# Patient Record
Sex: MALE | Race: WHITE | NOT HISPANIC OR LATINO | Employment: OTHER | URBAN - METROPOLITAN AREA
[De-identification: names, ages, dates, MRNs, and addresses within clinical notes are randomized per-mention and may not be internally consistent; named-entity substitution may affect disease eponyms.]

---

## 2023-05-15 ENCOUNTER — TELEPHONE (OUTPATIENT)
Dept: PRIMARY CARE | Facility: CLINIC | Age: 83
End: 2023-05-15
Payer: MEDICARE

## 2023-05-15 DIAGNOSIS — F41.9 ANXIETY: Primary | ICD-10-CM

## 2023-05-15 RX ORDER — SERTRALINE HYDROCHLORIDE 100 MG/1
100 TABLET, FILM COATED ORAL DAILY
Qty: 30 TABLET | Refills: 5 | Status: SHIPPED | OUTPATIENT
Start: 2023-05-15 | End: 2023-11-06 | Stop reason: SDUPTHER

## 2023-05-15 NOTE — TELEPHONE ENCOUNTER
Patient calling wants to know if he can increase his Sertraline from 1/2 tab to a full tab.  Please send new RX to [facility] in [facility]

## 2023-06-13 DIAGNOSIS — F41.9 ANXIETY: Primary | ICD-10-CM

## 2023-06-13 DIAGNOSIS — G47.00 INSOMNIA, UNSPECIFIED TYPE: ICD-10-CM

## 2023-06-13 RX ORDER — MIRTAZAPINE 15 MG/1
15 TABLET, FILM COATED ORAL NIGHTLY
COMMUNITY
End: 2023-06-13 | Stop reason: SDUPTHER

## 2023-06-13 RX ORDER — ALPRAZOLAM 0.25 MG/1
0.25 TABLET ORAL 3 TIMES DAILY PRN
Qty: 15 TABLET | Refills: 0 | Status: SHIPPED | OUTPATIENT
Start: 2023-06-13 | End: 2023-07-17

## 2023-06-13 RX ORDER — MIRTAZAPINE 15 MG/1
15 TABLET, FILM COATED ORAL NIGHTLY
Qty: 90 TABLET | Refills: 3 | Status: SHIPPED | OUTPATIENT
Start: 2023-06-13 | End: 2024-04-25

## 2023-07-17 ENCOUNTER — OFFICE VISIT (OUTPATIENT)
Dept: PRIMARY CARE | Facility: CLINIC | Age: 83
End: 2023-07-17
Payer: MEDICARE

## 2023-07-17 VITALS
TEMPERATURE: 98.2 F | BODY MASS INDEX: 26.34 KG/M2 | HEART RATE: 84 BPM | SYSTOLIC BLOOD PRESSURE: 130 MMHG | RESPIRATION RATE: 16 BRPM | DIASTOLIC BLOOD PRESSURE: 68 MMHG | WEIGHT: 184 LBS | HEIGHT: 70 IN | OXYGEN SATURATION: 98 %

## 2023-07-17 DIAGNOSIS — F41.9 ANXIETY: ICD-10-CM

## 2023-07-17 DIAGNOSIS — R73.9 HYPERGLYCEMIA: ICD-10-CM

## 2023-07-17 DIAGNOSIS — E55.9 VITAMIN D DEFICIENCY: Primary | ICD-10-CM

## 2023-07-17 DIAGNOSIS — E78.2 MIXED HYPERLIPIDEMIA: ICD-10-CM

## 2023-07-17 DIAGNOSIS — Z00.00 HEALTHCARE MAINTENANCE: ICD-10-CM

## 2023-07-17 DIAGNOSIS — I10 PRIMARY HYPERTENSION: ICD-10-CM

## 2023-07-17 DIAGNOSIS — D69.6 THROMBOCYTOPENIA (CMS-HCC): ICD-10-CM

## 2023-07-17 PROBLEM — R55 POSTURAL DIZZINESS WITH PRESYNCOPE: Status: RESOLVED | Noted: 2023-07-17 | Resolved: 2023-07-17

## 2023-07-17 PROBLEM — M54.9 BACK PAIN: Status: RESOLVED | Noted: 2023-07-17 | Resolved: 2023-07-17

## 2023-07-17 PROBLEM — M54.9 BACK PAIN: Status: ACTIVE | Noted: 2023-07-17

## 2023-07-17 PROBLEM — R47.02 DYSPHASIA: Status: ACTIVE | Noted: 2023-07-17

## 2023-07-17 PROBLEM — N52.9 MALE ERECTILE DISORDER OF ORGANIC ORIGIN: Status: ACTIVE | Noted: 2023-07-17

## 2023-07-17 PROBLEM — E78.5 HLD (HYPERLIPIDEMIA): Status: ACTIVE | Noted: 2023-07-17

## 2023-07-17 PROBLEM — I65.29 CAROTID ARTERY PLAQUE: Status: ACTIVE | Noted: 2023-07-17

## 2023-07-17 PROBLEM — B02.9 HERPES ZOSTER: Status: ACTIVE | Noted: 2023-07-17

## 2023-07-17 PROBLEM — G47.00 INSOMNIA: Status: ACTIVE | Noted: 2023-07-17

## 2023-07-17 PROBLEM — C44.90 SKIN CANCER: Status: ACTIVE | Noted: 2023-07-17

## 2023-07-17 PROBLEM — M25.559 ACUTE HIP PAIN: Status: ACTIVE | Noted: 2023-07-17

## 2023-07-17 PROBLEM — B02.29 POST HERPETIC NEURALGIA: Status: ACTIVE | Noted: 2023-07-17

## 2023-07-17 PROBLEM — N40.0 BPH (BENIGN PROSTATIC HYPERPLASIA): Status: ACTIVE | Noted: 2023-07-17

## 2023-07-17 PROBLEM — R42 POSTURAL DIZZINESS WITH PRESYNCOPE: Status: ACTIVE | Noted: 2023-07-17

## 2023-07-17 PROBLEM — I25.10 CAD (CORONARY ARTERY DISEASE): Status: ACTIVE | Noted: 2023-07-17

## 2023-07-17 PROBLEM — H10.30 ACUTE CONJUNCTIVITIS: Status: RESOLVED | Noted: 2023-07-17 | Resolved: 2023-07-17

## 2023-07-17 PROBLEM — R05.9 COUGH: Status: RESOLVED | Noted: 2023-07-17 | Resolved: 2023-07-17

## 2023-07-17 PROBLEM — R05.9 COUGH: Status: ACTIVE | Noted: 2023-07-17

## 2023-07-17 PROBLEM — R42 POSTURAL DIZZINESS WITH PRESYNCOPE: Status: RESOLVED | Noted: 2023-07-17 | Resolved: 2023-07-17

## 2023-07-17 PROBLEM — H10.30 ACUTE CONJUNCTIVITIS: Status: ACTIVE | Noted: 2023-07-17

## 2023-07-17 PROBLEM — R55 POSTURAL DIZZINESS WITH PRESYNCOPE: Status: ACTIVE | Noted: 2023-07-17

## 2023-07-17 PROBLEM — R47.02 DYSPHASIA: Status: RESOLVED | Noted: 2023-07-17 | Resolved: 2023-07-17

## 2023-07-17 PROBLEM — M25.559 ACUTE HIP PAIN: Status: RESOLVED | Noted: 2023-07-17 | Resolved: 2023-07-17

## 2023-07-17 PROBLEM — R09.89 CAROTID BRUIT: Status: ACTIVE | Noted: 2023-07-17

## 2023-07-17 PROCEDURE — 1157F ADVNC CARE PLAN IN RCRD: CPT | Performed by: INTERNAL MEDICINE

## 2023-07-17 PROCEDURE — 1036F TOBACCO NON-USER: CPT | Performed by: INTERNAL MEDICINE

## 2023-07-17 PROCEDURE — 1159F MED LIST DOCD IN RCRD: CPT | Performed by: INTERNAL MEDICINE

## 2023-07-17 PROCEDURE — 99214 OFFICE O/P EST MOD 30 MIN: CPT | Performed by: INTERNAL MEDICINE

## 2023-07-17 PROCEDURE — 1160F RVW MEDS BY RX/DR IN RCRD: CPT | Performed by: INTERNAL MEDICINE

## 2023-07-17 PROCEDURE — 3078F DIAST BP <80 MM HG: CPT | Performed by: INTERNAL MEDICINE

## 2023-07-17 PROCEDURE — G0439 PPPS, SUBSEQ VISIT: HCPCS | Performed by: INTERNAL MEDICINE

## 2023-07-17 PROCEDURE — 1126F AMNT PAIN NOTED NONE PRSNT: CPT | Performed by: INTERNAL MEDICINE

## 2023-07-17 PROCEDURE — 3075F SYST BP GE 130 - 139MM HG: CPT | Performed by: INTERNAL MEDICINE

## 2023-07-17 PROCEDURE — 1170F FXNL STATUS ASSESSED: CPT | Performed by: INTERNAL MEDICINE

## 2023-07-17 RX ORDER — DUTASTERIDE 0.5 MG/1
0.5 CAPSULE, LIQUID FILLED ORAL DAILY
COMMUNITY
End: 2023-10-06 | Stop reason: SDUPTHER

## 2023-07-17 RX ORDER — CHOLECALCIFEROL (VITAMIN D3) 125 MCG
1 CAPSULE ORAL DAILY
COMMUNITY
Start: 2019-09-12

## 2023-07-17 RX ORDER — ALPRAZOLAM 0.5 MG/1
0.5 TABLET ORAL 3 TIMES DAILY PRN
Qty: 30 TABLET | Refills: 0 | Status: SHIPPED | OUTPATIENT
Start: 2023-07-17 | End: 2023-10-16 | Stop reason: SDUPTHER

## 2023-07-17 RX ORDER — LEVETIRACETAM 500 MG/1
TABLET ORAL
COMMUNITY
End: 2023-10-23

## 2023-07-17 RX ORDER — ATORVASTATIN CALCIUM 20 MG/1
20 TABLET, FILM COATED ORAL DAILY
COMMUNITY
End: 2023-12-14 | Stop reason: SDUPTHER

## 2023-07-17 ASSESSMENT — ENCOUNTER SYMPTOMS
COUGH: 0
FATIGUE: 0
LOSS OF SENSATION IN FEET: 0
OCCASIONAL FEELINGS OF UNSTEADINESS: 0
DEPRESSION: 0
FEVER: 0
SHORTNESS OF BREATH: 0

## 2023-07-17 ASSESSMENT — PATIENT HEALTH QUESTIONNAIRE - PHQ9
1. LITTLE INTEREST OR PLEASURE IN DOING THINGS: NOT AT ALL
2. FEELING DOWN, DEPRESSED OR HOPELESS: NOT AT ALL
SUM OF ALL RESPONSES TO PHQ9 QUESTIONS 1 AND 2: 0

## 2023-07-17 ASSESSMENT — ACTIVITIES OF DAILY LIVING (ADL)
GROCERY_SHOPPING: INDEPENDENT
MANAGING_FINANCES: INDEPENDENT
DOING_HOUSEWORK: INDEPENDENT
TAKING_MEDICATION: INDEPENDENT
BATHING: INDEPENDENT
DRESSING: INDEPENDENT

## 2023-07-17 ASSESSMENT — PAIN SCALES - GENERAL: PAINLEVEL: 0-NO PAIN

## 2023-07-17 NOTE — PROGRESS NOTES
Subjective   Reason for Visit: Minh Harrington is an 83 y.o. male here for a Medicare Wellness visit.     Past Medical, Surgical, and Family History reviewed and updated in chart.    Reviewed all medications by prescribing practitioner or clinical pharmacist (such as prescriptions, OTCs, herbal therapies and supplements) and documented in the medical record.    HPI  Influenza declines  Covid 2021, 2022  PCV13 2016  PPSV23 2020  Shingrix declines  Tdap  Colonoscopy 2007  Psa   Adv Dir y  Depression screen 23  Bmi 26  Eye exam  Fall risk 23      C/o increase in Anxiety.  Taking x2 Alprazolam.  X1 tab 0.25 mg, ineffective.    OARRS:  Isabel Ritter, DO on 7/17/2023  2:21 PM  I have personally reviewed the OARRS report for Minh Harrington. I have considered the risks of abuse, dependence, addiction and diversion    Is the patient prescribed a combination of a benzodiazepine and opioid?  No    Last Urine Drug Screen / ordered today: Yes  Recent Results (from the past 71073 hour(s))   OPIATE/OPIOID/BENZO PRESCRIPTION COMPLIANCE    Collection Time: 05/26/21  3:11 PM   Result Value Ref Range    DRUG SCREEN COMMENT URINE SEE BELOW     Creatine, Urine 63.9 mg/dL    Amphetamine Screen, Urine PRESUMPTIVE NEGATIVE NEGATIVE    Barbiturate Screen, Urine PRESUMPTIVE NEGATIVE NEGATIVE    Cannabinoid Screen, Urine PRESUMPTIVE NEGATIVE NEGATIVE    Cocaine Screen, Urine PRESUMPTIVE NEGATIVE NEGATIVE    PCP Screen, Urine PRESUMPTIVE NEGATIVE NEGATIVE    7-Aminoclonazepam <25 Cutoff <25 ng/mL    Alpha-Hydroxyalprazolam 37 (A) Cutoff <25 ng/mL    Alpha-Hydroxymidazolam <25 Cutoff <25 ng/mL    Alprazolam <25 Cutoff <25 ng/mL    Chlordiazepoxide <25 Cutoff <25 ng/mL    Clonazepam <25 Cutoff <25 ng/mL    Diazepam <25 Cutoff <25 ng/mL    Lorazepam <25 Cutoff <25 ng/mL    Midazolam <25 Cutoff <25 ng/mL    Nordiazepam <25 Cutoff <25 ng/mL    Oxazepam <25 Cutoff <25 ng/mL    Temazepam <25 Cutoff <25 ng/mL    Zolpidem <25 Cutoff <25  "ng/mL    Zolpidem Metabolite (ZCA) <25 Cutoff <25 ng/mL    6-Acetylmorphine <25 Cutoff <25 ng/mL    Codeine <50 Cutoff <50 ng/mL    Hydrocodone <25 Cutoff <25 ng/mL    Hydromorphone <25 Cutoff <25 ng/mL    Morphine Urine <50 Cutoff <50 ng/mL    Norhydrocodone <25 Cutoff <25 ng/mL    Noroxycodone <25 Cutoff <25 ng/mL    Oxycodone <25 Cutoff <25 ng/mL    Oxymorphone <25 Cutoff <25 ng/mL    Tramadol <50 Cutoff <50 ng/mL    O-Desmethyltramadol <50 Cutoff <50 ng/mL    Fentanyl <2.5 Cutoff<2.5 ng/mL    Norfentanyl <2.5 Cutoff<2.5 ng/mL    METHADONE CONFIRMATION,URINE <25 Cutoff <25 ng/mL    EDDP <25 Cutoff <25 ng/mL     Results are as expected.     Controlled Substance Agreement:  Date of the Last Agreement: 7/17/2023  Reviewed Controlled Substance Agreement including but not limited to the benefits, risks, and alternatives to treatment with a Controlled Substance medication(s).    Benzodiazepines:  What is the patient's goal of therapy? Anxiety relief  Is this being achieved with current treatment? Yes    SANFORD-7:  No data recorded    Activities of Daily Living:   Is your overall impression that this patient is benefiting (symptom reduction outweighs side effects) from benzodiazepine therapy? Yes     1. Physical Functioning: Better  2. Family Relationship: Better  3. Social Relationship: Better  4. Mood: Better  5. Sleep Patterns: Better  6. Overall Function: Better  Patient Care Team:  Isabel Ritter DO as PCP - General  Isabel Ritter DO as PCP - [employer] PCP     Review of Systems   Constitutional:  Negative for fatigue and fever.   Respiratory:  Negative for cough and shortness of breath.    Cardiovascular:  Negative for chest pain and leg swelling.   All other systems reviewed and are negative.      Objective   Vitals:  /68   Pulse 84   Temp 36.8 °C (98.2 °F)   Resp 16   Ht 1.778 m (5' 10\")   Wt 83.5 kg (184 lb)   SpO2 98%   BMI 26.40 kg/m²       Physical Exam  Vitals and nursing " note reviewed.   Constitutional:       Appearance: Normal appearance.   HENT:      Head: Normocephalic.   Eyes:      Conjunctiva/sclera: Conjunctivae normal.      Pupils: Pupils are equal, round, and reactive to light.   Cardiovascular:      Rate and Rhythm: Normal rate and regular rhythm.      Pulses: Normal pulses.      Heart sounds: Normal heart sounds.   Pulmonary:      Effort: Pulmonary effort is normal.      Breath sounds: Normal breath sounds.   Musculoskeletal:         General: No swelling.      Cervical back: Neck supple.   Skin:     General: Skin is warm and dry.   Neurological:      General: No focal deficit present.      Mental Status: He is oriented to person, place, and time.         Assessment/Plan   Problem List Items Addressed This Visit       Anxiety    Relevant Medications    ALPRAZolam (Xanax) 0.5 mg tablet    HLD (hyperlipidemia)    Relevant Orders    Lipid Panel    Thyroid Stimulating Hormone    HTN (hypertension)    Relevant Orders    Comprehensive Metabolic Panel    CBC    Hyperglycemia    Relevant Orders    Hemoglobin A1C    Thrombocytopenia (CMS/HCC)    Vitamin D deficiency - Primary    Relevant Orders    Vitamin D, Total    Vitamin B12     Other Visit Diagnoses       Healthcare maintenance              Update preventive  Cont meds  Check labs  Stable based on symptoms and exam.  Continue established treatment plan and follow up at least yearly.  I have personally reviewed patients OARRS report.  This report is scanned into the emr.  I have considered the risks of abuse, dependence, addiction and diversion.  3 month fu

## 2023-10-06 DIAGNOSIS — N40.0 BENIGN PROSTATIC HYPERPLASIA WITHOUT LOWER URINARY TRACT SYMPTOMS: ICD-10-CM

## 2023-10-06 RX ORDER — DUTASTERIDE 0.5 MG/1
0.5 CAPSULE, LIQUID FILLED ORAL DAILY
Qty: 90 CAPSULE | Refills: 3 | Status: SHIPPED | OUTPATIENT
Start: 2023-10-06 | End: 2024-01-11 | Stop reason: SDUPTHER

## 2023-10-11 ENCOUNTER — LAB (OUTPATIENT)
Dept: LAB | Facility: LAB | Age: 83
End: 2023-10-11
Payer: MEDICARE

## 2023-10-11 DIAGNOSIS — E55.9 VITAMIN D DEFICIENCY: ICD-10-CM

## 2023-10-11 DIAGNOSIS — R73.9 HYPERGLYCEMIA: ICD-10-CM

## 2023-10-11 DIAGNOSIS — I10 PRIMARY HYPERTENSION: ICD-10-CM

## 2023-10-11 DIAGNOSIS — E78.2 MIXED HYPERLIPIDEMIA: ICD-10-CM

## 2023-10-11 LAB
25(OH)D3 SERPL-MCNC: 63 NG/ML (ref 30–100)
ALBUMIN SERPL BCP-MCNC: 4 G/DL (ref 3.4–5)
ALP SERPL-CCNC: 48 U/L (ref 33–136)
ALT SERPL W P-5'-P-CCNC: 13 U/L (ref 10–52)
ANION GAP SERPL CALC-SCNC: 10 MMOL/L (ref 10–20)
AST SERPL W P-5'-P-CCNC: 20 U/L (ref 9–39)
BILIRUB SERPL-MCNC: 0.9 MG/DL (ref 0–1.2)
BUN SERPL-MCNC: 17 MG/DL (ref 6–23)
CALCIUM SERPL-MCNC: 9.2 MG/DL (ref 8.6–10.3)
CHLORIDE SERPL-SCNC: 104 MMOL/L (ref 98–107)
CHOLEST SERPL-MCNC: 142 MG/DL (ref 0–199)
CHOLESTEROL/HDL RATIO: 2.4
CO2 SERPL-SCNC: 31 MMOL/L (ref 21–32)
CREAT SERPL-MCNC: 1.1 MG/DL (ref 0.5–1.3)
ERYTHROCYTE [DISTWIDTH] IN BLOOD BY AUTOMATED COUNT: 12.8 % (ref 11.5–14.5)
GFR SERPL CREATININE-BSD FRML MDRD: 66 ML/MIN/1.73M*2
GLUCOSE SERPL-MCNC: 127 MG/DL (ref 74–99)
HCT VFR BLD AUTO: 42.6 % (ref 41–52)
HDLC SERPL-MCNC: 58.7 MG/DL
HGB BLD-MCNC: 13.9 G/DL (ref 13.5–17.5)
LDLC SERPL CALC-MCNC: 72 MG/DL (ref 140–190)
MCH RBC QN AUTO: 30.7 PG (ref 26–34)
MCHC RBC AUTO-ENTMCNC: 32.6 G/DL (ref 32–36)
MCV RBC AUTO: 94 FL (ref 80–100)
NON HDL CHOLESTEROL: 83 MG/DL (ref 0–149)
NRBC BLD-RTO: 0 /100 WBCS (ref 0–0)
PLATELET # BLD AUTO: 128 X10*3/UL (ref 150–450)
PMV BLD AUTO: 12 FL (ref 7.5–11.5)
POTASSIUM SERPL-SCNC: 4.3 MMOL/L (ref 3.5–5.3)
PROT SERPL-MCNC: 7 G/DL (ref 6.4–8.2)
RBC # BLD AUTO: 4.53 X10*6/UL (ref 4.5–5.9)
SODIUM SERPL-SCNC: 141 MMOL/L (ref 136–145)
TRIGL SERPL-MCNC: 55 MG/DL (ref 0–149)
TSH SERPL-ACNC: 2.71 MIU/L (ref 0.44–3.98)
VIT B12 SERPL-MCNC: 458 PG/ML (ref 211–911)
VLDL: 11 MG/DL (ref 0–40)
WBC # BLD AUTO: 8.5 X10*3/UL (ref 4.4–11.3)

## 2023-10-11 PROCEDURE — 82306 VITAMIN D 25 HYDROXY: CPT

## 2023-10-11 PROCEDURE — 80061 LIPID PANEL: CPT

## 2023-10-11 PROCEDURE — 80053 COMPREHEN METABOLIC PANEL: CPT

## 2023-10-11 PROCEDURE — 85027 COMPLETE CBC AUTOMATED: CPT

## 2023-10-11 PROCEDURE — 84443 ASSAY THYROID STIM HORMONE: CPT

## 2023-10-11 PROCEDURE — 83036 HEMOGLOBIN GLYCOSYLATED A1C: CPT

## 2023-10-11 PROCEDURE — 36415 COLL VENOUS BLD VENIPUNCTURE: CPT

## 2023-10-11 PROCEDURE — 82607 VITAMIN B-12: CPT

## 2023-10-12 LAB
EST. AVERAGE GLUCOSE BLD GHB EST-MCNC: 103 MG/DL
HBA1C MFR BLD: 5.2 %

## 2023-10-16 DIAGNOSIS — F41.9 ANXIETY: ICD-10-CM

## 2023-10-16 RX ORDER — ALPRAZOLAM 0.5 MG/1
0.5 TABLET ORAL 3 TIMES DAILY PRN
Qty: 30 TABLET | Refills: 0 | Status: SHIPPED | OUTPATIENT
Start: 2023-10-16 | End: 2024-03-13 | Stop reason: SDUPTHER

## 2023-10-16 NOTE — TELEPHONE ENCOUNTER
----- Message from Isabel Ritter DO sent at 10/16/2023 12:25 PM EDT -----  Regarding: FW: Alprazolam .5mg  Contact: [phone]  Can you ask dr Nguyen to fill this rx. I won’t be back until tomorrow and I only have my phone.  Thx!  ----- Message -----  From: Ben June CMA  Sent: 10/16/2023  12:06 PM EDT  To: Isabel Ritter DO  Subject: FW: Alprazolam .5mg                                ----- Message -----  From: Minh Harrington  Sent: 10/16/2023  11:34 AM EDT  To: Do [place] [id number] Clinical Support Staff  Subject: Alprazolam .5mg                                  Hi yee, Christine.  Would you please ask the Dr if she would do me a small script for the above? I have been out of them for the past 5 weeks and would appreciate having a small supply just in case.  Thank you,  Minh Harrington

## 2023-10-17 ENCOUNTER — APPOINTMENT (OUTPATIENT)
Dept: PRIMARY CARE | Facility: CLINIC | Age: 83
End: 2023-10-17
Payer: MEDICARE

## 2023-10-18 ENCOUNTER — TELEPHONE (OUTPATIENT)
Dept: PRIMARY CARE | Facility: CLINIC | Age: 83
End: 2023-10-18
Payer: MEDICARE

## 2023-10-18 NOTE — TELEPHONE ENCOUNTER
----- Message from Isabel Ritter DO sent at 10/18/2023  9:28 AM EDT -----  Call patient his labs look very  good  I hope he is recovering well from his covid and will go in detail on labs when he comes in

## 2023-10-18 NOTE — RESULT ENCOUNTER NOTE
Call patient his labs look very  good  I hope he is recovering well from his covid and will go in detail on labs when he comes in

## 2023-10-22 DIAGNOSIS — R56.9 UNSPECIFIED CONVULSIONS (MULTI): ICD-10-CM

## 2023-10-23 RX ORDER — LEVETIRACETAM 500 MG/1
TABLET ORAL
Qty: 90 TABLET | Refills: 3 | Status: SHIPPED | OUTPATIENT
Start: 2023-10-23

## 2023-10-27 ENCOUNTER — TELEPHONE (OUTPATIENT)
Dept: PRIMARY CARE | Facility: CLINIC | Age: 83
End: 2023-10-27
Payer: MEDICARE

## 2023-10-27 NOTE — TELEPHONE ENCOUNTER
----- Message from Isabel Ritter DO sent at 10/25/2023 11:25 AM EDT -----  Regarding: FW: Scheduling Appt  Contact: [phone]  Can you reschedule his appt  He cancelled because he had covid  Nonurgent but due to follow up  ----- Message -----  From: Christine Collier LPN  Sent: 10/25/2023  11:00 AM EDT  To: Isabel Ritter DO  Subject: FW: Scheduling Appt                                ----- Message -----  From: Minh Harrington  Sent: 10/25/2023  10:22 AM EDT  To: [place] [id number] Clinical Support Staff  Subject: Scheduling Appt                                  Christine Watts. Tested negative for Covid on Tues 10/24. Feeling well enough to try the gym today. Ready to schedule visit to your office. Wondering how long the  and you would prefer me to wait before darkening your door?  Minh Corrales

## 2023-10-27 NOTE — TELEPHONE ENCOUNTER
----- Message from Isabel Ritter DO sent at 10/25/2023 11:25 AM EDT -----  Regarding: FW: Scheduling Appt  Contact: [phone]  Can you reschedule his appt  He cancelled because he had covid  Nonurgent but due to follow up  ----- Message -----  From: Christine Collier LPN  Sent: 10/25/2023  11:00 AM EDT  To: Isabel Ritter DO  Subject: FW: Scheduling Appt                                ----- Message -----  From: Minh Harrington  Sent: 10/25/2023  10:22 AM EDT  To: [place] [id number] Clinical Support Staff  Subject: Scheduling Appt                                  Christine Watts. Tested negative for Covid on Tues 10/24. Feeling well enough to try the gym today. Ready to schedule visit to your office. Wondering how long the  and you would prefer me to wait before darkening your door?  Minh Corrales

## 2023-11-01 DIAGNOSIS — R39.14 BENIGN PROSTATIC HYPERPLASIA WITH INCOMPLETE BLADDER EMPTYING: Primary | ICD-10-CM

## 2023-11-01 DIAGNOSIS — N40.1 BENIGN PROSTATIC HYPERPLASIA WITH INCOMPLETE BLADDER EMPTYING: Primary | ICD-10-CM

## 2023-11-06 DIAGNOSIS — F41.9 ANXIETY: ICD-10-CM

## 2023-11-06 RX ORDER — SERTRALINE HYDROCHLORIDE 100 MG/1
100 TABLET, FILM COATED ORAL DAILY
Qty: 90 TABLET | Refills: 3 | Status: SHIPPED | OUTPATIENT
Start: 2023-11-06 | End: 2024-11-05

## 2023-11-27 ENCOUNTER — OFFICE VISIT (OUTPATIENT)
Dept: PRIMARY CARE | Facility: CLINIC | Age: 83
End: 2023-11-27
Payer: MEDICARE

## 2023-11-27 VITALS
WEIGHT: 193 LBS | HEART RATE: 80 BPM | OXYGEN SATURATION: 98 % | BODY MASS INDEX: 27.63 KG/M2 | RESPIRATION RATE: 16 BRPM | SYSTOLIC BLOOD PRESSURE: 138 MMHG | DIASTOLIC BLOOD PRESSURE: 76 MMHG | TEMPERATURE: 97.8 F | HEIGHT: 70 IN

## 2023-11-27 DIAGNOSIS — I47.20 V TACH (MULTI): ICD-10-CM

## 2023-11-27 DIAGNOSIS — U07.1 COVID: ICD-10-CM

## 2023-11-27 DIAGNOSIS — F51.01 PRIMARY INSOMNIA: ICD-10-CM

## 2023-11-27 DIAGNOSIS — N40.1 BENIGN PROSTATIC HYPERPLASIA WITH INCOMPLETE BLADDER EMPTYING: ICD-10-CM

## 2023-11-27 DIAGNOSIS — F41.9 ANXIETY: ICD-10-CM

## 2023-11-27 DIAGNOSIS — I71.21 ANEURYSM OF ASCENDING AORTA WITHOUT RUPTURE (CMS-HCC): ICD-10-CM

## 2023-11-27 DIAGNOSIS — F33.1 MODERATE EPISODE OF RECURRENT MAJOR DEPRESSIVE DISORDER (MULTI): Primary | ICD-10-CM

## 2023-11-27 DIAGNOSIS — R39.14 BENIGN PROSTATIC HYPERPLASIA WITH INCOMPLETE BLADDER EMPTYING: ICD-10-CM

## 2023-11-27 PROCEDURE — 1126F AMNT PAIN NOTED NONE PRSNT: CPT | Performed by: INTERNAL MEDICINE

## 2023-11-27 PROCEDURE — 1159F MED LIST DOCD IN RCRD: CPT | Performed by: INTERNAL MEDICINE

## 2023-11-27 PROCEDURE — 1036F TOBACCO NON-USER: CPT | Performed by: INTERNAL MEDICINE

## 2023-11-27 PROCEDURE — 1160F RVW MEDS BY RX/DR IN RCRD: CPT | Performed by: INTERNAL MEDICINE

## 2023-11-27 PROCEDURE — 3078F DIAST BP <80 MM HG: CPT | Performed by: INTERNAL MEDICINE

## 2023-11-27 PROCEDURE — 99214 OFFICE O/P EST MOD 30 MIN: CPT | Performed by: INTERNAL MEDICINE

## 2023-11-27 PROCEDURE — 3075F SYST BP GE 130 - 139MM HG: CPT | Performed by: INTERNAL MEDICINE

## 2023-11-27 ASSESSMENT — PAIN SCALES - GENERAL: PAINLEVEL: 0-NO PAIN

## 2023-11-27 NOTE — PROGRESS NOTES
"Subjective   Minh Harrington is a 83 y.o. male who presents for Anxiety follow up.    HPI   Taking 1/2 tab Xanax as sleep aid PRN.  Rare use.  Wanting to decrease usage.    Covid positive 10/14/23.  Stated sx's were worse than previous infection.  Reports come increase in mucous production.  Clearing throat more often.    Reports noted hematuria x3 weeks ago.  Has since resolved.  Has h/o TURP, per Dr. Bush.  Was scheduled w/Urologist. Cancelled appt.    Review of Systems   Constitutional:  Negative for fatigue and fever.   Respiratory:  Negative for cough and shortness of breath.    Cardiovascular:  Negative for chest pain and leg swelling.   All other systems reviewed and are negative.      Health Maintenance Due   Topic Date Due    Derm Melanoma Skin Check  Never done    DTaP/Tdap/Td Vaccines (1 - Tdap) Never done    Zoster Vaccines (1 of 2) Never done    COVID-19 Vaccine (4 - Pfizer series) 04/13/2022    Influenza Vaccine (1) 09/01/2023       Objective   /76   Pulse 80   Temp 36.6 °C (97.8 °F)   Resp 16   Ht 1.778 m (5' 10\")   Wt 87.5 kg (193 lb)   SpO2 98%   BMI 27.69 kg/m²     Physical Exam  Vitals and nursing note reviewed.   Constitutional:       Appearance: Normal appearance.   HENT:      Head: Normocephalic.   Eyes:      Conjunctiva/sclera: Conjunctivae normal.      Pupils: Pupils are equal, round, and reactive to light.   Cardiovascular:      Rate and Rhythm: Normal rate and regular rhythm.      Pulses: Normal pulses.      Heart sounds: Normal heart sounds.   Pulmonary:      Effort: Pulmonary effort is normal.      Breath sounds: Normal breath sounds.   Musculoskeletal:         General: No swelling.      Cervical back: Neck supple.   Skin:     General: Skin is warm and dry.   Neurological:      General: No focal deficit present.      Mental Status: He is oriented to person, place, and time.         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  This report is scanned into the emr.  I have considered the risks of abuse, dependence, addiction and diversion.  Cont meds  Stable based on symptoms and exam.  Continue established treatment plan and follow up at least yearly.

## 2023-11-28 ASSESSMENT — ENCOUNTER SYMPTOMS
SHORTNESS OF BREATH: 0
FEVER: 0
FATIGUE: 0
COUGH: 0

## 2023-12-14 DIAGNOSIS — E78.2 MIXED HYPERLIPIDEMIA: ICD-10-CM

## 2023-12-14 RX ORDER — ATORVASTATIN CALCIUM 20 MG/1
20 TABLET, FILM COATED ORAL DAILY
Qty: 90 TABLET | Refills: 3 | Status: SHIPPED | OUTPATIENT
Start: 2023-12-14 | End: 2024-12-13

## 2024-01-11 ENCOUNTER — TELEPHONE (OUTPATIENT)
Dept: PRIMARY CARE | Facility: CLINIC | Age: 84
End: 2024-01-11
Payer: MEDICARE

## 2024-01-11 DIAGNOSIS — N40.0 BENIGN PROSTATIC HYPERPLASIA WITHOUT LOWER URINARY TRACT SYMPTOMS: ICD-10-CM

## 2024-01-11 RX ORDER — DUTASTERIDE 0.5 MG/1
0.5 CAPSULE, LIQUID FILLED ORAL DAILY
Qty: 90 CAPSULE | Refills: 3 | Status: SHIPPED | OUTPATIENT
Start: 2024-01-11

## 2024-03-13 ENCOUNTER — TELEPHONE (OUTPATIENT)
Dept: PEDIATRICS | Facility: CLINIC | Age: 84
End: 2024-03-13
Payer: MEDICARE

## 2024-03-13 DIAGNOSIS — F41.9 ANXIETY: ICD-10-CM

## 2024-03-13 RX ORDER — ALPRAZOLAM 0.5 MG/1
0.5 TABLET ORAL 3 TIMES DAILY PRN
Qty: 30 TABLET | Refills: 0 | Status: SHIPPED | OUTPATIENT
Start: 2024-03-13 | End: 2024-04-11 | Stop reason: SDUPTHER

## 2024-03-25 ENCOUNTER — OFFICE VISIT (OUTPATIENT)
Dept: PRIMARY CARE | Facility: CLINIC | Age: 84
End: 2024-03-25
Payer: MEDICARE

## 2024-03-25 ENCOUNTER — TELEPHONE (OUTPATIENT)
Dept: SCHEDULING | Age: 84
End: 2024-03-25

## 2024-03-25 VITALS
WEIGHT: 191 LBS | HEIGHT: 70 IN | TEMPERATURE: 97.9 F | BODY MASS INDEX: 27.35 KG/M2 | SYSTOLIC BLOOD PRESSURE: 130 MMHG | HEART RATE: 112 BPM | DIASTOLIC BLOOD PRESSURE: 80 MMHG | OXYGEN SATURATION: 100 % | RESPIRATION RATE: 16 BRPM

## 2024-03-25 DIAGNOSIS — I48.91 ATRIAL FIBRILLATION BY ELECTROCARDIOGRAM (MULTI): Primary | ICD-10-CM

## 2024-03-25 DIAGNOSIS — I71.21 ANEURYSM OF ASCENDING AORTA WITHOUT RUPTURE (CMS-HCC): ICD-10-CM

## 2024-03-25 DIAGNOSIS — F33.1 MODERATE EPISODE OF RECURRENT MAJOR DEPRESSIVE DISORDER (MULTI): ICD-10-CM

## 2024-03-25 DIAGNOSIS — I71.22 ANEURYSM OF THE AORTIC ARCH, WITHOUT RUPTURE (CMS-HCC): ICD-10-CM

## 2024-03-25 DIAGNOSIS — D69.6 THROMBOCYTOPENIA (CMS-HCC): ICD-10-CM

## 2024-03-25 DIAGNOSIS — I47.20 V TACH (MULTI): ICD-10-CM

## 2024-03-25 PROCEDURE — 99215 OFFICE O/P EST HI 40 MIN: CPT | Performed by: INTERNAL MEDICINE

## 2024-03-25 PROCEDURE — 93000 ELECTROCARDIOGRAM COMPLETE: CPT | Performed by: INTERNAL MEDICINE

## 2024-03-25 PROCEDURE — 1159F MED LIST DOCD IN RCRD: CPT | Performed by: INTERNAL MEDICINE

## 2024-03-25 PROCEDURE — 3075F SYST BP GE 130 - 139MM HG: CPT | Performed by: INTERNAL MEDICINE

## 2024-03-25 PROCEDURE — 1123F ACP DISCUSS/DSCN MKR DOCD: CPT | Performed by: INTERNAL MEDICINE

## 2024-03-25 PROCEDURE — 1036F TOBACCO NON-USER: CPT | Performed by: INTERNAL MEDICINE

## 2024-03-25 PROCEDURE — 3079F DIAST BP 80-89 MM HG: CPT | Performed by: INTERNAL MEDICINE

## 2024-03-25 PROCEDURE — 1160F RVW MEDS BY RX/DR IN RCRD: CPT | Performed by: INTERNAL MEDICINE

## 2024-03-25 PROCEDURE — 1157F ADVNC CARE PLAN IN RCRD: CPT | Performed by: INTERNAL MEDICINE

## 2024-03-25 RX ORDER — METOPROLOL TARTRATE 25 MG/1
25 TABLET, FILM COATED ORAL 2 TIMES DAILY
Qty: 60 TABLET | Refills: 11 | Status: SHIPPED | OUTPATIENT
Start: 2024-03-25 | End: 2025-03-25

## 2024-03-25 ASSESSMENT — PATIENT HEALTH QUESTIONNAIRE - PHQ9
SUM OF ALL RESPONSES TO PHQ9 QUESTIONS 1 AND 2: 0
2. FEELING DOWN, DEPRESSED OR HOPELESS: NOT AT ALL
1. LITTLE INTEREST OR PLEASURE IN DOING THINGS: NOT AT ALL

## 2024-03-25 NOTE — PROGRESS NOTES
"Subjective   Minh Harrington is a 84 y.o. male who presents for Anxiety and Depression follow up.    HPI     Pt states this morning felt his heart was irregular  Started at 815am  States this happens on a regular basis  He has been noticing that he gets winded more frequently and has decreased his work load at the gym  He still walks two miles a day and bikes 3 miles but does so slower than he used to   His wife has noticed  He isnt very concerned  States he lived a good life and doesn't want to have any surgery or invasive testing ie yousuf  Due to swallowing issues    Pt reports rare use of Xanax for sleep assistance.  Anxiety well controlled.    Reports having echo approx 3-4 years ago.  Noted \"leaky mitral valve\".  Recommended YOUSUF. Did not have testing done.  Reports feeling like \"heart stopped\" while in bed and while exercising over past x2 months.    OARRS:  Isabel Ritter, DO on 3/25/2024  3:23 PM  I have personally reviewed the OARRS report for Minh Harrington. I have considered the risks of abuse, dependence, addiction and diversion    Is the patient prescribed a combination of a benzodiazepine and opioid?  Yes, I feel it is clincially indicated to continue the medication and have discussed with the patient risks/benefits/alternatives.    Last Urine Drug Screen / ordered today: Yes  No results found for this or any previous visit (from the past 8760 hour(s)).  Results are as expected.     Clinical rationale for not completing a Urine Drug Screen: not due      Controlled Substance Agreement:  Date of the Last Agreement: 7/17/23  Reviewed Controlled Substance Agreement including but not limited to the benefits, risks, and alternatives to treatment with a Controlled Substance medication(s).    Benzodiazepines:  What is the patient's goal of therapy? Anxiety relief  Is this being achieved with current treatment? Yes    SANFORD-7:  No data recorded    Activities of Daily Living:   Is your overall impression " "that this patient is benefiting (symptom reduction outweighs side effects) from benzodiazepine therapy? Yes     1. Physical Functioning: Better  2. Family Relationship: Better  3. Social Relationship: Better  4. Mood: Better  5. Sleep Patterns: Better  6. Overall Function: Better  Review of Systems   Constitutional:  Positive for fatigue. Negative for fever.   Respiratory:  Positive for shortness of breath. Negative for cough.    Cardiovascular:  Negative for chest pain and leg swelling.   All other systems reviewed and are negative.      Health Maintenance Due   Topic Date Due    Derm Melanoma Skin Check  Never done    DTaP/Tdap/Td Vaccines (1 - Tdap) Never done    Zoster Vaccines (1 of 2) Never done    COVID-19 Vaccine (4 - 2023-24 season) 09/01/2023       Objective   /80   Pulse (!) 112   Temp 36.6 °C (97.9 °F)   Resp 16   Ht 1.778 m (5' 10\")   Wt 86.6 kg (191 lb)   SpO2 100%   BMI 27.41 kg/m²     Physical Exam  Vitals and nursing note reviewed.   Constitutional:       Appearance: Normal appearance.   HENT:      Head: Normocephalic.   Eyes:      Conjunctiva/sclera: Conjunctivae normal.      Pupils: Pupils are equal, round, and reactive to light.   Cardiovascular:      Rate and Rhythm: Normal rate. Rhythm irregular.      Pulses: Normal pulses.   Pulmonary:      Effort: Pulmonary effort is normal.      Breath sounds: Normal breath sounds.   Musculoskeletal:         General: No swelling.      Cervical back: Neck supple.   Skin:     General: Skin is warm and dry.   Neurological:      General: No focal deficit present.      Mental Status: He is oriented to person, place, and time.         Assessment/Plan   Problem List Items Addressed This Visit       Thrombocytopenia (CMS/HCC)    Relevant Medications    apixaban (Eliquis) 5 mg tablet    Moderate episode of recurrent major depressive disorder (CMS/HCC)    Aneurysm of ascending aorta without rupture (CMS/HCC)    V tach (CMS/HCC)    Relevant Medications    " metoprolol tartrate (Lopressor) 25 mg tablet     Other Visit Diagnoses       Atrial fibrillation by electrocardiogram (CMS/[facility])    -  Primary    Relevant Medications    apixaban (Eliquis) 5 mg tablet    metoprolol tartrate (Lopressor) 25 mg tablet    Other Relevant Orders    Thyroid Stimulating Hormone    Comprehensive Metabolic Panel    Magnesium    Transthoracic Echo (TTE) Complete    Referral to Cardiology    ECG 12 lead (Clinic Performed) (Completed)    Aneurysm of the aortic arch, without rupture (CMS/[facility])        Relevant Orders    Transthoracic Echo (TTE) Complete          I discussed with pt at length  I offered hospitalization , he respectfully declined  Wishing for medical management  Will check labs  Echo   Start metoprolol and eliquiis   Refer to cardiology for further recommendations  Did advise pt and he is agreeable that if he develops chest pain or lightheaded/dizzy he is to call 911 and go to hospital

## 2024-03-26 ENCOUNTER — OFFICE VISIT (OUTPATIENT)
Dept: CARDIOLOGY | Facility: CLINIC | Age: 84
End: 2024-03-26
Payer: MEDICARE

## 2024-03-26 VITALS
DIASTOLIC BLOOD PRESSURE: 70 MMHG | HEART RATE: 102 BPM | BODY MASS INDEX: 27.63 KG/M2 | WEIGHT: 193 LBS | HEIGHT: 70 IN | SYSTOLIC BLOOD PRESSURE: 122 MMHG

## 2024-03-26 DIAGNOSIS — I48.91 ATRIAL FIBRILLATION BY ELECTROCARDIOGRAM (MULTI): ICD-10-CM

## 2024-03-26 DIAGNOSIS — I25.10 CORONARY ARTERY DISEASE INVOLVING NATIVE CORONARY ARTERY OF NATIVE HEART WITHOUT ANGINA PECTORIS: Primary | ICD-10-CM

## 2024-03-26 DIAGNOSIS — I10 PRIMARY HYPERTENSION: ICD-10-CM

## 2024-03-26 DIAGNOSIS — I34.0 MITRAL VALVE INSUFFICIENCY, UNSPECIFIED ETIOLOGY: ICD-10-CM

## 2024-03-26 DIAGNOSIS — R94.31 ABNORMAL EKG: ICD-10-CM

## 2024-03-26 DIAGNOSIS — E78.49 OTHER HYPERLIPIDEMIA: ICD-10-CM

## 2024-03-26 PROCEDURE — 1160F RVW MEDS BY RX/DR IN RCRD: CPT | Performed by: INTERNAL MEDICINE

## 2024-03-26 PROCEDURE — 93000 ELECTROCARDIOGRAM COMPLETE: CPT | Performed by: INTERNAL MEDICINE

## 2024-03-26 PROCEDURE — 1036F TOBACCO NON-USER: CPT | Performed by: INTERNAL MEDICINE

## 2024-03-26 PROCEDURE — 1157F ADVNC CARE PLAN IN RCRD: CPT | Performed by: INTERNAL MEDICINE

## 2024-03-26 PROCEDURE — 3078F DIAST BP <80 MM HG: CPT | Performed by: INTERNAL MEDICINE

## 2024-03-26 PROCEDURE — 1123F ACP DISCUSS/DSCN MKR DOCD: CPT | Performed by: INTERNAL MEDICINE

## 2024-03-26 PROCEDURE — 3074F SYST BP LT 130 MM HG: CPT | Performed by: INTERNAL MEDICINE

## 2024-03-26 PROCEDURE — 1159F MED LIST DOCD IN RCRD: CPT | Performed by: INTERNAL MEDICINE

## 2024-03-26 PROCEDURE — 99205 OFFICE O/P NEW HI 60 MIN: CPT | Performed by: INTERNAL MEDICINE

## 2024-03-26 ASSESSMENT — ENCOUNTER SYMPTOMS
SHORTNESS OF BREATH: 1
FATIGUE: 1
COUGH: 0
FEVER: 0

## 2024-03-26 NOTE — PROGRESS NOTES
"Referred by Dr. Ritter for Please see below.     History Of Present Illness:    Minh Harrington is a 84 y.o. male presenting with CAD, atrial fibrillation.    I am seeing this 84-year-old hypertensive, hyperlipidemic man at the request of Dr. Ritter for evaluation of atrial fibrillation, and a history of coronary artery disease.    The patient has a longstanding history of coronary artery disease having sustained a prior myocardial infarction in October 2000 with subsequent CABG at [facility].  He has not followed with a cardiologist since then.  He has no prior history of congestive heart failure, and no known prior history of valvular heart disease.  At a routine office visit with Dr. Ritter, an irregular rhythm was discovered.  He has atrial fibrillation, for which his primary care physician started him on metoprolol and Eliquis.  He has not picked up either of these prescriptions yet, but will do so later today.    The patient reports that about 6 weeks ago he encountered stressful situation involving family issues at which time he started to experience symptoms of palpitations.  This feeling is an irregular beating and racing of his heart.  The symptoms occur daily and resolve after he consumes 2 ounces of vodka.  The palpitations are not associated with exercise.  In fact, during exercise the symptoms of palpitations are quiescent.  He exercises 6 days/week, walking 1 mile in 25 to 30 minutes, and riding a stationary bike for 30 minutes.  He also does resistance exercises 6 days/week.    He admits to irregular sleep patterns, but has no known prior history of sleep apnea.  He denies angina, dyspnea, orthopnea, PND, syncope, and near syncope.    At the conclusion of today's appointment, the patient stated \"my bucket list is complete.  If I die tomorrow I will die a happy man.  I do not want to go into the hospital for any further procedures.\"    PMH notable for history of epilepsy, status post CVA x 2, " history of transient global amnesia, status post TURP.    Review of Systems:  ROS is positive for microhematuria, and a nonproductive cough; patient denies fevers, chills, nausea, vomiting, diarrhea, constipation,  dysuria, abdominal pain,  red blood per rectum; all other review of systems is negative.      Past Medical History:  He has a past medical history of COVID-19 (02/16/2022), Epilepsy, unspecified, not intractable, without status epilepticus (CMS/HCC) (11/18/2020), and Unspecified convulsions (CMS/[facility]) (02/24/2021).    Past Surgical History:  He has a past surgical history that includes Other surgical history (02/12/2019); Other surgical history (02/12/2019); and Other surgical history (02/12/2019).      Social History:  He reports that he quit smoking about 27 years ago. His smoking use included cigarettes. He has never used smokeless tobacco. He reports current alcohol use of about 14.0 standard drinks of alcohol per week. He reports that he does not use drugs.    Family History:  Family History   Problem Relation Name Age of Onset    Heart failure Mother      Emphysema Father      Bipolar disorder Brother          Allergies:  Patient has no known allergies.    Outpatient Medications:  Current Outpatient Medications   Medication Instructions    ALPRAZolam (XANAX) 0.5 mg, oral, 3 times daily PRN    apixaban (ELIQUIS) 5 mg, oral, 2 times daily    atorvastatin (LIPITOR) 20 mg, oral, Daily, as directed    cholecalciferol (Vitamin D-3) 125 MCG (5000 UT) capsule 1 tablet, oral, Daily    dutasteride (AVODART) 0.5 mg, oral, Daily    levETIRAcetam (Keppra) 500 mg tablet TAKE 1/2 (ONE-HALF) OF A TABLET BY MOUTH DAILY    metoprolol tartrate (LOPRESSOR) 25 mg, oral, 2 times daily    mirtazapine (REMERON) 15 mg, oral, Nightly    sertraline (ZOLOFT) 100 mg, oral, Daily        Last Recorded Vitals:  Vitals:    03/26/24 0900   BP: 122/70   BP Location: Right arm   Patient Position: Sitting   Pulse: 102   Weight: 87.5 kg  "(193 lb)   Height: 1.778 m (5' 10\")   Body mass index is 27.69 kg/m².      Physical Exam:  GENERAL:  pleasant 84 year-old  HEENT: No xanthelasma  NECK: Supple, no palpable adenopathy or thyromegaly  CHEST: Clear to auscultation, respiratory effort unlabored  CARDIAC: Irregularly irregular, normal S1 and S2, no audible , rub, gallop, carotids are brisk, PMI is not displaced; there is a grade 2 systolic murmur heard at the apex.  ABD: Active bowel sounds, nontender, no organomegaly, no evidence of ascites  EXT: No clubbing, cyanosis, edema, or tenderness  NEURO: Awake, alert, appropriate, speech is fluent         Last Labs:  CBC -  Lab Results   Component Value Date    WBC 8.5 10/11/2023    HGB 13.9 10/11/2023    HCT 42.6 10/11/2023    MCV 94 10/11/2023     (L) 10/11/2023       CMP -  Lab Results   Component Value Date    CALCIUM 9.2 10/11/2023    PROT 7.0 10/11/2023    ALBUMIN 4.0 10/11/2023    AST 20 10/11/2023    ALT 13 10/11/2023    ALKPHOS 48 10/11/2023    BILITOT 0.9 10/11/2023       LIPID PANEL -   Lab Results   Component Value Date    CHOL 142 10/11/2023    TRIG 55 10/11/2023    HDL 58.7 10/11/2023    CHHDL 2.4 10/11/2023    LDLF 74 11/07/2022    VLDL 11 10/11/2023    NHDL 83 10/11/2023       RENAL FUNCTION PANEL -   Lab Results   Component Value Date    GLUCOSE 127 (H) 10/11/2023     10/11/2023    K 4.3 10/11/2023     10/11/2023    CO2 31 10/11/2023    ANIONGAP 10 10/11/2023    BUN 17 10/11/2023    CREATININE 1.10 10/11/2023    GFRMALE 76 12/16/2022    CALCIUM 9.2 10/11/2023    ALBUMIN 4.0 10/11/2023        Lab Results   Component Value Date    HGBA1C 5.2 10/11/2023         No recent results to review    Assessment/Plan   Problem List Items Addressed This Visit          Cardiac and Vasculature    CAD (coronary artery disease) - Primary    HLD (hyperlipidemia)    HTN (hypertension)    Atrial fibrillation by electrocardiogram (CMS/HCC)    Mitral valve insufficiency     This 84-year-old " "hypertensive, hyperlipidemic man with prior history of stroke has atrial fibrillation of uncertain duration and a [id number] score of 6, placing him at high risk for future stroke and thromboembolism.  He is status post prior CABG and myocardial infarction in 2000, with no details available.  He is quite active for age and has not had anginal symptoms during exercise.  He has a murmur of mitral regurgitation on exam.  Our approach:    1.  Based on his [id number] score, and high risk for stroke, I reemphasized the importance of him picking up the medications prescribed by his primary care physician yesterday, and starting them ASAP.  Specifically, Eliquis 5 mg twice daily, and metoprolol tartrate 25 mg twice daily should be started.  He understands and agrees.  At the conclusion of today's appointment, the patient stated \"my bucket list is complete.  If I die tomorrow I will die a  happy man.  I do not want to go into the hospital for any further procedures.\"  In the context of such a statement, a simple strategy of rate control and anticoagulation is appropriate.  - CBC, CMP  -Holter monitor to assess adequacy of rate control.    2.  Coronary artery disease, prior MI in 2000 with subsequent CABG, abnormal resting EKG with no recent ischemia assessment.  -Exercise SPECT.    -Lipid profile    3.  Murmur of mitral regurgitation:  -Echocardiography    4.  HTN:  BP is well controlled.   We discussed sodium restriction, lifestyle modification, and the DASH diet.  I advised the patient to check BPs at home daily, and to contact me with an update in one month.      Curt Shine MD  "

## 2024-03-26 NOTE — PATIENT INSTRUCTIONS
"It was my pleasure to meet you.  I look forward to being your cardiologist.  I am a huge believer in communicating with my patients.  Please contact me at any time, if anything is not clear to you regarding anything we have discussed, or if new questions occur to you.     You should increase your intake of fresh fruits and vegetables.  Try to consume 9-12 servings per day of such foods.  You should increase your intake of deep sea fish such as salmon and tuna.  Try to get two servings per week of fish, but if you are a pregnant woman, talk to your obstetrician before increasing your fish intake.  You should increase your intake of unprocessed nuts such as walnuts or almonds.  Increase your intake of plant-based protein.  You should avoid fried foods.  Don't consume sugary or starchy foods and sugary drinks.  Avoid saturated fats.  Try not to dine at restaurants more than once per month, and don't dine at fast food places.  Try to get 7-9 hours of sleep every night.  Try to get 150 minutes per week of moderate intensity exercise (after I have cleared you to start an exercise program).  Try to maintain the appropriate weight for your height based on body mass index (BMI). Maintain your cholesterol, blood sugar, and blood pressure in the recommended respective normal ranges.  There is a wealth of information on the American [employer] Association's website regarding this.  Just Google \"Life's Essential 8\" for more information.   Ask me about any of these details  if you have questions.    As your cardiologist, I will be available to you at any time to answer any question you have concerning your heart health.  My staff, Gali can also answer any questions you may have.  Best of luck.     It is important for us to have an accurate list of the medications, supplements, and their doses.  It is also important for us to have an accurate list of your allergies.  Please bring this information to every appointment.  " This is a vital part of the quality of care you receive through all of your providers.

## 2024-03-27 ENCOUNTER — APPOINTMENT (OUTPATIENT)
Dept: PRIMARY CARE | Facility: CLINIC | Age: 84
End: 2024-03-27
Payer: MEDICARE

## 2024-03-28 ENCOUNTER — LAB (OUTPATIENT)
Dept: LAB | Facility: LAB | Age: 84
End: 2024-03-28
Payer: MEDICARE

## 2024-03-28 DIAGNOSIS — I48.91 ATRIAL FIBRILLATION BY ELECTROCARDIOGRAM (MULTI): ICD-10-CM

## 2024-03-28 DIAGNOSIS — I25.10 CORONARY ARTERY DISEASE INVOLVING NATIVE CORONARY ARTERY OF NATIVE HEART WITHOUT ANGINA PECTORIS: ICD-10-CM

## 2024-03-28 LAB
ALBUMIN SERPL BCP-MCNC: 4 G/DL (ref 3.4–5)
ALP SERPL-CCNC: 134 U/L (ref 33–136)
ALT SERPL W P-5'-P-CCNC: 169 U/L (ref 10–52)
ANION GAP SERPL CALC-SCNC: 12 MMOL/L (ref 10–20)
AST SERPL W P-5'-P-CCNC: 184 U/L (ref 9–39)
BILIRUB SERPL-MCNC: 1.2 MG/DL (ref 0–1.2)
BUN SERPL-MCNC: 25 MG/DL (ref 6–23)
CALCIUM SERPL-MCNC: 9 MG/DL (ref 8.6–10.3)
CHLORIDE SERPL-SCNC: 104 MMOL/L (ref 98–107)
CHOLEST SERPL-MCNC: 110 MG/DL (ref 0–199)
CHOLESTEROL/HDL RATIO: 2.4
CO2 SERPL-SCNC: 26 MMOL/L (ref 21–32)
CREAT SERPL-MCNC: 1.23 MG/DL (ref 0.5–1.3)
EGFRCR SERPLBLD CKD-EPI 2021: 57 ML/MIN/1.73M*2
ERYTHROCYTE [DISTWIDTH] IN BLOOD BY AUTOMATED COUNT: 13.8 % (ref 11.5–14.5)
GLUCOSE SERPL-MCNC: 115 MG/DL (ref 74–99)
HCT VFR BLD AUTO: 42.6 % (ref 41–52)
HDLC SERPL-MCNC: 46.6 MG/DL
HGB BLD-MCNC: 13.5 G/DL (ref 13.5–17.5)
LDLC SERPL CALC-MCNC: 52 MG/DL
MAGNESIUM SERPL-MCNC: 2.07 MG/DL (ref 1.6–2.4)
MCH RBC QN AUTO: 29.9 PG (ref 26–34)
MCHC RBC AUTO-ENTMCNC: 31.7 G/DL (ref 32–36)
MCV RBC AUTO: 95 FL (ref 80–100)
NON HDL CHOLESTEROL: 63 MG/DL (ref 0–149)
NRBC BLD-RTO: 0 /100 WBCS (ref 0–0)
PLATELET # BLD AUTO: 129 X10*3/UL (ref 150–450)
POTASSIUM SERPL-SCNC: 4.9 MMOL/L (ref 3.5–5.3)
PROT SERPL-MCNC: 6.4 G/DL (ref 6.4–8.2)
RBC # BLD AUTO: 4.51 X10*6/UL (ref 4.5–5.9)
SODIUM SERPL-SCNC: 137 MMOL/L (ref 136–145)
TRIGL SERPL-MCNC: 55 MG/DL (ref 0–149)
TSH SERPL-ACNC: 5.27 MIU/L (ref 0.44–3.98)
VLDL: 11 MG/DL (ref 0–40)
WBC # BLD AUTO: 8.3 X10*3/UL (ref 4.4–11.3)

## 2024-03-28 PROCEDURE — 80061 LIPID PANEL: CPT

## 2024-03-28 PROCEDURE — 83735 ASSAY OF MAGNESIUM: CPT

## 2024-03-28 PROCEDURE — 36415 COLL VENOUS BLD VENIPUNCTURE: CPT

## 2024-03-28 PROCEDURE — 85027 COMPLETE CBC AUTOMATED: CPT

## 2024-03-28 PROCEDURE — 80053 COMPREHEN METABOLIC PANEL: CPT

## 2024-03-28 PROCEDURE — 84443 ASSAY THYROID STIM HORMONE: CPT

## 2024-04-04 ENCOUNTER — APPOINTMENT (OUTPATIENT)
Dept: CARDIOLOGY | Facility: CLINIC | Age: 84
End: 2024-04-04
Payer: MEDICARE

## 2024-04-08 DIAGNOSIS — I10 PRIMARY HYPERTENSION: Primary | ICD-10-CM

## 2024-04-08 RX ORDER — FUROSEMIDE 20 MG/1
20 TABLET ORAL DAILY
Qty: 3 TABLET | Refills: 0 | Status: SHIPPED | OUTPATIENT
Start: 2024-04-08 | End: 2024-04-18 | Stop reason: SDUPTHER

## 2024-04-09 DIAGNOSIS — R79.89 ABNORMAL LFTS: Primary | ICD-10-CM

## 2024-04-09 NOTE — RESULT ENCOUNTER NOTE
Call patient his labs look good  Only his liver tests are mildly elevated  Want to recheck in 2-3 weeks  Minimize alcohol in that time    Order in

## 2024-04-11 DIAGNOSIS — F41.9 ANXIETY: ICD-10-CM

## 2024-04-11 RX ORDER — ALPRAZOLAM 0.5 MG/1
0.5 TABLET ORAL 3 TIMES DAILY PRN
Qty: 30 TABLET | Refills: 0 | Status: SHIPPED | OUTPATIENT
Start: 2024-04-11 | End: 2024-05-10 | Stop reason: SDUPTHER

## 2024-04-15 ENCOUNTER — HOSPITAL ENCOUNTER (EMERGENCY)
Dept: CARDIOLOGY | Facility: HOSPITAL | Age: 84
Discharge: HOME | DRG: 291 | End: 2024-04-15
Payer: MEDICARE

## 2024-04-15 ENCOUNTER — HOSPITAL ENCOUNTER (INPATIENT)
Facility: HOSPITAL | Age: 84
LOS: 2 days | Discharge: LEFT BEFORE DISCHARGE | DRG: 291 | End: 2024-04-17
Attending: EMERGENCY MEDICINE | Admitting: HOSPITALIST
Payer: MEDICARE

## 2024-04-15 ENCOUNTER — APPOINTMENT (OUTPATIENT)
Dept: CARDIOLOGY | Facility: HOSPITAL | Age: 84
DRG: 291 | End: 2024-04-15
Payer: MEDICARE

## 2024-04-15 ENCOUNTER — APPOINTMENT (OUTPATIENT)
Dept: RADIOLOGY | Facility: HOSPITAL | Age: 84
DRG: 291 | End: 2024-04-15
Payer: MEDICARE

## 2024-04-15 DIAGNOSIS — I50.43 CHF (CONGESTIVE HEART FAILURE), NYHA CLASS I, ACUTE ON CHRONIC, COMBINED (MULTI): ICD-10-CM

## 2024-04-15 DIAGNOSIS — I10 PRIMARY HYPERTENSION: ICD-10-CM

## 2024-04-15 DIAGNOSIS — I48.91 ATRIAL FIBRILLATION WITH CONTROLLED VENTRICULAR RESPONSE (MULTI): Primary | ICD-10-CM

## 2024-04-15 DIAGNOSIS — I50.9 ACUTE CONGESTIVE HEART FAILURE, UNSPECIFIED HEART FAILURE TYPE (MULTI): ICD-10-CM

## 2024-04-15 LAB
ALBUMIN SERPL BCP-MCNC: 3.8 G/DL (ref 3.4–5)
ALP SERPL-CCNC: 75 U/L (ref 33–136)
ALT SERPL W P-5'-P-CCNC: 33 U/L (ref 10–52)
ANION GAP SERPL CALC-SCNC: 10 MMOL/L (ref 10–20)
AST SERPL W P-5'-P-CCNC: 28 U/L (ref 9–39)
ATRIAL RATE: 37 BPM
BASOPHILS # BLD AUTO: 0.03 X10*3/UL (ref 0–0.1)
BASOPHILS NFR BLD AUTO: 0.3 %
BILIRUB SERPL-MCNC: 1 MG/DL (ref 0–1.2)
BNP SERPL-MCNC: 1318 PG/ML (ref 0–99)
BUN SERPL-MCNC: 19 MG/DL (ref 6–23)
CALCIUM SERPL-MCNC: 9 MG/DL (ref 8.6–10.3)
CARDIAC TROPONIN I PNL SERPL HS: 32 NG/L (ref 0–20)
CARDIAC TROPONIN I PNL SERPL HS: 32 NG/L (ref 0–20)
CHLORIDE SERPL-SCNC: 104 MMOL/L (ref 98–107)
CO2 SERPL-SCNC: 26 MMOL/L (ref 21–32)
CREAT SERPL-MCNC: 1.13 MG/DL (ref 0.5–1.3)
EGFRCR SERPLBLD CKD-EPI 2021: 63 ML/MIN/1.73M*2
EOSINOPHIL # BLD AUTO: 0.04 X10*3/UL (ref 0–0.4)
EOSINOPHIL NFR BLD AUTO: 0.3 %
ERYTHROCYTE [DISTWIDTH] IN BLOOD BY AUTOMATED COUNT: 14 % (ref 11.5–14.5)
GLUCOSE SERPL-MCNC: 94 MG/DL (ref 74–99)
HCT VFR BLD AUTO: 43.1 % (ref 41–52)
HGB BLD-MCNC: 14.1 G/DL (ref 13.5–17.5)
IMM GRANULOCYTES # BLD AUTO: 0.06 X10*3/UL (ref 0–0.5)
IMM GRANULOCYTES NFR BLD AUTO: 0.5 % (ref 0–0.9)
LYMPHOCYTES # BLD AUTO: 0.94 X10*3/UL (ref 0.8–3)
LYMPHOCYTES NFR BLD AUTO: 8 %
MAGNESIUM SERPL-MCNC: 1.82 MG/DL (ref 1.6–2.4)
MCH RBC QN AUTO: 30.4 PG (ref 26–34)
MCHC RBC AUTO-ENTMCNC: 32.7 G/DL (ref 32–36)
MCV RBC AUTO: 93 FL (ref 80–100)
MONOCYTES # BLD AUTO: 1.08 X10*3/UL (ref 0.05–0.8)
MONOCYTES NFR BLD AUTO: 9.2 %
NEUTROPHILS # BLD AUTO: 9.6 X10*3/UL (ref 1.6–5.5)
NEUTROPHILS NFR BLD AUTO: 81.7 %
NRBC BLD-RTO: 0 /100 WBCS (ref 0–0)
PLATELET # BLD AUTO: 109 X10*3/UL (ref 150–450)
POTASSIUM SERPL-SCNC: 3.9 MMOL/L (ref 3.5–5.3)
PROT SERPL-MCNC: 6.5 G/DL (ref 6.4–8.2)
Q ONSET: 224 MS
QRS COUNT: 21 BEATS
QRS DURATION: 94 MS
QT INTERVAL: 330 MS
QTC CALCULATION(BAZETT): 483 MS
QTC FREDERICIA: 426 MS
R AXIS: 16 DEGREES
RBC # BLD AUTO: 4.64 X10*6/UL (ref 4.5–5.9)
SODIUM SERPL-SCNC: 136 MMOL/L (ref 136–145)
T AXIS: 99 DEGREES
T OFFSET: 389 MS
VENTRICULAR RATE: 129 BPM
WBC # BLD AUTO: 11.8 X10*3/UL (ref 4.4–11.3)

## 2024-04-15 PROCEDURE — 93005 ELECTROCARDIOGRAM TRACING: CPT

## 2024-04-15 PROCEDURE — G0378 HOSPITAL OBSERVATION PER HR: HCPCS

## 2024-04-15 PROCEDURE — 2500000001 HC RX 250 WO HCPCS SELF ADMINISTERED DRUGS (ALT 637 FOR MEDICARE OP): Performed by: EMERGENCY MEDICINE

## 2024-04-15 PROCEDURE — 84484 ASSAY OF TROPONIN QUANT: CPT | Performed by: EMERGENCY MEDICINE

## 2024-04-15 PROCEDURE — 2500000005 HC RX 250 GENERAL PHARMACY W/O HCPCS: Performed by: EMERGENCY MEDICINE

## 2024-04-15 PROCEDURE — 83735 ASSAY OF MAGNESIUM: CPT | Performed by: EMERGENCY MEDICINE

## 2024-04-15 PROCEDURE — 71045 X-RAY EXAM CHEST 1 VIEW: CPT | Performed by: RADIOLOGY

## 2024-04-15 PROCEDURE — 2500000004 HC RX 250 GENERAL PHARMACY W/ HCPCS (ALT 636 FOR OP/ED): Performed by: EMERGENCY MEDICINE

## 2024-04-15 PROCEDURE — 2500000002 HC RX 250 W HCPCS SELF ADMINISTERED DRUGS (ALT 637 FOR MEDICARE OP, ALT 636 FOR OP/ED): Mod: MUE | Performed by: HOSPITALIST

## 2024-04-15 PROCEDURE — 99285 EMERGENCY DEPT VISIT HI MDM: CPT | Mod: 25

## 2024-04-15 PROCEDURE — 96374 THER/PROPH/DIAG INJ IV PUSH: CPT

## 2024-04-15 PROCEDURE — 96366 THER/PROPH/DIAG IV INF ADDON: CPT

## 2024-04-15 PROCEDURE — 96375 TX/PRO/DX INJ NEW DRUG ADDON: CPT

## 2024-04-15 PROCEDURE — 71045 X-RAY EXAM CHEST 1 VIEW: CPT

## 2024-04-15 PROCEDURE — 99223 1ST HOSP IP/OBS HIGH 75: CPT | Performed by: HOSPITALIST

## 2024-04-15 PROCEDURE — 96365 THER/PROPH/DIAG IV INF INIT: CPT

## 2024-04-15 PROCEDURE — 1200000002 HC GENERAL ROOM WITH TELEMETRY DAILY

## 2024-04-15 PROCEDURE — 2500000001 HC RX 250 WO HCPCS SELF ADMINISTERED DRUGS (ALT 637 FOR MEDICARE OP): Performed by: HOSPITALIST

## 2024-04-15 PROCEDURE — 36415 COLL VENOUS BLD VENIPUNCTURE: CPT | Performed by: EMERGENCY MEDICINE

## 2024-04-15 PROCEDURE — 80053 COMPREHEN METABOLIC PANEL: CPT | Performed by: EMERGENCY MEDICINE

## 2024-04-15 PROCEDURE — 83880 ASSAY OF NATRIURETIC PEPTIDE: CPT | Performed by: EMERGENCY MEDICINE

## 2024-04-15 PROCEDURE — 85025 COMPLETE CBC W/AUTO DIFF WBC: CPT | Performed by: EMERGENCY MEDICINE

## 2024-04-15 RX ORDER — FUROSEMIDE 10 MG/ML
40 INJECTION INTRAMUSCULAR; INTRAVENOUS EVERY 24 HOURS
Status: DISCONTINUED | OUTPATIENT
Start: 2024-04-15 | End: 2024-04-17

## 2024-04-15 RX ORDER — DILTIAZEM HCL/D5W 125 MG/125
5 PLASTIC BAG, INJECTION (ML) INTRAVENOUS CONTINUOUS
Status: DISCONTINUED | OUTPATIENT
Start: 2024-04-15 | End: 2024-04-16

## 2024-04-15 RX ORDER — METOPROLOL TARTRATE 25 MG/1
25 TABLET, FILM COATED ORAL 2 TIMES DAILY
Status: DISCONTINUED | OUTPATIENT
Start: 2024-04-15 | End: 2024-04-15

## 2024-04-15 RX ORDER — ALPRAZOLAM 0.5 MG/1
0.5 TABLET ORAL NIGHTLY PRN
Status: DISCONTINUED | OUTPATIENT
Start: 2024-04-15 | End: 2024-04-17

## 2024-04-15 RX ORDER — LEVETIRACETAM 500 MG/1
500 TABLET, EXTENDED RELEASE ORAL DAILY
COMMUNITY
End: 2024-04-25 | Stop reason: WASHOUT

## 2024-04-15 RX ORDER — MIRTAZAPINE 15 MG/1
15 TABLET, FILM COATED ORAL NIGHTLY
Status: DISCONTINUED | OUTPATIENT
Start: 2024-04-15 | End: 2024-04-17 | Stop reason: HOSPADM

## 2024-04-15 RX ORDER — LEVETIRACETAM 500 MG/1
250 TABLET ORAL DAILY
Status: DISCONTINUED | OUTPATIENT
Start: 2024-04-15 | End: 2024-04-15

## 2024-04-15 RX ORDER — SERTRALINE HYDROCHLORIDE 50 MG/1
100 TABLET, FILM COATED ORAL DAILY
Status: DISCONTINUED | OUTPATIENT
Start: 2024-04-15 | End: 2024-04-17 | Stop reason: HOSPADM

## 2024-04-15 RX ORDER — DIVALPROEX SODIUM 250 MG/1
250 TABLET, FILM COATED, EXTENDED RELEASE ORAL DAILY
Status: DISCONTINUED | OUTPATIENT
Start: 2024-04-15 | End: 2024-04-15

## 2024-04-15 RX ORDER — VITAMIN B COMPLEX
1 CAPSULE ORAL DAILY
COMMUNITY

## 2024-04-15 RX ORDER — FUROSEMIDE 10 MG/ML
20 INJECTION INTRAMUSCULAR; INTRAVENOUS ONCE
Status: COMPLETED | OUTPATIENT
Start: 2024-04-15 | End: 2024-04-15

## 2024-04-15 RX ORDER — ATORVASTATIN CALCIUM 20 MG/1
20 TABLET, FILM COATED ORAL NIGHTLY
Status: DISCONTINUED | OUTPATIENT
Start: 2024-04-15 | End: 2024-04-17 | Stop reason: HOSPADM

## 2024-04-15 RX ORDER — LEVETIRACETAM 500 MG/1
250 TABLET ORAL 2 TIMES DAILY
Status: DISCONTINUED | OUTPATIENT
Start: 2024-04-15 | End: 2024-04-17 | Stop reason: HOSPADM

## 2024-04-15 RX ORDER — DILTIAZEM HYDROCHLORIDE 5 MG/ML
10 INJECTION INTRAVENOUS ONCE
Status: COMPLETED | OUTPATIENT
Start: 2024-04-15 | End: 2024-04-15

## 2024-04-15 RX ADMIN — Medication 5 MG/HR: at 16:18

## 2024-04-15 RX ADMIN — DILTIAZEM HYDROCHLORIDE 10 MG: 5 INJECTION INTRAVENOUS at 16:17

## 2024-04-15 RX ADMIN — FUROSEMIDE 20 MG: 10 INJECTION, SOLUTION INTRAMUSCULAR; INTRAVENOUS at 17:10

## 2024-04-15 RX ADMIN — SERTRALINE HYDROCHLORIDE 100 MG: 50 TABLET, FILM COATED ORAL at 20:26

## 2024-04-15 RX ADMIN — MIRTAZAPINE 15 MG: 15 TABLET, FILM COATED ORAL at 21:30

## 2024-04-15 RX ADMIN — ALPRAZOLAM 0.5 MG: 0.5 TABLET ORAL at 21:03

## 2024-04-15 RX ADMIN — NITROGLYCERIN 0.5 INCH: 20 OINTMENT TOPICAL at 17:10

## 2024-04-15 SDOH — SOCIAL STABILITY: SOCIAL INSECURITY: HAS ANYONE EVER THREATENED TO HURT YOUR FAMILY OR YOUR PETS?: NO

## 2024-04-15 SDOH — SOCIAL STABILITY: SOCIAL INSECURITY: ARE YOU OR HAVE YOU BEEN THREATENED OR ABUSED PHYSICALLY, EMOTIONALLY, OR SEXUALLY BY ANYONE?: NO

## 2024-04-15 SDOH — SOCIAL STABILITY: SOCIAL INSECURITY: DOES ANYONE TRY TO KEEP YOU FROM HAVING/CONTACTING OTHER FRIENDS OR DOING THINGS OUTSIDE YOUR HOME?: NO

## 2024-04-15 SDOH — SOCIAL STABILITY: SOCIAL INSECURITY: DO YOU FEEL ANYONE HAS EXPLOITED OR TAKEN ADVANTAGE OF YOU FINANCIALLY OR OF YOUR PERSONAL PROPERTY?: NO

## 2024-04-15 SDOH — SOCIAL STABILITY: SOCIAL INSECURITY: ARE THERE ANY APPARENT SIGNS OF INJURIES/BEHAVIORS THAT COULD BE RELATED TO ABUSE/NEGLECT?: NO

## 2024-04-15 SDOH — SOCIAL STABILITY: SOCIAL INSECURITY: HAVE YOU HAD THOUGHTS OF HARMING ANYONE ELSE?: NO

## 2024-04-15 SDOH — SOCIAL STABILITY: SOCIAL INSECURITY: ABUSE: ADULT

## 2024-04-15 SDOH — SOCIAL STABILITY: SOCIAL INSECURITY: WERE YOU ABLE TO COMPLETE ALL THE BEHAVIORAL HEALTH SCREENINGS?: YES

## 2024-04-15 SDOH — SOCIAL STABILITY: SOCIAL INSECURITY: DO YOU FEEL UNSAFE GOING BACK TO THE PLACE WHERE YOU ARE LIVING?: NO

## 2024-04-15 ASSESSMENT — LIFESTYLE VARIABLES
HOW MANY STANDARD DRINKS CONTAINING ALCOHOL DO YOU HAVE ON A TYPICAL DAY: 1 OR 2
HOW OFTEN DO YOU HAVE 6 OR MORE DRINKS ON ONE OCCASION: NEVER
AUDIT-C TOTAL SCORE: 1
SKIP TO QUESTIONS 9-10: 1
AUDIT-C TOTAL SCORE: 1
EVER FELT BAD OR GUILTY ABOUT YOUR DRINKING: NO
HOW OFTEN DO YOU HAVE A DRINK CONTAINING ALCOHOL: MONTHLY OR LESS
TOTAL SCORE: 0
HAVE YOU EVER FELT YOU SHOULD CUT DOWN ON YOUR DRINKING: NO
EVER HAD A DRINK FIRST THING IN THE MORNING TO STEADY YOUR NERVES TO GET RID OF A HANGOVER: NO
HAVE PEOPLE ANNOYED YOU BY CRITICIZING YOUR DRINKING: NO

## 2024-04-15 ASSESSMENT — PAIN SCALES - GENERAL
PAINLEVEL_OUTOF10: 0 - NO PAIN

## 2024-04-15 ASSESSMENT — COGNITIVE AND FUNCTIONAL STATUS - GENERAL
MOBILITY SCORE: 24
PATIENT BASELINE BEDBOUND: NO
DAILY ACTIVITIY SCORE: 24

## 2024-04-15 ASSESSMENT — ACTIVITIES OF DAILY LIVING (ADL)
TOILETING: INDEPENDENT
ADEQUATE_TO_COMPLETE_ADL: YES
ASSISTIVE_DEVICE: EYEGLASSES
BATHING: INDEPENDENT
JUDGMENT_ADEQUATE_SAFELY_COMPLETE_DAILY_ACTIVITIES: YES
HEARING - RIGHT EAR: DIFFICULTY WITH NOISE
DRESSING YOURSELF: INDEPENDENT
WALKS IN HOME: INDEPENDENT
LACK_OF_TRANSPORTATION: NO
HEARING - LEFT EAR: FUNCTIONAL
GROOMING: INDEPENDENT
PATIENT'S MEMORY ADEQUATE TO SAFELY COMPLETE DAILY ACTIVITIES?: YES
FEEDING YOURSELF: INDEPENDENT

## 2024-04-15 ASSESSMENT — PATIENT HEALTH QUESTIONNAIRE - PHQ9
SUM OF ALL RESPONSES TO PHQ9 QUESTIONS 1 & 2: 1
2. FEELING DOWN, DEPRESSED OR HOPELESS: NOT AT ALL
1. LITTLE INTEREST OR PLEASURE IN DOING THINGS: SEVERAL DAYS

## 2024-04-15 ASSESSMENT — PAIN - FUNCTIONAL ASSESSMENT: PAIN_FUNCTIONAL_ASSESSMENT: 0-10

## 2024-04-15 NOTE — ED PROVIDER NOTES
HPI   Chief Complaint   Patient presents with    Shortness of Breath     Patient has had shortness of breath for 2-3 weeks, patient has a pulse ox at home and it was reading        Patient is a 84-year-old male with history of coronary artery disease s/p CABG recently diagnosed atrial fibrillation on metoprolol and Eliquis states that when he goes to sleep or lays down his oxygen level goes down he feels short of breath his leg Xanax and he can sleep for several hours, also states he is about 12 pound weight gain and at this time was given a pill as a water pill but he took that pill and he could not feel his pulse in his hands and he thought he was dying so he would walk for several hours so that he does not die.  Never got dizzy never lost consciousness was just concerned about the pulse in his left wrist.  No chest pain                          Juanito Coma Scale Score: 15                     Patient History   Past Medical History:   Diagnosis Date    COVID-19 2022    COVID-19    Epilepsy, unspecified, not intractable, without status epilepticus (Multi) 2020    Epilepsy    Unspecified convulsions (Multi) 2021    Seizures     Past Surgical History:   Procedure Laterality Date    OTHER SURGICAL HISTORY  2019    Angioplasty    OTHER SURGICAL HISTORY  2019    Appendectomy    OTHER SURGICAL HISTORY  2019    Bypass     Family History   Problem Relation Name Age of Onset    Heart failure Mother      Emphysema Father      Bipolar disorder Brother       Social History     Tobacco Use    Smoking status: Former     Current packs/day: 0.00     Types: Cigarettes     Quit date:      Years since quittin.3    Smokeless tobacco: Never   Vaping Use    Vaping status: Never Used   Substance Use Topics    Alcohol use: Yes     Alcohol/week: 14.0 standard drinks of alcohol     Types: 14 Standard drinks or equivalent per week    Drug use: Never       Physical Exam   ED Triage Vitals [04/15/24  1513]   Temperature Heart Rate Respirations BP   36.6 °C (97.9 °F) (!) 132 (!) 23 174/81      Pulse Ox Temp Source Heart Rate Source Patient Position   96 % Temporal Monitor Sitting      BP Location FiO2 (%)     Right arm --       Physical Exam  Vitals and nursing note reviewed. Exam conducted with a chaperone present.   Constitutional:       Appearance: Normal appearance.   HENT:      Head: Normocephalic and atraumatic.      Nose: Nose normal.      Mouth/Throat:      Mouth: Mucous membranes are moist.   Eyes:      Pupils: Pupils are equal, round, and reactive to light.   Cardiovascular:      Rate and Rhythm: Tachycardia present. Rhythm irregular.   Pulmonary:      Effort: Pulmonary effort is normal.      Breath sounds: Examination of the right-lower field reveals rales. Examination of the left-lower field reveals rales. Rales present.   Abdominal:      Palpations: Abdomen is soft.   Musculoskeletal:         General: Normal range of motion.      Cervical back: Normal range of motion.      Right lower leg: Edema present.      Comments: 2+ bilateral pedal edema   Skin:     General: Skin is warm and dry.      Capillary Refill: Capillary refill takes less than 2 seconds.   Neurological:      General: No focal deficit present.      Mental Status: He is alert.   Psychiatric:         Mood and Affect: Mood normal.         ED Course & MDM   Diagnoses as of 04/15/24 1718   Atrial fibrillation with controlled ventricular response (Multi)   Acute congestive heart failure, unspecified heart failure type (Multi)       Medical Decision Making  EKG interpreted by me shows atrial fibrillation rapid rate of 129, normal QRS and nonspecific ST changes    Repeat EKG done and interpreted by me shows atrial fibrillation rate of 84 nonspecific ST changes normal QRS  Order CBC chemistry cardiac labs BN peptide and a chest x-ray.  My differential diagnosis  Atrial fibrillation with rapid ventricular rate congestive heart failure, fluid  overload, unlikely to be PE as patient is on Eliquis so I did not order the testing.  I also ordered IV Cardizem bolus at 10 mg x 1 and IV Cardizem nontypeable drip at 5 mg an hour.  Will reevaluate once all the interventions have been done and results of the tests are obtained.  Patient was given the medications and at this time patient still stays in atrial fibrillation but the rate is controlled patient was also given IV Lasix as the chest x-ray showed evolving congestive heart failure along with elevated BN peptide.  Patient was discussed with the hospitalist team and accepted to the service.  Labs Reviewed  CBC WITH AUTO DIFFERENTIAL - Abnormal     WBC                           11.8 (*)               nRBC                          0.0                    RBC                           4.64                   Hemoglobin                    14.1                   Hematocrit                    43.1                   MCV                           93                     MCH                           30.4                   MCHC                          32.7                   RDW                           14.0                   Platelets                     109 (*)                Neutrophils %                 81.7                   Immature Granulocytes %, Automated   0.5                    Lymphocytes %                 8.0                    Monocytes %                   9.2                    Eosinophils %                 0.3                    Basophils %                   0.3                    Neutrophils Absolute          9.60 (*)               Immature Granulocytes Absolute, Au*   0.06                   Lymphocytes Absolute          0.94                   Monocytes Absolute            1.08 (*)               Eosinophils Absolute          0.04                   Basophils Absolute            0.03                B-TYPE NATRIURETIC PEPTIDE - Abnormal     BNP                           1,318 (*)                 Narrative:     <100 pg/mL - Heart failure unlikely                100-299 pg/mL - Intermediate probability of acute heart                                failure exacerbation. Correlate with clinical                                context and patient history.                  >=300 pg/mL - Heart Failure likely. Correlate with clinical                                context and patient history.                                BNP testing is performed using different testing methodology at [facility] than at other [facility]. Direct result comparisons should only be made within the same method.                   SERIAL TROPONIN-INITIAL - Abnormal     Troponin I, High Sensitivity   32 (*)                   Narrative: Less than 99th percentile of normal range cutoff-                Female and children under 18 years old <14 ng/L; Male <21 ng/L: Negative                Repeat testing should be performed if clinically indicated.                                 Female and children under 18 years old 14-50 ng/L; Male 21-50 ng/L:                Consistent with possible cardiac damage and possible increased clinical                 risk. Serial measurements may help to assess extent of myocardial damage.                                 >50 ng/L: Consistent with cardiac damage, increased clinical risk and                myocardial infarction. Serial measurements may help assess extent of                 myocardial damage.                                  NOTE: Children less than 1 year old may have higher baseline troponin                 levels and results should be interpreted in conjunction with the overall                 clinical context.                                 NOTE: Troponin I testing is performed using a different                 testing methodology at [facility] than at other                 [facility]. Direct result comparisons should only                 be made within the same  method.  COMPREHENSIVE METABOLIC PANEL - Normal     Glucose                       94                     Sodium                        136                    Potassium                     3.9                    Chloride                      104                    Bicarbonate                   26                     Anion Gap                     10                     Urea Nitrogen                 19                     Creatinine                    1.13                   eGFR                          63                     Calcium                       9.0                    Albumin                       3.8                    Alkaline Phosphatase          75                     Total Protein                 6.5                    AST                           28                     Bilirubin, Total              1.0                    ALT                           33                  MAGNESIUM - Normal     Magnesium                     1.82                TROPONIN SERIES- (INITIAL, 1 HR)       Narrative: The following orders were created for panel order Troponin I Series, High Sensitivity (0, 1 HR).                Procedure                               Abnormality         Status                                   ---------                               -----------         ------                                   Troponin I, High Sensiti...[[id number]]  Abnormal            Final result                             Troponin, High Sensitivi...[[id number]]                                                                               Please view results for these tests on the individual orders.  SERIAL TROPONIN, 1 HOUR     XR chest 1 view   Final Result    Developing CHF          MACRO:    None          Signed by: Cam Cho 4/15/2024 4:48 PM    Dictation workstation:   [id number]              Procedure  Procedures     Rigo Aguila MD  04/15/24 7924

## 2024-04-15 NOTE — H&P
History and Physical        ASSESSMENT AND PLAN:     Atrial fibrillation with RVR  Congestive heart failure exacerbation  History of coronary artery disease status post bypass  -P/W worsening shortness of breath, lower extremity edema  -Symptoms consistent with heart failure  -CXR with bilateral effusions    Plan:  -Continue Cardizem drip  -Obtain echocardiogram  -Start Lasix IV daily  -Cardiology consult  -Patient states that he has had terrible fatigue, and intolerance to metoprolol.  Hold metoprolol for now          VTE Prophylaxis: Sanaz Birmingham MD    HISTORY OF PRESENT ILLNESS:   Chief Complaint:     History Of Present Illness:    Minh Harrington is a 84 y.o. male with a significant past medical history of coronary artery disease status post bypass, newly diagnosed atrial fibrillation who presented to the emergency room with worsening shortness of breath and lower extremity edema    He states that he was recently diagnosed with atrial fibrillation, he was started on metoprolol and Eliquis.    He states since then he has had 12 pound weight gain and worsening shortness of breath.  He wakes up in the middle the night gasping for air, checks his oxygenation and it is it is in the 70s.    He currently admits to shortness of breath, denies chest pain, denies abdominal symptoms.    The emergency room, patient was noted to be in atrial fibrillation with RVR, started on Cardizem at 5 cc.  Chest x-ray showed bilateral pleural effusion    He states that prior to all of this he has been healthy and exercising at the gym.       Review of systems: 10 point review of systems is otherwise negative except as mentioned above.    PAST HISTORIES:       Past Medical History:  He has a past medical history of COVID-19 (02/16/2022), Epilepsy, unspecified, not intractable, without status epilepticus (Multi) (11/18/2020), and Unspecified convulsions (Multi) (02/24/2021).    Past Surgical History:  He has  "a past surgical history that includes Other surgical history (02/12/2019); Other surgical history (02/12/2019); and Other surgical history (02/12/2019).      Social History:  He reports that he quit smoking about 27 years ago. His smoking use included cigarettes. He has never used smokeless tobacco. He reports current alcohol use of about 14.0 standard drinks of alcohol per week. He reports that he does not use drugs.    Family History:  Family History   Problem Relation Name Age of Onset   • Heart failure Mother     • Emphysema Father     • Bipolar disorder Brother          Allergies:  Patient has no known allergies.    OBJECTIVE:       Last Recorded Vitals:  Vitals:    04/15/24 1513 04/15/24 1649   BP: 174/81 148/69   BP Location: Right arm Left arm   Patient Position: Sitting Lying   Pulse: (!) 132 79   Resp: (!) 23 18   Temp: 36.6 °C (97.9 °F)    TempSrc: Temporal    SpO2: 96% 98%   Weight: 86.6 kg (191 lb)    Height: 1.778 m (5' 10\")        Last I/O:  No intake/output data recorded.    Physical Exam      PHYSICAL EXAM:   GENERAL: Laying in bed, does not appear to be in any distress.   HEENT: HEAD: Normocephalic atraumatic.  Neck: Supple.  Eyes: Pupils are reactive to direct light.   CVS: S1, S2 heard.  Irregular rhythm.  LUNGS: Coarse breath sounds appreciated.  ABDOMEN: Soft, nontender to palpate. Positive bowel sounds. No guarding or rebound appreciated.  NEUROLOGICAL: No focal neurological deficits appreciated. Cranial nerves are grossly intact.  EXTREMITIES: Lower extremity edema  SKIN:  Grossly intact, warm and dry.      Scheduled Medications  furosemide, 20 mg, intravenous, Once  nitroglycerin, 0.5 inch, transdermal, Once      PRN Medications    Continuous Medications  dilTIAZem, 5 mg/hr, Last Rate: 5 mg/hr (04/15/24 1618)        Outpatient Medications:  Prior to Admission medications    Medication Sig Start Date End Date Taking? Authorizing Provider   ALPRAZolam (Xanax) 0.5 mg tablet Take 1 tablet (0.5 " mg) by mouth 3 times a day as needed for anxiety. 4/11/24 12/7/24  Isabel Ritter DO   apixaban (Eliquis) 5 mg tablet Take 1 tablet (5 mg) by mouth 2 times a day. 3/25/24 3/25/25  Isabel Ritter DO   atorvastatin (Lipitor) 20 mg tablet Take 1 tablet (20 mg) by mouth once daily. as directed 12/14/23 12/13/24  Isabel Ritter DO   cholecalciferol (Vitamin D-3) 125 MCG (5000 UT) capsule Take 1 tablet by mouth once daily. 9/12/19   Historical Provider, MD   dutasteride (Avodart) 0.5 mg capsule Take 1 capsule (0.5 mg) by mouth once daily. 1/11/24   Isabel Ritter DO   furosemide (Lasix) 20 mg tablet Take 1 tablet (20 mg) by mouth once daily. 4/8/24 4/8/25  Isabel Ritter DO   levETIRAcetam (Keppra) 500 mg tablet TAKE 1/2 (ONE-HALF) OF A TABLET BY MOUTH DAILY 10/23/23   Isabel Ritter DO   metoprolol tartrate (Lopressor) 25 mg tablet Take 1 tablet (25 mg) by mouth 2 times a day. 3/25/24 3/25/25  Isabel Ritter DO   mirtazapine (Remeron) 15 mg tablet Take 1 tablet (15 mg) by mouth once daily at bedtime. 6/13/23 6/12/24  Isabel Ritter DO   sertraline (Zoloft) 100 mg tablet Take 1 tablet (100 mg) by mouth once daily. 11/6/23 11/5/24  Isabel Ritter DO   ALPRAZolam (Xanax) 0.5 mg tablet Take 1 tablet (0.5 mg) by mouth 3 times a day as needed for anxiety. 3/13/24 4/11/24  Misty Ruffin DO       LABS AND IMAGING:     Labs:  Results for orders placed or performed during the hospital encounter of 04/15/24 (from the past 24 hour(s))   ECG 12 lead   Result Value Ref Range    Ventricular Rate 129 BPM    Atrial Rate 37 BPM    QRS Duration 94 ms    QT Interval 330 ms    QTC Calculation(Bazett) 483 ms    R Axis 16 degrees    T Axis 99 degrees    QRS Count 21 beats    Q Onset 224 ms    T Offset 389 ms    QTC Fredericia 426 ms   CBC and Auto Differential   Result Value Ref Range    WBC 11.8 (H) 4.4 - 11.3 x10*3/uL    nRBC 0.0 0.0 - 0.0 /100 WBCs    RBC 4.64 4.50 - 5.90 x10*6/uL    Hemoglobin 14.1 13.5 -  17.5 g/dL    Hematocrit 43.1 41.0 - 52.0 %    MCV 93 80 - 100 fL    MCH 30.4 26.0 - 34.0 pg    MCHC 32.7 32.0 - 36.0 g/dL    RDW 14.0 11.5 - 14.5 %    Platelets 109 (L) 150 - 450 x10*3/uL    Neutrophils % 81.7 40.0 - 80.0 %    Immature Granulocytes %, Automated 0.5 0.0 - 0.9 %    Lymphocytes % 8.0 13.0 - 44.0 %    Monocytes % 9.2 2.0 - 10.0 %    Eosinophils % 0.3 0.0 - 6.0 %    Basophils % 0.3 0.0 - 2.0 %    Neutrophils Absolute 9.60 (H) 1.60 - 5.50 x10*3/uL    Immature Granulocytes Absolute, Automated 0.06 0.00 - 0.50 x10*3/uL    Lymphocytes Absolute 0.94 0.80 - 3.00 x10*3/uL    Monocytes Absolute 1.08 (H) 0.05 - 0.80 x10*3/uL    Eosinophils Absolute 0.04 0.00 - 0.40 x10*3/uL    Basophils Absolute 0.03 0.00 - 0.10 x10*3/uL   Comprehensive Metabolic Panel   Result Value Ref Range    Glucose 94 74 - 99 mg/dL    Sodium 136 136 - 145 mmol/L    Potassium 3.9 3.5 - 5.3 mmol/L    Chloride 104 98 - 107 mmol/L    Bicarbonate 26 21 - 32 mmol/L    Anion Gap 10 10 - 20 mmol/L    Urea Nitrogen 19 6 - 23 mg/dL    Creatinine 1.13 0.50 - 1.30 mg/dL    eGFR 63 >60 mL/min/1.73m*2    Calcium 9.0 8.6 - 10.3 mg/dL    Albumin 3.8 3.4 - 5.0 g/dL    Alkaline Phosphatase 75 33 - 136 U/L    Total Protein 6.5 6.4 - 8.2 g/dL    AST 28 9 - 39 U/L    Bilirubin, Total 1.0 0.0 - 1.2 mg/dL    ALT 33 10 - 52 U/L   Magnesium   Result Value Ref Range    Magnesium 1.82 1.60 - 2.40 mg/dL   B-type natriuretic peptide   Result Value Ref Range    BNP 1,318 (H) 0 - 99 pg/mL   Troponin I, High Sensitivity, Initial   Result Value Ref Range    Troponin I, High Sensitivity 32 (H) 0 - 20 ng/L        Imaging:  XR chest 1 view  Narrative: Interpreted By:  Cam Cho,   STUDY:  XR CHEST 1 VIEW;  4/15/2024 4:39 pm      INDICATION:  Signs/Symptoms:Chest Pain.      COMPARISON:  01/06/2022.      ACCESSION NUMBER(S):  [id number]      ORDERING CLINICIAN:  CONSTANZA TUCKER      FINDINGS:  Cardiac silhouette enlarged. Small to moderate-sized right-sided and  trace  left-sided pleural effusions. Pulmonary vascular congestion  noted. Bibasilar patchy opacities, likely atelectasis. No  pneumothorax. Status post median sternotomy      Impression: Developing CHF      MACRO:  None      Signed by: Cam Cho 4/15/2024 4:48 PM  Dictation workstation:   [id number]  ECG 12 lead  Atrial fibrillation with rapid ventricular response  Nonspecific ST and T wave abnormality  Abnormal ECG  When compared with ECG of 03-FEB-2019 07:32,  Atrial fibrillation has replaced Sinus rhythm  Vent. rate has increased BY  64 BPM  Borderline criteria for Inferior infarct are no longer Present  ST now depressed in Inferior leads  Nonspecific T wave abnormality now evident in Inferior leads    See ED provider note for full interpretation and clinical correlation    Confirmed by Janette Madden ([id number]) on 4/15/2024 4:38:48 PM

## 2024-04-16 ENCOUNTER — APPOINTMENT (OUTPATIENT)
Dept: CARDIOLOGY | Facility: HOSPITAL | Age: 84
DRG: 291 | End: 2024-04-16
Payer: MEDICARE

## 2024-04-16 PROBLEM — I50.9 ACUTE CONGESTIVE HEART FAILURE (MULTI): Status: ACTIVE | Noted: 2024-04-15

## 2024-04-16 LAB
ANION GAP SERPL CALC-SCNC: 12 MMOL/L (ref 10–20)
AORTIC VALVE MEAN GRADIENT: 3 MMHG
AORTIC VALVE PEAK VELOCITY: 1.15 M/S
ATRIAL RATE: 234 BPM
AV PEAK GRADIENT: 5.3 MMHG
AVA (PEAK VEL): 1.95 CM2
AVA (VTI): 1.85 CM2
BUN SERPL-MCNC: 15 MG/DL (ref 6–23)
CALCIUM SERPL-MCNC: 8.6 MG/DL (ref 8.6–10.3)
CHLORIDE SERPL-SCNC: 104 MMOL/L (ref 98–107)
CO2 SERPL-SCNC: 27 MMOL/L (ref 21–32)
CREAT SERPL-MCNC: 0.99 MG/DL (ref 0.5–1.3)
EGFRCR SERPLBLD CKD-EPI 2021: 74 ML/MIN/1.73M*2
EJECTION FRACTION APICAL 4 CHAMBER: 33.9
ERYTHROCYTE [DISTWIDTH] IN BLOOD BY AUTOMATED COUNT: 14.1 % (ref 11.5–14.5)
GLUCOSE SERPL-MCNC: 78 MG/DL (ref 74–99)
HCT VFR BLD AUTO: 41.3 % (ref 41–52)
HGB BLD-MCNC: 13.7 G/DL (ref 13.5–17.5)
HOLD SPECIMEN: NORMAL
LEFT ATRIUM VOLUME AREA LENGTH INDEX BSA: 68.9 ML/M2
LEFT VENTRICLE INTERNAL DIMENSION DIASTOLE: 5.85 CM (ref 3.5–6)
LEFT VENTRICULAR OUTFLOW TRACT DIAMETER: 2.1 CM
LV EJECTION FRACTION BIPLANE: 34 %
MAGNESIUM SERPL-MCNC: 1.88 MG/DL (ref 1.6–2.4)
MCH RBC QN AUTO: 30.9 PG (ref 26–34)
MCHC RBC AUTO-ENTMCNC: 33.2 G/DL (ref 32–36)
MCV RBC AUTO: 93 FL (ref 80–100)
MITRAL VALVE E/E' RATIO: 5.8
NRBC BLD-RTO: 0 /100 WBCS (ref 0–0)
PLATELET # BLD AUTO: 112 X10*3/UL (ref 150–450)
POTASSIUM SERPL-SCNC: 3.6 MMOL/L (ref 3.5–5.3)
Q ONSET: 225 MS
QRS COUNT: 14 BEATS
QRS DURATION: 96 MS
QT INTERVAL: 386 MS
QTC CALCULATION(BAZETT): 456 MS
QTC FREDERICIA: 431 MS
R AXIS: 30 DEGREES
RBC # BLD AUTO: 4.44 X10*6/UL (ref 4.5–5.9)
RIGHT VENTRICLE FREE WALL PEAK S': 7.29 CM/S
RIGHT VENTRICLE PEAK SYSTOLIC PRESSURE: 36.9 MMHG
SODIUM SERPL-SCNC: 139 MMOL/L (ref 136–145)
T AXIS: -64 DEGREES
T OFFSET: 418 MS
TRICUSPID ANNULAR PLANE SYSTOLIC EXCURSION: 1.4 CM
VENTRICULAR RATE: 84 BPM
WBC # BLD AUTO: 9.9 X10*3/UL (ref 4.4–11.3)

## 2024-04-16 PROCEDURE — 96374 THER/PROPH/DIAG INJ IV PUSH: CPT | Mod: 59

## 2024-04-16 PROCEDURE — 1200000002 HC GENERAL ROOM WITH TELEMETRY DAILY

## 2024-04-16 PROCEDURE — 96366 THER/PROPH/DIAG IV INF ADDON: CPT

## 2024-04-16 PROCEDURE — 2500000001 HC RX 250 WO HCPCS SELF ADMINISTERED DRUGS (ALT 637 FOR MEDICARE OP): Performed by: NURSE PRACTITIONER

## 2024-04-16 PROCEDURE — 82374 ASSAY BLOOD CARBON DIOXIDE: CPT | Performed by: HOSPITALIST

## 2024-04-16 PROCEDURE — 2500000004 HC RX 250 GENERAL PHARMACY W/ HCPCS (ALT 636 FOR OP/ED): Performed by: NURSE PRACTITIONER

## 2024-04-16 PROCEDURE — 93306 TTE W/DOPPLER COMPLETE: CPT

## 2024-04-16 PROCEDURE — G0378 HOSPITAL OBSERVATION PER HR: HCPCS

## 2024-04-16 PROCEDURE — 93306 TTE W/DOPPLER COMPLETE: CPT | Performed by: INTERNAL MEDICINE

## 2024-04-16 PROCEDURE — 99223 1ST HOSP IP/OBS HIGH 75: CPT | Performed by: INTERNAL MEDICINE

## 2024-04-16 PROCEDURE — 2500000004 HC RX 250 GENERAL PHARMACY W/ HCPCS (ALT 636 FOR OP/ED): Performed by: HOSPITALIST

## 2024-04-16 PROCEDURE — 85027 COMPLETE CBC AUTOMATED: CPT | Performed by: HOSPITALIST

## 2024-04-16 PROCEDURE — 99232 SBSQ HOSP IP/OBS MODERATE 35: CPT | Performed by: INTERNAL MEDICINE

## 2024-04-16 PROCEDURE — 2500000001 HC RX 250 WO HCPCS SELF ADMINISTERED DRUGS (ALT 637 FOR MEDICARE OP): Performed by: HOSPITALIST

## 2024-04-16 PROCEDURE — 2500000001 HC RX 250 WO HCPCS SELF ADMINISTERED DRUGS (ALT 637 FOR MEDICARE OP): Performed by: INTERNAL MEDICINE

## 2024-04-16 PROCEDURE — 2500000002 HC RX 250 W HCPCS SELF ADMINISTERED DRUGS (ALT 637 FOR MEDICARE OP, ALT 636 FOR OP/ED): Performed by: NURSE PRACTITIONER

## 2024-04-16 PROCEDURE — 36415 COLL VENOUS BLD VENIPUNCTURE: CPT | Performed by: HOSPITALIST

## 2024-04-16 PROCEDURE — 80048 BASIC METABOLIC PNL TOTAL CA: CPT | Performed by: HOSPITALIST

## 2024-04-16 PROCEDURE — 96367 TX/PROPH/DG ADDL SEQ IV INF: CPT

## 2024-04-16 PROCEDURE — 83735 ASSAY OF MAGNESIUM: CPT | Performed by: HOSPITALIST

## 2024-04-16 RX ORDER — METOPROLOL TARTRATE 50 MG/1
50 TABLET ORAL 2 TIMES DAILY
Status: DISCONTINUED | OUTPATIENT
Start: 2024-04-16 | End: 2024-04-16

## 2024-04-16 RX ORDER — MAGNESIUM SULFATE HEPTAHYDRATE 40 MG/ML
2 INJECTION, SOLUTION INTRAVENOUS ONCE
Status: COMPLETED | OUTPATIENT
Start: 2024-04-16 | End: 2024-04-16

## 2024-04-16 RX ORDER — ACETAMINOPHEN 325 MG/1
650 TABLET ORAL EVERY 6 HOURS PRN
Status: DISCONTINUED | OUTPATIENT
Start: 2024-04-16 | End: 2024-04-17 | Stop reason: HOSPADM

## 2024-04-16 RX ORDER — METOPROLOL TARTRATE 25 MG/1
25 TABLET, FILM COATED ORAL 2 TIMES DAILY
Status: DISCONTINUED | OUTPATIENT
Start: 2024-04-16 | End: 2024-04-17 | Stop reason: HOSPADM

## 2024-04-16 RX ORDER — DILTIAZEM HYDROCHLORIDE 30 MG/1
60 TABLET, FILM COATED ORAL EVERY 6 HOURS
Status: DISCONTINUED | OUTPATIENT
Start: 2024-04-16 | End: 2024-04-16

## 2024-04-16 RX ORDER — POTASSIUM CHLORIDE 20 MEQ/1
40 TABLET, EXTENDED RELEASE ORAL ONCE
Status: COMPLETED | OUTPATIENT
Start: 2024-04-16 | End: 2024-04-16

## 2024-04-16 RX ORDER — METOPROLOL TARTRATE 25 MG/1
25 TABLET, FILM COATED ORAL 2 TIMES DAILY
Status: DISCONTINUED | OUTPATIENT
Start: 2024-04-16 | End: 2024-04-16

## 2024-04-16 RX ADMIN — POTASSIUM CHLORIDE 40 MEQ: 1500 TABLET, EXTENDED RELEASE ORAL at 08:26

## 2024-04-16 RX ADMIN — MAGNESIUM SULFATE HEPTAHYDRATE 2 G: 40 INJECTION, SOLUTION INTRAVENOUS at 08:26

## 2024-04-16 RX ADMIN — LEVETIRACETAM 250 MG: 500 TABLET, FILM COATED ORAL at 21:35

## 2024-04-16 RX ADMIN — ALPRAZOLAM 0.5 MG: 0.5 TABLET ORAL at 21:34

## 2024-04-16 RX ADMIN — METOPROLOL TARTRATE 50 MG: 50 TABLET, FILM COATED ORAL at 08:26

## 2024-04-16 RX ADMIN — ATORVASTATIN CALCIUM 20 MG: 10 TABLET, FILM COATED ORAL at 21:34

## 2024-04-16 RX ADMIN — FUROSEMIDE 40 MG: 10 INJECTION, SOLUTION INTRAMUSCULAR; INTRAVENOUS at 18:15

## 2024-04-16 RX ADMIN — MIRTAZAPINE 15 MG: 15 TABLET, FILM COATED ORAL at 21:35

## 2024-04-16 RX ADMIN — LEVETIRACETAM 250 MG: 500 TABLET, FILM COATED ORAL at 08:26

## 2024-04-16 RX ADMIN — PERFLUTREN 2 ML OF DILUTION: 6.52 INJECTION, SUSPENSION INTRAVENOUS at 11:27

## 2024-04-16 RX ADMIN — APIXABAN 5 MG: 5 TABLET, FILM COATED ORAL at 08:26

## 2024-04-16 ASSESSMENT — COGNITIVE AND FUNCTIONAL STATUS - GENERAL
DAILY ACTIVITIY SCORE: 24
MOBILITY SCORE: 23
CLIMB 3 TO 5 STEPS WITH RAILING: A LITTLE

## 2024-04-16 ASSESSMENT — PAIN SCALES - GENERAL
PAINLEVEL_OUTOF10: 0 - NO PAIN
PAINLEVEL_OUTOF10: 0 - NO PAIN

## 2024-04-16 ASSESSMENT — PAIN - FUNCTIONAL ASSESSMENT: PAIN_FUNCTIONAL_ASSESSMENT: 0-10

## 2024-04-16 NOTE — CARE PLAN
The patient's goals for the shift include      The clinical goals for the shift include Patients heart rate will remain WNR

## 2024-04-16 NOTE — NURSING NOTE
Patient care assumed- he is sleeping at this time, but arouses easily to name. Alert and oriented. Call light within reach.

## 2024-04-16 NOTE — NURSING NOTE
CHF Clinical Nurse Navigator Documentation  Congestive Heart Failure disease education was performed by the Clinical Nurse Navigator with a good understanding: yes  CHF signs and symptoms discussed and when to call cardiologist?  yes  Living With Heart Failure Education booklet?  no  Controlling Heart Failure at Home Education? yes  CHF Education Teaching Tool? yes  AGUILAR education modules assigned?  no  Home medication usage?  yes  Nutrition Education? Yes-low sodium   Fluid Restriction Education? yes  Daily Weight Education? Yes-daily weight log provided   Cardiovascular Rehab Referral ordered?  no  Follow-up with Cardiologist after discharge education? yes  Comments: Met with patient, wife, and son at bedside for heart failure education. All verbalized understanding. Patient lives at home with his wife. Patient's cardiologist is Dr. Valle. He is compliant with all his medications and follow up appointments. Answered all questions. Provided my contact information should any other questions or concerns arise.

## 2024-04-16 NOTE — H&P (VIEW-ONLY)
Inpatient consult to Cardiology  Consult performed by: MERCEDES Vargas-CNP  Consult ordered by: Amada Birmingham MD  Reason for consult: afib                            Date:   4/16/2024  Patient name:  Minh Harrington Jr.  Date of admission:  4/15/2024  3:22 PM  MRN:   [id number]  YOB: 1940  Time of Consult:  9:24 AM    Consulting Cardiologist: MERCEDES Reyez, CNP  Primary Cardiologist:  Dr. Heriberto Valle    Referring Provider: Dr. Shafer      Admission Diagnosis:     Atrial fibrillation with controlled ventricular response (Multi)      History of Present Illness:      84-year-old gentleman with past medical history of coronary artery disease status post bypass surgery, MI in October 2000 with subsequent CABG, newly diagnosed atrial fibrillation and epilepsy who presented to [facility] emergency department after his wife and son who is a paramedic told him he needs to come be evaluated.  The patient reports that he is very active and works out at the gym every day and noticed his breathing to be labored for the past few weeks.  He has reported 10 to 12 pound weight gain over the past few weeks.  He also was recently diagnosed with atrial fibrillation about 3 weeks ago and was started on metoprolol and Eliquis.  He denies any chest pain.  EKG does show atrial fibrillation with RVR.  No obvious ST wave abnormalities.  Chest x-ray concerning for new developing congestive heart failure.  BNP was elevated at 1300.  Troponin elevated at 32 but flat.  He has not seen a cardiologist prior to seeing Dr. Shine for some time.  He plans to follow-up with Dr. Valle.  He was admitted to the general medicine service and started on diuretics.  General cardiology was consulted for atrial fibrillation.      Allergies:     No Known Allergies      Past Medical History:     Past Medical History:   Diagnosis Date     COVID-19 2022    COVID-19    Epilepsy, unspecified, not intractable, without status epilepticus (Multi) 2020    Epilepsy    Unspecified convulsions (Multi) 2021    Seizures       Past Surgical History:     Past Surgical History:   Procedure Laterality Date    OTHER SURGICAL HISTORY  2019    Angioplasty    OTHER SURGICAL HISTORY  2019    Appendectomy    OTHER SURGICAL HISTORY  2019    Bypass       Family History:     Family History   Problem Relation Name Age of Onset    Heart failure Mother      Emphysema Father      Bipolar disorder Brother         Social History:     Social History     Tobacco Use    Smoking status: Former     Current packs/day: 0.00     Types: Cigarettes     Quit date:      Years since quittin.3    Smokeless tobacco: Never   Vaping Use    Vaping status: Never Used   Substance Use Topics    Alcohol use: Yes     Alcohol/week: 14.0 standard drinks of alcohol     Types: 14 Standard drinks or equivalent per week    Drug use: Never       CURRENT INPATIENT MEDICATIONS    apixaban, 5 mg, oral, BID  atorvastatin, 20 mg, oral, Nightly  furosemide, 40 mg, intravenous, q24h  levETIRAcetam, 250 mg, oral, BID  magnesium sulfate, 2 g, intravenous, Once  metoprolol tartrate, 50 mg, oral, BID  mirtazapine, 15 mg, oral, Nightly  perflutren lipid microspheres, 0.5-10 mL of dilution, intravenous, Once in imaging  perflutren protein A microsphere, 0.5 mL, intravenous, Once in imaging  sertraline, 100 mg, oral, Daily  sulfur hexafluoride microsphr, 2 mL, intravenous, Once in imaging         Current Outpatient Medications   Medication Instructions    ALPRAZolam (XANAX) 0.5 mg, oral, 3 times daily PRN    apixaban (ELIQUIS) 5 mg, oral, 2 times daily    atorvastatin (LIPITOR) 20 mg, oral, Daily, as directed    b complex vitamins capsule 1 capsule, oral, Daily, After lunch    cholecalciferol (Vitamin D-3) 125 MCG (5000 UT) capsule 1 tablet, oral, Daily, After lunch     dutasteride (AVODART) 0.5 mg, oral, Daily    furosemide (LASIX) 20 mg, oral, Daily    levETIRAcetam (Keppra) 500 mg tablet TAKE 1/2 (ONE-HALF) OF A TABLET BY MOUTH DAILY    levETIRAcetam XR (KEPPRA XR) 500 mg, oral, Daily, After lunch    metoprolol tartrate (LOPRESSOR) 25 mg, oral, 2 times daily    mirtazapine (REMERON) 15 mg, oral, Nightly    sertraline (ZOLOFT) 100 mg, oral, Daily        Review of Systems:      12 point review of systems was obtained in detail and is negative other than that detailed above.    Vital Signs:     Vitals:    24 0200 24 0400 24 0613 24 0823   BP: 149/76 146/86 147/77 (!) 140/92   BP Location: Left arm Left arm Left arm Left arm   Patient Position: Lying Lying Lying Lying   Pulse: 92 86 88 105   Resp:    Temp: 36.2 °C (97.1 °F) 36.1 °C (97 °F) 36.2 °C (97.2 °F) 36.5 °C (97.7 °F)   TempSrc: Temporal Temporal Temporal Temporal   SpO2: 94% 96% 95% 92%   Weight:       Height:           Intake/Output Summary (Last 24 hours) at 2024 0924  Last data filed at 2024 0823  Gross per 24 hour   Intake 471.77 ml   Output --   Net 471.77 ml       Wt Readings from Last 4 Encounters:   04/15/24 84 kg (185 lb 1.6 oz)   24 87.5 kg (193 lb)   24 86.6 kg (191 lb)   23 87.5 kg (193 lb)       Physical Examination:     Physical Exam  Vitals and nursing note reviewed.   HENT:      Head: Normocephalic.      Nose: Nose normal.      Mouth/Throat:      Mouth: Mucous membranes are moist.   Eyes:      Pupils: Pupils are equal, round, and reactive to light.   Cardiovascular:      Rate and Rhythm: Normal rate. Rhythm irregular.      Heart sounds: S1 normal and S2 normal.   Pulmonary:      Effort: Pulmonary effort is normal.      Comments: Diminished  Abdominal:      Palpations: Abdomen is soft.   Musculoskeletal:      Right lower le+ Edema present.      Left lower le+ Edema present.   Skin:     General: Skin is warm and dry.      Capillary Refill:  Capillary refill takes less than 2 seconds.   Neurological:      General: No focal deficit present.      Mental Status: He is alert and oriented to person, place, and time.   Psychiatric:         Mood and Affect: Mood normal.           Lab:     CBC:   Results from last 7 days   Lab Units 04/16/24  0529 04/15/24  1611   WBC AUTO x10*3/uL 9.9 11.8*   RBC AUTO x10*6/uL 4.44* 4.64   HEMOGLOBIN g/dL 13.7 14.1   HEMATOCRIT % 41.3 43.1   MCV fL 93 93   MCH pg 30.9 30.4   MCHC g/dL 33.2 32.7   RDW % 14.1 14.0   PLATELETS AUTO x10*3/uL 112* 109*     CMP:    Results from last 7 days   Lab Units 04/16/24  0529 04/15/24  1611   SODIUM mmol/L 139 136   POTASSIUM mmol/L 3.6 3.9   CHLORIDE mmol/L 104 104   CO2 mmol/L 27 26   BUN mg/dL 15 19   CREATININE mg/dL 0.99 1.13   GLUCOSE mg/dL 78 94   PROTEIN TOTAL g/dL  --  6.5   CALCIUM mg/dL 8.6 9.0   BILIRUBIN TOTAL mg/dL  --  1.0   ALK PHOS U/L  --  75   AST U/L  --  28   ALT U/L  --  33     BMP:    Results from last 7 days   Lab Units 04/16/24  0529 04/15/24  1611   SODIUM mmol/L 139 136   POTASSIUM mmol/L 3.6 3.9   CHLORIDE mmol/L 104 104   CO2 mmol/L 27 26   BUN mg/dL 15 19   CREATININE mg/dL 0.99 1.13   CALCIUM mg/dL 8.6 9.0   GLUCOSE mg/dL 78 94     Magnesium:  Results from last 7 days   Lab Units 04/16/24  0529 04/15/24  1611   MAGNESIUM mg/dL 1.88 1.82     Troponin:    Results from last 7 days   Lab Units 04/15/24  1747 04/15/24  1611   TROPHS ng/L 32* 32*     BNP:   Results from last 7 days   Lab Units 04/15/24  1611   BNP pg/mL 1,318*     Lipid Panel:         Diagnostic Studies:       ECG 12 lead    Result Date: 4/15/2024  Atrial fibrillation Nonspecific ST and T wave abnormality Abnormal ECG When compared with ECG of 15-APR-2024 15:26, Vent. rate has decreased BY  45 BPM T wave inversion no longer evident in Lateral leads See ED provider note for full interpretation and clinical correlation    XR chest 1 view    Result Date: 4/15/2024  Interpreted By:  Cam Cho, STUDY:  XR CHEST 1 VIEW;  4/15/2024 4:39 pm   INDICATION: Signs/Symptoms:Chest Pain.   COMPARISON: 01/06/2022.   ACCESSION NUMBER(S): [id number]   ORDERING CLINICIAN: CONSTANZA TUCKER   FINDINGS: Cardiac silhouette enlarged. Small to moderate-sized right-sided and trace left-sided pleural effusions. Pulmonary vascular congestion noted. Bibasilar patchy opacities, likely atelectasis. No pneumothorax. Status post median sternotomy       Developing CHF   MACRO: None   Signed by: Cam Cho 4/15/2024 4:48 PM Dictation workstation:   [id number]    ECG 12 lead    Result Date: 4/15/2024  Atrial fibrillation with rapid ventricular response Nonspecific ST and T wave abnormality Abnormal ECG When compared with ECG of 03-FEB-2019 07:32, Atrial fibrillation has replaced Sinus rhythm Vent. rate has increased BY  64 BPM Borderline criteria for Inferior infarct are no longer Present ST now depressed in Inferior leads Nonspecific T wave abnormality now evident in Inferior leads See ED provider note for full interpretation and clinical correlation Confirmed by Janette Madden ([id number]) on 4/15/2024 4:38:48 PM        Radiology:     XR chest 1 view   Final Result   Developing CHF        MACRO:   None        Signed by: Cam Cho 4/15/2024 4:48 PM   Dictation workstation:   [id number]      Transthoracic Echo (TTE) Complete    (Results Pending)       Problem List:     Patient Active Problem List   Diagnosis    Anxiety    BPH (benign prostatic hyperplasia)    CAD (coronary artery disease)    Carotid artery plaque    Carotid bruit    Herpes zoster    Post herpetic neuralgia    HLD (hyperlipidemia)    HTN (hypertension)    Hyperglycemia    Insomnia    Male erectile disorder of organic origin    Skin cancer    Thrombocytopenia (CMS-HCC)    Vitamin D deficiency    Moderate episode of recurrent major depressive disorder (Multi)    Aneurysm of ascending aorta without rupture (CMS-HCC)    V tach (Multi)    Abnormal EKG    Mitral valve  insufficiency    Atrial fibrillation with controlled ventricular response (Multi)       Assessment:   Acute congestive heart failure  Hypoxia  CAD status post CABG  Hyperlipidemia  Hypertension  Paroxysmal atrial fibrillation on Eliquis      Plan:   Admit to medicine service.  Continue telemetry monitoring.  Remains in rate controlled atrial fibrillation currently.  Monitor electrolytes.  Keep potassium greater than 4 and magnesium greater than 2.  Replaced this morning.  DC IV Cardizem and increase metoprolol.  Continue Eliquis  Echocardiogram to be done today  Spoke to patient about options moving forward and treatments in regards to his A-fib.  He is heavily against a YORDY with cardioversion.  He has concerns with his epiglottitis as he has had issues in the past with swallowing and he does not want a YORDY done.  We did speak to him about having a cardioversion done after being on 4 to 5 weeks of continued anticoagulation in which she is entertaining the idea.  For now we will continue with rate control strategies with metoprolol  Continue diuretics, monitor strict I's and O's and daily weights  Will continue to follow along, plan for discharge probably tomorrow.    Didier BRANDT-BC  Adult Gerontology Acute Care Nurse Practitioner  [facility]  [phone]        Didier BRANDT-BC  Adult Gerontology Acute Care Nurse Practitioner  [facility]  [phone]      Of note, this documentation is completed using the Dragon Dictation system (voice recognition software). There may be spelling and/or grammatical errors that were not corrected prior to final submission.      Electronically signed by MERCEDES Vargas-CNP, on 4/16/2024 at 9:24 AM   Patient seen and examined in conjunction with MERCEDES Bryan/CNP and agree with the evaluation as  noted above.  84-year-old gentleman with history of CAD s/p CABG, was recently diagnosed with new onset atrial fibrillation and started on beta-blockers and Eliquis for anticoagulation.  He reported a recent 10 pound weight gain and increased shortness of breath with his usual activity at the gym for which she came to the emergency room was found to be in A-fib with RVR.  He denied any associated chest pain.  Troponin is mildly elevated in the 30 range and BNP is greater than 1300.  He continues to deny any chest pain.  Agree with examination as noted above.  Cardiac exam reveals irregularly irregular first and second heart sound, no murmurs are heard.  Chest is clear to auscultation bilaterally.  There is 1+ bilateral ankle edema.    ASSESSMENT AND PLAN:  1.  Persistent A-fib: The patient remains in A-fib but the rate is better controlled with current medications.  Had a lengthy discussion with the patient regarding his options for possible YORDY cardioversion and continued rate control and anticoagulation.  He said he has a chronic issues with his epiglottis, and he is adamant about not having anything introduced down his throat.  We also gave him the option of strict compliance with anticoagulation for at least 4 to 6 weeks and attempt elective cardioversion with no YORDY.  This appears to be agreeable for the patient.  He has an echocardiogram pending and we will as long as there are no significant high risk features on the echocardiogram, we will proceed with this option.  2.  CAD s/p remote CABG with no recurrent chest pain, continue current medications.  3.  Hypertension: Blood pressure is well-controlled on current medications which we will continue.

## 2024-04-16 NOTE — DISCHARGE INSTRUCTIONS
HEART FAILURE EDUCATION:  1. Weigh yourself daily and record on your weight log.  2. If you gain more than 2 or 3 pounds overnight, call your cardiologist.  3. Follow a low sodium diet. No more than 2000 mg in one day, or more than 650 mg per meal.  4. Limit total fluids to no more than 8 cups (or 2 liters) per day - this includes all fluids (water, coffee, juice, milk, tea, etc.)  5. Monitor your blood pressure daily and record on your weight log.  6. Call to schedule your follow-up appointments when you get home if they were not already scheduled for you.  7. Keep your follow-up appointments! Bring your weight log with you so the doctors can see your weight trend and blood pressure readings.  8. Be sure to  any new prescriptions and take them as directed. If unsure of the medications, be sure to call your cardiologist.  9. Stay as active as you can tolerate.   10. If you notice subtle change of symptoms (slight increase in swelling, slight shortness of breath, a new intolerance to laying flat, a new cough), be sure to call your cardiologist.  11. If you have any questions or concerns or you have not heard back from the cardiologist, feel free to call Celestina Castañeda heart failure navigator at [phone].

## 2024-04-16 NOTE — CONSULTS
Inpatient consult to Cardiology  Consult performed by: MERCEDES Vargas-CNP  Consult ordered by: Amada Birmingham MD  Reason for consult: afib                            Date:   4/16/2024  Patient name:  Minh Harrington Jr.  Date of admission:  4/15/2024  3:22 PM  MRN:   [id number]  YOB: 1940  Time of Consult:  9:24 AM    Consulting Cardiologist: MERCEDES Reyez, CNP  Primary Cardiologist:  Dr. Heriberto Valle    Referring Provider: Dr. Shafer      Admission Diagnosis:     Atrial fibrillation with controlled ventricular response (Multi)      History of Present Illness:      84-year-old gentleman with past medical history of coronary artery disease status post bypass surgery, MI in October 2000 with subsequent CABG, newly diagnosed atrial fibrillation and epilepsy who presented to [facility] emergency department after his wife and son who is a paramedic told him he needs to come be evaluated.  The patient reports that he is very active and works out at the gym every day and noticed his breathing to be labored for the past few weeks.  He has reported 10 to 12 pound weight gain over the past few weeks.  He also was recently diagnosed with atrial fibrillation about 3 weeks ago and was started on metoprolol and Eliquis.  He denies any chest pain.  EKG does show atrial fibrillation with RVR.  No obvious ST wave abnormalities.  Chest x-ray concerning for new developing congestive heart failure.  BNP was elevated at 1300.  Troponin elevated at 32 but flat.  He has not seen a cardiologist prior to seeing Dr. Shine for some time.  He plans to follow-up with Dr. Valle.  He was admitted to the general medicine service and started on diuretics.  General cardiology was consulted for atrial fibrillation.      Allergies:     No Known Allergies      Past Medical History:     Past Medical History:   Diagnosis Date     COVID-19 2022    COVID-19    Epilepsy, unspecified, not intractable, without status epilepticus (Multi) 2020    Epilepsy    Unspecified convulsions (Multi) 2021    Seizures       Past Surgical History:     Past Surgical History:   Procedure Laterality Date    OTHER SURGICAL HISTORY  2019    Angioplasty    OTHER SURGICAL HISTORY  2019    Appendectomy    OTHER SURGICAL HISTORY  2019    Bypass       Family History:     Family History   Problem Relation Name Age of Onset    Heart failure Mother      Emphysema Father      Bipolar disorder Brother         Social History:     Social History     Tobacco Use    Smoking status: Former     Current packs/day: 0.00     Types: Cigarettes     Quit date:      Years since quittin.3    Smokeless tobacco: Never   Vaping Use    Vaping status: Never Used   Substance Use Topics    Alcohol use: Yes     Alcohol/week: 14.0 standard drinks of alcohol     Types: 14 Standard drinks or equivalent per week    Drug use: Never       CURRENT INPATIENT MEDICATIONS    apixaban, 5 mg, oral, BID  atorvastatin, 20 mg, oral, Nightly  furosemide, 40 mg, intravenous, q24h  levETIRAcetam, 250 mg, oral, BID  magnesium sulfate, 2 g, intravenous, Once  metoprolol tartrate, 50 mg, oral, BID  mirtazapine, 15 mg, oral, Nightly  perflutren lipid microspheres, 0.5-10 mL of dilution, intravenous, Once in imaging  perflutren protein A microsphere, 0.5 mL, intravenous, Once in imaging  sertraline, 100 mg, oral, Daily  sulfur hexafluoride microsphr, 2 mL, intravenous, Once in imaging         Current Outpatient Medications   Medication Instructions    ALPRAZolam (XANAX) 0.5 mg, oral, 3 times daily PRN    apixaban (ELIQUIS) 5 mg, oral, 2 times daily    atorvastatin (LIPITOR) 20 mg, oral, Daily, as directed    b complex vitamins capsule 1 capsule, oral, Daily, After lunch    cholecalciferol (Vitamin D-3) 125 MCG (5000 UT) capsule 1 tablet, oral, Daily, After lunch     dutasteride (AVODART) 0.5 mg, oral, Daily    furosemide (LASIX) 20 mg, oral, Daily    levETIRAcetam (Keppra) 500 mg tablet TAKE 1/2 (ONE-HALF) OF A TABLET BY MOUTH DAILY    levETIRAcetam XR (KEPPRA XR) 500 mg, oral, Daily, After lunch    metoprolol tartrate (LOPRESSOR) 25 mg, oral, 2 times daily    mirtazapine (REMERON) 15 mg, oral, Nightly    sertraline (ZOLOFT) 100 mg, oral, Daily        Review of Systems:      12 point review of systems was obtained in detail and is negative other than that detailed above.    Vital Signs:     Vitals:    24 0200 24 0400 24 0613 24 0823   BP: 149/76 146/86 147/77 (!) 140/92   BP Location: Left arm Left arm Left arm Left arm   Patient Position: Lying Lying Lying Lying   Pulse: 92 86 88 105   Resp:    Temp: 36.2 °C (97.1 °F) 36.1 °C (97 °F) 36.2 °C (97.2 °F) 36.5 °C (97.7 °F)   TempSrc: Temporal Temporal Temporal Temporal   SpO2: 94% 96% 95% 92%   Weight:       Height:           Intake/Output Summary (Last 24 hours) at 2024 0924  Last data filed at 2024 0823  Gross per 24 hour   Intake 471.77 ml   Output --   Net 471.77 ml       Wt Readings from Last 4 Encounters:   04/15/24 84 kg (185 lb 1.6 oz)   24 87.5 kg (193 lb)   24 86.6 kg (191 lb)   23 87.5 kg (193 lb)       Physical Examination:     Physical Exam  Vitals and nursing note reviewed.   HENT:      Head: Normocephalic.      Nose: Nose normal.      Mouth/Throat:      Mouth: Mucous membranes are moist.   Eyes:      Pupils: Pupils are equal, round, and reactive to light.   Cardiovascular:      Rate and Rhythm: Normal rate. Rhythm irregular.      Heart sounds: S1 normal and S2 normal.   Pulmonary:      Effort: Pulmonary effort is normal.      Comments: Diminished  Abdominal:      Palpations: Abdomen is soft.   Musculoskeletal:      Right lower le+ Edema present.      Left lower le+ Edema present.   Skin:     General: Skin is warm and dry.      Capillary Refill:  Capillary refill takes less than 2 seconds.   Neurological:      General: No focal deficit present.      Mental Status: He is alert and oriented to person, place, and time.   Psychiatric:         Mood and Affect: Mood normal.           Lab:     CBC:   Results from last 7 days   Lab Units 04/16/24  0529 04/15/24  1611   WBC AUTO x10*3/uL 9.9 11.8*   RBC AUTO x10*6/uL 4.44* 4.64   HEMOGLOBIN g/dL 13.7 14.1   HEMATOCRIT % 41.3 43.1   MCV fL 93 93   MCH pg 30.9 30.4   MCHC g/dL 33.2 32.7   RDW % 14.1 14.0   PLATELETS AUTO x10*3/uL 112* 109*     CMP:    Results from last 7 days   Lab Units 04/16/24  0529 04/15/24  1611   SODIUM mmol/L 139 136   POTASSIUM mmol/L 3.6 3.9   CHLORIDE mmol/L 104 104   CO2 mmol/L 27 26   BUN mg/dL 15 19   CREATININE mg/dL 0.99 1.13   GLUCOSE mg/dL 78 94   PROTEIN TOTAL g/dL  --  6.5   CALCIUM mg/dL 8.6 9.0   BILIRUBIN TOTAL mg/dL  --  1.0   ALK PHOS U/L  --  75   AST U/L  --  28   ALT U/L  --  33     BMP:    Results from last 7 days   Lab Units 04/16/24  0529 04/15/24  1611   SODIUM mmol/L 139 136   POTASSIUM mmol/L 3.6 3.9   CHLORIDE mmol/L 104 104   CO2 mmol/L 27 26   BUN mg/dL 15 19   CREATININE mg/dL 0.99 1.13   CALCIUM mg/dL 8.6 9.0   GLUCOSE mg/dL 78 94     Magnesium:  Results from last 7 days   Lab Units 04/16/24  0529 04/15/24  1611   MAGNESIUM mg/dL 1.88 1.82     Troponin:    Results from last 7 days   Lab Units 04/15/24  1747 04/15/24  1611   TROPHS ng/L 32* 32*     BNP:   Results from last 7 days   Lab Units 04/15/24  1611   BNP pg/mL 1,318*     Lipid Panel:         Diagnostic Studies:       ECG 12 lead    Result Date: 4/15/2024  Atrial fibrillation Nonspecific ST and T wave abnormality Abnormal ECG When compared with ECG of 15-APR-2024 15:26, Vent. rate has decreased BY  45 BPM T wave inversion no longer evident in Lateral leads See ED provider note for full interpretation and clinical correlation    XR chest 1 view    Result Date: 4/15/2024  Interpreted By:  Cam Cho, STUDY:  XR CHEST 1 VIEW;  4/15/2024 4:39 pm   INDICATION: Signs/Symptoms:Chest Pain.   COMPARISON: 01/06/2022.   ACCESSION NUMBER(S): [id number]   ORDERING CLINICIAN: CONSTANZA TUCKER   FINDINGS: Cardiac silhouette enlarged. Small to moderate-sized right-sided and trace left-sided pleural effusions. Pulmonary vascular congestion noted. Bibasilar patchy opacities, likely atelectasis. No pneumothorax. Status post median sternotomy       Developing CHF   MACRO: None   Signed by: Cam Cho 4/15/2024 4:48 PM Dictation workstation:   [id number]    ECG 12 lead    Result Date: 4/15/2024  Atrial fibrillation with rapid ventricular response Nonspecific ST and T wave abnormality Abnormal ECG When compared with ECG of 03-FEB-2019 07:32, Atrial fibrillation has replaced Sinus rhythm Vent. rate has increased BY  64 BPM Borderline criteria for Inferior infarct are no longer Present ST now depressed in Inferior leads Nonspecific T wave abnormality now evident in Inferior leads See ED provider note for full interpretation and clinical correlation Confirmed by Janette Madden ([id number]) on 4/15/2024 4:38:48 PM        Radiology:     XR chest 1 view   Final Result   Developing CHF        MACRO:   None        Signed by: Cam Cho 4/15/2024 4:48 PM   Dictation workstation:   [id number]      Transthoracic Echo (TTE) Complete    (Results Pending)       Problem List:     Patient Active Problem List   Diagnosis    Anxiety    BPH (benign prostatic hyperplasia)    CAD (coronary artery disease)    Carotid artery plaque    Carotid bruit    Herpes zoster    Post herpetic neuralgia    HLD (hyperlipidemia)    HTN (hypertension)    Hyperglycemia    Insomnia    Male erectile disorder of organic origin    Skin cancer    Thrombocytopenia (CMS-HCC)    Vitamin D deficiency    Moderate episode of recurrent major depressive disorder (Multi)    Aneurysm of ascending aorta without rupture (CMS-HCC)    V tach (Multi)    Abnormal EKG    Mitral valve  insufficiency    Atrial fibrillation with controlled ventricular response (Multi)       Assessment:   Acute congestive heart failure  Hypoxia  CAD status post CABG  Hyperlipidemia  Hypertension  Paroxysmal atrial fibrillation on Eliquis      Plan:   Admit to medicine service.  Continue telemetry monitoring.  Remains in rate controlled atrial fibrillation currently.  Monitor electrolytes.  Keep potassium greater than 4 and magnesium greater than 2.  Replaced this morning.  DC IV Cardizem and increase metoprolol.  Continue Eliquis  Echocardiogram to be done today  Spoke to patient about options moving forward and treatments in regards to his A-fib.  He is heavily against a YORDY with cardioversion.  He has concerns with his epiglottitis as he has had issues in the past with swallowing and he does not want a YORDY done.  We did speak to him about having a cardioversion done after being on 4 to 5 weeks of continued anticoagulation in which she is entertaining the idea.  For now we will continue with rate control strategies with metoprolol  Continue diuretics, monitor strict I's and O's and daily weights  Will continue to follow along, plan for discharge probably tomorrow.    Didier BRANDT-BC  Adult Gerontology Acute Care Nurse Practitioner  [facility]  [phone]        Didier BRANDT-BC  Adult Gerontology Acute Care Nurse Practitioner  [facility]  [phone]      Of note, this documentation is completed using the Dragon Dictation system (voice recognition software). There may be spelling and/or grammatical errors that were not corrected prior to final submission.      Electronically signed by MERCEDES Vargas-CNP, on 4/16/2024 at 9:24 AM   Patient seen and examined in conjunction with MERCEDES Bryan/CNP and agree with the evaluation as  noted above.  84-year-old gentleman with history of CAD s/p CABG, was recently diagnosed with new onset atrial fibrillation and started on beta-blockers and Eliquis for anticoagulation.  He reported a recent 10 pound weight gain and increased shortness of breath with his usual activity at the gym for which she came to the emergency room was found to be in A-fib with RVR.  He denied any associated chest pain.  Troponin is mildly elevated in the 30 range and BNP is greater than 1300.  He continues to deny any chest pain.  Agree with examination as noted above.  Cardiac exam reveals irregularly irregular first and second heart sound, no murmurs are heard.  Chest is clear to auscultation bilaterally.  There is 1+ bilateral ankle edema.    ASSESSMENT AND PLAN:  1.  Persistent A-fib: The patient remains in A-fib but the rate is better controlled with current medications.  Had a lengthy discussion with the patient regarding his options for possible YORDY cardioversion and continued rate control and anticoagulation.  He said he has a chronic issues with his epiglottis, and he is adamant about not having anything introduced down his throat.  We also gave him the option of strict compliance with anticoagulation for at least 4 to 6 weeks and attempt elective cardioversion with no YORDY.  This appears to be agreeable for the patient.  He has an echocardiogram pending and we will as long as there are no significant high risk features on the echocardiogram, we will proceed with this option.  2.  CAD s/p remote CABG with no recurrent chest pain, continue current medications.  3.  Hypertension: Blood pressure is well-controlled on current medications which we will continue.

## 2024-04-16 NOTE — PROGRESS NOTES
Medical Group Progress Note  ASSESSMENT & PLAN:     Atrial fibrillation with RVR  Congestive heart failure exacerbation  History of coronary artery disease status post bypass  -P/W worsening shortness of breath, lower extremity edema  -Symptoms consistent with heart failure  -CXR with bilateral effusions     Plan:  -Continue Cardizem drip  -Obtain echocardiogram  -Start Lasix IV daily  -Cardiology consult  -Patient states that he has had terrible fatigue, and intolerance to metoprolol.  Hold metoprolol for now         VTE Prophylaxis: Eliquis     4/26/24:  Rates better controlled and symptoms improved  Swelling down  Cont daily lasix  Cont metoprolol  Plan for outpatient cardioversion, avoiding Mike for now  Cont eliquis  Likely discharge tomorrow if continued improvement    El Shafer MD    SUBJECTIVE     No overnight events. Resting comfortably. Swelling and dyspnea improved.    OBJECTIVE:     Last Recorded Vitals:  Vitals:    04/16/24 0200 04/16/24 0400 04/16/24 0613 04/16/24 0823   BP: 149/76 146/86 147/77 (!) 140/92   BP Location: Left arm Left arm Left arm Left arm   Patient Position: Lying Lying Lying Lying   Pulse: 92 86 88 105   Resp: 17 16 16 16   Temp: 36.2 °C (97.1 °F) 36.1 °C (97 °F) 36.2 °C (97.2 °F) 36.5 °C (97.7 °F)   TempSrc: Temporal Temporal Temporal Temporal   SpO2: 94% 96% 95% 92%   Weight:       Height:         Last I/O:  No intake/output data recorded.    Physical Exam  GENERAL: Laying in bed, does not appear to be in any distress.   HEENT: HEAD: Normocephalic atraumatic.  Neck: Supple.  Eyes: Pupils are reactive to direct light.   CVS: S1, S2 heard.  Irregular rhythm.  LUNGS: Coarse breath sounds appreciated.  ABDOMEN: Soft, nontender to palpate. Positive bowel sounds. No guarding or rebound appreciated.  NEUROLOGICAL: No focal neurological deficits appreciated. Cranial nerves are grossly intact.  EXTREMITIES: Lower extremity edema  SKIN:  Grossly intact, warm and dry.    Inpatient  Medications:  [Held by provider] apixaban, 5 mg, oral, BID  atorvastatin, 20 mg, oral, Nightly  furosemide, 40 mg, intravenous, q24h  levETIRAcetam, 250 mg, oral, BID  metoprolol tartrate, 25 mg, oral, BID  mirtazapine, 15 mg, oral, Nightly  perflutren protein A microsphere, 0.5 mL, intravenous, Once in imaging  sertraline, 100 mg, oral, Daily  sulfur hexafluoride microsphr, 2 mL, intravenous, Once in imaging    PRN Medications  PRN medications: acetaminophen, ALPRAZolam  Continuous Medications:     LABS AND IMAGING:     Labs:  Results from last 7 days   Lab Units 04/16/24  0529 04/15/24  1611   WBC AUTO x10*3/uL 9.9 11.8*   RBC AUTO x10*6/uL 4.44* 4.64   HEMOGLOBIN g/dL 13.7 14.1   HEMATOCRIT % 41.3 43.1   MCV fL 93 93   MCH pg 30.9 30.4   MCHC g/dL 33.2 32.7   RDW % 14.1 14.0   PLATELETS AUTO x10*3/uL 112* 109*     Results from last 7 days   Lab Units 04/16/24  0529 04/15/24  1611   SODIUM mmol/L 139 136   POTASSIUM mmol/L 3.6 3.9   CHLORIDE mmol/L 104 104   CO2 mmol/L 27 26   BUN mg/dL 15 19   CREATININE mg/dL 0.99 1.13   GLUCOSE mg/dL 78 94   PROTEIN TOTAL g/dL  --  6.5   CALCIUM mg/dL 8.6 9.0   BILIRUBIN TOTAL mg/dL  --  1.0   ALK PHOS U/L  --  75   AST U/L  --  28   ALT U/L  --  33     Results from last 7 days   Lab Units 04/16/24  0529 04/15/24  1611   MAGNESIUM mg/dL 1.88 1.82     Results from last 7 days   Lab Units 04/15/24  1747 04/15/24  1611   TROPHS ng/L 32* 32*     Imaging:  ECG 12 lead  Atrial fibrillation  Nonspecific ST and T wave abnormality  Abnormal ECG  When compared with ECG of 15-APR-2024 15:26,  Vent. rate has decreased BY  45 BPM  T wave inversion no longer evident in Lateral leads    See ED provider note for full interpretation and clinical correlation    Confirmed by Janette Madden ([id number]) on 4/16/2024 11:38:43 AM

## 2024-04-17 VITALS
OXYGEN SATURATION: 96 % | HEART RATE: 117 BPM | TEMPERATURE: 98.2 F | BODY MASS INDEX: 26.5 KG/M2 | RESPIRATION RATE: 16 BRPM | SYSTOLIC BLOOD PRESSURE: 152 MMHG | WEIGHT: 185.1 LBS | DIASTOLIC BLOOD PRESSURE: 94 MMHG | HEIGHT: 70 IN

## 2024-04-17 PROCEDURE — RXMED WILLOW AMBULATORY MEDICATION CHARGE

## 2024-04-17 PROCEDURE — 99233 SBSQ HOSP IP/OBS HIGH 50: CPT | Performed by: INTERNAL MEDICINE

## 2024-04-17 PROCEDURE — G0378 HOSPITAL OBSERVATION PER HR: HCPCS

## 2024-04-17 PROCEDURE — 2500000005 HC RX 250 GENERAL PHARMACY W/O HCPCS: Performed by: NURSE PRACTITIONER

## 2024-04-17 PROCEDURE — 2500000001 HC RX 250 WO HCPCS SELF ADMINISTERED DRUGS (ALT 637 FOR MEDICARE OP): Performed by: NURSE PRACTITIONER

## 2024-04-17 PROCEDURE — 2500000006 HC RX 250 W HCPCS SELF ADMINISTERED DRUGS (ALT 637 FOR ALL PAYERS): Mod: MUE | Performed by: HOSPITALIST

## 2024-04-17 PROCEDURE — 2500000001 HC RX 250 WO HCPCS SELF ADMINISTERED DRUGS (ALT 637 FOR MEDICARE OP): Performed by: INTERNAL MEDICINE

## 2024-04-17 PROCEDURE — 99239 HOSP IP/OBS DSCHRG MGMT >30: CPT | Performed by: INTERNAL MEDICINE

## 2024-04-17 PROCEDURE — 96375 TX/PRO/DX INJ NEW DRUG ADDON: CPT

## 2024-04-17 RX ORDER — LANOLIN ALCOHOL/MO/W.PET/CERES
400 CREAM (GRAM) TOPICAL DAILY
Status: DISCONTINUED | OUTPATIENT
Start: 2024-04-17 | End: 2024-04-17 | Stop reason: HOSPADM

## 2024-04-17 RX ORDER — FUROSEMIDE 40 MG/1
20 TABLET ORAL DAILY
Status: DISCONTINUED | OUTPATIENT
Start: 2024-04-17 | End: 2024-04-17 | Stop reason: HOSPADM

## 2024-04-17 RX ORDER — MAGNESIUM SULFATE HEPTAHYDRATE 40 MG/ML
2 INJECTION, SOLUTION INTRAVENOUS ONCE
Status: DISCONTINUED | OUTPATIENT
Start: 2024-04-17 | End: 2024-04-17 | Stop reason: HOSPADM

## 2024-04-17 RX ORDER — ALPRAZOLAM 0.5 MG/1
0.5 TABLET ORAL 3 TIMES DAILY PRN
Status: DISCONTINUED | OUTPATIENT
Start: 2024-04-17 | End: 2024-04-17 | Stop reason: HOSPADM

## 2024-04-17 RX ORDER — METOPROLOL TARTRATE 1 MG/ML
5 INJECTION, SOLUTION INTRAVENOUS ONCE
Status: COMPLETED | OUTPATIENT
Start: 2024-04-17 | End: 2024-04-17

## 2024-04-17 RX ORDER — POTASSIUM CHLORIDE 20 MEQ/1
40 TABLET, EXTENDED RELEASE ORAL ONCE
Status: DISCONTINUED | OUTPATIENT
Start: 2024-04-17 | End: 2024-04-17 | Stop reason: HOSPADM

## 2024-04-17 RX ADMIN — LEVETIRACETAM 250 MG: 500 TABLET, FILM COATED ORAL at 08:51

## 2024-04-17 RX ADMIN — METOPROLOL TARTRATE 5 MG: 5 INJECTION INTRAVENOUS at 09:31

## 2024-04-17 RX ADMIN — METOPROLOL TARTRATE 25 MG: 25 TABLET, FILM COATED ORAL at 08:51

## 2024-04-17 RX ADMIN — SERTRALINE HYDROCHLORIDE 100 MG: 50 TABLET, FILM COATED ORAL at 08:50

## 2024-04-17 ASSESSMENT — COGNITIVE AND FUNCTIONAL STATUS - GENERAL
DAILY ACTIVITIY SCORE: 24
CLIMB 3 TO 5 STEPS WITH RAILING: A LITTLE
MOBILITY SCORE: 23

## 2024-04-17 ASSESSMENT — PAIN SCALES - GENERAL
PAINLEVEL_OUTOF10: 0 - NO PAIN
PAINLEVEL_OUTOF10: 0 - NO PAIN

## 2024-04-17 ASSESSMENT — PAIN - FUNCTIONAL ASSESSMENT
PAIN_FUNCTIONAL_ASSESSMENT: 0-10
PAIN_FUNCTIONAL_ASSESSMENT: 0-10

## 2024-04-17 NOTE — CARE PLAN
Problem: Pain  Goal: My pain/discomfort is manageable  Outcome: Progressing     Problem: Safety  Goal: Patient will be injury free during hospitalization  Outcome: Progressing  Goal: I will remain free of falls  Outcome: Progressing  Flowsheets (Taken 4/17/2024 0550)  Resident will remain free of falls: Assist with toileting as orderd     Problem: Daily Care  Goal: Daily care needs are met  Outcome: Progressing     Problem: Psychosocial Needs  Goal: Demonstrates ability to cope with hospitalization/illness  Outcome: Progressing  Goal: Collaborate with me, my family, and caregiver to identify my specific goals  Outcome: Progressing     Problem: Discharge Barriers  Goal: My discharge needs are met  Outcome: Progressing  Flowsheets (Taken 4/17/2024 0550)  Resident's discharge needs are met:   Identify potential discharge barriers on admission and throughout stay   Involve resident/family/caregiver in discharge planning process     Problem: Nutrition  Goal: Less than 5 days NPO/clear liquids  Outcome: Progressing  Goal: Oral intake greater than 50%  Outcome: Progressing  Goal: Oral intake greater 75%  Outcome: Progressing  Goal: Consume prescribed supplement  Outcome: Progressing  Goal: Adequate PO fluid intake  Outcome: Progressing  Goal: Nutrition support goals are met within 48 hrs  Outcome: Progressing  Goal: Nutrition support is meeting 75% of nutrient needs  Outcome: Progressing  Goal: Tube feed tolerance  Outcome: Progressing  Goal: BG  mg/dL  Outcome: Progressing  Goal: Lab values WNL  Outcome: Progressing  Goal: Electrolytes WNL  Outcome: Progressing  Goal: Promote healing  Outcome: Progressing  Goal: Maintain stable weight  Outcome: Progressing  Goal: Reduce weight from edema/fluid  Outcome: Progressing  Goal: Gradual weight gain  Outcome: Progressing  Goal: Improve ostomy output  Outcome: Progressing     Problem: Pain - Adult  Goal: Verbalizes/displays adequate comfort level or baseline comfort  level  Outcome: Progressing     Problem: Safety - Adult  Goal: Free from fall injury  Outcome: Progressing  Flowsheets (Taken 4/17/2024 0550)  Free from fall injury:   Instruct family/caregiver on patient safety   Based on caregiver fall risk screen, instruct family/caregiver to ask for assistance with transferring infant if caregiver noted to have fall risk factors     Problem: Discharge Planning  Goal: Discharge to home or other facility with appropriate resources  Outcome: Progressing     Problem: Chronic Conditions and Co-morbidities  Goal: Patient's chronic conditions and co-morbidity symptoms are monitored and maintained or improved  Outcome: Progressing     Problem: Heart Failure  Goal: Improved gas exchange this shift  Outcome: Progressing  Goal: Improved urinary output this shift  Outcome: Progressing  Flowsheets (Taken 4/17/2024 0550)  Improved urinary output this shift:   Med administration/monitor effect   Monitor intake/output and daily weight   Monitor serum electrolytes/replace per order  Goal: Reduction in peripheral edema within 24 hours  Outcome: Progressing  Goal: Report improvement of dyspnea/breathlessness this shift  Outcome: Progressing  Goal: Weight from fluid excess reduced over 2-3 days, then stabilize  Outcome: Progressing  Goal: Increase self care and/or family involvement in 24 hours  Outcome: Progressing   The patient's goals for the shift include      The clinical goals for the shift include Patients heart rate will remain WNR    Over the shift, the patient did not make progress toward the following goals. Barriers to progression include acute illness. Recommendations to address these barriers include education and medication .

## 2024-04-17 NOTE — PROGRESS NOTES
Jose Martin LONI/Cardiologist:  Didier Hill, MERCEDES-CEM, Dr. Floyd Reid  Primary Cardiologist:   Date:  4/17/2024  Patient:  Minh Harrington Jr.  YOB: 1940  MRN:  [id number]   Admit Date:  4/15/2024      SUBJECTIVE:    Minh Harrington Jr. was seen and examined today at bedside.     He denies any chest pain or shortness of breath.     Still in atrial fibrillation, slightly faster today    Plan for [facility] today with new finding of cardiomyopathy        VITALS:     Vitals:    04/16/24 1617 04/16/24 1916 04/17/24 0433 04/17/24 0745   BP: 122/71 159/70 123/55 (!) 152/94   BP Location:  Left arm Left arm Left arm   Patient Position:  Lying Lying Sitting   Pulse: 88 93 99 (!) 117   Resp:  17 16 16   Temp: 36.3 °C (97.3 °F) 36.1 °C (97 °F) 36.5 °C (97.7 °F) 36.8 °C (98.2 °F)   TempSrc:   Temporal Temporal   SpO2: 90% 96% 94% 96%   Weight:       Height:           Intake/Output Summary (Last 24 hours) at 4/17/2024 0908  Last data filed at 4/16/2024 2250  Gross per 24 hour   Intake 530 ml   Output 1700 ml   Net -1170 ml       Wt Readings from Last 4 Encounters:   04/15/24 84 kg (185 lb 1.6 oz)   03/26/24 87.5 kg (193 lb)   03/25/24 86.6 kg (191 lb)   11/27/23 87.5 kg (193 lb)       CURRENT HOSPITAL MEDICATIONS:   [Held by provider] apixaban, 5 mg, oral, BID  atorvastatin, 20 mg, oral, Nightly  furosemide, 40 mg, intravenous, q24h  levETIRAcetam, 250 mg, oral, BID  metoprolol tartrate, 25 mg, oral, BID  mirtazapine, 15 mg, oral, Nightly  perflutren protein A microsphere, 0.5 mL, intravenous, Once in imaging  sertraline, 100 mg, oral, Daily  sulfur hexafluoride microsphr, 2 mL, intravenous, Once in imaging         Current Outpatient Medications   Medication Instructions    ALPRAZolam (XANAX) 0.5 mg, oral, 3 times daily PRN    apixaban (ELIQUIS) 5 mg, oral, 2 times daily    atorvastatin (LIPITOR) 20 mg, oral, Daily, as directed    b complex vitamins capsule 1 capsule, oral, Daily,  After lunch    cholecalciferol (Vitamin D-3) 125 MCG (5000 UT) capsule 1 tablet, oral, Daily, After lunch    dutasteride (AVODART) 0.5 mg, oral, Daily    furosemide (LASIX) 20 mg, oral, Daily    levETIRAcetam (Keppra) 500 mg tablet TAKE 1/2 (ONE-HALF) OF A TABLET BY MOUTH DAILY    levETIRAcetam XR (KEPPRA XR) 500 mg, oral, Daily, After lunch    metoprolol tartrate (LOPRESSOR) 25 mg, oral, 2 times daily    mirtazapine (REMERON) 15 mg, oral, Nightly    sertraline (ZOLOFT) 100 mg, oral, Daily        PHYSICAL EXAMINATION:     Physical Exam  HENT:      Head: Normocephalic.      Mouth/Throat:      Mouth: Mucous membranes are moist.   Eyes:      Pupils: Pupils are equal, round, and reactive to light.   Cardiovascular:      Rate and Rhythm: Tachycardia present. Rhythm irregular.   Pulmonary:      Effort: Pulmonary effort is normal.   Abdominal:      General: Bowel sounds are normal.      Palpations: Abdomen is soft.   Musculoskeletal:         General: Normal range of motion.      Right lower le+ Edema present.      Left lower le+ Edema present.   Skin:     General: Skin is warm and dry.      Capillary Refill: Capillary refill takes less than 2 seconds.   Neurological:      Mental Status: He is alert and oriented to person, place, and time.   Psychiatric:         Mood and Affect: Mood normal.         LAB DATA:     CBC:   Results from last 7 days   Lab Units 24  0529 04/15/24  1611   WBC AUTO x10*3/uL 9.9 11.8*   RBC AUTO x10*6/uL 4.44* 4.64   HEMOGLOBIN g/dL 13.7 14.1   HEMATOCRIT % 41.3 43.1   MCV fL 93 93   MCH pg 30.9 30.4   MCHC g/dL 33.2 32.7   RDW % 14.1 14.0   PLATELETS AUTO x10*3/uL 112* 109*     CMP:    Results from last 7 days   Lab Units 24  0529 04/15/24  1611   SODIUM mmol/L 139 136   POTASSIUM mmol/L 3.6 3.9   CHLORIDE mmol/L 104 104   CO2 mmol/L 27 26   BUN mg/dL 15 19   CREATININE mg/dL 0.99 1.13   GLUCOSE mg/dL 78 94   PROTEIN TOTAL g/dL  --  6.5   CALCIUM mg/dL 8.6 9.0   BILIRUBIN TOTAL  mg/dL  --  1.0   ALK PHOS U/L  --  75   AST U/L  --  28   ALT U/L  --  33     BMP:    Results from last 7 days   Lab Units 04/16/24  0529 04/15/24  1611   SODIUM mmol/L 139 136   POTASSIUM mmol/L 3.6 3.9   CHLORIDE mmol/L 104 104   CO2 mmol/L 27 26   BUN mg/dL 15 19   CREATININE mg/dL 0.99 1.13   CALCIUM mg/dL 8.6 9.0   GLUCOSE mg/dL 78 94     Magnesium:  Results from last 7 days   Lab Units 04/16/24  0529 04/15/24  1611   MAGNESIUM mg/dL 1.88 1.82     Troponin:    Results from last 7 days   Lab Units 04/15/24  1747 04/15/24  1611   TROPHS ng/L 32* 32*     BNP:   Results from last 7 days   Lab Units 04/15/24  1611   BNP pg/mL 1,318*     Lipid Panel:         DIAGNOSTIC TESTING:   [place] [id number]   Tel [phone] Fax [phone]     TRANSTHORACIC ECHOCARDIOGRAM REPORT        Patient Name:      EAMON ORR LULÚ Bond Physician:    [id number] Ministerio Napier DO  Study Date:        4/16/2024            Ordering Provider:    [id number] SALMA KERNS  MRN/PID:           [id number]             Fellow:  Accession#:        [id number]         Nurse:                Claudia Riggins RN  Date of Birth/Age: 1940 / 84 years Sonographer:          Samira CLOUD  Gender:            M                    Additional Staff:  Height:            177.80 cm            Admit Date:           4/15/2024  Weight:            83.92 kg             Admission Status:     Inpatient -                                                                Routine  BSA / BMI:         2.02 m2 / 26.54      Department Location:  [facility]                     kg/m2                                      Echo Lab  Blood Pressure: 147 /77 mmHg     Study Type:    TRANSTHORACIC ECHO (TTE) COMPLETE  Diagnosis/ICD: 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  Valve Disorders:   Aortic Insufficiency, Mitral Regurgitation, Tricuspid                     Regurgitation and Pulmonic Insufficiency.  Pertinent History: HTN, CAD, Hyperlipidemia, A-Fib and Cancer.     Study Detail: The following Echo studies were performed: 2D, M-Mode, Doppler and                color flow. Definity used as a contrast agent for endocardial                border definition. Total contrast used for this procedure was 2 mL                via IV push. Unable to obtain subcostal view. The patient was                awake.        PHYSICIAN INTERPRETATION:  Left Ventricle: Left ventricular systolic function is moderately to severely decreased, with an estimated ejection fraction of 30-35%. There is global hypokinesis of the left ventricle with minor regional variations. The left ventricular cavity size is upper limits of normal. Left ventricular diastolic filling was not assessed.  Left Atrium: The left atrium is moderately dilated.  Right Ventricle: The right ventricle is mildly enlarged. There is mildly reduced right ventricular systolic function.  Right Atrium: The right atrium is moderately dilated.  Aortic Valve: The aortic valve appears structurally normal. The aortic valve appears tricuspid. There is mild aortic valve cusp calcification. There is evidence of mildly elevated transaortic gradients consistent with sclerosis of the aortic valve.  There is mild aortic valve regurgitation. The peak instantaneous gradient of the aortic valve is 5.3 mmHg. The mean gradient of the aortic valve is 3.0 mmHg.  Mitral Valve: The mitral valve is normal in structure. There is mild thickening and calcification of the anterior and posterior mitral valve leaflets.  There is no evidence of mitral valve stenosis. There is normal mitral valve leaflet mobility. There is mild to moderate mitral annular calcification. There is mild mitral valve regurgitation.  Tricuspid Valve: The tricuspid valve is structurally normal. There is normal tricuspid valve leaflet mobility. There is mild to moderate tricuspid regurgitation.  Pulmonic Valve: The pulmonic valve is structurally normal. There is mild pulmonic valve regurgitation.  Pericardium: There is a trivial pericardial effusion.  Aorta: The aortic root is normal.  Pulmonary Artery: The main pulmonary artery is normal in size, and position, with normal bifurcation into the left and right pulmonary arteries. The tricuspid regurgitant velocity is 2.91 m/s, and with an estimated right atrial pressure of 3 mmHg, the estimated pulmonary artery pressure is mildly elevated with the RVSP at 36.9 mmHg.  Systemic Veins: The inferior vena cava appears to be of normal size.  In comparison to the previous echocardiogram(s): There are no prior studies on this patient for comparison purposes.        CONCLUSIONS:   1. Left ventricular systolic function is moderately to severely decreased with a 30-35% estimated ejection fraction.   2. There is mildly reduced right ventricular systolic function.   3. The left atrium is moderately dilated.   4. The right atrium is moderately dilated.   5. There is no evidence of mitral valve stenosis.   6. Mild mitral valve regurgitation.   7. Mild to moderate tricuspid regurgitation.   8. Aortic valve sclerosis.   9. Mild aortic valve regurgitation.  10. The main pulmonary artery is normal in size, and position, with normal bifurcation into the left and right pulmonary arteries.  11. There is global hypokinesis of the left ventricle with minor regional variations.    ECG 12 lead    Result Date: 4/16/2024  Atrial fibrillation Nonspecific ST and T wave abnormality Abnormal ECG When compared with ECG of 15-APR-2024 15:26,  Vent. rate has decreased BY  45 BPM T wave inversion no longer evident in Lateral leads See ED provider note for full interpretation and clinical correlation Confirmed by Janette Madden ([id number]) on 4/16/2024 11:38:43 AM    XR chest 1 view    Result Date: 4/15/2024  Interpreted By:  Cam Cho, STUDY: XR CHEST 1 VIEW;  4/15/2024 4:39 pm   INDICATION: Signs/Symptoms:Chest Pain.   COMPARISON: 01/06/2022.   ACCESSION NUMBER(S): [id number]   ORDERING CLINICIAN: CONSTANZA TUCKER   FINDINGS: Cardiac silhouette enlarged. Small to moderate-sized right-sided and trace left-sided pleural effusions. Pulmonary vascular congestion noted. Bibasilar patchy opacities, likely atelectasis. No pneumothorax. Status post median sternotomy       Developing CHF   MACRO: None   Signed by: Cam Cho 4/15/2024 4:48 PM Dictation workstation:   [id number]    ECG 12 lead    Result Date: 4/15/2024  Atrial fibrillation with rapid ventricular response Nonspecific ST and T wave abnormality Abnormal ECG When compared with ECG of 03-FEB-2019 07:32, Atrial fibrillation has replaced Sinus rhythm Vent. rate has increased BY  64 BPM Borderline criteria for Inferior infarct are no longer Present ST now depressed in Inferior leads Nonspecific T wave abnormality now evident in Inferior leads See ED provider note for full interpretation and clinical correlation Confirmed by Janette Madden ([id number]) on 4/15/2024 4:38:48 PM       Transthoracic Echo (TTE) Complete   Final Result      XR chest 1 view   Final Result   Developing CHF        MACRO:   None        Signed by: Cam Cho 4/15/2024 4:48 PM   Dictation workstation:   [id number]      Cardiac Catheterization Procedure    (Results Pending)         RADIOLOGY:     Transthoracic Echo (TTE) Complete   Final Result      XR chest 1 view   Final Result   Developing CHF        MACRO:   None        Signed by: Cam Cho 4/15/2024 4:48 PM   Dictation workstation:   [id number]      Cardiac  Catheterization Procedure    (Results Pending)       PROBLEM LIST     Patient Active Problem List   Diagnosis    Anxiety    BPH (benign prostatic hyperplasia)    CAD (coronary artery disease)    Carotid artery plaque    Carotid bruit    Herpes zoster    Post herpetic neuralgia    HLD (hyperlipidemia)    HTN (hypertension)    Hyperglycemia    Insomnia    Male erectile disorder of organic origin    Skin cancer    Thrombocytopenia (CMS-HCC)    Vitamin D deficiency    Moderate episode of recurrent major depressive disorder (Multi)    Aneurysm of ascending aorta without rupture (CMS-HCC)    V tach (Multi)    Abnormal EKG    Mitral valve insufficiency    Atrial fibrillation with controlled ventricular response (Multi)    Acute congestive heart failure (Multi)       ASSESSMENT:   Acute congestive heart failure  Hypoxia  CAD status post CABG  Hyperlipidemia  Hypertension  Paroxysmal atrial fibrillation on Eliquis        PLAN:   Admit to medicine service.  Continue telemetry monitoring.  Remains in rate controlled atrial fibrillation currently.  Monitor electrolytes.  Keep potassium greater than 4 and magnesium greater than 2.  Replaced this morning.  DC IV Cardizem and increase metoprolol.  Continue Eliquis  Echocardiogram to be done today  Spoke to patient about options moving forward and treatments in regards to his A-fib.  He is heavily against a YORDY with cardioversion.  He has concerns with his epiglottitis as he has had issues in the past with swallowing and he does not want a YORDY done.  We did speak to him about having a cardioversion done after being on 4 to 5 weeks of continued anticoagulation in which she is entertaining the idea.  For now we will continue with rate control strategies with metoprolol  Continue diuretics, monitor strict I's and O's and daily weights  Will continue to follow along, plan for discharge probably tomorrow.    Didier Hill [facility]  Adult Gerontology Acute Care Nurse Practitioner    [facility] [place]   [facility]  [phone]          Didier Hill AGACNP-BC  Adult Gerontology Acute Care Nurse Practitioner  [facility]  [phone]        Of note, this documentation is completed using the Dragon Dictation system (voice recognition software). There may be spelling and/or grammatical errors that were not corrected prior to final submission.        Electronically signed by VANIA Vargas, on 4/16/2024 at 9:24 AM   Patient seen and examined in conjunction with KLAUDIA Bryan and agree with the evaluation as noted above.  84-year-old gentleman with history of CAD s/p CABG, was recently diagnosed with new onset atrial fibrillation and started on beta-blockers and Eliquis for anticoagulation.  He reported a recent 10 pound weight gain and increased shortness of breath with his usual activity at the gym for which she came to the emergency room was found to be in A-fib with RVR.  He denied any associated chest pain.  Troponin is mildly elevated in the 30 range and BNP is greater than 1300.  He continues to deny any chest pain.  Agree with examination as noted above.  Cardiac exam reveals irregularly irregular first and second heart sound, no murmurs are heard.  Chest is clear to auscultation bilaterally.  There is 1+ bilateral ankle edema.     ASSESSMENT AND PLAN:  1.  Persistent A-fib: The patient remains in A-fib but the rate is better controlled with current medications.  Had a lengthy discussion with the patient regarding his options for possible YORDY cardioversion and continued rate control and anticoagulation.  He said he has a chronic issues with his epiglottis, and he is adamant about not having anything introduced down his throat.  We also gave him the option of strict compliance with anticoagulation for at least 4 to 6 weeks and attempt  elective cardioversion with no YORDY.  This appears to be agreeable for the patient.  He has an echocardiogram pending and we will as long as there are no significant high risk features on the echocardiogram, we will proceed with this option.  2.  CAD s/p remote CABG with no recurrent chest pain, continue current medications.  3.  Hypertension: Blood pressure is well-controlled on current medications which we will continue.     4/17/24  Patient remains alert and oriented this morning.  Follows commands.  Mentation appropriate.  For some reason his metoprolol was on hold as there was some miscommunication in regards to the patient and the primary caregiver.  This medication cannot be held for any circumstance.  Please speak with cardiology before holding medication.  Reinitiate metoprolol  Give 5 mg IV Lopressor now  Continue holding Eliquis with plans for LHC today  Echo completed yesterday, shows EF to be around 30 to 35% which is new for him.  Heart failure navigator consulted and following  Initiate GDMT for heart failure management  Continue diuretics, monitor strict I's and O's and daily weights  Monitor electrolytes, keep potassium greater than 4 and magnesium greater than 2  Further recommendations post LHC  Will continue to follow along        Didier Hill [facility]  Adult Gerontology Acute Care Nurse Practitioner  [facility] [place]   [facility]  [phone]          Of note, this documentation is completed using the Dragon Dictation system (voice recognition software). There may be spelling and/or grammatical errors that were not corrected prior to final submission.    Please do not hesitate to call with questions.  Electronically signed by VANIA Vargas, on 4/17/2024 at 9:08 AM     Patient seen and examined in conjunction with MERCEDES Bryan/CNP and agree with the evaluation as noted above.  Patient remains in A-fib with  controlled ventricular response.  Echo shows decline in LV function with EF of 35% as noted above for which cardiac catheterization was recommended.  He was initially agreeable to proceeding, however because of some changes in the schedule, the patient has declined to proceed with the cardiac catheterization as scheduled.  Had a lengthy discussion with him on the need for further ischemic evaluation, but he was adamant about not proceeding with cardiac catheterization and wishes to be discharged.  We will continue with current rate control and anticoagulation and schedule follow-up with Dr. Valle as an outpatient for further evaluation.  AKA

## 2024-04-17 NOTE — DISCHARGE SUMMARY
Discharge Diagnosis  Atrial fibrillation with controlled ventricular response (Multi)  Acute on chronic systolic CHF  CAD    Issues Requiring Follow-Up  Cardiology follow up    Discharge Meds     Your medication list        START taking these medications        Instructions Last Dose Given Next Dose Due   empagliflozin 10 mg  Commonly known as: Jardiance      Take 1 tablet (10 mg) by mouth once daily.       furosemide 20 mg tablet  Commonly known as: Lasix      Take 1 tablet (20 mg) by mouth once daily.       sacubitriL-valsartan 24-26 mg tablet  Commonly known as: Entresto      Take 1 tablet by mouth 2 times a day.              CHANGE how you take these medications        Instructions Last Dose Given Next Dose Due   sertraline 100 mg tablet  Commonly known as: Zoloft  What changed: when to take this      Take 1 tablet (100 mg) by mouth once daily.              CONTINUE taking these medications        Instructions Last Dose Given Next Dose Due   ALPRAZolam 0.5 mg tablet  Commonly known as: Xanax      Take 1 tablet (0.5 mg) by mouth 3 times a day as needed for anxiety.       apixaban 5 mg tablet  Commonly known as: Eliquis      Take 1 tablet (5 mg) by mouth 2 times a day.       atorvastatin 20 mg tablet  Commonly known as: Lipitor      Take 1 tablet (20 mg) by mouth once daily. as directed       b complex vitamins capsule           cholecalciferol 125 MCG (5000 UT) capsule  Commonly known as: Vitamin D-3           dutasteride 0.5 mg capsule  Commonly known as: Avodart      Take 1 capsule (0.5 mg) by mouth once daily.       levETIRAcetam  mg 24 hr tablet  Commonly known as: Keppra XR           metoprolol tartrate 25 mg tablet  Commonly known as: Lopressor      Take 1 tablet (25 mg) by mouth 2 times a day.       mirtazapine 15 mg tablet  Commonly known as: Remeron      Take 1 tablet (15 mg) by mouth once daily at bedtime.              ASK your doctor about these medications        Instructions Last Dose Given  Next Dose Due   levETIRAcetam 500 mg tablet  Commonly known as: Keppra      TAKE 1/2 (ONE-HALF) OF A TABLET BY MOUTH DAILY                 Where to Get Your Medications        These medications were sent to [facility]  [address] , [facility] [address], [facility] [address]      Hours: 9 AM to 5:30 PM Mon-Fri, 9 AM to 1 PM Saturday Phone: [phone]   empagliflozin 10 mg  sacubitriL-valsartan 24-26 mg tablet         Test Results Pending At Discharge  Pending Labs       No current pending labs.            Hospital Course   84-year-old male with past medical history of coronary artery disease status post bypass, newly diagnosed atrial fibrillation, who is presenting to the emergency department with shortness of breath and lower extremity edema.  He was in atrial for with rapid ventricular rate and placed on a diltiazem drip initially and transition back to orals.  His echocardiogram showed a newly depressed EF of 30 to 35%.  Initially plans were for a left heart catheterization to outline his coronary anatomy.  He was started on appropriate GDMT for the heart failure.  Unfortunately his catheterization was delayed due to other procedures and he did not want to wait to be rescheduled later in the day.  He requested to be discharged AGAINST MEDICAL ADVICE.  We discussed the risks of leaving including heart attack worsening shortness of breath even death.  He understood these risks and still preferred to discharge home despite our recommendations.  Will arrange for outpatient cardiology follow-up.  He will also arrange for an outpatient BMP to evaluate his kidney function in light of the new medications.  Greater than 30 minutes spent in discharge activities.    Pertinent Physical Exam At Time of Discharge  Physical Exam  GENERAL: Laying in bed, does not appear to be in any distress.   HEENT: HEAD: Normocephalic atraumatic.  Neck: Supple.  Eyes: Pupils are reactive to direct light.   CVS: S1, S2 heard.  Irregular  rhythm.  LUNGS: Coarse breath sounds appreciated.  ABDOMEN: Soft, nontender to palpate. Positive bowel sounds. No guarding or rebound appreciated.  NEUROLOGICAL: No focal neurological deficits appreciated. Cranial nerves are grossly intact.  EXTREMITIES: Lower extremity edema  SKIN:  Grossly intact, warm and dry.    Outpatient Follow-Up  Future Appointments   Date Time Provider Department Center   4/18/2024  2:30 PM Heriberto Valle MD [id number] [place]   6/27/2024  2:30 PM Isabel Ritter DO [address]         El Shafer MD

## 2024-04-17 NOTE — NURSING NOTE
"Pt refused to take his Xanax this morning, states he will receive Valium when he goes for his Cardiac Cath and he doesn't want to take both.    Pt then became upset, stating he was told his procedure would be at a specific time and he did not have the procedure at that time.    Pt takes Metoprolol d/t increased heart rate. VERONICA Hill informed pt his heart rate was too high, therefore his procedure was pushed back. Pt's heart rate was in the 180's. New order for push Metoprolol was given. Pt's heart rate went down to normal range.    Pt remained unhappy and became increasingly more upset because he still hadn't gone for his Cardiac Cath. Pt refused to talk to anyone. States he is leaving. Pt was informed if he left it would be against medical advise. Pt was informed insurance may not cover hospital stay if he left AMA. Pt stated, \"He did not care\" and began to get dressed. Pt. Refused to sign AMA paperwork.    MD CHETAN Reid and SABI Villegaser both aware of Pt's decision to leave against medical advice.  "

## 2024-04-18 ENCOUNTER — PATIENT OUTREACH (OUTPATIENT)
Dept: CARDIOLOGY | Facility: CLINIC | Age: 84
End: 2024-04-18

## 2024-04-18 ENCOUNTER — PHARMACY VISIT (OUTPATIENT)
Dept: PHARMACY | Facility: CLINIC | Age: 84
End: 2024-04-18
Payer: COMMERCIAL

## 2024-04-18 DIAGNOSIS — I10 PRIMARY HYPERTENSION: ICD-10-CM

## 2024-04-18 RX ORDER — FUROSEMIDE 20 MG/1
20 TABLET ORAL DAILY
Qty: 30 TABLET | Refills: 0 | Status: SHIPPED | OUTPATIENT
Start: 2024-04-18 | End: 2024-05-18

## 2024-04-18 NOTE — NURSING NOTE
Received call from patient's wife with questions regarding his low sodium diet. Answered her questions. Patient did leave AMA. She said she noted that he was to be started on Jardiance, Entresto, and Lasix, however, she called their pharmacy and there were no prescriptions there. I explained that these medications were sent to [facility] and can be picked up there. He was originally scheduled to see Dr. Valle today but was told while he was in the hospital that that appointment was cancelled. I explained that nothing had yet been rescheduled and she was planning to call and get it rescheduled. No other questions or concerns at this time.

## 2024-04-18 NOTE — PROGRESS NOTES
Discharge Facility:  [facility]  Discharge Diagnosis:  Atrial fibrillation with controlled ventricular response (Multi)  Acute on chronic systolic CHF  CAD  Admission Date: 4/15/24  Discharge Date: 4/17/24--AMA    PCP Appointment Date: Dr Ritter 6/27/24  Specialist Appointment Date:  Dr. Valle 4/18/24  Hospital Encounter and Summary: Linked     Patient has an appt within 2 days of discharge     no

## 2024-04-23 ENCOUNTER — PATIENT OUTREACH (OUTPATIENT)
Dept: CARDIOLOGY | Facility: CLINIC | Age: 84
End: 2024-04-23
Payer: MEDICARE

## 2024-04-23 NOTE — PROGRESS NOTES
Unable to reach patient for call back after patient's follow up appointment with PCP.   SAEM with call back number for patient to call if needed   If no voicemail available call attempts x 2 were made to contact the patient to assist with any questions or concerns patient may have.

## 2024-04-24 ENCOUNTER — APPOINTMENT (OUTPATIENT)
Dept: CARDIOLOGY | Facility: HOSPITAL | Age: 84
End: 2024-04-24
Payer: MEDICARE

## 2024-04-24 ENCOUNTER — APPOINTMENT (OUTPATIENT)
Dept: RADIOLOGY | Facility: HOSPITAL | Age: 84
End: 2024-04-24
Payer: MEDICARE

## 2024-04-24 DIAGNOSIS — G47.00 INSOMNIA, UNSPECIFIED TYPE: ICD-10-CM

## 2024-04-25 ENCOUNTER — OFFICE VISIT (OUTPATIENT)
Dept: CARDIOLOGY | Facility: CLINIC | Age: 84
End: 2024-04-25
Payer: MEDICARE

## 2024-04-25 VITALS
WEIGHT: 183 LBS | SYSTOLIC BLOOD PRESSURE: 116 MMHG | BODY MASS INDEX: 25.62 KG/M2 | DIASTOLIC BLOOD PRESSURE: 60 MMHG | HEIGHT: 71 IN | HEART RATE: 84 BPM

## 2024-04-25 DIAGNOSIS — I50.41 ACUTE COMBINED SYSTOLIC AND DIASTOLIC HEART FAILURE (MULTI): ICD-10-CM

## 2024-04-25 DIAGNOSIS — Z87.891 FORMER SMOKER: ICD-10-CM

## 2024-04-25 DIAGNOSIS — I10 PRIMARY HYPERTENSION: ICD-10-CM

## 2024-04-25 DIAGNOSIS — I48.0 PAROXYSMAL ATRIAL FIBRILLATION (MULTI): ICD-10-CM

## 2024-04-25 DIAGNOSIS — E78.2 MIXED HYPERLIPIDEMIA: ICD-10-CM

## 2024-04-25 DIAGNOSIS — I65.23 ATHEROSCLEROSIS OF BOTH CAROTID ARTERIES: ICD-10-CM

## 2024-04-25 DIAGNOSIS — I48.91 ATRIAL FIBRILLATION WITH CONTROLLED VENTRICULAR RESPONSE (MULTI): ICD-10-CM

## 2024-04-25 DIAGNOSIS — I71.21 ANEURYSM OF ASCENDING AORTA WITHOUT RUPTURE (CMS-HCC): ICD-10-CM

## 2024-04-25 DIAGNOSIS — I25.10 CORONARY ARTERY DISEASE INVOLVING NATIVE CORONARY ARTERY OF NATIVE HEART WITHOUT ANGINA PECTORIS: ICD-10-CM

## 2024-04-25 DIAGNOSIS — Z95.1 HISTORY OF CORONARY ARTERY BYPASS GRAFT: ICD-10-CM

## 2024-04-25 PROCEDURE — 99214 OFFICE O/P EST MOD 30 MIN: CPT | Performed by: INTERNAL MEDICINE

## 2024-04-25 PROCEDURE — 1111F DSCHRG MED/CURRENT MED MERGE: CPT | Performed by: INTERNAL MEDICINE

## 2024-04-25 PROCEDURE — 1157F ADVNC CARE PLAN IN RCRD: CPT | Performed by: INTERNAL MEDICINE

## 2024-04-25 PROCEDURE — 3078F DIAST BP <80 MM HG: CPT | Performed by: INTERNAL MEDICINE

## 2024-04-25 PROCEDURE — 1036F TOBACCO NON-USER: CPT | Performed by: INTERNAL MEDICINE

## 2024-04-25 PROCEDURE — 1159F MED LIST DOCD IN RCRD: CPT | Performed by: INTERNAL MEDICINE

## 2024-04-25 PROCEDURE — 1123F ACP DISCUSS/DSCN MKR DOCD: CPT | Performed by: INTERNAL MEDICINE

## 2024-04-25 PROCEDURE — 1160F RVW MEDS BY RX/DR IN RCRD: CPT | Performed by: INTERNAL MEDICINE

## 2024-04-25 PROCEDURE — 3074F SYST BP LT 130 MM HG: CPT | Performed by: INTERNAL MEDICINE

## 2024-04-25 RX ORDER — MIRTAZAPINE 15 MG/1
15 TABLET, FILM COATED ORAL NIGHTLY
Qty: 90 TABLET | Refills: 3 | Status: SHIPPED | OUTPATIENT
Start: 2024-04-25

## 2024-04-25 ASSESSMENT — ENCOUNTER SYMPTOMS
CONSTITUTIONAL NEGATIVE: 1
CARDIOVASCULAR NEGATIVE: 1
NEUROLOGICAL NEGATIVE: 1
RESPIRATORY NEGATIVE: 1

## 2024-04-25 NOTE — PROGRESS NOTES
CARDIOLOGY OFFICE VISIT      CHIEF COMPLAINT      HISTORY OF PRESENT ILLNESS  The patient is being seen after recent hospitalization at [facility] for congestive heart failure.  The patient was having problems with dyspnea with exertion and swelling of his lower extremities.  He went to the hospital because of this.  He was found to be in atrial fibrillation with a controlled ventricular response.  They were going to plan to do cardioversion however he did not wish to have YORDY performed.  Therefore we are going to perform cardioversion in approximately 4 weeks.  However he is going to a graduation of a grandson from college and will not be back till May 20 and will do it soon after that.  He states that since discharge in the hospital he has been doing well.  He denies chest discomfort or symptoms of myocardial ischemia.  He denies dyspnea with activities.  He denies palpitations and syncope.  He denies pretibial edema.  He denies any problem with his current medication.  I told him I would like to increase his Entresto.  He will let me know if any problems with this.  I told him like to obtain lab work in a week or so with his renal profile and BNP.  I told him I will see him back in the office about 1 week after cardioversion with an EKG.    Impression:  Coronary disease no angina  Remote CABG  Former smoker  Hyperlipidemia  Chronic systolic congestive heart failure  Paroxysmal atrial fibrillation  Ischemic cardiomyopathy left ventricular ejection fraction 30 to 35% on echocardiogram 4/16/2024, will repeat an 3 months to see if any improvement left ventricular systolic function with better control of atrial fibrillation  Overweight  History of aneurysm of ascending thoracic aorta    Please excuse any errors in grammar or translation related to this dictation.  Voice recognition software was utilized to prepare this document.    Past Medical History  Past Medical History:   Diagnosis Date    COVID-19 02/16/2022     COVID-19    Epilepsy, unspecified, not intractable, without status epilepticus (Multi) 2020    Epilepsy    Unspecified convulsions (Multi) 2021    Seizures       Social History  Social History     Tobacco Use    Smoking status: Former     Current packs/day: 0.00     Types: Cigarettes     Quit date:      Years since quittin.3    Smokeless tobacco: Never   Vaping Use    Vaping status: Never Used   Substance Use Topics    Alcohol use: Yes     Alcohol/week: 14.0 standard drinks of alcohol     Types: 14 Standard drinks or equivalent per week    Drug use: Never       Family History     Family History   Problem Relation Name Age of Onset    Heart failure Mother      Emphysema Father      Bipolar disorder Brother          Allergies:  No Known Allergies     Outpatient Medications:  Current Outpatient Medications   Medication Instructions    ALPRAZolam (XANAX) 0.5 mg, oral, 3 times daily PRN    apixaban (ELIQUIS) 5 mg, oral, 2 times daily    atorvastatin (LIPITOR) 20 mg, oral, Daily, as directed    b complex vitamins capsule 1 capsule, oral, Daily, After lunch    cholecalciferol (Vitamin D-3) 125 MCG (5000 UT) capsule 1 tablet, oral, Daily, After lunch    dutasteride (AVODART) 0.5 mg, oral, Daily    furosemide (LASIX) 20 mg, oral, Daily    Jardiance 10 mg, oral, Daily    levETIRAcetam (Keppra) 500 mg tablet TAKE 1/2 (ONE-HALF) OF A TABLET BY MOUTH DAILY    metoprolol tartrate (LOPRESSOR) 25 mg, oral, 2 times daily    mirtazapine (REMERON) 15 mg, oral, Nightly    sertraline (ZOLOFT) 100 mg, oral, Daily          REVIEW OF SYSTEMS  Review of Systems   Constitutional: Negative.   Cardiovascular: Negative.    Respiratory: Negative.     Neurological: Negative.    All other systems reviewed and are negative.        VITALS  Vitals:    24 1452   BP: 116/60   Pulse: 84       PHYSICAL EXAM  Constitutional:       Appearance: Healthy appearance.   Eyes:      Pupils: Pupils are equal, round, and reactive to  light.   Pulmonary:      Effort: Pulmonary effort is normal.      Breath sounds: Normal breath sounds.   Cardiovascular:      PMI at left midclavicular line. Normal rate. Irregularly irregular rhythm.      Murmurs:  1/6 murmur at apex      No gallop.  No click. No rub.   Pulses:     Intact distal pulses.   Musculoskeletal: Normal range of motion.      Cervical back: Normal range of motion. Skin:     General: Skin is warm and dry.   Neurological:      General: No focal deficit present.      Mental Status: Alert and oriented to person, place and time.           ASSESSMENT AND PLAN  Problem List Items Addressed This Visit       CAD (coronary artery disease)    Carotid artery plaque    HLD (hyperlipidemia)    HTN (hypertension)    Aneurysm of ascending aorta without rupture (CMS-HCC)    Atrial fibrillation with controlled ventricular response (Multi)    History of coronary artery bypass graft    Acute combined systolic and diastolic heart failure (Multi)    BMI 25.0-25.9,adult    Former smoker     Other Visit Diagnoses       Paroxysmal atrial fibrillation (Multi)                [unfilled]

## 2024-04-30 ENCOUNTER — APPOINTMENT (OUTPATIENT)
Dept: CARDIOLOGY | Facility: CLINIC | Age: 84
End: 2024-04-30
Payer: MEDICARE

## 2024-04-30 ENCOUNTER — PHARMACY VISIT (OUTPATIENT)
Dept: PHARMACY | Facility: CLINIC | Age: 84
End: 2024-04-30
Payer: MEDICARE

## 2024-04-30 PROCEDURE — RXMED WILLOW AMBULATORY MEDICATION CHARGE

## 2024-05-10 DIAGNOSIS — F41.9 ANXIETY: ICD-10-CM

## 2024-05-10 RX ORDER — ALPRAZOLAM 0.5 MG/1
0.5 TABLET ORAL 3 TIMES DAILY PRN
Qty: 30 TABLET | Refills: 0 | Status: SHIPPED | OUTPATIENT
Start: 2024-05-10 | End: 2024-06-10 | Stop reason: SDUPTHER

## 2024-05-10 NOTE — TELEPHONE ENCOUNTER
----- Message from Minh Harrington Jr. sent at 5/10/2024  9:11 AM EDT -----  Regarding: Alprazolam 0.5mg  Contact: [phone]  Christine Watts.   My 30 day refill anniversary is tomorrow, May 11. I still have 6 left, but will be leaving for a week next Tuesday for a grandson's grad from [employer]. I was hoping the Dr would do a new 30 script for me today or Monday, May 13. I am going for the requested blood work on Monday as well.  Next fun time for me is an electrical Cardioversion with Dr. Yanez of [facility] on Friday, May 24th. Will keep you posted. As always thanks to you and the Dr for your help.  Minh Harirngton

## 2024-05-14 ENCOUNTER — LAB (OUTPATIENT)
Dept: LAB | Facility: LAB | Age: 84
End: 2024-05-14
Payer: MEDICARE

## 2024-05-14 DIAGNOSIS — R79.89 ABNORMAL LFTS: ICD-10-CM

## 2024-05-14 DIAGNOSIS — I50.43 CHF (CONGESTIVE HEART FAILURE), NYHA CLASS I, ACUTE ON CHRONIC, COMBINED (MULTI): ICD-10-CM

## 2024-05-14 LAB
ALBUMIN SERPL BCP-MCNC: 4.1 G/DL (ref 3.4–5)
ALP SERPL-CCNC: 56 U/L (ref 33–136)
ALT SERPL W P-5'-P-CCNC: 23 U/L (ref 10–52)
ANION GAP SERPL CALC-SCNC: 12 MMOL/L (ref 10–20)
AST SERPL W P-5'-P-CCNC: 27 U/L (ref 9–39)
BILIRUB SERPL-MCNC: 0.8 MG/DL (ref 0–1.2)
BUN SERPL-MCNC: 32 MG/DL (ref 6–23)
CALCIUM SERPL-MCNC: 9.2 MG/DL (ref 8.6–10.3)
CHLORIDE SERPL-SCNC: 105 MMOL/L (ref 98–107)
CO2 SERPL-SCNC: 29 MMOL/L (ref 21–32)
CREAT SERPL-MCNC: 1.39 MG/DL (ref 0.5–1.3)
EGFRCR SERPLBLD CKD-EPI 2021: 49 ML/MIN/1.73M*2
GLUCOSE SERPL-MCNC: 110 MG/DL (ref 74–99)
POTASSIUM SERPL-SCNC: 4.6 MMOL/L (ref 3.5–5.3)
PROT SERPL-MCNC: 6.5 G/DL (ref 6.4–8.2)
SODIUM SERPL-SCNC: 141 MMOL/L (ref 136–145)

## 2024-05-14 PROCEDURE — 80053 COMPREHEN METABOLIC PANEL: CPT

## 2024-05-14 PROCEDURE — 36415 COLL VENOUS BLD VENIPUNCTURE: CPT

## 2024-05-14 NOTE — RESULT ENCOUNTER NOTE
Call patient liver tests are normalized  A little dehydrated  How much water pill has he been taking

## 2024-05-16 ENCOUNTER — PATIENT OUTREACH (OUTPATIENT)
Dept: CARDIOLOGY | Facility: CLINIC | Age: 84
End: 2024-05-16
Payer: MEDICARE

## 2024-05-22 RX ORDER — SODIUM CHLORIDE 9 MG/ML
20 INJECTION, SOLUTION INTRAVENOUS CONTINUOUS
Status: CANCELLED | OUTPATIENT
Start: 2024-05-22

## 2024-05-24 ENCOUNTER — APPOINTMENT (OUTPATIENT)
Dept: CARDIOLOGY | Facility: HOSPITAL | Age: 84
End: 2024-05-24
Payer: MEDICARE

## 2024-06-03 PROCEDURE — RXMED WILLOW AMBULATORY MEDICATION CHARGE

## 2024-06-04 ENCOUNTER — PHARMACY VISIT (OUTPATIENT)
Dept: PHARMACY | Facility: CLINIC | Age: 84
End: 2024-06-04
Payer: MEDICARE

## 2024-06-10 ENCOUNTER — TELEPHONE (OUTPATIENT)
Dept: CARDIOLOGY | Facility: HOSPITAL | Age: 84
End: 2024-06-10

## 2024-06-10 DIAGNOSIS — F41.9 ANXIETY: ICD-10-CM

## 2024-06-10 RX ORDER — ALPRAZOLAM 0.5 MG/1
0.5 TABLET ORAL 3 TIMES DAILY PRN
Qty: 30 TABLET | Refills: 0 | Status: SHIPPED | OUTPATIENT
Start: 2024-06-10 | End: 2025-02-05

## 2024-06-11 ENCOUNTER — HOSPITAL ENCOUNTER (OUTPATIENT)
Dept: CARDIOLOGY | Facility: HOSPITAL | Age: 84
Discharge: HOME | End: 2024-06-11
Payer: MEDICARE

## 2024-06-11 ENCOUNTER — ANESTHESIA EVENT (OUTPATIENT)
Dept: CARDIOLOGY | Facility: HOSPITAL | Age: 84
End: 2024-06-11
Payer: MEDICARE

## 2024-06-11 ENCOUNTER — ANESTHESIA (OUTPATIENT)
Dept: CARDIOLOGY | Facility: HOSPITAL | Age: 84
End: 2024-06-11
Payer: MEDICARE

## 2024-06-11 VITALS
HEART RATE: 83 BPM | SYSTOLIC BLOOD PRESSURE: 110 MMHG | BODY MASS INDEX: 25.93 KG/M2 | HEIGHT: 71 IN | DIASTOLIC BLOOD PRESSURE: 76 MMHG | TEMPERATURE: 97.2 F | WEIGHT: 185.19 LBS | OXYGEN SATURATION: 98 % | RESPIRATION RATE: 18 BRPM

## 2024-06-11 DIAGNOSIS — I48.0 PAROXYSMAL ATRIAL FIBRILLATION (MULTI): ICD-10-CM

## 2024-06-11 DIAGNOSIS — E78.2 MIXED HYPERLIPIDEMIA: ICD-10-CM

## 2024-06-11 LAB
ANION GAP SERPL CALC-SCNC: 11 MMOL/L (ref 10–20)
APTT PPP: 37 SECONDS (ref 27–38)
ATRIAL RATE: 74 BPM
ATRIAL RATE: 81 BPM
BUN SERPL-MCNC: 21 MG/DL (ref 6–23)
CALCIUM SERPL-MCNC: 9 MG/DL (ref 8.6–10.3)
CHLORIDE SERPL-SCNC: 103 MMOL/L (ref 98–107)
CO2 SERPL-SCNC: 27 MMOL/L (ref 21–32)
CREAT SERPL-MCNC: 1.25 MG/DL (ref 0.5–1.3)
EGFRCR SERPLBLD CKD-EPI 2021: 56 ML/MIN/1.73M*2
GLUCOSE SERPL-MCNC: 93 MG/DL (ref 74–99)
INR PPP: 1.5 (ref 0.9–1.1)
P AXIS: 85 DEGREES
P OFFSET: 187 MS
P ONSET: 137 MS
POTASSIUM SERPL-SCNC: 5.4 MMOL/L (ref 3.5–5.3)
PR INTERVAL: 178 MS
PROTHROMBIN TIME: 17.3 SECONDS (ref 9.8–12.8)
Q ONSET: 226 MS
Q ONSET: 227 MS
QRS COUNT: 13 BEATS
QRS COUNT: 16 BEATS
QRS DURATION: 90 MS
QRS DURATION: 90 MS
QT INTERVAL: 342 MS
QT INTERVAL: 430 MS
QTC CALCULATION(BAZETT): 441 MS
QTC CALCULATION(BAZETT): 477 MS
QTC FREDERICIA: 405 MS
QTC FREDERICIA: 461 MS
R AXIS: 15 DEGREES
R AXIS: 17 DEGREES
SODIUM SERPL-SCNC: 136 MMOL/L (ref 136–145)
T AXIS: 159 DEGREES
T AXIS: 162 DEGREES
T OFFSET: 398 MS
T OFFSET: 441 MS
VENTRICULAR RATE: 100 BPM
VENTRICULAR RATE: 74 BPM

## 2024-06-11 PROCEDURE — 93005 ELECTROCARDIOGRAM TRACING: CPT | Mod: 59

## 2024-06-11 PROCEDURE — 3700000002 HC GENERAL ANESTHESIA TIME - EACH INCREMENTAL 1 MINUTE: Performed by: INTERNAL MEDICINE

## 2024-06-11 PROCEDURE — 7100000009 HC PHASE TWO TIME - INITIAL BASE CHARGE: Performed by: INTERNAL MEDICINE

## 2024-06-11 PROCEDURE — 93010 ELECTROCARDIOGRAM REPORT: CPT | Performed by: INTERNAL MEDICINE

## 2024-06-11 PROCEDURE — 2500000004 HC RX 250 GENERAL PHARMACY W/ HCPCS (ALT 636 FOR OP/ED): Performed by: STUDENT IN AN ORGANIZED HEALTH CARE EDUCATION/TRAINING PROGRAM

## 2024-06-11 PROCEDURE — 7100000010 HC PHASE TWO TIME - EACH INCREMENTAL 1 MINUTE: Performed by: INTERNAL MEDICINE

## 2024-06-11 PROCEDURE — 80048 BASIC METABOLIC PNL TOTAL CA: CPT | Performed by: NURSE PRACTITIONER

## 2024-06-11 PROCEDURE — 85610 PROTHROMBIN TIME: CPT | Performed by: NURSE PRACTITIONER

## 2024-06-11 PROCEDURE — 36415 COLL VENOUS BLD VENIPUNCTURE: CPT | Performed by: NURSE PRACTITIONER

## 2024-06-11 PROCEDURE — 92960 CARDIOVERSION ELECTRIC EXT: CPT | Performed by: INTERNAL MEDICINE

## 2024-06-11 PROCEDURE — 3700000001 HC GENERAL ANESTHESIA TIME - INITIAL BASE CHARGE: Performed by: INTERNAL MEDICINE

## 2024-06-11 PROCEDURE — 2500000004 HC RX 250 GENERAL PHARMACY W/ HCPCS (ALT 636 FOR OP/ED): Performed by: NURSE PRACTITIONER

## 2024-06-11 PROCEDURE — 93005 ELECTROCARDIOGRAM TRACING: CPT

## 2024-06-11 PROCEDURE — 99223 1ST HOSP IP/OBS HIGH 75: CPT | Performed by: NURSE PRACTITIONER

## 2024-06-11 RX ORDER — SODIUM CHLORIDE 9 MG/ML
20 INJECTION, SOLUTION INTRAVENOUS CONTINUOUS
Status: DISCONTINUED | OUTPATIENT
Start: 2024-06-11 | End: 2024-06-12 | Stop reason: HOSPADM

## 2024-06-11 RX ORDER — PROPOFOL 10 MG/ML
INJECTION, EMULSION INTRAVENOUS AS NEEDED
Status: DISCONTINUED | OUTPATIENT
Start: 2024-06-11 | End: 2024-06-11

## 2024-06-11 RX ORDER — ATORVASTATIN CALCIUM 20 MG/1
20 TABLET, FILM COATED ORAL DAILY
Start: 2024-06-11

## 2024-06-11 RX ADMIN — PROPOFOL 50 MG: 10 INJECTION, EMULSION INTRAVENOUS at 10:43

## 2024-06-11 RX ADMIN — SODIUM CHLORIDE 20 ML/HR: 9 INJECTION, SOLUTION INTRAVENOUS at 08:56

## 2024-06-11 ASSESSMENT — ENCOUNTER SYMPTOMS
ALLERGIC/IMMUNOLOGIC NEGATIVE: 1
GASTROINTESTINAL NEGATIVE: 1
LIGHT-HEADEDNESS: 0
ENDOCRINE NEGATIVE: 1
CARDIOVASCULAR NEGATIVE: 1
OCCASIONAL FEELINGS OF UNSTEADINESS: 0
EYES NEGATIVE: 1
DIZZINESS: 0
PSYCHIATRIC NEGATIVE: 1
PALPITATIONS: 0
NEUROLOGICAL NEGATIVE: 1
FATIGUE: 1
CHEST TIGHTNESS: 0
DEPRESSION: 0
SHORTNESS OF BREATH: 1
LOSS OF SENSATION IN FEET: 0

## 2024-06-11 ASSESSMENT — PATIENT HEALTH QUESTIONNAIRE - PHQ9
10. IF YOU CHECKED OFF ANY PROBLEMS, HOW DIFFICULT HAVE THESE PROBLEMS MADE IT FOR YOU TO DO YOUR WORK, TAKE CARE OF THINGS AT HOME, OR GET ALONG WITH OTHER PEOPLE: NOT DIFFICULT AT ALL
SUM OF ALL RESPONSES TO PHQ9 QUESTIONS 1 AND 2: 1
1. LITTLE INTEREST OR PLEASURE IN DOING THINGS: SEVERAL DAYS
2. FEELING DOWN, DEPRESSED OR HOPELESS: NOT AT ALL

## 2024-06-11 ASSESSMENT — COLUMBIA-SUICIDE SEVERITY RATING SCALE - C-SSRS
6. HAVE YOU EVER DONE ANYTHING, STARTED TO DO ANYTHING, OR PREPARED TO DO ANYTHING TO END YOUR LIFE?: NO
1. IN THE PAST MONTH, HAVE YOU WISHED YOU WERE DEAD OR WISHED YOU COULD GO TO SLEEP AND NOT WAKE UP?: NO
2. HAVE YOU ACTUALLY HAD ANY THOUGHTS OF KILLING YOURSELF?: NO

## 2024-06-11 ASSESSMENT — PAIN SCALES - GENERAL: PAIN_LEVEL: 0

## 2024-06-11 NOTE — H&P
History Of Present Illness  Minh Harrington Jr. is a 84 y.o. male with past medical history significant for chronic systolic heart failure, ICM with LVEF 30-35% per ECHO 4/16/2024, CAD, s/p remote CABG, HLD with recent hospitalization for dyspnea/CHF and found to be in atrial fibrillation.  Patient was to undergo YORDY guided DCC during that hospitalization, however did not wish to have a YORDY and therefore it was recommended patient take 4-6 weeks of oral anticoagulation and return for elective outpatient DCC.  Patient requested to wait until after his [employer] college graduation.  He is at [facility] today for this procedure under the care of Dr. Yanez.    Patient denies recent c/o illness, fevers chills or sweats.  Denies c/o light headedness, dizziness, headache, chest pain, SOB or palpitations at the present time.  Denies c/o N/V/D/C.  Denies c/o lower extremity edema or weakness.      Past Medical History  Past Medical History:   Diagnosis Date    Arrhythmia     CHF (congestive heart failure) (Multi)     Coronary artery disease     COVID-19 02/16/2022    COVID-19    Epilepsy, unspecified, not intractable, without status epilepticus (Multi) 11/18/2020    Epilepsy    Hyperlipidemia     Hypertension     Ischemic cardiomyopathy     Unspecified convulsions (Multi) 02/24/2021    Seizures     Surgical History  Past Surgical History:   Procedure Laterality Date    OTHER SURGICAL HISTORY  02/12/2019    Angioplasty    OTHER SURGICAL HISTORY  02/12/2019    Appendectomy    OTHER SURGICAL HISTORY  02/12/2019    Bypass     Social History  Remote tobacco use  Frequent alcohol use (14 drinks per week)  Denies drug use    Family History  Family History   Problem Relation Name Age of Onset    Heart failure Mother      Emphysema Father      Bipolar disorder Brother       Allergies  Patient has no known allergies.    Review of Systems   Constitutional:  Positive for fatigue.   HENT: Negative.     Eyes: Negative.   "  Respiratory:  Positive for shortness of breath. Negative for chest tightness.    Cardiovascular: Negative.  Negative for palpitations.   Gastrointestinal: Negative.    Endocrine: Negative.    Genitourinary: Negative.    Skin: Negative.    Allergic/Immunologic: Negative.    Neurological: Negative.  Negative for dizziness and light-headedness.   Psychiatric/Behavioral: Negative.     All other systems reviewed and are negative.    Physical Exam  Vitals reviewed.   Constitutional:       Appearance: Normal appearance.   HENT:      Head: Normocephalic and atraumatic.      Nose: Nose normal.      Mouth/Throat:      Mouth: Mucous membranes are moist.      Pharynx: Oropharynx is clear.   Eyes:      Pupils: Pupils are equal, round, and reactive to light.   Cardiovascular:      Rate and Rhythm: Normal rate. Rhythm irregular.      Pulses: Normal pulses.      Heart sounds: Normal heart sounds.   Pulmonary:      Effort: Pulmonary effort is normal.      Breath sounds: Normal breath sounds.   Abdominal:      General: Bowel sounds are normal.      Palpations: Abdomen is soft.   Musculoskeletal:         General: Normal range of motion.      Cervical back: Normal range of motion and neck supple.   Skin:     General: Skin is warm and dry.   Neurological:      General: No focal deficit present.      Mental Status: He is alert and oriented to person, place, and time.   Psychiatric:         Mood and Affect: Mood normal.         Behavior: Behavior normal.       Last Recorded Vitals  Blood pressure 110/76, pulse 83, temperature 36.2 °C (97.2 °F), temperature source Temporal, resp. rate 18, height 1.791 m (5' 10.5\"), weight 84 kg (185 lb 3 oz), SpO2 94%.    Relevant Results  Labs pending at this time     Results for orders placed or performed during the hospital encounter of 06/11/24 (from the past 24 hour(s))   ECG 12 Lead   Result Value Ref Range    Ventricular Rate 100 BPM    Atrial Rate 81 BPM    QRS Duration 90 ms    QT Interval 342 " ms    QTC Calculation(Bazett) 441 ms    R Axis 15 degrees    T Axis 159 degrees    QRS Count 16 beats    Q Onset 227 ms    T Offset 398 ms    QTC Fredericia 405 ms        Assessment/Plan   Persistent Atrial Fibrillation   -Maintained on metoprolol tartrate 25mg PO BID and anticoagulated with Eliquis 5mg PO BID.   -DCC today under the care of Dr. Yanez.  2.    Chronic systolic heart failure   -currently compensated  3.    Ischemic cardiomyopathy   -LVEF 30 to 35% per echocardiogram in April, 2024  4.    Coronary artery disease   -S/p remote CABG  5.    Benign essential hypertension   -Stable  6.    Hyperlipidemia   -On statin therapy.  7.    Hx of AAA    I spent 75 minutes in the professional and overall care of this patient.      Mariely Urias, APRN-CNP

## 2024-06-11 NOTE — DISCHARGE INSTRUCTIONS
CARDIOVERSION DISCHARGE INSTRUCTIONS    FOR SUDDEN AND SEVERE CHEST PAIN, SHORTNESS OF BREATH, SIGNS OF STROKE OR CHANGES IN MENTAL STATUS YOU SHOULD CALL 911 IMMEDIATELY.    FOR NEXT 24 HOURS  - Upon discharge, you should return home and rest for the remainder of the day and evening. You do not have to stay on bed rest but should not be very active.    It is recommended a responsible adult be with you for the first 24 hours after the procedure.    - No driving for 24 hours after procedure.  Please arrange for someone to drive you home from the hospital today.  No driving until your follow-up appointment with your provider if you have had a passing out spell in the recent past or previously restricted from driving.     - Do not drive, operate machinery, or use power tools for 24 hours after your procedure.     - Do not make any legal decisions for 24 hours after your procedure.     - Do not drink alcoholic beverages for 24 hours after your procedure.

## 2024-06-11 NOTE — ANESTHESIA POSTPROCEDURE EVALUATION
Patient: Minh Harrington Jr.    Procedure Summary       Date: 06/11/24 Room / Location: [facility]    Anesthesia Start: 1041 Anesthesia Stop: 1048    Procedure: CARDIOVERSION EXTERNAL Diagnosis: Paroxysmal atrial fibrillation (Multi)    Scheduled Providers: Fausto Yanez MD Responsible Provider: Ministerio Ya DO    Anesthesia Type: MAC ASA Status: 3            Anesthesia Type: MAC    Vitals Value Taken Time   /71 06/11/24 1048   Pulse 70 06/11/24 1048   Resp 10 06/11/24 1048   SpO2 98 % 06/11/24 1036       Anesthesia Post Evaluation    Patient location during evaluation: bedside  Patient participation: complete - patient participated  Level of consciousness: awake and alert  Pain score: 0  Pain management: adequate  Airway patency: patent  Cardiovascular status: acceptable  Respiratory status: acceptable  Hydration status: acceptable  Postoperative Nausea and Vomiting: none        There were no known notable events for this encounter.

## 2024-06-11 NOTE — ANESTHESIA PREPROCEDURE EVALUATION
Patient: Minh Harrington Jr.    Procedure Information       Date/Time: 06/11/24 1030    Scheduled providers: Fausto Yanez MD    Procedure: CARDIOVERSION EXTERNAL    Location: [facility]            Relevant Problems   Cardiac   (+) Abnormal EKG   (+) Acute congestive heart failure (Multi)   (+) Aneurysm of ascending aorta without rupture (CMS-HCC)   (+) Atrial fibrillation with controlled ventricular response (Multi)   (+) CAD (coronary artery disease)   (+) HLD (hyperlipidemia)   (+) HTN (hypertension)   (+) History of coronary artery bypass graft   (+) Mitral valve insufficiency      Neuro   (+) Anxiety   (+) Carotid artery plaque   (+) Moderate episode of recurrent major depressive disorder (Multi)      /Renal   (+) BPH (benign prostatic hyperplasia)      Hematology   (+) Thrombocytopenia (CMS-HCC)      ID   (+) Herpes zoster      Skin   (+) Skin cancer       Clinical information reviewed:   Tobacco  Allergies  Meds   Med Hx  Surg Hx   Fam Hx  Soc Hx        NPO Detail:  NPO/Void Status  Date of Last Liquid: 06/10/24  Time of Last Liquid: 2300  Date of Last Solid: 06/10/24  Time of Last Solid: 1900  Last Intake Type: Clear fluids  Time of Last Void: 0700         Physical Exam    Airway  Mallampati: II     Cardiovascular   Rhythm: regular  Rate: normal     Dental    Pulmonary - normal exam     Abdominal - normal exam             Anesthesia Plan    History of general anesthesia?: yes  History of complications of general anesthesia?: no    ASA 3     MAC     intravenous induction   Postoperative administration of opioids is intended.  Anesthetic plan and risks discussed with patient.

## 2024-06-14 ENCOUNTER — PATIENT OUTREACH (OUTPATIENT)
Dept: CARDIOLOGY | Facility: CLINIC | Age: 84
End: 2024-06-14
Payer: MEDICARE

## 2024-06-27 ENCOUNTER — APPOINTMENT (OUTPATIENT)
Dept: PRIMARY CARE | Facility: CLINIC | Age: 84
End: 2024-06-27
Payer: MEDICARE

## 2024-06-27 VITALS
BODY MASS INDEX: 26.48 KG/M2 | RESPIRATION RATE: 16 BRPM | DIASTOLIC BLOOD PRESSURE: 68 MMHG | TEMPERATURE: 97.8 F | WEIGHT: 185 LBS | OXYGEN SATURATION: 97 % | HEART RATE: 88 BPM | HEIGHT: 70 IN | SYSTOLIC BLOOD PRESSURE: 112 MMHG

## 2024-06-27 DIAGNOSIS — F41.9 ANXIETY: ICD-10-CM

## 2024-06-27 DIAGNOSIS — Z00.00 HEALTHCARE MAINTENANCE: ICD-10-CM

## 2024-06-27 DIAGNOSIS — E55.9 VITAMIN D DEFICIENCY: ICD-10-CM

## 2024-06-27 DIAGNOSIS — I50.41 ACUTE COMBINED SYSTOLIC AND DIASTOLIC HEART FAILURE (MULTI): ICD-10-CM

## 2024-06-27 DIAGNOSIS — R73.9 HYPERGLYCEMIA: ICD-10-CM

## 2024-06-27 DIAGNOSIS — F33.1 MODERATE EPISODE OF RECURRENT MAJOR DEPRESSIVE DISORDER (MULTI): ICD-10-CM

## 2024-06-27 DIAGNOSIS — G47.33 OBSTRUCTIVE SLEEP APNEA SYNDROME: ICD-10-CM

## 2024-06-27 DIAGNOSIS — N18.31 STAGE 3A CHRONIC KIDNEY DISEASE (MULTI): Primary | ICD-10-CM

## 2024-06-27 DIAGNOSIS — I48.91 ATRIAL FIBRILLATION WITH CONTROLLED VENTRICULAR RESPONSE (MULTI): ICD-10-CM

## 2024-06-27 DIAGNOSIS — I10 PRIMARY HYPERTENSION: ICD-10-CM

## 2024-06-27 DIAGNOSIS — I50.43 CHF (CONGESTIVE HEART FAILURE), NYHA CLASS I, ACUTE ON CHRONIC, COMBINED (MULTI): ICD-10-CM

## 2024-06-27 DIAGNOSIS — E78.2 MIXED HYPERLIPIDEMIA: ICD-10-CM

## 2024-06-27 DIAGNOSIS — I25.810 CORONARY ARTERY DISEASE INVOLVING CORONARY BYPASS GRAFT OF NATIVE HEART WITHOUT ANGINA PECTORIS: ICD-10-CM

## 2024-06-27 PROBLEM — R09.89 CAROTID BRUIT: Status: RESOLVED | Noted: 2023-07-17 | Resolved: 2024-06-27

## 2024-06-27 PROBLEM — R94.31 ABNORMAL EKG: Status: RESOLVED | Noted: 2024-03-26 | Resolved: 2024-06-27

## 2024-06-27 PROCEDURE — 3078F DIAST BP <80 MM HG: CPT | Performed by: INTERNAL MEDICINE

## 2024-06-27 PROCEDURE — 1157F ADVNC CARE PLAN IN RCRD: CPT | Performed by: INTERNAL MEDICINE

## 2024-06-27 PROCEDURE — 1160F RVW MEDS BY RX/DR IN RCRD: CPT | Performed by: INTERNAL MEDICINE

## 2024-06-27 PROCEDURE — 1159F MED LIST DOCD IN RCRD: CPT | Performed by: INTERNAL MEDICINE

## 2024-06-27 PROCEDURE — 3074F SYST BP LT 130 MM HG: CPT | Performed by: INTERNAL MEDICINE

## 2024-06-27 PROCEDURE — 99214 OFFICE O/P EST MOD 30 MIN: CPT | Performed by: INTERNAL MEDICINE

## 2024-06-27 PROCEDURE — 1036F TOBACCO NON-USER: CPT | Performed by: INTERNAL MEDICINE

## 2024-06-27 PROCEDURE — G0439 PPPS, SUBSEQ VISIT: HCPCS | Performed by: INTERNAL MEDICINE

## 2024-06-27 PROCEDURE — 1123F ACP DISCUSS/DSCN MKR DOCD: CPT | Performed by: INTERNAL MEDICINE

## 2024-06-27 PROCEDURE — 1158F ADVNC CARE PLAN TLK DOCD: CPT | Performed by: INTERNAL MEDICINE

## 2024-06-27 PROCEDURE — 1170F FXNL STATUS ASSESSED: CPT | Performed by: INTERNAL MEDICINE

## 2024-06-27 ASSESSMENT — ACTIVITIES OF DAILY LIVING (ADL)
DRESSING: INDEPENDENT
DOING_HOUSEWORK: INDEPENDENT
BATHING: INDEPENDENT
MANAGING_FINANCES: INDEPENDENT
TAKING_MEDICATION: INDEPENDENT
GROCERY_SHOPPING: INDEPENDENT

## 2024-06-27 ASSESSMENT — ENCOUNTER SYMPTOMS
OCCASIONAL FEELINGS OF UNSTEADINESS: 0
LOSS OF SENSATION IN FEET: 0
FATIGUE: 0
FEVER: 0
DEPRESSION: 0
COUGH: 0
SHORTNESS OF BREATH: 0

## 2024-06-27 NOTE — PROGRESS NOTES
Subjective   Reason for Visit: Minh Harrington Jr. is an 84 y.o. male here for a Medicare Wellness visit.     Past Medical, Surgical, and Family History reviewed and updated in chart.         HPI  Influenza 2023  Covid 2021, 2022  PCV13 2016  PPSV23 2020  Shingrix decline  Tdap  Colonoscopy age  Psa age  Adv Dir Yes  Bmi 26  Eye exam 24  Fall risk 24  Depression screen 24    Pt fatigued  Tired during day  Has had eradic bp  Snoring during night per wife  Recent dx of chf and afib    OARRS:  Isabel Ritter DO on 7/2/2024  3:21 PM  I have personally reviewed the OARRS report for Minh Harrington Jr.. I have considered the risks of abuse, dependence, addiction and diversion    Is the patient prescribed a combination of a benzodiazepine and opioid?  Yes, I feel it is clincially indicated to continue the medication and have discussed with the patient risks/benefits/alternatives.    Last Urine Drug Screen / ordered today: Yes  No results found for this or any previous visit (from the past 8760 hour(s)).  Results are as expected.     Clinical rationale for not completing a Urine Drug Screen: today      Controlled Substance Agreement:  Date of the Last Agreement: 7/17/2023  Reviewed Controlled Substance Agreement including but not limited to the benefits, risks, and alternatives to treatment with a Controlled Substance medication(s).    Benzodiazepines:  What is the patient's goal of therapy? Anxiety relief  Is this being achieved with current treatment? Yes    SANFORD-7:  No data recorded    Activities of Daily Living:   Is your overall impression that this patient is benefiting (symptom reduction outweighs side effects) from benzodiazepine therapy? Yes     1. Physical Functioning: Better  2. Family Relationship: Better  3. Social Relationship: Better  4. Mood: Better  5. Sleep Patterns: Better  6. Overall Function: Better    Patient Care Team:  Isabel Rtiter DO as PCP - General  Isabel Ritter DO as PCP -  "[employer] Medicare Advantage PCP  Curt Shine MD as Consulting Physician (Cardiology)  Heriberto Valle MD as Cardiologist (Cardiology)  Veda Davis RN as Care Manager (Case Management)     Review of Systems   Constitutional:  Negative for fatigue and fever.   Respiratory:  Negative for cough and shortness of breath.    Cardiovascular:  Negative for chest pain and leg swelling.   All other systems reviewed and are negative.      Objective   Vitals:  /68   Pulse 88   Temp 36.6 °C (97.8 °F)   Resp 16   Ht 1.778 m (5' 10\")   Wt 83.9 kg (185 lb)   SpO2 97%   BMI 26.54 kg/m²       Physical Exam  Vitals and nursing note reviewed.   Constitutional:       Appearance: Normal appearance.   HENT:      Head: Normocephalic.   Eyes:      Conjunctiva/sclera: Conjunctivae normal.      Pupils: Pupils are equal, round, and reactive to light.   Cardiovascular:      Rate and Rhythm: Normal rate and regular rhythm.      Pulses: Normal pulses.      Heart sounds: Normal heart sounds.   Pulmonary:      Effort: Pulmonary effort is normal.      Breath sounds: Normal breath sounds.   Musculoskeletal:         General: No swelling.      Cervical back: Neck supple.   Skin:     General: Skin is warm and dry.   Neurological:      General: No focal deficit present.      Mental Status: He is oriented to person, place, and time.       Assessment/Plan   Problem List Items Addressed This Visit       Anxiety    CAD (coronary artery disease)    HLD (hyperlipidemia)    HTN (hypertension)    Hyperglycemia    Vitamin D deficiency    Moderate episode of recurrent major depressive disorder (Multi)    Atrial fibrillation with controlled ventricular response (Multi)    Acute combined systolic and diastolic heart failure (Multi)    Stage 3a chronic kidney disease (Multi) - Primary     Other Visit Diagnoses       CHF (congestive heart failure), NYHA class I, acute on chronic, combined (Multi)        Relevant Medications    " empagliflozin (Jardiance) 10 mg    Healthcare maintenance        Obstructive sleep apnea syndrome        Relevant Orders    Home sleep apnea test (HSAT)        Check labs  Cont meds  Update preventive  I have personally reviewed patients OARRS report.  This report is scanned into the emr.  I have considered the risks of abuse, dependence, addiction and diversion.  Stable based on symptoms and exam.  Continue established treatment plan and follow up at least yearly.  Reviewed all labs and records  Pt considering home sleep study - agreeable

## 2024-06-28 ENCOUNTER — APPOINTMENT (OUTPATIENT)
Dept: CARDIOLOGY | Facility: CLINIC | Age: 84
End: 2024-06-28
Payer: MEDICARE

## 2024-07-02 DIAGNOSIS — F41.9 ANXIETY: ICD-10-CM

## 2024-07-02 RX ORDER — ALPRAZOLAM 0.5 MG/1
0.5 TABLET ORAL 3 TIMES DAILY PRN
Qty: 30 TABLET | Refills: 0 | Status: SHIPPED | OUTPATIENT
Start: 2024-07-02 | End: 2025-02-27

## 2024-07-08 DIAGNOSIS — E78.2 MIXED HYPERLIPIDEMIA: ICD-10-CM

## 2024-07-08 RX ORDER — ATORVASTATIN CALCIUM 20 MG/1
20 TABLET, FILM COATED ORAL DAILY
Qty: 90 TABLET | Refills: 3 | Status: SHIPPED | OUTPATIENT
Start: 2024-07-08

## 2024-07-09 ENCOUNTER — APPOINTMENT (OUTPATIENT)
Dept: CARDIOLOGY | Facility: CLINIC | Age: 84
End: 2024-07-09
Payer: MEDICARE

## 2024-07-10 ENCOUNTER — APPOINTMENT (OUTPATIENT)
Dept: CARDIOLOGY | Facility: CLINIC | Age: 84
End: 2024-07-10
Payer: MEDICARE

## 2024-07-25 ENCOUNTER — HOSPITAL ENCOUNTER (OUTPATIENT)
Dept: CARDIOLOGY | Facility: CLINIC | Age: 84
Discharge: HOME | End: 2024-07-25
Payer: MEDICARE

## 2024-07-25 VITALS — HEIGHT: 70 IN | BODY MASS INDEX: 26.48 KG/M2 | WEIGHT: 185 LBS

## 2024-07-25 DIAGNOSIS — I25.10 CORONARY ARTERY DISEASE INVOLVING NATIVE CORONARY ARTERY OF NATIVE HEART WITHOUT ANGINA PECTORIS: ICD-10-CM

## 2024-07-25 DIAGNOSIS — I50.41 ACUTE COMBINED SYSTOLIC AND DIASTOLIC HEART FAILURE (MULTI): ICD-10-CM

## 2024-07-25 PROCEDURE — 93306 TTE W/DOPPLER COMPLETE: CPT | Performed by: INTERNAL MEDICINE

## 2024-07-25 PROCEDURE — 93306 TTE W/DOPPLER COMPLETE: CPT

## 2024-07-26 LAB
AORTIC VALVE MEAN GRADIENT: 4 MMHG
AORTIC VALVE PEAK VELOCITY: 1.25 M/S
AV PEAK GRADIENT: 6.3 MMHG
AVA (PEAK VEL): 2.69 CM2
AVA (VTI): 2.8 CM2
EJECTION FRACTION APICAL 4 CHAMBER: 34.9
EJECTION FRACTION: 33 %
LEFT VENTRICLE INTERNAL DIMENSION DIASTOLE: 4.8 CM (ref 3.5–6)
LEFT VENTRICULAR OUTFLOW TRACT DIAMETER: 2.5 CM
LV EJECTION FRACTION BIPLANE: 31 %
MITRAL VALVE E/E' RATIO: 5.4
RIGHT VENTRICLE PEAK SYSTOLIC PRESSURE: 52.8 MMHG

## 2024-07-29 DIAGNOSIS — F41.9 ANXIETY: ICD-10-CM

## 2024-07-29 RX ORDER — ALPRAZOLAM 0.5 MG/1
0.5 TABLET ORAL 3 TIMES DAILY PRN
Qty: 30 TABLET | Refills: 0 | Status: SHIPPED | OUTPATIENT
Start: 2024-07-29 | End: 2025-03-26

## 2024-08-01 ENCOUNTER — APPOINTMENT (OUTPATIENT)
Dept: CARDIOLOGY | Facility: CLINIC | Age: 84
End: 2024-08-01
Payer: MEDICARE

## 2024-08-01 VITALS
BODY MASS INDEX: 26.05 KG/M2 | DIASTOLIC BLOOD PRESSURE: 64 MMHG | WEIGHT: 182 LBS | SYSTOLIC BLOOD PRESSURE: 114 MMHG | HEIGHT: 70 IN | HEART RATE: 73 BPM

## 2024-08-01 DIAGNOSIS — I10 PRIMARY HYPERTENSION: ICD-10-CM

## 2024-08-01 DIAGNOSIS — E78.2 MIXED HYPERLIPIDEMIA: ICD-10-CM

## 2024-08-01 DIAGNOSIS — Z87.891 FORMER SMOKER: ICD-10-CM

## 2024-08-01 DIAGNOSIS — I34.0 NONRHEUMATIC MITRAL VALVE REGURGITATION: ICD-10-CM

## 2024-08-01 DIAGNOSIS — I50.41 ACUTE COMBINED SYSTOLIC AND DIASTOLIC HEART FAILURE (MULTI): ICD-10-CM

## 2024-08-01 DIAGNOSIS — I25.10 CORONARY ARTERY DISEASE INVOLVING NATIVE CORONARY ARTERY OF NATIVE HEART WITHOUT ANGINA PECTORIS: ICD-10-CM

## 2024-08-01 PROCEDURE — 1159F MED LIST DOCD IN RCRD: CPT | Performed by: INTERNAL MEDICINE

## 2024-08-01 PROCEDURE — 1157F ADVNC CARE PLAN IN RCRD: CPT | Performed by: INTERNAL MEDICINE

## 2024-08-01 PROCEDURE — 3074F SYST BP LT 130 MM HG: CPT | Performed by: INTERNAL MEDICINE

## 2024-08-01 PROCEDURE — 99214 OFFICE O/P EST MOD 30 MIN: CPT | Performed by: INTERNAL MEDICINE

## 2024-08-01 PROCEDURE — 1123F ACP DISCUSS/DSCN MKR DOCD: CPT | Performed by: INTERNAL MEDICINE

## 2024-08-01 PROCEDURE — 3078F DIAST BP <80 MM HG: CPT | Performed by: INTERNAL MEDICINE

## 2024-08-01 PROCEDURE — 1036F TOBACCO NON-USER: CPT | Performed by: INTERNAL MEDICINE

## 2024-08-01 RX ORDER — FUROSEMIDE 20 MG/1
TABLET ORAL
Qty: 30 TABLET | Refills: 1 | Status: SHIPPED | OUTPATIENT
Start: 2024-08-01

## 2024-08-01 ASSESSMENT — ENCOUNTER SYMPTOMS
CARDIOVASCULAR NEGATIVE: 1
RESPIRATORY NEGATIVE: 1
NEUROLOGICAL NEGATIVE: 1
CONSTITUTIONAL NEGATIVE: 1

## 2024-08-01 NOTE — PATIENT INSTRUCTIONS
2 months visit  PRN furosemide 20 mg for swelling    Patient educated on proper medication use.   Patient educated on risk factor modification.   Please bring any lab results from other providers / physicians to your next appointment.     Please bring all medicines, vitamins, and herbal supplements with you when you come to the office.     Prescriptions will not be filled unless you are compliant with your follow up appointments or have a follow up appointment scheduled as per instruction of your physician. Refills should be requested at the time of your visit.

## 2024-08-01 NOTE — PROGRESS NOTES
CARDIOLOGY OFFICE VISIT      CHIEF COMPLAINT      HISTORY OF PRESENT ILLNESS    The patient states that he is feeling well.  He states he had cardioversion without any problems but shortly thereafter he went back in atrial fibrillation.  The patient states that he wants to just keep doing the way he is doing since he is asymptomatic.  I did talk to him about drug loading and retrying cardioversion.  He states she has no interest in doing that.  He states that he is upset from his hospitalization in April.  He states she does not want to go back to the hospital anymore unless he absolutely has to.  He continues to be physically quite active with exercising.  He denies chest discomfort.  He denies dyspnea.  He denies presyncope and syncope.  He denies any problem with his medications.  He denies any problem with bleeding.  I did go over his echocardiogram with him from .  This demonstrates moderately to severely dilated left atrium and left ventricular ejection fraction of 30 to 35%.      Impression:  Coronary disease no angina  Remote CABG  Former smoker  Hyperlipidemia  Chronic systolic congestive heart failure  Permanent atrial fibrillation  Ischemic cardiomyopathy left ventricular ejection fraction 30 to 35% on echocardiogram   Overweight  History of aneurysm of ascending thoracic aorta     Please excuse any errors in grammar or translation related to this dictation.  Voice recognition software was utilized to prepare this document    Past Medical History      Social History  Social History     Tobacco Use    Smoking status: Former     Current packs/day: 0.00     Average packs/day: 1 pack/day for 40.0 years (40.0 ttl pk-yrs)     Types: Cigarettes     Start date: 1957     Quit date:      Years since quittin.6    Smokeless tobacco: Never   Vaping Use    Vaping status: Never Used   Substance Use Topics    Alcohol use: Yes     Alcohol/week: 14.0 standard drinks of alcohol     Types: 14 Standard  drinks or equivalent per week     Comment: daily    Drug use: Never       Family History     Family History   Problem Relation Name Age of Onset    Heart failure Mother Nandaly     Emphysema Father Saroj John.     Lung disease Father Saroj John.     Bipolar disorder Brother          Allergies:  No Known Allergies     Outpatient Medications:  Current Outpatient Medications   Medication Instructions    ALPRAZolam (XANAX) 0.5 mg, oral, 3 times daily PRN    apixaban (ELIQUIS) 5 mg, oral, 2 times daily    atorvastatin (LIPITOR) 20 mg, oral, Daily, as directed    b complex vitamins capsule 1 capsule, oral, Daily, After lunch    cholecalciferol (Vitamin D-3) 125 MCG (5000 UT) capsule 1 tablet, oral, Daily, After lunch    dutasteride (AVODART) 0.5 mg, oral, Daily    empagliflozin (JARDIANCE) 10 mg, oral, Daily    furosemide (LASIX) 20 mg, oral, Daily    levETIRAcetam (Keppra) 500 mg tablet TAKE 1/2 (ONE-HALF) OF A TABLET BY MOUTH DAILY    metoprolol tartrate (LOPRESSOR) 25 mg, oral, 2 times daily    mirtazapine (REMERON) 15 mg, oral, Nightly    sacubitriL-valsartan (Entresto) 49-51 mg tablet 1 tablet, oral, 2 times daily    sertraline (ZOLOFT) 100 mg, oral, Daily          REVIEW OF SYSTEMS  Review of Systems   Constitutional: Negative.   Cardiovascular: Negative.    Respiratory: Negative.     Neurological: Negative.    All other systems reviewed and are negative.        VITALS  Vitals:    08/01/24 1400   BP: 114/64   Pulse: 73       PHYSICAL EXAM  Constitutional:       Appearance: Healthy appearance.   Eyes:      Pupils: Pupils are equal, round, and reactive to light.   Pulmonary:      Effort: Pulmonary effort is normal.      Breath sounds: Normal breath sounds.   Cardiovascular:      PMI at left midclavicular line. Normal rate. Regular rhythm.      Murmurs: There is no murmur.      No gallop.  No click. No rub.   Pulses:     Intact distal pulses.   Musculoskeletal: Normal range of motion.      Cervical back: Normal range of  motion. Skin:     General: Skin is warm and dry.   Neurological:      General: No focal deficit present.      Mental Status: Alert and oriented to person, place and time.           ASSESSMENT AND PLAN  Problem List Items Addressed This Visit       CAD (coronary artery disease)    HLD (hyperlipidemia)    HTN (hypertension)    Mitral valve insufficiency    Acute combined systolic and diastolic heart failure (Multi)    BMI 26.0-26.9,adult    Former smoker       [unfilled]

## 2024-08-05 ENCOUNTER — APPOINTMENT (OUTPATIENT)
Dept: CARDIOLOGY | Facility: HOSPITAL | Age: 84
End: 2024-08-05
Payer: MEDICARE

## 2024-08-05 ENCOUNTER — APPOINTMENT (OUTPATIENT)
Dept: RADIOLOGY | Facility: HOSPITAL | Age: 84
End: 2024-08-05
Payer: MEDICARE

## 2024-08-05 ENCOUNTER — HOSPITAL ENCOUNTER (INPATIENT)
Facility: HOSPITAL | Age: 84
LOS: 3 days | Discharge: HOME | End: 2024-08-08
Attending: EMERGENCY MEDICINE | Admitting: INTERNAL MEDICINE
Payer: MEDICARE

## 2024-08-05 DIAGNOSIS — I48.91 ATRIAL FIBRILLATION WITH CONTROLLED VENTRICULAR RESPONSE (MULTI): ICD-10-CM

## 2024-08-05 DIAGNOSIS — N32.89 BLADDER MASS: ICD-10-CM

## 2024-08-05 DIAGNOSIS — R31.9 HEMATURIA, UNSPECIFIED TYPE: Primary | ICD-10-CM

## 2024-08-05 PROBLEM — I50.42 CHRONIC COMBINED SYSTOLIC (CONGESTIVE) AND DIASTOLIC (CONGESTIVE) HEART FAILURE (MULTI): Status: ACTIVE | Noted: 2024-08-05

## 2024-08-05 PROBLEM — D68.32 HEMORRHAGIC DISORDER DUE TO EXTRINSIC CIRCULATING ANTICOAGULANTS (MULTI): Status: ACTIVE | Noted: 2024-08-05

## 2024-08-05 PROBLEM — R31.0 GROSS HEMATURIA: Status: ACTIVE | Noted: 2024-08-05

## 2024-08-05 LAB
ABO GROUP (TYPE) IN BLOOD: NORMAL
ALBUMIN SERPL BCP-MCNC: 4.1 G/DL (ref 3.4–5)
ALP SERPL-CCNC: 70 U/L (ref 33–136)
ALT SERPL W P-5'-P-CCNC: 21 U/L (ref 10–52)
ANION GAP SERPL CALC-SCNC: 11 MMOL/L (ref 10–20)
ANTIBODY SCREEN: NORMAL
APPEARANCE UR: ABNORMAL
APTT PPP: 44 SECONDS (ref 27–38)
AST SERPL W P-5'-P-CCNC: 24 U/L (ref 9–39)
BASOPHILS # BLD AUTO: 0.03 X10*3/UL (ref 0–0.1)
BASOPHILS NFR BLD AUTO: 0.4 %
BILIRUB SERPL-MCNC: 1 MG/DL (ref 0–1.2)
BILIRUB UR STRIP.AUTO-MCNC: NEGATIVE MG/DL
BUN SERPL-MCNC: 21 MG/DL (ref 6–23)
CALCIUM SERPL-MCNC: 8.9 MG/DL (ref 8.6–10.3)
CHLORIDE SERPL-SCNC: 103 MMOL/L (ref 98–107)
CO2 SERPL-SCNC: 27 MMOL/L (ref 21–32)
COLOR UR: ABNORMAL
CREAT SERPL-MCNC: 1.07 MG/DL (ref 0.5–1.3)
EGFRCR SERPLBLD CKD-EPI 2021: 68 ML/MIN/1.73M*2
EOSINOPHIL # BLD AUTO: 0.07 X10*3/UL (ref 0–0.4)
EOSINOPHIL NFR BLD AUTO: 1 %
ERYTHROCYTE [DISTWIDTH] IN BLOOD BY AUTOMATED COUNT: 14.5 % (ref 11.5–14.5)
GLUCOSE SERPL-MCNC: 92 MG/DL (ref 74–99)
GLUCOSE UR STRIP.AUTO-MCNC: ABNORMAL MG/DL
HCT VFR BLD AUTO: 46.8 % (ref 41–52)
HGB BLD-MCNC: 15 G/DL (ref 13.5–17.5)
HOLD SPECIMEN: NORMAL
IMM GRANULOCYTES # BLD AUTO: 0.05 X10*3/UL (ref 0–0.5)
IMM GRANULOCYTES NFR BLD AUTO: 0.7 % (ref 0–0.9)
INR PPP: 1.4 (ref 0.9–1.1)
KETONES UR STRIP.AUTO-MCNC: NEGATIVE MG/DL
LEUKOCYTE ESTERASE UR QL STRIP.AUTO: NEGATIVE
LYMPHOCYTES # BLD AUTO: 1.06 X10*3/UL (ref 0.8–3)
LYMPHOCYTES NFR BLD AUTO: 14.9 %
MCH RBC QN AUTO: 31.1 PG (ref 26–34)
MCHC RBC AUTO-ENTMCNC: 32.1 G/DL (ref 32–36)
MCV RBC AUTO: 97 FL (ref 80–100)
MONOCYTES # BLD AUTO: 0.76 X10*3/UL (ref 0.05–0.8)
MONOCYTES NFR BLD AUTO: 10.7 %
NEUTROPHILS # BLD AUTO: 5.16 X10*3/UL (ref 1.6–5.5)
NEUTROPHILS NFR BLD AUTO: 72.3 %
NITRITE UR QL STRIP.AUTO: NEGATIVE
NRBC BLD-RTO: 0 /100 WBCS (ref 0–0)
PH UR STRIP.AUTO: 6.5 [PH]
PLATELET # BLD AUTO: 116 X10*3/UL (ref 150–450)
POTASSIUM SERPL-SCNC: 4.1 MMOL/L (ref 3.5–5.3)
PROT SERPL-MCNC: 7.2 G/DL (ref 6.4–8.2)
PROT UR STRIP.AUTO-MCNC: ABNORMAL MG/DL
PROTHROMBIN TIME: 16.2 SECONDS (ref 9.8–12.8)
RBC # BLD AUTO: 4.83 X10*6/UL (ref 4.5–5.9)
RBC # UR STRIP.AUTO: ABNORMAL /UL
RBC #/AREA URNS AUTO: >20 /HPF
RH FACTOR (ANTIGEN D): NORMAL
SODIUM SERPL-SCNC: 137 MMOL/L (ref 136–145)
SP GR UR STRIP.AUTO: 1.02
UROBILINOGEN UR STRIP.AUTO-MCNC: ABNORMAL MG/DL
WBC # BLD AUTO: 7.1 X10*3/UL (ref 4.4–11.3)
WBC #/AREA URNS AUTO: ABNORMAL /HPF

## 2024-08-05 PROCEDURE — 99285 EMERGENCY DEPT VISIT HI MDM: CPT

## 2024-08-05 PROCEDURE — 51798 US URINE CAPACITY MEASURE: CPT

## 2024-08-05 PROCEDURE — 74177 CT ABD & PELVIS W/CONTRAST: CPT | Mod: FOREIGN READ | Performed by: RADIOLOGY

## 2024-08-05 PROCEDURE — 85610 PROTHROMBIN TIME: CPT

## 2024-08-05 PROCEDURE — 85730 THROMBOPLASTIN TIME PARTIAL: CPT

## 2024-08-05 PROCEDURE — 2500000002 HC RX 250 W HCPCS SELF ADMINISTERED DRUGS (ALT 637 FOR MEDICARE OP, ALT 636 FOR OP/ED): Performed by: INTERNAL MEDICINE

## 2024-08-05 PROCEDURE — 74177 CT ABD & PELVIS W/CONTRAST: CPT

## 2024-08-05 PROCEDURE — 85025 COMPLETE CBC W/AUTO DIFF WBC: CPT

## 2024-08-05 PROCEDURE — 80053 COMPREHEN METABOLIC PANEL: CPT

## 2024-08-05 PROCEDURE — 81001 URINALYSIS AUTO W/SCOPE: CPT

## 2024-08-05 PROCEDURE — 99222 1ST HOSP IP/OBS MODERATE 55: CPT | Performed by: NURSE PRACTITIONER

## 2024-08-05 PROCEDURE — 1200000002 HC GENERAL ROOM WITH TELEMETRY DAILY

## 2024-08-05 PROCEDURE — 99285 EMERGENCY DEPT VISIT HI MDM: CPT | Performed by: EMERGENCY MEDICINE

## 2024-08-05 PROCEDURE — 36415 COLL VENOUS BLD VENIPUNCTURE: CPT

## 2024-08-05 PROCEDURE — 2500000001 HC RX 250 WO HCPCS SELF ADMINISTERED DRUGS (ALT 637 FOR MEDICARE OP): Performed by: INTERNAL MEDICINE

## 2024-08-05 PROCEDURE — 86901 BLOOD TYPING SEROLOGIC RH(D): CPT

## 2024-08-05 PROCEDURE — 99223 1ST HOSP IP/OBS HIGH 75: CPT | Performed by: INTERNAL MEDICINE

## 2024-08-05 PROCEDURE — 51702 INSERT TEMP BLADDER CATH: CPT

## 2024-08-05 PROCEDURE — 81003 URINALYSIS AUTO W/O SCOPE: CPT

## 2024-08-05 PROCEDURE — 2550000001 HC RX 255 CONTRASTS: Performed by: EMERGENCY MEDICINE

## 2024-08-05 PROCEDURE — 2500000001 HC RX 250 WO HCPCS SELF ADMINISTERED DRUGS (ALT 637 FOR MEDICARE OP)

## 2024-08-05 PROCEDURE — 93005 ELECTROCARDIOGRAM TRACING: CPT

## 2024-08-05 PROCEDURE — 99223 1ST HOSP IP/OBS HIGH 75: CPT

## 2024-08-05 RX ORDER — ATORVASTATIN CALCIUM 20 MG/1
20 TABLET, FILM COATED ORAL NIGHTLY
Status: DISCONTINUED | OUTPATIENT
Start: 2024-08-05 | End: 2024-08-08 | Stop reason: HOSPADM

## 2024-08-05 RX ORDER — MIRTAZAPINE 15 MG/1
15 TABLET, FILM COATED ORAL NIGHTLY
Status: DISCONTINUED | OUTPATIENT
Start: 2024-08-05 | End: 2024-08-08 | Stop reason: HOSPADM

## 2024-08-05 RX ORDER — SERTRALINE HYDROCHLORIDE 100 MG/1
100 TABLET, FILM COATED ORAL NIGHTLY
Status: DISCONTINUED | OUTPATIENT
Start: 2024-08-05 | End: 2024-08-08 | Stop reason: HOSPADM

## 2024-08-05 RX ORDER — ALPRAZOLAM 0.5 MG/1
0.5 TABLET ORAL 3 TIMES DAILY PRN
Status: DISCONTINUED | OUTPATIENT
Start: 2024-08-05 | End: 2024-08-08 | Stop reason: HOSPADM

## 2024-08-05 RX ORDER — METOPROLOL SUCCINATE 50 MG/1
50 TABLET, EXTENDED RELEASE ORAL 2 TIMES DAILY
Status: DISCONTINUED | OUTPATIENT
Start: 2024-08-05 | End: 2024-08-06

## 2024-08-05 RX ORDER — LIDOCAINE HYDROCHLORIDE 20 MG/ML
1 JELLY TOPICAL ONCE
Status: DISCONTINUED | OUTPATIENT
Start: 2024-08-05 | End: 2024-08-08 | Stop reason: HOSPADM

## 2024-08-05 RX ORDER — METOPROLOL TARTRATE 1 MG/ML
5 INJECTION, SOLUTION INTRAVENOUS EVERY 5 MIN PRN
Status: DISCONTINUED | OUTPATIENT
Start: 2024-08-05 | End: 2024-08-07

## 2024-08-05 RX ORDER — METOPROLOL TARTRATE 25 MG/1
25 TABLET, FILM COATED ORAL 2 TIMES DAILY
Status: DISCONTINUED | OUTPATIENT
Start: 2024-08-05 | End: 2024-08-05

## 2024-08-05 RX ORDER — FINASTERIDE 5 MG/1
5 TABLET, FILM COATED ORAL DAILY
Status: DISCONTINUED | OUTPATIENT
Start: 2024-08-05 | End: 2024-08-08 | Stop reason: HOSPADM

## 2024-08-05 RX ORDER — LEVETIRACETAM 500 MG/1
250 TABLET ORAL 2 TIMES DAILY
Status: DISCONTINUED | OUTPATIENT
Start: 2024-08-05 | End: 2024-08-08 | Stop reason: HOSPADM

## 2024-08-05 SDOH — SOCIAL STABILITY: SOCIAL INSECURITY: HAVE YOU HAD THOUGHTS OF HARMING ANYONE ELSE?: NO

## 2024-08-05 SDOH — SOCIAL STABILITY: SOCIAL INSECURITY: DOES ANYONE TRY TO KEEP YOU FROM HAVING/CONTACTING OTHER FRIENDS OR DOING THINGS OUTSIDE YOUR HOME?: NO

## 2024-08-05 SDOH — SOCIAL STABILITY: SOCIAL INSECURITY: DO YOU FEEL UNSAFE GOING BACK TO THE PLACE WHERE YOU ARE LIVING?: NO

## 2024-08-05 SDOH — SOCIAL STABILITY: SOCIAL INSECURITY: HAS ANYONE EVER THREATENED TO HURT YOUR FAMILY OR YOUR PETS?: NO

## 2024-08-05 SDOH — SOCIAL STABILITY: SOCIAL INSECURITY: WERE YOU ABLE TO COMPLETE ALL THE BEHAVIORAL HEALTH SCREENINGS?: YES

## 2024-08-05 SDOH — SOCIAL STABILITY: SOCIAL INSECURITY: DO YOU FEEL ANYONE HAS EXPLOITED OR TAKEN ADVANTAGE OF YOU FINANCIALLY OR OF YOUR PERSONAL PROPERTY?: NO

## 2024-08-05 SDOH — SOCIAL STABILITY: SOCIAL INSECURITY: ABUSE: ADULT

## 2024-08-05 SDOH — SOCIAL STABILITY: SOCIAL INSECURITY: ARE YOU OR HAVE YOU BEEN THREATENED OR ABUSED PHYSICALLY, EMOTIONALLY, OR SEXUALLY BY ANYONE?: NO

## 2024-08-05 SDOH — SOCIAL STABILITY: SOCIAL INSECURITY: HAVE YOU HAD ANY THOUGHTS OF HARMING ANYONE ELSE?: NO

## 2024-08-05 SDOH — SOCIAL STABILITY: SOCIAL INSECURITY: ARE THERE ANY APPARENT SIGNS OF INJURIES/BEHAVIORS THAT COULD BE RELATED TO ABUSE/NEGLECT?: NO

## 2024-08-05 ASSESSMENT — ENCOUNTER SYMPTOMS
EYES NEGATIVE: 1
PALPITATIONS: 0
VOMITING: 0
DYSURIA: 1
FREQUENCY: 0
WHEEZING: 0
HEMATURIA: 1
SHORTNESS OF BREATH: 0
FLANK PAIN: 0
CONSTITUTIONAL NEGATIVE: 1
ENDOCRINE NEGATIVE: 1
ALLERGIC/IMMUNOLOGIC NEGATIVE: 1
ABDOMINAL DISTENTION: 0
ABDOMINAL PAIN: 0
DIARRHEA: 0
NAUSEA: 0
RESPIRATORY NEGATIVE: 1
CHEST TIGHTNESS: 0
PSYCHIATRIC NEGATIVE: 1
MUSCULOSKELETAL NEGATIVE: 1
CONSTIPATION: 0
NEUROLOGICAL NEGATIVE: 1
CARDIOVASCULAR NEGATIVE: 1
DIZZINESS: 0

## 2024-08-05 ASSESSMENT — ACTIVITIES OF DAILY LIVING (ADL)
LACK_OF_TRANSPORTATION: NO
HEARING - LEFT EAR: FUNCTIONAL
ADEQUATE_TO_COMPLETE_ADL: YES
GROOMING: INDEPENDENT
BATHING: INDEPENDENT
TOILETING: INDEPENDENT
HEARING - RIGHT EAR: DIFFICULTY WITH NOISE
FEEDING YOURSELF: INDEPENDENT
WALKS IN HOME: INDEPENDENT
PATIENT'S MEMORY ADEQUATE TO SAFELY COMPLETE DAILY ACTIVITIES?: YES
DRESSING YOURSELF: INDEPENDENT
ASSISTIVE_DEVICE: EYEGLASSES
JUDGMENT_ADEQUATE_SAFELY_COMPLETE_DAILY_ACTIVITIES: YES

## 2024-08-05 ASSESSMENT — COGNITIVE AND FUNCTIONAL STATUS - GENERAL
DAILY ACTIVITIY SCORE: 24
MOBILITY SCORE: 24
PATIENT BASELINE BEDBOUND: NO
DAILY ACTIVITIY SCORE: 24
MOBILITY SCORE: 24

## 2024-08-05 ASSESSMENT — COLUMBIA-SUICIDE SEVERITY RATING SCALE - C-SSRS
2. HAVE YOU ACTUALLY HAD ANY THOUGHTS OF KILLING YOURSELF?: NO
1. IN THE PAST MONTH, HAVE YOU WISHED YOU WERE DEAD OR WISHED YOU COULD GO TO SLEEP AND NOT WAKE UP?: NO
6. HAVE YOU EVER DONE ANYTHING, STARTED TO DO ANYTHING, OR PREPARED TO DO ANYTHING TO END YOUR LIFE?: NO

## 2024-08-05 ASSESSMENT — PATIENT HEALTH QUESTIONNAIRE - PHQ9
2. FEELING DOWN, DEPRESSED OR HOPELESS: NOT AT ALL
1. LITTLE INTEREST OR PLEASURE IN DOING THINGS: NOT AT ALL
SUM OF ALL RESPONSES TO PHQ9 QUESTIONS 1 & 2: 0

## 2024-08-05 ASSESSMENT — PAIN SCALES - GENERAL
PAINLEVEL_OUTOF10: 0 - NO PAIN
PAINLEVEL_OUTOF10: 0 - NO PAIN

## 2024-08-05 ASSESSMENT — LIFESTYLE VARIABLES
TOTAL SCORE: 0
EVER HAD A DRINK FIRST THING IN THE MORNING TO STEADY YOUR NERVES TO GET RID OF A HANGOVER: NO
HAVE PEOPLE ANNOYED YOU BY CRITICIZING YOUR DRINKING: NO
SKIP TO QUESTIONS 9-10: 0
HAVE YOU EVER FELT YOU SHOULD CUT DOWN ON YOUR DRINKING: NO
HOW MANY STANDARD DRINKS CONTAINING ALCOHOL DO YOU HAVE ON A TYPICAL DAY: 1 OR 2
EVER FELT BAD OR GUILTY ABOUT YOUR DRINKING: NO
HOW OFTEN DO YOU HAVE A DRINK CONTAINING ALCOHOL: 2-3 TIMES A WEEK
AUDIT-C TOTAL SCORE: 6
AUDIT-C TOTAL SCORE: 6
HOW OFTEN DO YOU HAVE 6 OR MORE DRINKS ON ONE OCCASION: WEEKLY

## 2024-08-05 ASSESSMENT — PAIN - FUNCTIONAL ASSESSMENT: PAIN_FUNCTIONAL_ASSESSMENT: 0-10

## 2024-08-05 NOTE — CONSULTS
Consults    Reason For Consult  Risk stratification prior to urologic procedure on 8/7/2024.    History Of Present Illness  Mr. Len Birmingham is an 84-year-old male with complicated past medical history to include PAF on Eliquis (recently discovered around 3/25/24 -- and recently started eliquis around this time), acute combined systolic and diastolic heart failure, CAD, HLD, HTN, ischemic cardiopathy with LVEF 30-35% (4/16/2024), prior MI, CKD 3 A, CABG in 2000, history of aneurysm of ascending thoracic aorta, TIA, BPH s/p TURP in 2008 who presented to the ED with complaints of gross hematuria that began and has persisted of 3-4 days duration.    Approximately 4 weeks ago, patient started Eliquis for atrial fibrillation. Since prior TURP in 2008, he has experienced gross hematuria occasionally (approximately 1 time per month).  Patient reports that he will occasionally have gross hematuria, which eventually clears and will be urinating yellow urine.  He has not followed up with urology in 5 to 6 years since his TURP in 2008.  He has not seen a urologist since Dr. Ley retired 5 to 6 years ago.  Last dose of Eliquis noted to be the night of 8/4/2024.    Of note, recently underwent electrical cardioversion 4/25/2024. Normal sinus rhythm achieved s/p 200 J synchronized biphasic cardioversion for PAF. There is a current standing order for nuclear stress test that was first ordered 3/26/2024.    Most recent outpatient visit with cardiologist, Dr. Valle, 8/1/2024. At this visit, patient stated that he had a recent cardioversion, though shortly after went back into atrial fibrillation.  At this visit, patient with no interest in pursuing repeat cardioversion.  He endorsed being physically active.  At that time he had no chest discomfort, dyspnea, presyncope or syncope.    TTE 4/25/2024: LVEF 30 to 35%. Global hypokinesis of left ventricle with minor regional variations.  Moderately increased LV septal thickness.   Mildly enlarged RV normal right ventricular global systolic function.  Moderate to severe dilation of the left atrium.  Moderate to severe dilation of the right atrium.  Moderate MR.  Mild AR.  Severely elevated pulmonary artery pressure of 52.8 suggestive of pulmonary hypertension.  He does not have a right heart catheterization to confirm this.  Moderate dilation of ascending aorta.    Home medications: Eliquis 5 mg twice daily, atorvastatin 20 mg, Jardiance 10 mg, Lasix 20 mg, metoprolol tartrate 25 mg twice daily, Entresto 49 to 51 mg, sertraline 100 mg, Xanax 0.5 mg 3 times daily as needed, dutasteride 0.5 mg, Keppra 250 mg, Remeron 15 mg.    Medications while inpatient: Atorvastatin 20 mg, Jardiance 10 mg, Proscar 5 mg, Keppra 2050 mg, metoprolol tartrate 25 mg twice daily, Remeron 15 mg, Entresto 41 to 51 mg, sertraline 100 mg.  Home Eliquis has been held.    Cardiology was consulted for risk stratification prior to urologic procedure on 8/7/2024.    Arrived to the ED afebrile temperature 96.8.  Pulse 60.  Respirations 18.  /64.  One-time hypotensive episode 95/49 approximately 2 hours since arrival to ED.  O2 saturation 98% on RA.    CBC without leukocytosis or anemia.  Current hemoglobin 15.0.  Thrombocytopenia to 116 (last 112 on 4/16/2024).  PT/INR 16.2 and 1.4 respectively.  APTT 44.  CMP without gross electrolyte disturbance.  No hepatic or renal derangement.  UA demonstrating 2+ protein, 3+ blood without infection.    CT A/P with: Large amount of heterogenous blood in the bladder with questionable lesion versus adherent blood along the right lateral wall of the bladder measuring 3.3 cm x 1.1 cm.  Mildly distended urinary bladder with associated wall trabeculation suggestive of chronic bladder outlet obstruction.  Borderline prostatomegaly.  Cardiomegaly with severe coronary artery calcifications.  Small right pleural effusion.  Mild hepatic steatosis.    Patient seen and examined this afternoon.  He tells me of late, he goes to the [facility] center 6x/week for physical activity. No difficulty in completing his ADLs. He has had no angina at rest or upon exertion. States that he has been having SOB, most notably when he feels himself going in/out of a-fib. Most recent episode was today lying in hospital bed. He has no dizziness, presyncope or syncope. Overall feels well.    Past Medical History  He has a past medical history of Abnormal ECG, Arrhythmia, Atrial fibrillation (Multi), CHF (congestive heart failure) (Multi), Coronary artery disease, COVID-19 (02/16/2022), Epilepsy, unspecified, not intractable, without status epilepticus (Multi) (11/18/2020), Hyperlipidemia, Hypertension, Ischemic cardiomyopathy, Myocardial infarction (Multi), and Unspecified convulsions (Multi) (02/24/2021).    Surgical History  He has a past surgical history that includes Other surgical history (02/12/2019); Other surgical history (02/12/2019); Other surgical history (02/12/2019); Cardiac catheterization; Arterial bypass surgry; and Coronary angioplasty.     Social History  He reports that he quit smoking about 27 years ago. His smoking use included cigarettes. He started smoking about 67 years ago. He has a 40 pack-year smoking history. He has never used smokeless tobacco. He reports current alcohol use of about 14.0 standard drinks of alcohol per week. He reports that he does not use drugs.    Family History  Family History   Problem Relation Name Age of Onset    Heart failure Mother Nana1     Emphysema Father Saroj Sr.     Lung disease Father Saroj Sr.     Bipolar disorder Brother          Allergies  Patient has no known allergies.    Review of Systems  Negative as otherwise stated above     Physical Exam  VITALS: I have reviewed the vital signs.   GENERAL: Well developed adult in no acute distress.  Resting comfortably in bed. On RA  NEURO: Alert and oriented x3, able to answer questions and follow commands. No motor or  sensory deficits. Moves all extremities. Face is symmetric and expressive. No tremor.  EYES: PERRL. No scleral icterus or conjunctival injection. No discharge.   HENT: Normocephalic, atraumatic. Hearing is grossly intact. Nares grossly patent and without discharge. Mucous membranes moist.   NECK: No JVD. Patient moves neck without restriction.   CARDIO: Irregularly irregular. Borderline tachycardic rate. No murmur, rub, or gallop. Pulses equal bilaterally in the upper and lower extremity. No lower extremity edema.   PULM: Lungs clear to auscultation in all fields. No wheezes, rales, or rhonchi. No conversational dyspnea. No splinting, stridor, or accessory muscle use.    GI: Abdomen is soft and non-distended. No tenderness to palpation. No guarding or rigidity. Normoactive bowel sounds.   : No suprapubic tenderness. No CVA tenderness. 3-way barbosa in place with SBI running. Red coolaid urine  EXTREMITIES: Symmetric muscle bulk. No joint swelling. No clubbing, cyanosis, or deformity. Muscle strength 5/5 in b/l upper and lower extremities  SKIN: Warm and dry. Normal turgor. No rash or lesions appreciated.   PSYCH: Mood, affect, and interaction is appropriate to the setting. Not internally stimulated.    Last Recorded Vitals  BP (!) 136/95 (BP Location: Right arm, Patient Position: Lying)   Pulse 60   Temp 36.4 °C (97.5 °F) (Temporal)   Resp 18   Wt 79.8 kg (176 lb)   SpO2 95%     Relevant Results  Scheduled medications  [Held by provider] apixaban, 5 mg, oral, BID  atorvastatin, 20 mg, oral, Nightly  empagliflozin, 10 mg, oral, Daily  finasteride, 5 mg, oral, Daily  levETIRAcetam, 250 mg, oral, BID  lidocaine, 1 Application, urethral, Once  metoprolol tartrate, 25 mg, oral, BID  mirtazapine, 15 mg, oral, Nightly  sacubitriL-valsartan, 1 tablet, oral, BID  sertraline, 100 mg, oral, Nightly      Continuous medications     PRN medications  PRN medications: ALPRAZolam  Results for orders placed or performed during  the hospital encounter of 08/05/24 (from the past 24 hour(s))   CBC and Auto Differential   Result Value Ref Range    WBC 7.1 4.4 - 11.3 x10*3/uL    nRBC 0.0 0.0 - 0.0 /100 WBCs    RBC 4.83 4.50 - 5.90 x10*6/uL    Hemoglobin 15.0 13.5 - 17.5 g/dL    Hematocrit 46.8 41.0 - 52.0 %    MCV 97 80 - 100 fL    MCH 31.1 26.0 - 34.0 pg    MCHC 32.1 32.0 - 36.0 g/dL    RDW 14.5 11.5 - 14.5 %    Platelets 116 (L) 150 - 450 x10*3/uL    Neutrophils % 72.3 40.0 - 80.0 %    Immature Granulocytes %, Automated 0.7 0.0 - 0.9 %    Lymphocytes % 14.9 13.0 - 44.0 %    Monocytes % 10.7 2.0 - 10.0 %    Eosinophils % 1.0 0.0 - 6.0 %    Basophils % 0.4 0.0 - 2.0 %    Neutrophils Absolute 5.16 1.60 - 5.50 x10*3/uL    Immature Granulocytes Absolute, Automated 0.05 0.00 - 0.50 x10*3/uL    Lymphocytes Absolute 1.06 0.80 - 3.00 x10*3/uL    Monocytes Absolute 0.76 0.05 - 0.80 x10*3/uL    Eosinophils Absolute 0.07 0.00 - 0.40 x10*3/uL    Basophils Absolute 0.03 0.00 - 0.10 x10*3/uL   Comprehensive metabolic panel   Result Value Ref Range    Glucose 92 74 - 99 mg/dL    Sodium 137 136 - 145 mmol/L    Potassium 4.1 3.5 - 5.3 mmol/L    Chloride 103 98 - 107 mmol/L    Bicarbonate 27 21 - 32 mmol/L    Anion Gap 11 10 - 20 mmol/L    Urea Nitrogen 21 6 - 23 mg/dL    Creatinine 1.07 0.50 - 1.30 mg/dL    eGFR 68 >60 mL/min/1.73m*2    Calcium 8.9 8.6 - 10.3 mg/dL    Albumin 4.1 3.4 - 5.0 g/dL    Alkaline Phosphatase 70 33 - 136 U/L    Total Protein 7.2 6.4 - 8.2 g/dL    AST 24 9 - 39 U/L    Bilirubin, Total 1.0 0.0 - 1.2 mg/dL    ALT 21 10 - 52 U/L   Protime-INR   Result Value Ref Range    Protime 16.2 (H) 9.8 - 12.8 seconds    INR 1.4 (H) 0.9 - 1.1   aPTT   Result Value Ref Range    aPTT 44 (H) 27 - 38 seconds   Type And Screen   Result Value Ref Range    ABO TYPE O     Rh TYPE POS     ANTIBODY SCREEN NEG    Urinalysis with Reflex Culture and Microscopic   Result Value Ref Range    Color, Urine Red (N) Straw, Yellow    Appearance, Urine Turbid (N) Clear     Specific Gravity, Urine 1.020 1.005 - 1.035    pH, Urine 6.5 5.0, 5.5, 6.0, 6.5, 7.0, 7.5, 8.0    Protein, Urine 100 (2+) (A) NEGATIVE, 10 (TRACE) mg/dL    Glucose, Urine 70 (1+) (A) NEGATIVE mg/dL    Blood, Urine 1.0 (3+) (A) NEGATIVE    Ketones, Urine NEGATIVE NEGATIVE mg/dL    Bilirubin, Urine NEGATIVE NEGATIVE    Urobilinogen, Urine NORM NORM mg/dL    Nitrite, Urine NEGATIVE NEGATIVE    Leukocyte Esterase, Urine NEGATIVE NEGATIVE   Urinalysis Microscopic   Result Value Ref Range    WBC, Urine 1-5 1-5, NONE /HPF    RBC, Urine >20 (A) NONE, 1-2, 3-5 /HPF     CT abdomen pelvis w IV contrast    Result Date: 8/5/2024  STUDY: CT Abdomen and Pelvis with IV Contrast; 8/5/2024 at 9:50 AM. INDICATION: Painless hematuria. COMPARISON: XR left hip/pelvis 9/21/2022. ACCESSION NUMBER(S): [id number] ORDERING CLINICIAN: AR BARRERA TECHNIQUE: CT of the abdomen and pelvis was performed.  Contiguous axial images were obtained at 3 mm slice thickness through the abdomen and pelvis. Coronal and sagittal reconstructions at 3 mm slice thickness were performed.  Omnipaque 350 75 mL was administered intravenously.  FINDINGS: LOWER CHEST: Heart is enlarged with severe coronary calcifications  No pericardial effusion.  Small right pleural effusion.  ABDOMEN:  LIVER: No hepatomegaly.  Smooth surface contour.  Mild fatty infiltration of the liver.  BILE DUCTS: No intrahepatic or extrahepatic biliary ductal dilatation.  GALLBLADDER: The gallbladder is present without definite gallstones. STOMACH: No abnormalities identified.  PANCREAS: No masses or ductal dilatation.  SPLEEN: No splenomegaly or focal splenic lesion.  ADRENAL GLANDS: No thickening or nodules.  KIDNEYS AND URETERS: Kidneys are normal in size and location.  No renal or ureteral calculi.  Renal cysts noted measuring up to 5.4 cm on the left.  PELVIS:  BLADDER: Urinary bladder is mildly distended with associated wall trabeculation.  Large amount of heterogeneous blood in  the bladder. Questionable lesion versus adherent blood along the right lateral wall of the bladder on image 132 measuring 3.3 cm x 1.1 cm.  REPRODUCTIVE ORGANS: Prostate gland is borderline enlarged measuring 5 cm.  BOWEL: Mild to moderate stool volume.  Appendix is not visualized.  VESSELS: No abnormalities identified.  Abdominal aorta is normal in caliber. Severe plaque along the distal aorta.  PERITONEUM/RETROPERITONEUM/LYMPH NODES: No free fluid.  No pneumoperitoneum. No lymphadenopathy.  ABDOMINAL WALL: No abnormalities identified. SOFT TISSUES: No abnormalities identified.  BONES: No acute fracture or aggressive osseous lesion.    1. Large amount of heterogeneous blood in the bladder. Questionable lesion versus adherent blood along the right lateral wall of the bladder measuring 3.3 cm x 1.1 cm. Recommend further evaluation with CT urogram or cystoscopy. 2. Urinary bladder is mildly distended with associated wall trabeculation. Findings suggestive of chronic bladder outlet obstruction. 3. Borderline enlarged prostate. 4. Cardiomegaly with severe coronary artery calcifications. 5. Small right pleural effusion. 6. Mild hepatic steatosis. Signed by Giacomo Pandey MD    Transthoracic Echo Complete    Result Date: 7/26/2024              [address], [address] [place] [address]          Tel [phone] Fax [phone] TRANSTHORACIC ECHOCARDIOGRAM REPORT Patient Name:      EAMON CHINO  Reading Physician:    [id number] Ministerio Napier DO Study Date:        7/25/2024            Ordering Provider:    [id number] SAIMA BAIG MRN/PID:           [id number]             Fellow: Accession#:        [id number]         Nurse: Date of Birth/Age: 1940 / 84 years Sonographer:          Ministerio Taylor Gender:            M                    Additional  Staff: Height:            177.80 cm            Admit Date:           7/25/2024 Weight:            83.92 kg             Admission Status:     Outpatient BSA / BMI:         2.02 m2 / 26.54      Department Location:  [facility]                    kg/m2                                      Paige Gonzalez Blood Pressure: 110 /60 mmHg Study Type:    TRANSTHORACIC ECHO (TTE) COMPLETE Diagnosis/ICD: Atherosclerotic heart disease of native coronary artery without                angina pectoris-I25.10; Acute combined systolic (congestive) and                diastolic (congestive) heart failure (CHF)-I50.41 Indication:    CAD, Systolic and Diastolic HF CPT Codes:     Echo Complete w Full [id number] Patient History: Smoker:            Former. Pertinent History: A-Fib, CAD, CHF, HTN, Hyperlipidemia and Systolic and                    Diastolic HF, VT, MR, CABG. Study Detail: The following Echo studies were performed: 2D, M-Mode, Doppler and               color flow. The patient was awake.  PHYSICIAN INTERPRETATION: Left Ventricle: Left ventricular ejection fraction is moderately decreased, by visual estimate at 30-35%. There is global hypokinesis of the left ventricle with minor regional variations. The left ventricular cavity size is normal. The left ventricular septal wall thickness is moderately increased. There is mild to moderate concentric left ventricular hypertrophy. Left ventricular diastolic filling was not assessed. Left Atrium: The left atrium is moderate to severely dilated. Right Ventricle: The right ventricle is mildly enlarged. There is normal right ventricular global systolic function. Right Atrium: The right atrium is moderately to severely dilated. Aortic Valve: The aortic valve appears structurally normal. The aortic valve appears tricuspid. There is mild to moderate aortic valve cusp calcification. The aortic valve dimensionless index is 0.57. There is mild aortic valve regurgitation. The peak  instantaneous gradient of the aortic valve is 6.2 mmHg. The mean gradient of the aortic valve is 4.0 mmHg. Mitral Valve: The mitral valve is normal in structure. There is mild thickening and calcification of the anterior and posterior mitral valve leaflets. There is no evidence of mitral valve stenosis. The doppler estimated mean and peak diastolic pressure gradients are 3.0 mmHg and 5.3 mmHg respectively. There is normal mitral valve leaflet mobility. There is moderate mitral valve regurgitation. Tricuspid Valve: The tricuspid valve is structurally normal. There is normal tricuspid valve leaflet mobility. There is mild tricuspid regurgitation. Pulmonic Valve: The pulmonic valve is structurally normal. There is mild pulmonic valve regurgitation. Pericardium: There is no pericardial effusion noted. Aorta: The aortic root is normal. There is moderate dilatation of the ascending aorta. There is mild dilatation of the aortic root. Pulmonary Artery: Pulmonary hypertension is present. The tricuspid regurgitant velocity is 3.53 m/s, and with an estimated right atrial pressure of 3 mmHg, the estimated pulmonary artery pressure is severely elevated with the RVSP at 52.8 mmHg. In comparison to the previous echocardiogram(s): The left ventricular function is unchanged.  CONCLUSIONS:  1. Left ventricular ejection fraction is moderately decreased, by visual estimate at 30-35%.  2. There is global hypokinesis of the left ventricle with minor regional variations.  3. Moderately increased left ventricular septal thickness.  4. Mildly enlarged right ventricle.  5. There is normal right ventricular global systolic function.  6. The left atrium is moderate to severely dilated.  7. The right atrium is moderately to severely dilated.  8. There is no evidence of mitral valve stenosis.  9. Moderate mitral valve regurgitation. 10. Mild aortic valve regurgitation. 11. Pulmonary hypertension is present. 12. Severely elevated pulmonary artery  pressure. 13. There is moderate dilatation of the ascending aorta. QUANTITATIVE DATA SUMMARY: 2D MEASUREMENTS:                           Normal Ranges: Ao Root d:     3.80 cm    (2.0-3.7cm) LAs:           4.90 cm    (2.7-4.0cm) RVIDd:         3.30 cm    (0.9-3.6cm) IVSd:          1.50 cm    (0.6-1.1cm) LVPWd:         1.10 cm    (0.6-1.1cm) LVIDd:         4.80 cm    (3.9-5.9cm) LVIDs:         4.30 cm LV Mass Index: 121.7 g/m2 LV % FS        10.4 % LA VOLUME:                             Normal Ranges: LA Area A4C:     35.1 cm2 LA Area A2C:     34.6 cm2 LA Volume Index: 64.9 ml/m2 LA Vol A4C:      128.0 ml LA Vol A2C:      127.0 ml LA Vol BP:       131.0 ml RA VOLUME BY A/L METHOD:                               Normal Ranges: RA Vol A4C:        92.2 ml    (8.3-19.5ml) RA Vol Index A4C:  45.7 ml/m2 RA Area A4C:       28.9 cm2 RA Major Axis A4C: 7.7 cm AORTA MEASUREMENTS:                    Normal Ranges: Asc Ao, d: 4.30 cm (2.1-3.4cm) LV SYSTOLIC FUNCTION BY 2D PLANIMETRY (MOD):                      Normal Ranges: EF-A4C View:    35 % (>=55%) EF-A2C View:    31 % EF-Biplane:     31 % EF-Visual:      33 % LV EF Reported: 33 % LV DIASTOLIC FUNCTION:                         Normal Ranges: MV Peak E:    0.89 m/s  (0.7-1.2 m/s) MV e'         0.118 m/s (>8.0) MV lateral e' 0.16 m/s MV medial e'  0.07 m/s E/e' Ratio:   7.50      (<8.0) MITRAL VALVE:                      Normal Ranges: MV Vmax:    1.15 m/s (<=1.3m/s) MV peak P.3 mmHg (<5mmHg) MV mean PG: 3.0 mmHg (<48mmHg) MV DT:      236 msec (150-240msec) AORTIC VALVE:                                   Normal Ranges: AoV Vmax:                1.25 m/s (<=1.7m/s) AoV Peak P.2 mmHg (<20mmHg) AoV Mean P.0 mmHg (1.7-11.5mmHg) LVOT Max Ian:            0.68 m/s (<=1.1m/s) AoV VTI:                 23.00 cm (18-25cm) LVOT VTI:                13.10 cm LVOT Diameter:           2.50 cm  (1.8-2.4cm) AoV Area, VTI:           2.80 cm2 (2.5-5.5cm2) AoV  Area,Vmax:           2.69 cm2 (2.5-4.5cm2) AoV Dimensionless Index: 0.57  RIGHT VENTRICLE: RV Basal 4.70 cm RV Mid   3.10 cm RV Major 6.9 cm TRICUSPID VALVE/RVSP:                             Normal Ranges: Peak TR Velocity: 3.53 m/s Est. RA Pressure: 3 mmHg RV Syst Pressure: 52.8 mmHg (< 30mmHg) PULMONIC VALVE:                         Normal Ranges: PV Accel Time: 67 msec  (>120ms) PV Max Ian:    0.8 m/s  (0.6-0.9m/s) PV Max P.3 mmHg  [id number] Ministerio Napier DO Electronically signed on 2024 at 12:12:15 PM  ** Final **         Assessment/Plan     # Gross hematuria  # ?Bladder lesion   # PAF on Eliquis with recent cardioversion 2024  # CAD  # Combined systolic/diastolic heart failure not in exacerbation  # Ischemic cardiomyopathy  # HTN  # HLD  # Thoracic aortic aneurysm  # History of TIA  # CKD 3 AA  -VSS/HDS.  No anemia.  Last dose of Eliquis 2024.  -Urology following with recommendations for three-way Pandya and CBI.  -Plan for possible cystoscopy versus fulguration and clot evacuation 2024  -TTE 2024: LVEF 30 to 35%. Global hypokinesis of left ventricle with minor regional variations.  Moderately increased LV septal thickness.  Mildly enlarged RV normal right ventricular global systolic function.  Moderate to severe dilation of the left atrium. Moderate to severe dilation of the right atrium.  Moderate MR.  Mild AR.  Severely elevated pulmonary artery pressure of 52.8 suggestive of pulmonary hypertension.  He does not have a right heart catheterization to confirm this.  Moderate dilation of ascending aorta.  -Only presenting symptoms of SOB at rest and on exertion related to going in/out of afib, not a current symptom of HF. He is euvolemic.   -Patient is deemed medically stable for surgery from a cardiology perspective. He remains at elevated risk for a-fib with RVR, HF, and MI in the perioperative period  -for better rate control, DC metoprolol tartrate 25mg bid. Start metoprolol  succinate 50mg bid  -optimize electrolytes  -Keep on remaining GDMT  -Agree with holding eliquis      Discussed with Dr. Riddhi Winter DO  IM PGY III

## 2024-08-05 NOTE — H&P
History Of Present Illness  Minh Harrington Jr. is a 84 y.o. male presenting with gross hematuria.  Patient states that he had a TURP back in 2008.  Since then patient occasionally passes a blood clot here and there but not very frequently.  Patient noted to he had significant amount of gross hematuria for the last few days.  Patient recently started Eliquis for atrial fibrillation and stroke prophylaxis.  Patient denies any pain or discomfort.  He denies dysuria or flank pain.  Patient does not have history of kidney stones.  He currently denies chest pain shortness of breath nausea vomiting diarrhea constipation fever chills dysuria or headache.    In the emergency department his vital signs are stable.  CMP was unremarkable.  INR is 1.4 with an elevated pro time and APTT.  CBC was unremarkable with a stable hemoglobin of 15.0.  Platelets were slightly low at 116.  Urinalysis showed gross hematuria and was negative for leukocyte esterase nitrites and bacteria.  CT of abdomen pelvis shows blood in bladder along with a possible mass on lateral wall of bladder.  Case was discussed with urology who recommended three-way Pandya catheter be placed for bladder irrigation.  Patient may require cystoscopy/intervention during this hospitalization but reportedly no procedure planned imminently.     Past Medical History  He has a past medical history of Abnormal ECG, Arrhythmia, Atrial fibrillation (Multi), CHF (congestive heart failure) (Multi), Coronary artery disease, COVID-19 (02/16/2022), Epilepsy, unspecified, not intractable, without status epilepticus (Multi) (11/18/2020), Hyperlipidemia, Hypertension, Ischemic cardiomyopathy, Myocardial infarction (Multi), and Unspecified convulsions (Multi) (02/24/2021).    Surgical History  He has a past surgical history that includes Other surgical history (02/12/2019); Other surgical history (02/12/2019); Other surgical history (02/12/2019); Cardiac catheterization; Arterial  bypass surgry; and Coronary angioplasty.     Social History  He reports that he quit smoking about 27 years ago. His smoking use included cigarettes. He started smoking about 67 years ago. He has a 40 pack-year smoking history. He has never used smokeless tobacco. He reports current alcohol use of about 14.0 standard drinks of alcohol per week. He reports that he does not use drugs.    Family History  Family History   Problem Relation Name Age of Onset    Heart failure Mother Nana1     Emphysema Father Saroj John.     Lung disease Father Saroj John.     Bipolar disorder Brother          Allergies  Patient has no known allergies.    Review of Systems   All other systems reviewed and are negative.       Physical Exam  Constitutional:       General: He is not in acute distress.     Appearance: Normal appearance.   HENT:      Head: Normocephalic and atraumatic.      Mouth/Throat:      Mouth: Mucous membranes are moist.   Eyes:      Extraocular Movements: Extraocular movements intact.      Conjunctiva/sclera: Conjunctivae normal.   Cardiovascular:      Rate and Rhythm: Normal rate and regular rhythm.      Heart sounds: Normal heart sounds.   Pulmonary:      Effort: Pulmonary effort is normal.      Breath sounds: Normal breath sounds. No wheezing, rhonchi or rales.   Abdominal:      General: Abdomen is flat. Bowel sounds are normal.      Palpations: Abdomen is soft.   Genitourinary:     Comments: Pandya present with gross hematuria in bag  Musculoskeletal:         General: No swelling or tenderness. Normal range of motion.      Cervical back: Normal range of motion and neck supple.   Skin:     General: Skin is warm and dry.      Findings: No rash.   Neurological:      General: No focal deficit present.      Mental Status: He is alert and oriented to person, place, and time.      Cranial Nerves: No cranial nerve deficit.      Sensory: No sensory deficit.   Psychiatric:         Mood and Affect: Mood normal.         Behavior:  Behavior normal.          Last Recorded Vitals  BP 97/56   Pulse 86   Temp 36 °C (96.8 °F) (Temporal)   Resp (!) 22   Wt 79.8 kg (176 lb)   SpO2 (!) 93%     Relevant Results  Scheduled medications    Continuous medications    PRN medications      Results for orders placed or performed during the hospital encounter of 08/05/24 (from the past 24 hour(s))   CBC and Auto Differential   Result Value Ref Range    WBC 7.1 4.4 - 11.3 x10*3/uL    nRBC 0.0 0.0 - 0.0 /100 WBCs    RBC 4.83 4.50 - 5.90 x10*6/uL    Hemoglobin 15.0 13.5 - 17.5 g/dL    Hematocrit 46.8 41.0 - 52.0 %    MCV 97 80 - 100 fL    MCH 31.1 26.0 - 34.0 pg    MCHC 32.1 32.0 - 36.0 g/dL    RDW 14.5 11.5 - 14.5 %    Platelets 116 (L) 150 - 450 x10*3/uL    Neutrophils % 72.3 40.0 - 80.0 %    Immature Granulocytes %, Automated 0.7 0.0 - 0.9 %    Lymphocytes % 14.9 13.0 - 44.0 %    Monocytes % 10.7 2.0 - 10.0 %    Eosinophils % 1.0 0.0 - 6.0 %    Basophils % 0.4 0.0 - 2.0 %    Neutrophils Absolute 5.16 1.60 - 5.50 x10*3/uL    Immature Granulocytes Absolute, Automated 0.05 0.00 - 0.50 x10*3/uL    Lymphocytes Absolute 1.06 0.80 - 3.00 x10*3/uL    Monocytes Absolute 0.76 0.05 - 0.80 x10*3/uL    Eosinophils Absolute 0.07 0.00 - 0.40 x10*3/uL    Basophils Absolute 0.03 0.00 - 0.10 x10*3/uL   Comprehensive metabolic panel   Result Value Ref Range    Glucose 92 74 - 99 mg/dL    Sodium 137 136 - 145 mmol/L    Potassium 4.1 3.5 - 5.3 mmol/L    Chloride 103 98 - 107 mmol/L    Bicarbonate 27 21 - 32 mmol/L    Anion Gap 11 10 - 20 mmol/L    Urea Nitrogen 21 6 - 23 mg/dL    Creatinine 1.07 0.50 - 1.30 mg/dL    eGFR 68 >60 mL/min/1.73m*2    Calcium 8.9 8.6 - 10.3 mg/dL    Albumin 4.1 3.4 - 5.0 g/dL    Alkaline Phosphatase 70 33 - 136 U/L    Total Protein 7.2 6.4 - 8.2 g/dL    AST 24 9 - 39 U/L    Bilirubin, Total 1.0 0.0 - 1.2 mg/dL    ALT 21 10 - 52 U/L   Protime-INR   Result Value Ref Range    Protime 16.2 (H) 9.8 - 12.8 seconds    INR 1.4 (H) 0.9 - 1.1   aPTT   Result  Value Ref Range    aPTT 44 (H) 27 - 38 seconds   Type And Screen   Result Value Ref Range    ABO TYPE O     Rh TYPE POS     ANTIBODY SCREEN NEG    Urinalysis with Reflex Culture and Microscopic   Result Value Ref Range    Color, Urine Red (N) Straw, Yellow    Appearance, Urine Turbid (N) Clear    Specific Gravity, Urine 1.020 1.005 - 1.035    pH, Urine 6.5 5.0, 5.5, 6.0, 6.5, 7.0, 7.5, 8.0    Protein, Urine 100 (2+) (A) NEGATIVE, 10 (TRACE) mg/dL    Glucose, Urine 70 (1+) (A) NEGATIVE mg/dL    Blood, Urine 1.0 (3+) (A) NEGATIVE    Ketones, Urine NEGATIVE NEGATIVE mg/dL    Bilirubin, Urine NEGATIVE NEGATIVE    Urobilinogen, Urine NORM NORM mg/dL    Nitrite, Urine NEGATIVE NEGATIVE    Leukocyte Esterase, Urine NEGATIVE NEGATIVE   Urinalysis Microscopic   Result Value Ref Range    WBC, Urine 1-5 1-5, NONE /HPF    RBC, Urine >20 (A) NONE, 1-2, 3-5 /HPF       CT abdomen pelvis w IV contrast    Result Date: 8/5/2024  STUDY: CT Abdomen and Pelvis with IV Contrast; 8/5/2024 at 9:50 AM. INDICATION: Painless hematuria. COMPARISON: XR left hip/pelvis 9/21/2022. ACCESSION NUMBER(S): [id number] ORDERING CLINICIAN: AR BARRERA TECHNIQUE: CT of the abdomen and pelvis was performed.  Contiguous axial images were obtained at 3 mm slice thickness through the abdomen and pelvis. Coronal and sagittal reconstructions at 3 mm slice thickness were performed.  Omnipaque 350 75 mL was administered intravenously.  FINDINGS: LOWER CHEST: Heart is enlarged with severe coronary calcifications  No pericardial effusion.  Small right pleural effusion.  ABDOMEN:  LIVER: No hepatomegaly.  Smooth surface contour.  Mild fatty infiltration of the liver.  BILE DUCTS: No intrahepatic or extrahepatic biliary ductal dilatation.  GALLBLADDER: The gallbladder is present without definite gallstones. STOMACH: No abnormalities identified.  PANCREAS: No masses or ductal dilatation.  SPLEEN: No splenomegaly or focal splenic lesion.  ADRENAL GLANDS: No  thickening or nodules.  KIDNEYS AND URETERS: Kidneys are normal in size and location.  No renal or ureteral calculi.  Renal cysts noted measuring up to 5.4 cm on the left.  PELVIS:  BLADDER: Urinary bladder is mildly distended with associated wall trabeculation.  Large amount of heterogeneous blood in the bladder. Questionable lesion versus adherent blood along the right lateral wall of the bladder on image 132 measuring 3.3 cm x 1.1 cm.  REPRODUCTIVE ORGANS: Prostate gland is borderline enlarged measuring 5 cm.  BOWEL: Mild to moderate stool volume.  Appendix is not visualized.  VESSELS: No abnormalities identified.  Abdominal aorta is normal in caliber. Severe plaque along the distal aorta.  PERITONEUM/RETROPERITONEUM/LYMPH NODES: No free fluid.  No pneumoperitoneum. No lymphadenopathy.  ABDOMINAL WALL: No abnormalities identified. SOFT TISSUES: No abnormalities identified.  BONES: No acute fracture or aggressive osseous lesion.    1. Large amount of heterogeneous blood in the bladder. Questionable lesion versus adherent blood along the right lateral wall of the bladder measuring 3.3 cm x 1.1 cm. Recommend further evaluation with CT urogram or cystoscopy. 2. Urinary bladder is mildly distended with associated wall trabeculation. Findings suggestive of chronic bladder outlet obstruction. 3. Borderline enlarged prostate. 4. Cardiomegaly with severe coronary artery calcifications. 5. Small right pleural effusion. 6. Mild hepatic steatosis. Signed by Giacomo Pandey MD    Transthoracic Echo Complete    Result Date: 7/26/2024              [address], [address], [place] [address]          Tel [phone] Fax [phone] TRANSTHORACIC ECHOCARDIOGRAM REPORT Patient Name:      EAMON Bond Physician:    [id number] Ministerio Napier DO Study Date:        7/25/2024            Ordering Provider:    [id number] SAIMA HUGGINS                                                                SAFIA MRN/PID:           [id number]             Fellow: Accession#:        [id number]         Nurse: Date of Birth/Age: 1940 / 84 years Sonographer:          Ministerio Taylor Gender:            M                    Additional Staff: Height:            177.80 cm            Admit Date:           7/25/2024 Weight:            83.92 kg             Admission Status:     Outpatient BSA / BMI:         2.02 m2 / 26.54      Department Location:  [facility]                    kg/m2                                      Paige Billingsley Blood Pressure: 110 /60 mmHg Study Type:    TRANSTHORACIC ECHO (TTE) COMPLETE Diagnosis/ICD: Atherosclerotic heart disease of native coronary artery without                angina pectoris-I25.10; Acute combined systolic (congestive) and                diastolic (congestive) heart failure (CHF)-I50.41 Indication:    CAD, Systolic and Diastolic HF CPT Codes:     Echo Complete w Full [id number] Patient History: Smoker:            Former. Pertinent History: A-Fib, CAD, CHF, HTN, Hyperlipidemia and Systolic and                    Diastolic HF, VT, MR, CABG. Study Detail: The following Echo studies were performed: 2D, M-Mode, Doppler and               color flow. The patient was awake.  PHYSICIAN INTERPRETATION: Left Ventricle: Left ventricular ejection fraction is moderately decreased, by visual estimate at 30-35%. There is global hypokinesis of the left ventricle with minor regional variations. The left ventricular cavity size is normal. The left ventricular septal wall thickness is moderately increased. There is mild to moderate concentric left ventricular hypertrophy. Left ventricular diastolic filling was not assessed. Left Atrium: The left atrium is moderate to severely dilated. Right Ventricle: The right ventricle is mildly enlarged. There is normal right ventricular global systolic function. Right Atrium: The right  atrium is moderately to severely dilated. Aortic Valve: The aortic valve appears structurally normal. The aortic valve appears tricuspid. There is mild to moderate aortic valve cusp calcification. The aortic valve dimensionless index is 0.57. There is mild aortic valve regurgitation. The peak instantaneous gradient of the aortic valve is 6.2 mmHg. The mean gradient of the aortic valve is 4.0 mmHg. Mitral Valve: The mitral valve is normal in structure. There is mild thickening and calcification of the anterior and posterior mitral valve leaflets. There is no evidence of mitral valve stenosis. The doppler estimated mean and peak diastolic pressure gradients are 3.0 mmHg and 5.3 mmHg respectively. There is normal mitral valve leaflet mobility. There is moderate mitral valve regurgitation. Tricuspid Valve: The tricuspid valve is structurally normal. There is normal tricuspid valve leaflet mobility. There is mild tricuspid regurgitation. Pulmonic Valve: The pulmonic valve is structurally normal. There is mild pulmonic valve regurgitation. Pericardium: There is no pericardial effusion noted. Aorta: The aortic root is normal. There is moderate dilatation of the ascending aorta. There is mild dilatation of the aortic root. Pulmonary Artery: Pulmonary hypertension is present. The tricuspid regurgitant velocity is 3.53 m/s, and with an estimated right atrial pressure of 3 mmHg, the estimated pulmonary artery pressure is severely elevated with the RVSP at 52.8 mmHg. In comparison to the previous echocardiogram(s): The left ventricular function is unchanged.  CONCLUSIONS:  1. Left ventricular ejection fraction is moderately decreased, by visual estimate at 30-35%.  2. There is global hypokinesis of the left ventricle with minor regional variations.  3. Moderately increased left ventricular septal thickness.  4. Mildly enlarged right ventricle.  5. There is normal right ventricular global systolic function.  6. The left atrium  is moderate to severely dilated.  7. The right atrium is moderately to severely dilated.  8. There is no evidence of mitral valve stenosis.  9. Moderate mitral valve regurgitation. 10. Mild aortic valve regurgitation. 11. Pulmonary hypertension is present. 12. Severely elevated pulmonary artery pressure. 13. There is moderate dilatation of the ascending aorta. QUANTITATIVE DATA SUMMARY: 2D MEASUREMENTS:                           Normal Ranges: Ao Root d:     3.80 cm    (2.0-3.7cm) LAs:           4.90 cm    (2.7-4.0cm) RVIDd:         3.30 cm    (0.9-3.6cm) IVSd:          1.50 cm    (0.6-1.1cm) LVPWd:         1.10 cm    (0.6-1.1cm) LVIDd:         4.80 cm    (3.9-5.9cm) LVIDs:         4.30 cm LV Mass Index: 121.7 g/m2 LV % FS        10.4 % LA VOLUME:                             Normal Ranges: LA Area A4C:     35.1 cm2 LA Area A2C:     34.6 cm2 LA Volume Index: 64.9 ml/m2 LA Vol A4C:      128.0 ml LA Vol A2C:      127.0 ml LA Vol BP:       131.0 ml RA VOLUME BY A/L METHOD:                               Normal Ranges: RA Vol A4C:        92.2 ml    (8.3-19.5ml) RA Vol Index A4C:  45.7 ml/m2 RA Area A4C:       28.9 cm2 RA Major Axis A4C: 7.7 cm AORTA MEASUREMENTS:                    Normal Ranges: Asc Ao, d: 4.30 cm (2.1-3.4cm) LV SYSTOLIC FUNCTION BY 2D PLANIMETRY (MOD):                      Normal Ranges: EF-A4C View:    35 % (>=55%) EF-A2C View:    31 % EF-Biplane:     31 % EF-Visual:      33 % LV EF Reported: 33 % LV DIASTOLIC FUNCTION:                         Normal Ranges: MV Peak E:    0.89 m/s  (0.7-1.2 m/s) MV e'         0.118 m/s (>8.0) MV lateral e' 0.16 m/s MV medial e'  0.07 m/s E/e' Ratio:   7.50      (<8.0) MITRAL VALVE:                      Normal Ranges: MV Vmax:    1.15 m/s (<=1.3m/s) MV peak P.3 mmHg (<5mmHg) MV mean PG: 3.0 mmHg (<48mmHg) MV DT:      236 msec (150-240msec) AORTIC VALVE:                                   Normal Ranges: AoV Vmax:                1.25 m/s (<=1.7m/s) AoV Peak PG:              6.2 mmHg (<20mmHg) AoV Mean P.0 mmHg (1.7-11.5mmHg) LVOT Max Ian:            0.68 m/s (<=1.1m/s) AoV VTI:                 23.00 cm (18-25cm) LVOT VTI:                13.10 cm LVOT Diameter:           2.50 cm  (1.8-2.4cm) AoV Area, VTI:           2.80 cm2 (2.5-5.5cm2) AoV Area,Vmax:           2.69 cm2 (2.5-4.5cm2) AoV Dimensionless Index: 0.57  RIGHT VENTRICLE: RV Basal 4.70 cm RV Mid   3.10 cm RV Major 6.9 cm TRICUSPID VALVE/RVSP:                             Normal Ranges: Peak TR Velocity: 3.53 m/s Est. RA Pressure: 3 mmHg RV Syst Pressure: 52.8 mmHg (< 30mmHg) PULMONIC VALVE:                         Normal Ranges: PV Accel Time: 67 msec  (>120ms) PV Max Ian:    0.8 m/s  (0.6-0.9m/s) PV Max P.3 mmHg  [id number] Ministerio Napier DO Electronically signed on 2024 at 12:12:15 PM  ** Final **         Assessment/Plan   Principal Problem:    Hemorrhagic disorder due to extrinsic circulating anticoagulants (Multi)  Active Problems:    Anxiety    BPH (benign prostatic hyperplasia)    CAD (coronary artery disease)    HLD (hyperlipidemia)    HTN (hypertension)    Thrombocytopenia (CMS-HCC)    Atrial fibrillation with controlled ventricular response (Multi)    Stage 3a chronic kidney disease (Multi)    Gross hematuria    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)    Hematuria, unspecified type    -Patient with 4 days of gross hematuria shortly following initiation of Eliquis for atrial fibrillation and stroke prophylaxis as evidence of hemorrhagic disorder due to extrinsic circulating anticoagulants  -Patient's hemoglobin stable at 15.0  -Patient is slightly thrombocytopenic in the 110's but no indication for transfusion at this time; will monitor closely  -Patient with history of TURP and BPH  -CT showing blood in the bladder with possible mass in bladder  -Urology consulted and recommended three-way Pandya catheter be placed for bladder irrigation;  possible intervention this hospitalization; appreciate comanagement  -Urinalysis not consistent with urinary tract infection  -Remainder home medications to be resumed as appropriate    Code: DNAR CCA DNI ok with ICU  DVT prophylaxis: Holding with active hemorrhage  GI prophylaxis: Not indicated  Diet: Cardiac    Patient will require 2 midnight stay for further management and workup of above. Premature discharge could lead to rehospitalization and/or worsening of patient's clinical condition up to and including death.    Marshal Ross MD

## 2024-08-05 NOTE — ED PROVIDER NOTES
"EMERGENCY DEPARTMENT ENCOUNTER      Pt Name: Minh Harrington Jr.  MRN: [id number]  Birthdate 1940  Date of evaluation: 8/5/2024  Provider: Kedar Moreira DO    CHIEF COMPLAINT       Chief Complaint   Patient presents with    Blood in Urine     Patient states for approx 4 days he has blood in his urine \"more then usual\"         HISTORY OF PRESENT ILLNESS    Patient is an 84-year-old male with a past medical history of A-fib on Eliquis, CAD with bypass, hyperlipidemia, hypertension, epilepsy on Keppra, CKD stage IIIa, and CHF with a EF of 30 to 35% presents to the ED with the chief complaint of increased blood in urine.  He states that he has had minimal blood in his urine since 2008 after TURP but he has never had as much blood as he saw today.  He states about 2 days ago he did have some dysuria but at that time did not notice any hematuria.  He did recently start Eliquis back in April for newly diagnosed A-fib.  Additionally he had an episode of lightheadedness while at dinner a few days ago.  He stated that it lasted just a few seconds and he did not have any other associated symptoms at the time.  He has a 40-pack-year smoking history as well.  He denies any hematochezia, melena, nausea vomiting, hematemesis, any recent falls, syncope, abdominal pain, back pain, or chest pain.          Nursing Notes were reviewed.    PAST MEDICAL HISTORY     Past Medical History:   Diagnosis Date    Abnormal ECG     Arrhythmia     Atrial fibrillation (Multi)     CHF (congestive heart failure) (Multi)     Coronary artery disease     COVID-19 02/16/2022    COVID-19    Epilepsy, unspecified, not intractable, without status epilepticus (Multi) 11/18/2020    Epilepsy    Hyperlipidemia     Hypertension     Ischemic cardiomyopathy     Myocardial infarction (Multi)     Unspecified convulsions (Multi) 02/24/2021    Seizures         SURGICAL HISTORY       Past Surgical History:   Procedure Laterality Date    ARTERIAL BYPASS SURGERY   "    CARDIAC CATHETERIZATION      CORONARY ANGIOPLASTY      OTHER SURGICAL HISTORY  02/12/2019    Angioplasty    OTHER SURGICAL HISTORY  02/12/2019    Appendectomy    OTHER SURGICAL HISTORY  02/12/2019    Bypass         CURRENT MEDICATIONS       Previous Medications    ALPRAZOLAM (XANAX) 0.5 MG TABLET    Take 1 tablet (0.5 mg) by mouth 3 times a day as needed for anxiety.    APIXABAN (ELIQUIS) 5 MG TABLET    Take 1 tablet (5 mg) by mouth 2 times a day.    ATORVASTATIN (LIPITOR) 20 MG TABLET    TAKE 1 TABLET BY MOUTH ONCE DAILY AS DIRECTED    B COMPLEX VITAMINS CAPSULE    Take 1 capsule by mouth once daily. After lunch    CHOLECALCIFEROL (VITAMIN D-3) 125 MCG (5000 UT) CAPSULE    Take 1 tablet by mouth once daily. After lunch    DUTASTERIDE (AVODART) 0.5 MG CAPSULE    Take 1 capsule (0.5 mg) by mouth once daily.    EMPAGLIFLOZIN (JARDIANCE) 10 MG    Take 1 tablet (10 mg) by mouth once daily.    FUROSEMIDE (LASIX) 20 MG TABLET    Take one tablet PRN for swelling    LEVETIRACETAM (KEPPRA) 500 MG TABLET    TAKE 1/2 (ONE-HALF) OF A TABLET BY MOUTH DAILY    METOPROLOL TARTRATE (LOPRESSOR) 25 MG TABLET    Take 1 tablet (25 mg) by mouth 2 times a day.    MIRTAZAPINE (REMERON) 15 MG TABLET    TAKE 1 TABLET BY MOUTH AT BEDTIME    SACUBITRIL-VALSARTAN (ENTRESTO) 49-51 MG TABLET    Take 1 tablet by mouth 2 times a day.    SERTRALINE (ZOLOFT) 100 MG TABLET    Take 1 tablet (100 mg) by mouth once daily.       ALLERGIES     Patient has no known allergies.    FAMILY HISTORY       Family History   Problem Relation Name Age of Onset    Heart failure Mother Nana1     Emphysema Father Saroj Sr.     Lung disease Father Saroj Sr.     Bipolar disorder Brother            SOCIAL HISTORY       Social History     Socioeconomic History    Marital status:    Tobacco Use    Smoking status: Former     Current packs/day: 0.00     Average packs/day: 1 pack/day for 40.0 years (40.0 ttl pk-yrs)     Types: Cigarettes     Start date: 6/14/1957      Quit date:      Years since quittin.6    Smokeless tobacco: Never   Vaping Use    Vaping status: Never Used   Substance and Sexual Activity    Alcohol use: Yes     Alcohol/week: 14.0 standard drinks of alcohol     Types: 14 Standard drinks or equivalent per week     Comment: daily    Drug use: Never    Sexual activity: Not Currently     Partners: Female     Birth control/protection: Abstinence     Social Determinants of Health     Financial Resource Strain: Low Risk  (4/15/2024)    Overall Financial Resource Strain (CARDIA)     Difficulty of Paying Living Expenses: Not hard at all   Transportation Needs: No Transportation Needs (4/15/2024)    PRAPARE - Transportation     Lack of Transportation (Medical): No     Lack of Transportation (Non-Medical): No   Housing Stability: High Risk (4/15/2024)    Housing Stability Vital Sign     Unable to Pay for Housing in the Last Year: No     Number of Places Lived in the Last Year: 3     Unstable Housing in the Last Year: No       SCREENINGS                        PHYSICAL EXAM    (up to 7 for level 4, 8 or more for level 5)     ED Triage Vitals [24 0750]   Temperature Heart Rate Respirations BP   36 °C (96.8 °F) 60 18 136/64      Pulse Ox Temp Source Heart Rate Source Patient Position   98 % Temporal Monitor Sitting      BP Location FiO2 (%)     Right arm --       Physical Exam  Vitals reviewed.   Constitutional:       General: He is not in acute distress.     Appearance: He is not ill-appearing or toxic-appearing.   HENT:      Head: Normocephalic and atraumatic.   Cardiovascular:      Rate and Rhythm: Normal rate. Rhythm irregular.      Heart sounds: Normal heart sounds. No murmur heard.  Pulmonary:      Effort: Pulmonary effort is normal.      Breath sounds: Normal breath sounds.   Abdominal:      General: Abdomen is flat.      Palpations: Abdomen is soft. There is no mass.      Tenderness: There is no abdominal tenderness.   Skin:     General: Skin is warm  and dry.      Capillary Refill: Capillary refill takes less than 2 seconds.   Neurological:      General: No focal deficit present.      Mental Status: He is alert.   Psychiatric:         Mood and Affect: Mood normal.         Behavior: Behavior normal.          DIAGNOSTIC RESULTS     LABS:  Labs Reviewed   CBC WITH AUTO DIFFERENTIAL - Abnormal       Result Value    WBC 7.1      nRBC 0.0      RBC 4.83      Hemoglobin 15.0      Hematocrit 46.8      MCV 97      MCH 31.1      MCHC 32.1      RDW 14.5      Platelets 116 (*)     Neutrophils % 72.3      Immature Granulocytes %, Automated 0.7      Lymphocytes % 14.9      Monocytes % 10.7      Eosinophils % 1.0      Basophils % 0.4      Neutrophils Absolute 5.16      Immature Granulocytes Absolute, Automated 0.05      Lymphocytes Absolute 1.06      Monocytes Absolute 0.76      Eosinophils Absolute 0.07      Basophils Absolute 0.03     PROTIME-INR - Abnormal    Protime 16.2 (*)     INR 1.4 (*)    APTT - Abnormal    aPTT 44 (*)     Narrative:     The APTT is no longer used for monitoring Unfractionated Heparin Therapy. For monitoring Heparin Therapy, use the Heparin Assay.   URINALYSIS WITH REFLEX CULTURE AND MICROSCOPIC - Abnormal    Color, Urine Red (*)     Appearance, Urine Turbid (*)     Specific Gravity, Urine 1.020      pH, Urine 6.5      Protein, Urine 100 (2+) (*)     Glucose, Urine 70 (1+) (*)     Blood, Urine 1.0 (3+) (*)     Ketones, Urine NEGATIVE      Bilirubin, Urine NEGATIVE      Urobilinogen, Urine NORM      Nitrite, Urine NEGATIVE      Leukocyte Esterase, Urine NEGATIVE     URINALYSIS MICROSCOPIC WITH REFLEX CULTURE - Abnormal    WBC, Urine 1-5      RBC, Urine >20 (*)    COMPREHENSIVE METABOLIC PANEL - Normal    Glucose 92      Sodium 137      Potassium 4.1      Chloride 103      Bicarbonate 27      Anion Gap 11      Urea Nitrogen 21      Creatinine 1.07      eGFR 68      Calcium 8.9      Albumin 4.1      Alkaline Phosphatase 70      Total Protein 7.2      AST  24      Bilirubin, Total 1.0      ALT 21     TYPE AND SCREEN    ABO TYPE O      Rh TYPE POS      ANTIBODY SCREEN NEG     URINALYSIS WITH REFLEX CULTURE AND MICROSCOPIC    Narrative:     The following orders were created for panel order Urinalysis with Reflex Culture and Microscopic.  Procedure                               Abnormality         Status                     ---------                               -----------         ------                     Urinalysis with Reflex C...[[id number]]  Abnormal            Final result               Extra Urine Gray Tube[[id number]]                            In process                   Please view results for these tests on the individual orders.   EXTRA URINE GRAY TUBE       All other labs were within normal range or not returned as of this dictation.    Imaging  CT abdomen pelvis w IV contrast   Final Result   1. Large amount of heterogeneous blood in the bladder. Questionable   lesion versus adherent blood along the right lateral wall of the   bladder measuring 3.3 cm x 1.1 cm. Recommend further evaluation with   CT urogram or cystoscopy.   2. Urinary bladder is mildly distended with associated wall   trabeculation. Findings suggestive of chronic bladder outlet   obstruction.   3. Borderline enlarged prostate.   4. Cardiomegaly with severe coronary artery calcifications.   5. Small right pleural effusion.   6. Mild hepatic steatosis.   Signed by Giacomo Pandey MD           Procedures  Procedures     EMERGENCY DEPARTMENT COURSE/MDM:   Medical Decision Making  Patient is an 84-year-old male with a past medical history of A-fib on Eliquis, CAD with bypass, hyperlipidemia, hypertension, CKD stage IIIa, epilepsy, and CHF with an EF of 30 to 35% presented to the ED with increased hematuria.  States that he has had hematuria since 2008 after a TURP, but has never noticed this much blood in his urine before this morning.  Noticed some dysuria that started a few days ago but no  hematuria at that time.  He denies any hematochezia, melena, nausea or vomiting, hematic emesis, any recent falls, syncope, abdominal pain, back pain, or chest pain.  He does have a 40-pack-year smoking history.He is hemodynamically stable and vital signs are within normal limits.  He appears generally well on exam and did not have any abdominal tenderness on palpation.    Differential diagnoses include but are not limited to UTI, hemorrhagic cystitis, nephrolithiasis, malignancy, or medication effect. CMP is unremarkable, no electrolyte abnormality or TERRY are present.  CBC is unremarkable for any anemia.  His platelets are low but he has chronic thrombocytopenia.  UA did not show any bacteria or increased white blood cells eliminating any UTI as a cause.  It was positive for 3+ blood and 2+ protein and had over 20 red blood cells.  PT and PTT are both mildly elevated which is most likely contributed to Eliquis. CT abdomen pelvis showed a large amount of blood within the bladder and a questionable lesion versus adherent blood along the bladder wall.  Further evaluation with CT urogram or cystoscopy was recommended.  The bladder was mildly distended suggestive of chronic bladder outlet obstruction with a borderline enlarged prostate. Upon speaking to urology a three-way Pandya will be placed for bladder irrigation and he will be admitted to medicine for further workup.  They also recommended stopping Eliquis for now.  Urology placed on consult.    I have seen and evaluated the patient and agree with the medical student's documentation as above.  I have edited and made adjustments as needed.  Plan of care was discussed with the attending physician.    Please see ED course for the rest of the MDM.    Kedar Moreira DO, PGY-3  Emergency Medicine    Amount and/or Complexity of Data Reviewed  External Data Reviewed: notes.     Details: Recent cardiology progress note reviewed which did not have any medication changes and he  did not have any problems with bleeding at the time.   Labs: ordered. Decision-making details documented in ED Course.  Radiology: ordered. Decision-making details documented in ED Course.        Please see ED course for additional MDM.    ED Course as of 08/05/24 1154   Mon Aug 05, 2024   1107 Spoke with Dr. Hoffman from neurology, he recommends patient be admitted, he does agree with placing a Pandya catheter for irrigation.  He also recommends that we hold the patient's Eliquis. [CL]      ED Course User Index  [CL] Kedar GLADIS Moreira, DO         Diagnoses as of 08/05/24 1154   Hematuria, unspecified type   Bladder mass        CT abdomen pelvis w IV contrast   Final Result   1. Large amount of heterogeneous blood in the bladder. Questionable   lesion versus adherent blood along the right lateral wall of the   bladder measuring 3.3 cm x 1.1 cm. Recommend further evaluation with   CT urogram or cystoscopy.   2. Urinary bladder is mildly distended with associated wall   trabeculation. Findings suggestive of chronic bladder outlet   obstruction.   3. Borderline enlarged prostate.   4. Cardiomegaly with severe coronary artery calcifications.   5. Small right pleural effusion.   6. Mild hepatic steatosis.   Signed by Giacomo Pandey MD          Patient and or family in agreement and understanding of treatment plan.  All questions answered.      I reviewed the case with the attending ED physician. The attending ED physician agrees with the plan. Patient and/or patient´s representative was counseled regarding labs, imaging, likely diagnosis, and plan. All questions were answered.    ED Medications administered this visit:    Medications   iohexol (OMNIPaque) 350 mg iodine/mL solution 75 mL (75 mL intravenous Given 8/5/24 0972)       New Prescriptions from this visit:    New Prescriptions    No medications on file       Follow-up:  No follow-up provider specified.      Final Impression:   1. Hematuria, unspecified type    2.  Bladder mass          (Please note that portions of this note were completed with a voice recognition program.  Efforts were made to edit the dictations but occasionally words are mis-transcribed.)       Kedar Moreira,   Resident  08/05/24 [id number]

## 2024-08-05 NOTE — CONSULTS
"Inpatient consult to Urology  Consult performed by: Silva Dalton, MERCEDES-CNP  Consult ordered by: James Guidry DO  Reason for consult: hematuria, bladder mass      History Of Present Illness  Minh Harrington Jr. is a 84 y.o. male with PMHx of paroxysmal atrial fibrillation on eliquis, acute combined systolic and diastolic heart failure, CAD, HLD, HTN, ischemic cardiomyopathy with left ventricular ejection fraction of 30-35% (4/16/2024), MI, CKD stage 3a, CABG (2000), hx of aneurysm of ascending thoracic aorta, TIA, BPH s/p TURP (2008) with Dr. Bush that presents to the emergency department for hematuria.  Patient reports that he started eliquis 4 weeks ago for atrial fibrillation.  He noticed last evening he starting urinating blood with some clots.  He reports that he had a TURP with Dr. Bush in 2008 and ever since then he occasionally has hematuria.  He states that this happens maybe once a month where he will notice that he is  have a hard time urinating.  He has to use force to start the stream and that is when he notices a tiny clot, urinates blood and then clears over the next few days back to yellow urine. He did follow up with Dr. Bush and was told the the TURP never healed completely and to monitor.  Patient has not seen a urologist since Dr. Bush retired in the past 5-6 years.  Patient endorses some dysuria or a \"stinging\" sensation with urination.  He denies urgency and frequency.  He states that he is able to start a urinary stream without difficulty, has a forceful stream and feels that he empties his bladder for the most part.  He states that he goes to the bathroom 1 time a night.  He denies any suprapubic or flank pain.  He reports his last dose of eliquis was last evening (8/4/2024).  He currently denies CP, SOB, fever, chills, nausea, vomiting, and abdominal pain.    In the ED, vital signs reported Tmax 36, HR 60, resp 18, /84, Spo2 98% on room air.  Labs reported no leukocytosis, " WBC 7.1, H&H stable 15.0/46.8, platelets 116, CMP WNL, INR 1.4, UA with 2+ protein, 1+ glucose, 3+ blood, RBC >20.  CT abdomen and pelvis reported Large amount of heterogeneous blood in the bladder. Questionable  lesion versus adherent blood along the right lateral wall of the bladder measuring 3.3 cm x 1.1 cm. Urinary bladder is mildly distended with associated wall trabeculation.  Borderline enlarged prostate.       Past Medical History  Past Medical History:   Diagnosis Date    Abnormal ECG     Arrhythmia     Atrial fibrillation (Multi)     CHF (congestive heart failure) (Multi)     Coronary artery disease     COVID-19 02/16/2022    COVID-19    Epilepsy, unspecified, not intractable, without status epilepticus (Multi) 11/18/2020    Epilepsy    Hyperlipidemia     Hypertension     Ischemic cardiomyopathy     Myocardial infarction (Multi)     Unspecified convulsions (Multi) 02/24/2021    Seizures        Surgical History  Past Surgical History:   Procedure Laterality Date    ARTERIAL BYPASS SURGERY      CARDIAC CATHETERIZATION      CORONARY ANGIOPLASTY      OTHER SURGICAL HISTORY  02/12/2019    Angioplasty    OTHER SURGICAL HISTORY  02/12/2019    Appendectomy    OTHER SURGICAL HISTORY  02/12/2019    Bypass         Social History  Social Determinants of Health     Tobacco Use: Medium Risk (8/5/2024)    Patient History     Smoking Tobacco Use: Former     Smokeless Tobacco Use: Never     Passive Exposure: Not on file   Alcohol Use: Not At Risk (4/15/2024)    AUDIT-C     Frequency of Alcohol Consumption: Monthly or less     Average Number of Drinks: 1 or 2     Frequency of Binge Drinking: Never   Financial Resource Strain: Low Risk  (4/15/2024)    Overall Financial Resource Strain (CARDIA)     Difficulty of Paying Living Expenses: Not hard at all   Food Insecurity: Not on file   Transportation Needs: No Transportation Needs (4/15/2024)    PRAPARE - Transportation     Lack of Transportation (Medical): No     Lack of  Transportation (Non-Medical): No   Physical Activity: Not on file   Stress: Not on file   Social Connections: Not on file   Intimate Partner Violence: Not on file   Depression: Not at risk (6/27/2024)    PHQ-2     PHQ-2 Score: 0   Housing Stability: High Risk (4/15/2024)    Housing Stability Vital Sign     Unable to Pay for Housing in the Last Year: No     Number of Places Lived in the Last Year: 3     Unstable Housing in the Last Year: No   Utilities: Not on file   Digital Equity: Not on file   Health Literacy: Not on file        Family History  Family History   Problem Relation Name Age of Onset    Heart failure Mother Nana1     Emphysema Father Saroj Sr.     Lung disease Father Saroj Sr.     Bipolar disorder Brother          Home Medications  Prior to Admission medications    Medication Sig Start Date End Date Taking? Authorizing Provider   ALPRAZolam (Xanax) 0.5 mg tablet Take 1 tablet (0.5 mg) by mouth 3 times a day as needed for anxiety. 7/29/24 3/26/25 Yes Isabel Ritter DO   apixaban (Eliquis) 5 mg tablet Take 1 tablet (5 mg) by mouth 2 times a day. 3/25/24 3/25/25 Yes Isabel Ritter DO   atorvastatin (Lipitor) 20 mg tablet TAKE 1 TABLET BY MOUTH ONCE DAILY AS DIRECTED 7/8/24  Yes Isabel Ritter DO   b complex vitamins capsule Take 1 capsule by mouth once daily. After lunch   Yes Historical Provider, MD   cholecalciferol (Vitamin D-3) 125 MCG (5000 UT) capsule Take 1 tablet by mouth once daily. After lunch 9/12/19  Yes Historical Provider, MD   dutasteride (Avodart) 0.5 mg capsule Take 1 capsule (0.5 mg) by mouth once daily. 1/11/24  Yes Isabel Ritter DO   empagliflozin (Jardiance) 10 mg Take 1 tablet (10 mg) by mouth once daily. 6/27/24  Yes Isabel Ritter DO   furosemide (Lasix) 20 mg tablet Take one tablet PRN for swelling 8/1/24  Yes Heriberto Valle MD   levETIRAcetam (Keppra) 500 mg tablet TAKE 1/2 (ONE-HALF) OF A TABLET BY MOUTH DAILY 10/23/23  Yes Isabel Ritter DO    metoprolol tartrate (Lopressor) 25 mg tablet Take 1 tablet (25 mg) by mouth 2 times a day. 3/25/24 3/25/25 Yes Isabel Ritter DO   mirtazapine (Remeron) 15 mg tablet TAKE 1 TABLET BY MOUTH AT BEDTIME 4/25/24  Yes Isabel Ritter DO   sacubitriL-valsartan (Entresto) 49-51 mg tablet Take 1 tablet by mouth 2 times a day. 8/1/24 8/1/25 Yes Heriberto Valle MD   sertraline (Zoloft) 100 mg tablet Take 1 tablet (100 mg) by mouth once daily.  Patient taking differently: Take 1 tablet (100 mg) by mouth once daily at bedtime. 11/6/23 11/5/24 Yes Isabel Ritter DO   furosemide (Lasix) 20 mg tablet Take 1 tablet (20 mg) by mouth once daily.  Patient not taking: Reported on 6/27/2024 4/18/24 8/1/24  Isabel Ritter DO   sacubitriL-valsartan (Entresto) 49-51 mg tablet Take 1 tablet by mouth 2 times a day. 4/25/24 8/1/24  Heriberto Valle MD        Allergies  Patient has no known allergies.    Review of Systems  Review of Systems   Constitutional: Negative.    HENT: Negative.     Eyes: Negative.    Respiratory: Negative.  Negative for chest tightness, shortness of breath and wheezing.    Cardiovascular: Negative.  Negative for chest pain and palpitations.   Gastrointestinal:  Negative for abdominal distention, abdominal pain, constipation, diarrhea, nausea and vomiting.   Endocrine: Negative.    Genitourinary:  Positive for dysuria and hematuria. Negative for flank pain, frequency and urgency.   Musculoskeletal: Negative.    Allergic/Immunologic: Negative.    Neurological: Negative.  Negative for dizziness.   Psychiatric/Behavioral: Negative.          Physical Exam  Physical Exam  Vitals reviewed.   Constitutional:       General: He is awake.      Appearance: Normal appearance.   Cardiovascular:      Rate and Rhythm: Normal rate and regular rhythm.      Pulses: Normal pulses.      Heart sounds: Normal heart sounds.   Pulmonary:      Effort: Pulmonary effort is normal.      Breath sounds: Normal breath  "sounds and air entry.   Abdominal:      General: Abdomen is flat. There is no distension.      Palpations: Abdomen is soft.      Tenderness: There is no abdominal tenderness. There is no right CVA tenderness, left CVA tenderness, guarding or rebound.   Genitourinary:     Comments: 3 way barbosa catheter with CBI wide open with cherry red urine.  Manually irrigated for small clots.    Musculoskeletal:         General: Normal range of motion.      Cervical back: Normal range of motion.   Skin:     General: Skin is warm and dry.   Neurological:      General: No focal deficit present.      Mental Status: He is alert and oriented to person, place, and time.   Psychiatric:         Behavior: Behavior is cooperative.          Medications  Scheduled medications  lidocaine, 1 Application, urethral, Once      Continuous medications     PRN medications       Last Recorded Vitals  Heart Rate:  []   Temp:  [36 °C (96.8 °F)]   Resp:  [18-22]   BP: ()/(49-64)   Height:  [177.8 cm (5' 10\")]   Weight:  [79.8 kg (176 lb)]   SpO2:  [93 %-98 %]      Input/Output  No intake or output data in the 24 hours ending 08/05/24 1359     Labs  Results for orders placed or performed during the hospital encounter of 08/05/24 (from the past 24 hour(s))   CBC and Auto Differential   Result Value Ref Range    WBC 7.1 4.4 - 11.3 x10*3/uL    nRBC 0.0 0.0 - 0.0 /100 WBCs    RBC 4.83 4.50 - 5.90 x10*6/uL    Hemoglobin 15.0 13.5 - 17.5 g/dL    Hematocrit 46.8 41.0 - 52.0 %    MCV 97 80 - 100 fL    MCH 31.1 26.0 - 34.0 pg    MCHC 32.1 32.0 - 36.0 g/dL    RDW 14.5 11.5 - 14.5 %    Platelets 116 (L) 150 - 450 x10*3/uL    Neutrophils % 72.3 40.0 - 80.0 %    Immature Granulocytes %, Automated 0.7 0.0 - 0.9 %    Lymphocytes % 14.9 13.0 - 44.0 %    Monocytes % 10.7 2.0 - 10.0 %    Eosinophils % 1.0 0.0 - 6.0 %    Basophils % 0.4 0.0 - 2.0 %    Neutrophils Absolute 5.16 1.60 - 5.50 x10*3/uL    Immature Granulocytes Absolute, Automated 0.05 0.00 - 0.50 " x10*3/uL    Lymphocytes Absolute 1.06 0.80 - 3.00 x10*3/uL    Monocytes Absolute 0.76 0.05 - 0.80 x10*3/uL    Eosinophils Absolute 0.07 0.00 - 0.40 x10*3/uL    Basophils Absolute 0.03 0.00 - 0.10 x10*3/uL   Comprehensive metabolic panel   Result Value Ref Range    Glucose 92 74 - 99 mg/dL    Sodium 137 136 - 145 mmol/L    Potassium 4.1 3.5 - 5.3 mmol/L    Chloride 103 98 - 107 mmol/L    Bicarbonate 27 21 - 32 mmol/L    Anion Gap 11 10 - 20 mmol/L    Urea Nitrogen 21 6 - 23 mg/dL    Creatinine 1.07 0.50 - 1.30 mg/dL    eGFR 68 >60 mL/min/1.73m*2    Calcium 8.9 8.6 - 10.3 mg/dL    Albumin 4.1 3.4 - 5.0 g/dL    Alkaline Phosphatase 70 33 - 136 U/L    Total Protein 7.2 6.4 - 8.2 g/dL    AST 24 9 - 39 U/L    Bilirubin, Total 1.0 0.0 - 1.2 mg/dL    ALT 21 10 - 52 U/L   Protime-INR   Result Value Ref Range    Protime 16.2 (H) 9.8 - 12.8 seconds    INR 1.4 (H) 0.9 - 1.1   aPTT   Result Value Ref Range    aPTT 44 (H) 27 - 38 seconds   Type And Screen   Result Value Ref Range    ABO TYPE O     Rh TYPE POS     ANTIBODY SCREEN NEG    Urinalysis with Reflex Culture and Microscopic   Result Value Ref Range    Color, Urine Red (N) Straw, Yellow    Appearance, Urine Turbid (N) Clear    Specific Gravity, Urine 1.020 1.005 - 1.035    pH, Urine 6.5 5.0, 5.5, 6.0, 6.5, 7.0, 7.5, 8.0    Protein, Urine 100 (2+) (A) NEGATIVE, 10 (TRACE) mg/dL    Glucose, Urine 70 (1+) (A) NEGATIVE mg/dL    Blood, Urine 1.0 (3+) (A) NEGATIVE    Ketones, Urine NEGATIVE NEGATIVE mg/dL    Bilirubin, Urine NEGATIVE NEGATIVE    Urobilinogen, Urine NORM NORM mg/dL    Nitrite, Urine NEGATIVE NEGATIVE    Leukocyte Esterase, Urine NEGATIVE NEGATIVE   Urinalysis Microscopic   Result Value Ref Range    WBC, Urine 1-5 1-5, NONE /HPF    RBC, Urine >20 (A) NONE, 1-2, 3-5 /HPF       Imaging  CT abdomen pelvis w IV contrast    Result Date: 8/5/2024  STUDY: CT Abdomen and Pelvis with IV Contrast; 8/5/2024 at 9:50 AM. INDICATION: Painless hematuria. COMPARISON: XR left  hip/pelvis 9/21/2022. ACCESSION NUMBER(S): [id number] ORDERING CLINICIAN: AR BARRERA TECHNIQUE: CT of the abdomen and pelvis was performed.  Contiguous axial images were obtained at 3 mm slice thickness through the abdomen and pelvis. Coronal and sagittal reconstructions at 3 mm slice thickness were performed.  Omnipaque 350 75 mL was administered intravenously.  FINDINGS: LOWER CHEST: Heart is enlarged with severe coronary calcifications  No pericardial effusion.  Small right pleural effusion.  ABDOMEN:  LIVER: No hepatomegaly.  Smooth surface contour.  Mild fatty infiltration of the liver.  BILE DUCTS: No intrahepatic or extrahepatic biliary ductal dilatation.  GALLBLADDER: The gallbladder is present without definite gallstones. STOMACH: No abnormalities identified.  PANCREAS: No masses or ductal dilatation.  SPLEEN: No splenomegaly or focal splenic lesion.  ADRENAL GLANDS: No thickening or nodules.  KIDNEYS AND URETERS: Kidneys are normal in size and location.  No renal or ureteral calculi.  Renal cysts noted measuring up to 5.4 cm on the left.  PELVIS:  BLADDER: Urinary bladder is mildly distended with associated wall trabeculation.  Large amount of heterogeneous blood in the bladder. Questionable lesion versus adherent blood along the right lateral wall of the bladder on image 132 measuring 3.3 cm x 1.1 cm.  REPRODUCTIVE ORGANS: Prostate gland is borderline enlarged measuring 5 cm.  BOWEL: Mild to moderate stool volume.  Appendix is not visualized.  VESSELS: No abnormalities identified.  Abdominal aorta is normal in caliber. Severe plaque along the distal aorta.  PERITONEUM/RETROPERITONEUM/LYMPH NODES: No free fluid.  No pneumoperitoneum. No lymphadenopathy.  ABDOMINAL WALL: No abnormalities identified. SOFT TISSUES: No abnormalities identified.  BONES: No acute fracture or aggressive osseous lesion.    1. Large amount of heterogeneous blood in the bladder. Questionable lesion versus adherent blood along  the right lateral wall of the bladder measuring 3.3 cm x 1.1 cm. Recommend further evaluation with CT urogram or cystoscopy. 2. Urinary bladder is mildly distended with associated wall trabeculation. Findings suggestive of chronic bladder outlet obstruction. 3. Borderline enlarged prostate. 4. Cardiomegaly with severe coronary artery calcifications. 5. Small right pleural effusion. 6. Mild hepatic steatosis. Signed by Giacomo Pandey MD    Transthoracic Echo Complete    Result Date: 7/26/2024              [address], [address], [place] [address]          Tel [phone] Fax [phone] TRANSTHORACIC ECHOCARDIOGRAM REPORT Patient Name:      EAMON ORR LULÚ  Reading Physician:    [id number] Ministerio Napier DO Study Date:        7/25/2024            Ordering Provider:    [id number] SAIMA BAIG MRN/PID:           [id number]             Fellow: Accession#:        [id number]         Nurse: Date of Birth/Age: 1940 / 84 years Sonographer:          Ministerio Taylor Gender:            M                    Additional Staff: Height:            177.80 cm            Admit Date:           7/25/2024 Weight:            83.92 kg             Admission Status:     Outpatient BSA / BMI:         2.02 m2 / 26.54      Department Location:  [facility]                    kg/m2                                      [facility] Blood Pressure: 110 /60 mmHg Study Type:    TRANSTHORACIC ECHO (TTE) COMPLETE Diagnosis/ICD: Atherosclerotic heart disease of native coronary artery without                angina pectoris-I25.10; Acute combined systolic (congestive) and                diastolic (congestive) heart failure (CHF)-I50.41 Indication:    CAD, Systolic and Diastolic HF CPT Codes:     Echo Complete w Full [id number] Patient History: Smoker:            Former.  Pertinent History: A-Fib, CAD, CHF, HTN, Hyperlipidemia and Systolic and                    Diastolic HF, VT, MR, CABG. Study Detail: The following Echo studies were performed: 2D, M-Mode, Doppler and               color flow. The patient was awake.  PHYSICIAN INTERPRETATION: Left Ventricle: Left ventricular ejection fraction is moderately decreased, by visual estimate at 30-35%. There is global hypokinesis of the left ventricle with minor regional variations. The left ventricular cavity size is normal. The left ventricular septal wall thickness is moderately increased. There is mild to moderate concentric left ventricular hypertrophy. Left ventricular diastolic filling was not assessed. Left Atrium: The left atrium is moderate to severely dilated. Right Ventricle: The right ventricle is mildly enlarged. There is normal right ventricular global systolic function. Right Atrium: The right atrium is moderately to severely dilated. Aortic Valve: The aortic valve appears structurally normal. The aortic valve appears tricuspid. There is mild to moderate aortic valve cusp calcification. The aortic valve dimensionless index is 0.57. There is mild aortic valve regurgitation. The peak instantaneous gradient of the aortic valve is 6.2 mmHg. The mean gradient of the aortic valve is 4.0 mmHg. Mitral Valve: The mitral valve is normal in structure. There is mild thickening and calcification of the anterior and posterior mitral valve leaflets. There is no evidence of mitral valve stenosis. The doppler estimated mean and peak diastolic pressure gradients are 3.0 mmHg and 5.3 mmHg respectively. There is normal mitral valve leaflet mobility. There is moderate mitral valve regurgitation. Tricuspid Valve: The tricuspid valve is structurally normal. There is normal tricuspid valve leaflet mobility. There is mild tricuspid regurgitation. Pulmonic Valve: The pulmonic valve is structurally normal. There is mild pulmonic valve regurgitation.  Pericardium: There is no pericardial effusion noted. Aorta: The aortic root is normal. There is moderate dilatation of the ascending aorta. There is mild dilatation of the aortic root. Pulmonary Artery: Pulmonary hypertension is present. The tricuspid regurgitant velocity is 3.53 m/s, and with an estimated right atrial pressure of 3 mmHg, the estimated pulmonary artery pressure is severely elevated with the RVSP at 52.8 mmHg. In comparison to the previous echocardiogram(s): The left ventricular function is unchanged.  CONCLUSIONS:  1. Left ventricular ejection fraction is moderately decreased, by visual estimate at 30-35%.  2. There is global hypokinesis of the left ventricle with minor regional variations.  3. Moderately increased left ventricular septal thickness.  4. Mildly enlarged right ventricle.  5. There is normal right ventricular global systolic function.  6. The left atrium is moderate to severely dilated.  7. The right atrium is moderately to severely dilated.  8. There is no evidence of mitral valve stenosis.  9. Moderate mitral valve regurgitation. 10. Mild aortic valve regurgitation. 11. Pulmonary hypertension is present. 12. Severely elevated pulmonary artery pressure. 13. There is moderate dilatation of the ascending aorta. QUANTITATIVE DATA SUMMARY: 2D MEASUREMENTS:                           Normal Ranges: Ao Root d:     3.80 cm    (2.0-3.7cm) LAs:           4.90 cm    (2.7-4.0cm) RVIDd:         3.30 cm    (0.9-3.6cm) IVSd:          1.50 cm    (0.6-1.1cm) LVPWd:         1.10 cm    (0.6-1.1cm) LVIDd:         4.80 cm    (3.9-5.9cm) LVIDs:         4.30 cm LV Mass Index: 121.7 g/m2 LV % FS        10.4 % LA VOLUME:                             Normal Ranges: LA Area A4C:     35.1 cm2 LA Area A2C:     34.6 cm2 LA Volume Index: 64.9 ml/m2 LA Vol A4C:      128.0 ml LA Vol A2C:      127.0 ml LA Vol BP:       131.0 ml RA VOLUME BY A/L METHOD:                               Normal Ranges: RA Vol A4C:         92.2 ml    (8.3-19.5ml) RA Vol Index A4C:  45.7 ml/m2 RA Area A4C:       28.9 cm2 RA Major Axis A4C: 7.7 cm AORTA MEASUREMENTS:                    Normal Ranges: Asc Ao, d: 4.30 cm (2.1-3.4cm) LV SYSTOLIC FUNCTION BY 2D PLANIMETRY (MOD):                      Normal Ranges: EF-A4C View:    35 % (>=55%) EF-A2C View:    31 % EF-Biplane:     31 % EF-Visual:      33 % LV EF Reported: 33 % LV DIASTOLIC FUNCTION:                         Normal Ranges: MV Peak E:    0.89 m/s  (0.7-1.2 m/s) MV e'         0.118 m/s (>8.0) MV lateral e' 0.16 m/s MV medial e'  0.07 m/s E/e' Ratio:   7.50      (<8.0) MITRAL VALVE:                      Normal Ranges: MV Vmax:    1.15 m/s (<=1.3m/s) MV peak P.3 mmHg (<5mmHg) MV mean PG: 3.0 mmHg (<48mmHg) MV DT:      236 msec (150-240msec) AORTIC VALVE:                                   Normal Ranges: AoV Vmax:                1.25 m/s (<=1.7m/s) AoV Peak P.2 mmHg (<20mmHg) AoV Mean P.0 mmHg (1.7-11.5mmHg) LVOT Max Ian:            0.68 m/s (<=1.1m/s) AoV VTI:                 23.00 cm (18-25cm) LVOT VTI:                13.10 cm LVOT Diameter:           2.50 cm  (1.8-2.4cm) AoV Area, VTI:           2.80 cm2 (2.5-5.5cm2) AoV Area,Vmax:           2.69 cm2 (2.5-4.5cm2) AoV Dimensionless Index: 0.57  RIGHT VENTRICLE: RV Basal 4.70 cm RV Mid   3.10 cm RV Major 6.9 cm TRICUSPID VALVE/RVSP:                             Normal Ranges: Peak TR Velocity: 3.53 m/s Est. RA Pressure: 3 mmHg RV Syst Pressure: 52.8 mmHg (< 30mmHg) PULMONIC VALVE:                         Normal Ranges: PV Accel Time: 67 msec  (>120ms) PV Max Ian:    0.8 m/s  (0.6-0.9m/s) PV Max P.3 mmHg  [id number] Ministerio Napier DO Electronically signed on 2024 at 12:12:15 PM  ** Final **          Plan  Hemorrhagic disorder due to extrinsic circulating anticoagulants (Multi)    #Hematuria  - 3 Way barbosa catheter inserted in the emergency department. CBI started.  Manually irrigated for small clots.   -  CBI currently wide open, urine is pink tinged to light red currently.  - OK to manually irrigated PRN to maintain patency.  Goal to keep urine clear to pink tinged.   - Continue to hold anticoagulation / eliquis  -CT A/P with questionable lesion versus adherent blood along the right lateral wall of the bladder measuring 3.3 cm x 1.1 cm.   - Patient may have diet today.    - Will make NPO tomorrow evening for possible cystoscopy, fulguration and clot evacuation on Wednesday.    - Given patient cardiac history, will consult cardiology for risk stratification for urology procedure.       Plan of care discussed Dr. Felix Dalton, APRN-CNP    I spent 60 minutes in the professional and overall care of this patient.

## 2024-08-06 LAB
ANION GAP SERPL CALC-SCNC: 12 MMOL/L (ref 10–20)
ATRIAL RATE: 117 BPM
BUN SERPL-MCNC: 15 MG/DL (ref 6–23)
CALCIUM SERPL-MCNC: 8.3 MG/DL (ref 8.6–10.3)
CHLORIDE SERPL-SCNC: 105 MMOL/L (ref 98–107)
CO2 SERPL-SCNC: 23 MMOL/L (ref 21–32)
CREAT SERPL-MCNC: 0.94 MG/DL (ref 0.5–1.3)
EGFRCR SERPLBLD CKD-EPI 2021: 79 ML/MIN/1.73M*2
ERYTHROCYTE [DISTWIDTH] IN BLOOD BY AUTOMATED COUNT: 14.4 % (ref 11.5–14.5)
GLUCOSE SERPL-MCNC: 93 MG/DL (ref 74–99)
HCT VFR BLD AUTO: 41.3 % (ref 41–52)
HGB BLD-MCNC: 13.1 G/DL (ref 13.5–17.5)
MAGNESIUM SERPL-MCNC: 2.17 MG/DL (ref 1.6–2.4)
MCH RBC QN AUTO: 30.8 PG (ref 26–34)
MCHC RBC AUTO-ENTMCNC: 31.7 G/DL (ref 32–36)
MCV RBC AUTO: 97 FL (ref 80–100)
NRBC BLD-RTO: 0 /100 WBCS (ref 0–0)
PLATELET # BLD AUTO: 109 X10*3/UL (ref 150–450)
POTASSIUM SERPL-SCNC: 4.3 MMOL/L (ref 3.5–5.3)
Q ONSET: 227 MS
QRS COUNT: 17 BEATS
QRS DURATION: 92 MS
QT INTERVAL: 332 MS
QTC CALCULATION(BAZETT): 444 MS
QTC FREDERICIA: 403 MS
R AXIS: 41 DEGREES
RBC # BLD AUTO: 4.25 X10*6/UL (ref 4.5–5.9)
SODIUM SERPL-SCNC: 136 MMOL/L (ref 136–145)
T AXIS: 100 DEGREES
T OFFSET: 393 MS
VENTRICULAR RATE: 108 BPM
WBC # BLD AUTO: 11.1 X10*3/UL (ref 4.4–11.3)

## 2024-08-06 PROCEDURE — 80048 BASIC METABOLIC PNL TOTAL CA: CPT | Performed by: INTERNAL MEDICINE

## 2024-08-06 PROCEDURE — 36415 COLL VENOUS BLD VENIPUNCTURE: CPT | Performed by: INTERNAL MEDICINE

## 2024-08-06 PROCEDURE — 99233 SBSQ HOSP IP/OBS HIGH 50: CPT | Performed by: INTERNAL MEDICINE

## 2024-08-06 PROCEDURE — 2500000005 HC RX 250 GENERAL PHARMACY W/O HCPCS: Performed by: INTERNAL MEDICINE

## 2024-08-06 PROCEDURE — 99231 SBSQ HOSP IP/OBS SF/LOW 25: CPT | Performed by: NURSE PRACTITIONER

## 2024-08-06 PROCEDURE — 2500000002 HC RX 250 W HCPCS SELF ADMINISTERED DRUGS (ALT 637 FOR MEDICARE OP, ALT 636 FOR OP/ED): Performed by: INTERNAL MEDICINE

## 2024-08-06 PROCEDURE — 1200000002 HC GENERAL ROOM WITH TELEMETRY DAILY

## 2024-08-06 PROCEDURE — 99233 SBSQ HOSP IP/OBS HIGH 50: CPT

## 2024-08-06 PROCEDURE — 85027 COMPLETE CBC AUTOMATED: CPT | Performed by: INTERNAL MEDICINE

## 2024-08-06 PROCEDURE — 83735 ASSAY OF MAGNESIUM: CPT | Performed by: INTERNAL MEDICINE

## 2024-08-06 PROCEDURE — 2500000001 HC RX 250 WO HCPCS SELF ADMINISTERED DRUGS (ALT 637 FOR MEDICARE OP): Performed by: INTERNAL MEDICINE

## 2024-08-06 PROCEDURE — 2500000001 HC RX 250 WO HCPCS SELF ADMINISTERED DRUGS (ALT 637 FOR MEDICARE OP)

## 2024-08-06 RX ORDER — METOPROLOL SUCCINATE 25 MG/1
25 TABLET, EXTENDED RELEASE ORAL 2 TIMES DAILY
Status: DISCONTINUED | OUTPATIENT
Start: 2024-08-06 | End: 2024-08-08 | Stop reason: HOSPADM

## 2024-08-06 RX ORDER — METOPROLOL SUCCINATE 25 MG/1
25 TABLET, EXTENDED RELEASE ORAL ONCE
Status: DISCONTINUED | OUTPATIENT
Start: 2024-08-06 | End: 2024-08-06

## 2024-08-06 SDOH — ECONOMIC STABILITY: TRANSPORTATION INSECURITY
IN THE PAST 12 MONTHS, HAS LACK OF TRANSPORTATION KEPT YOU FROM MEETINGS, WORK, OR FROM GETTING THINGS NEEDED FOR DAILY LIVING?: NO

## 2024-08-06 SDOH — HEALTH STABILITY: MENTAL HEALTH
HOW OFTEN DO YOU NEED TO HAVE SOMEONE HELP YOU WHEN YOU READ INSTRUCTIONS, PAMPHLETS, OR OTHER WRITTEN MATERIAL FROM YOUR DOCTOR OR PHARMACY?: NEVER

## 2024-08-06 SDOH — ECONOMIC STABILITY: INCOME INSECURITY: IN THE PAST 12 MONTHS, HAS THE ELECTRIC, GAS, OIL, OR WATER COMPANY THREATENED TO SHUT OFF SERVICE IN YOUR HOME?: NO

## 2024-08-06 SDOH — ECONOMIC STABILITY: INCOME INSECURITY: HOW HARD IS IT FOR YOU TO PAY FOR THE VERY BASICS LIKE FOOD, HOUSING, MEDICAL CARE, AND HEATING?: NOT HARD AT ALL

## 2024-08-06 SDOH — ECONOMIC STABILITY: FOOD INSECURITY: WITHIN THE PAST 12 MONTHS, YOU WORRIED THAT YOUR FOOD WOULD RUN OUT BEFORE YOU GOT MONEY TO BUY MORE.: NEVER TRUE

## 2024-08-06 SDOH — ECONOMIC STABILITY: INCOME INSECURITY: IN THE LAST 12 MONTHS, WAS THERE A TIME WHEN YOU WERE NOT ABLE TO PAY THE MORTGAGE OR RENT ON TIME?: NO

## 2024-08-06 SDOH — SOCIAL STABILITY: SOCIAL NETWORK: ARE YOU MARRIED, WIDOWED, DIVORCED, SEPARATED, NEVER MARRIED, OR LIVING WITH A PARTNER?: MARRIED

## 2024-08-06 SDOH — ECONOMIC STABILITY: HOUSING INSECURITY: AT ANY TIME IN THE PAST 12 MONTHS, WERE YOU HOMELESS OR LIVING IN A SHELTER (INCLUDING NOW)?: NO

## 2024-08-06 SDOH — ECONOMIC STABILITY: FOOD INSECURITY: WITHIN THE PAST 12 MONTHS, THE FOOD YOU BOUGHT JUST DIDN'T LAST AND YOU DIDN'T HAVE MONEY TO GET MORE.: NEVER TRUE

## 2024-08-06 SDOH — ECONOMIC STABILITY: TRANSPORTATION INSECURITY
IN THE PAST 12 MONTHS, HAS THE LACK OF TRANSPORTATION KEPT YOU FROM MEDICAL APPOINTMENTS OR FROM GETTING MEDICATIONS?: NO

## 2024-08-06 ASSESSMENT — COGNITIVE AND FUNCTIONAL STATUS - GENERAL
WALKING IN HOSPITAL ROOM: A LITTLE
CLIMB 3 TO 5 STEPS WITH RAILING: A LITTLE
DAILY ACTIVITIY SCORE: 24
MOBILITY SCORE: 22

## 2024-08-06 ASSESSMENT — PAIN SCALES - GENERAL
PAINLEVEL_OUTOF10: 0 - NO PAIN
PAINLEVEL_OUTOF10: 0 - NO PAIN

## 2024-08-06 ASSESSMENT — PAIN - FUNCTIONAL ASSESSMENT: PAIN_FUNCTIONAL_ASSESSMENT: 0-10

## 2024-08-06 NOTE — NURSING NOTE
Pt with CBI,had many small clots early on,manually irrigated and was flowing clear to pale pink overnight. Noted to become more cherry red around 0600-manually irrigated for a few small clots and is running clear again. He is on tele-afib, he had some afib rvr overnight and received one dose of iv lopressor and heartrate has remained <120 bpm sustained. He has been npo since midnight for possible urologic procedure. He has call bell in reach,bed alarm on,safety has been maintained.

## 2024-08-06 NOTE — PROGRESS NOTES
Minh Harrington Jr. is a 84 y.o. male on day 1 of admission presenting with Hemorrhagic disorder due to extrinsic circulating anticoagulants (Multi).      Subjective   Patient doing well this morning and has no complaints other than that he didn't sleep very well. Denies pain, fever/chills.      Objective     Last Recorded Vitals  /64 (BP Location: Left arm, Patient Position: Lying)   Pulse 96   Temp 36.4 °C (97.5 °F) (Temporal)   Resp 18   Wt 79.8 kg (176 lb)   SpO2 92%   Intake/Output last 3 Shifts:    Intake/Output Summary (Last 24 hours) at 8/6/2024 1037  Last data filed at 8/6/2024 0925  Gross per 24 hour   Intake 0 ml   Output 1775 ml   Net -1775 ml       Admission Weight  Weight: 79.8 kg (176 lb) (08/05/24 0750)    Daily Weight  08/05/24 : 79.8 kg (176 lb)    Image Results  CT abdomen pelvis w IV contrast  Narrative: STUDY:  CT Abdomen and Pelvis with IV Contrast; 8/5/2024 at 9:50 AM.  INDICATION:  Painless hematuria.  COMPARISON:  XR left hip/pelvis 9/21/2022.  ACCESSION NUMBER(S):  [id number]  ORDERING CLINICIAN:  AR BARRERA  TECHNIQUE:  CT of the abdomen and pelvis was performed.  Contiguous axial images  were obtained at 3 mm slice thickness through the abdomen and pelvis.   Coronal and sagittal reconstructions at 3 mm slice thickness were  performed.  Omnipaque 350 75 mL was administered intravenously.    FINDINGS:  LOWER CHEST:  Heart is enlarged with severe coronary calcifications  No pericardial  effusion.  Small right pleural effusion.     ABDOMEN:     LIVER:  No hepatomegaly.  Smooth surface contour.  Mild fatty infiltration of  the liver.     BILE DUCTS:  No intrahepatic or extrahepatic biliary ductal dilatation.     GALLBLADDER:  The gallbladder is present without definite gallstones.  STOMACH:  No abnormalities identified.     PANCREAS:  No masses or ductal dilatation.     SPLEEN:  No splenomegaly or focal splenic lesion.     ADRENAL GLANDS:  No thickening or nodules.      KIDNEYS AND URETERS:  Kidneys are normal in size and location.  No renal or ureteral  calculi.  Renal cysts noted measuring up to 5.4 cm on the left.     PELVIS:     BLADDER:  Urinary bladder is mildly distended with associated wall  trabeculation.  Large amount of heterogeneous blood in the bladder.   Questionable lesion versus adherent blood along the right lateral wall  of the bladder on image 132 measuring 3.3 cm x 1.1 cm.     REPRODUCTIVE ORGANS:  Prostate gland is borderline enlarged measuring 5 cm.     BOWEL:  Mild to moderate stool volume.  Appendix is not visualized.     VESSELS:  No abnormalities identified.  Abdominal aorta is normal in caliber.   Severe plaque along the distal aorta.     PERITONEUM/RETROPERITONEUM/LYMPH NODES:  No free fluid.  No pneumoperitoneum.  No lymphadenopathy.     ABDOMINAL WALL:  No abnormalities identified.  SOFT TISSUES:   No abnormalities identified.     BONES:  No acute fracture or aggressive osseous lesion.  Impression: 1. Large amount of heterogeneous blood in the bladder. Questionable  lesion versus adherent blood along the right lateral wall of the  bladder measuring 3.3 cm x 1.1 cm. Recommend further evaluation with  CT urogram or cystoscopy.  2. Urinary bladder is mildly distended with associated wall  trabeculation. Findings suggestive of chronic bladder outlet  obstruction.  3. Borderline enlarged prostate.  4. Cardiomegaly with severe coronary artery calcifications.  5. Small right pleural effusion.  6. Mild hepatic steatosis.  Signed by Giacomo Pandey MD      Physical Exam  Constitutional:       General: He is not in acute distress.     Appearance: Normal appearance.   HENT:      Head: Normocephalic and atraumatic.      Mouth/Throat:      Mouth: Mucous membranes are moist.   Eyes:      Extraocular Movements: Extraocular movements intact.      Conjunctiva/sclera: Conjunctivae normal.   Cardiovascular:      Rate and Rhythm: Normal rate and regular rhythm.       Heart sounds: Normal heart sounds.   Pulmonary:      Effort: Pulmonary effort is normal.      Breath sounds: Normal breath sounds. No wheezing, rhonchi or rales.   Abdominal:      General: Abdomen is flat. Bowel sounds are normal.      Palpations: Abdomen is soft.   Genitourinary:     Comments: 3 way barbosa with Teodoro Aid appearing urine  Musculoskeletal:         General: No swelling or tenderness. Normal range of motion.      Cervical back: Normal range of motion and neck supple.   Skin:     General: Skin is warm and dry.      Findings: No rash.   Neurological:      General: No focal deficit present.      Mental Status: He is alert and oriented to person, place, and time.      Cranial Nerves: No cranial nerve deficit.      Sensory: No sensory deficit.   Psychiatric:         Mood and Affect: Mood normal.         Behavior: Behavior normal.         Relevant Results  Scheduled medications  [Held by provider] apixaban, 5 mg, oral, BID  atorvastatin, 20 mg, oral, Nightly  empagliflozin, 10 mg, oral, Daily  finasteride, 5 mg, oral, Daily  levETIRAcetam, 250 mg, oral, BID  lidocaine, 1 Application, urethral, Once  metoprolol succinate XL, 50 mg, oral, BID  mirtazapine, 15 mg, oral, Nightly  sacubitriL-valsartan, 1 tablet, oral, BID  sertraline, 100 mg, oral, Nightly      Continuous medications     PRN medications  PRN medications: ALPRAZolam, metoprolol    Results for orders placed or performed during the hospital encounter of 08/05/24 (from the past 24 hour(s))   CBC   Result Value Ref Range    WBC 11.1 4.4 - 11.3 x10*3/uL    nRBC 0.0 0.0 - 0.0 /100 WBCs    RBC 4.25 (L) 4.50 - 5.90 x10*6/uL    Hemoglobin 13.1 (L) 13.5 - 17.5 g/dL    Hematocrit 41.3 41.0 - 52.0 %    MCV 97 80 - 100 fL    MCH 30.8 26.0 - 34.0 pg    MCHC 31.7 (L) 32.0 - 36.0 g/dL    RDW 14.4 11.5 - 14.5 %    Platelets 109 (L) 150 - 450 x10*3/uL   Basic Metabolic Panel   Result Value Ref Range    Glucose 93 74 - 99 mg/dL    Sodium 136 136 - 145 mmol/L     Potassium 4.3 3.5 - 5.3 mmol/L    Chloride 105 98 - 107 mmol/L    Bicarbonate 23 21 - 32 mmol/L    Anion Gap 12 10 - 20 mmol/L    Urea Nitrogen 15 6 - 23 mg/dL    Creatinine 0.94 0.50 - 1.30 mg/dL    eGFR 79 >60 mL/min/1.73m*2    Calcium 8.3 (L) 8.6 - 10.3 mg/dL   Magnesium   Result Value Ref Range    Magnesium 2.17 1.60 - 2.40 mg/dL       CT abdomen pelvis w IV contrast    Result Date: 8/5/2024  STUDY: CT Abdomen and Pelvis with IV Contrast; 8/5/2024 at 9:50 AM. INDICATION: Painless hematuria. COMPARISON: XR left hip/pelvis 9/21/2022. ACCESSION NUMBER(S): [id number] ORDERING CLINICIAN: AR BARRERA TECHNIQUE: CT of the abdomen and pelvis was performed.  Contiguous axial images were obtained at 3 mm slice thickness through the abdomen and pelvis. Coronal and sagittal reconstructions at 3 mm slice thickness were performed.  Omnipaque 350 75 mL was administered intravenously.  FINDINGS: LOWER CHEST: Heart is enlarged with severe coronary calcifications  No pericardial effusion.  Small right pleural effusion.  ABDOMEN:  LIVER: No hepatomegaly.  Smooth surface contour.  Mild fatty infiltration of the liver.  BILE DUCTS: No intrahepatic or extrahepatic biliary ductal dilatation.  GALLBLADDER: The gallbladder is present without definite gallstones. STOMACH: No abnormalities identified.  PANCREAS: No masses or ductal dilatation.  SPLEEN: No splenomegaly or focal splenic lesion.  ADRENAL GLANDS: No thickening or nodules.  KIDNEYS AND URETERS: Kidneys are normal in size and location.  No renal or ureteral calculi.  Renal cysts noted measuring up to 5.4 cm on the left.  PELVIS:  BLADDER: Urinary bladder is mildly distended with associated wall trabeculation.  Large amount of heterogeneous blood in the bladder. Questionable lesion versus adherent blood along the right lateral wall of the bladder on image 132 measuring 3.3 cm x 1.1 cm.  REPRODUCTIVE ORGANS: Prostate gland is borderline enlarged measuring 5 cm.  BOWEL: Mild  to moderate stool volume.  Appendix is not visualized.  VESSELS: No abnormalities identified.  Abdominal aorta is normal in caliber. Severe plaque along the distal aorta.  PERITONEUM/RETROPERITONEUM/LYMPH NODES: No free fluid.  No pneumoperitoneum. No lymphadenopathy.  ABDOMINAL WALL: No abnormalities identified. SOFT TISSUES: No abnormalities identified.  BONES: No acute fracture or aggressive osseous lesion.    1. Large amount of heterogeneous blood in the bladder. Questionable lesion versus adherent blood along the right lateral wall of the bladder measuring 3.3 cm x 1.1 cm. Recommend further evaluation with CT urogram or cystoscopy. 2. Urinary bladder is mildly distended with associated wall trabeculation. Findings suggestive of chronic bladder outlet obstruction. 3. Borderline enlarged prostate. 4. Cardiomegaly with severe coronary artery calcifications. 5. Small right pleural effusion. 6. Mild hepatic steatosis. Signed by Giacomo Pandey MD    Transthoracic Echo Complete    Result Date: 7/26/2024              [address], [address], [place] [address]          Tel [phone] Fax [phone] TRANSTHORACIC ECHOCARDIOGRAM REPORT Patient Name:      EAMON Bond Physician:    [id number] Ministerio Napier DO Study Date:        7/25/2024            Ordering Provider:    [id number] SAIMA BAIG MRN/PID:           [id number]             Fellow: Accession#:        [id number]         Nurse: Date of Birth/Age: 1940 / 84 years Sonographer:          Ministerio Taylor Gender:            M                    Additional Staff: Height:            177.80 cm            Admit Date:           7/25/2024 Weight:            83.92 kg             Admission Status:     Outpatient BSA / BMI:         2.02 m2 / 26.54      Department Location:  [facility]  Heart                    kg/m2                                      Paige Gonzalez Blood Pressure: 110 /60 mmHg Study Type:    TRANSTHORACIC ECHO (TTE) COMPLETE Diagnosis/ICD: Atherosclerotic heart disease of native coronary artery without                angina pectoris-I25.10; Acute combined systolic (congestive) and                diastolic (congestive) heart failure (CHF)-I50.41 Indication:    CAD, Systolic and Diastolic HF CPT Codes:     Echo Complete w Full Doppler-[id number] Patient History: Smoker:            Former. Pertinent History: A-Fib, CAD, CHF, HTN, Hyperlipidemia and Systolic and                    Diastolic HF, VT, MR, CABG. Study Detail: The following Echo studies were performed: 2D, M-Mode, Doppler and               color flow. The patient was awake.  PHYSICIAN INTERPRETATION: Left Ventricle: Left ventricular ejection fraction is moderately decreased, by visual estimate at 30-35%. There is global hypokinesis of the left ventricle with minor regional variations. The left ventricular cavity size is normal. The left ventricular septal wall thickness is moderately increased. There is mild to moderate concentric left ventricular hypertrophy. Left ventricular diastolic filling was not assessed. Left Atrium: The left atrium is moderate to severely dilated. Right Ventricle: The right ventricle is mildly enlarged. There is normal right ventricular global systolic function. Right Atrium: The right atrium is moderately to severely dilated. Aortic Valve: The aortic valve appears structurally normal. The aortic valve appears tricuspid. There is mild to moderate aortic valve cusp calcification. The aortic valve dimensionless index is 0.57. There is mild aortic valve regurgitation. The peak instantaneous gradient of the aortic valve is 6.2 mmHg. The mean gradient of the aortic valve is 4.0 mmHg. Mitral Valve: The mitral valve is normal in structure. There is mild thickening and calcification of the anterior and  posterior mitral valve leaflets. There is no evidence of mitral valve stenosis. The doppler estimated mean and peak diastolic pressure gradients are 3.0 mmHg and 5.3 mmHg respectively. There is normal mitral valve leaflet mobility. There is moderate mitral valve regurgitation. Tricuspid Valve: The tricuspid valve is structurally normal. There is normal tricuspid valve leaflet mobility. There is mild tricuspid regurgitation. Pulmonic Valve: The pulmonic valve is structurally normal. There is mild pulmonic valve regurgitation. Pericardium: There is no pericardial effusion noted. Aorta: The aortic root is normal. There is moderate dilatation of the ascending aorta. There is mild dilatation of the aortic root. Pulmonary Artery: Pulmonary hypertension is present. The tricuspid regurgitant velocity is 3.53 m/s, and with an estimated right atrial pressure of 3 mmHg, the estimated pulmonary artery pressure is severely elevated with the RVSP at 52.8 mmHg. In comparison to the previous echocardiogram(s): The left ventricular function is unchanged.  CONCLUSIONS:  1. Left ventricular ejection fraction is moderately decreased, by visual estimate at 30-35%.  2. There is global hypokinesis of the left ventricle with minor regional variations.  3. Moderately increased left ventricular septal thickness.  4. Mildly enlarged right ventricle.  5. There is normal right ventricular global systolic function.  6. The left atrium is moderate to severely dilated.  7. The right atrium is moderately to severely dilated.  8. There is no evidence of mitral valve stenosis.  9. Moderate mitral valve regurgitation. 10. Mild aortic valve regurgitation. 11. Pulmonary hypertension is present. 12. Severely elevated pulmonary artery pressure. 13. There is moderate dilatation of the ascending aorta. QUANTITATIVE DATA SUMMARY: 2D MEASUREMENTS:                           Normal Ranges: Ao Root d:     3.80 cm    (2.0-3.7cm) LAs:           4.90 cm     (2.7-4.0cm) RVIDd:         3.30 cm    (0.9-3.6cm) IVSd:          1.50 cm    (0.6-1.1cm) LVPWd:         1.10 cm    (0.6-1.1cm) LVIDd:         4.80 cm    (3.9-5.9cm) LVIDs:         4.30 cm LV Mass Index: 121.7 g/m2 LV % FS        10.4 % LA VOLUME:                             Normal Ranges: LA Area A4C:     35.1 cm2 LA Area A2C:     34.6 cm2 LA Volume Index: 64.9 ml/m2 LA Vol A4C:      128.0 ml LA Vol A2C:      127.0 ml LA Vol BP:       131.0 ml RA VOLUME BY A/L METHOD:                               Normal Ranges: RA Vol A4C:        92.2 ml    (8.3-19.5ml) RA Vol Index A4C:  45.7 ml/m2 RA Area A4C:       28.9 cm2 RA Major Axis A4C: 7.7 cm AORTA MEASUREMENTS:                    Normal Ranges: Asc Ao, d: 4.30 cm (2.1-3.4cm) LV SYSTOLIC FUNCTION BY 2D PLANIMETRY (MOD):                      Normal Ranges: EF-A4C View:    35 % (>=55%) EF-A2C View:    31 % EF-Biplane:     31 % EF-Visual:      33 % LV EF Reported: 33 % LV DIASTOLIC FUNCTION:                         Normal Ranges: MV Peak E:    0.89 m/s  (0.7-1.2 m/s) MV e'         0.118 m/s (>8.0) MV lateral e' 0.16 m/s MV medial e'  0.07 m/s E/e' Ratio:   7.50      (<8.0) MITRAL VALVE:                      Normal Ranges: MV Vmax:    1.15 m/s (<=1.3m/s) MV peak P.3 mmHg (<5mmHg) MV mean PG: 3.0 mmHg (<48mmHg) MV DT:      236 msec (150-240msec) AORTIC VALVE:                                   Normal Ranges: AoV Vmax:                1.25 m/s (<=1.7m/s) AoV Peak P.2 mmHg (<20mmHg) AoV Mean P.0 mmHg (1.7-11.5mmHg) LVOT Max Ian:            0.68 m/s (<=1.1m/s) AoV VTI:                 23.00 cm (18-25cm) LVOT VTI:                13.10 cm LVOT Diameter:           2.50 cm  (1.8-2.4cm) AoV Area, VTI:           2.80 cm2 (2.5-5.5cm2) AoV Area,Vmax:           2.69 cm2 (2.5-4.5cm2) AoV Dimensionless Index: 0.57  RIGHT VENTRICLE: RV Basal 4.70 cm RV Mid   3.10 cm RV Major 6.9 cm TRICUSPID VALVE/RVSP:                             Normal Ranges: Peak TR  Velocity: 3.53 m/s Est. RA Pressure: 3 mmHg RV Syst Pressure: 52.8 mmHg (< 30mmHg) PULMONIC VALVE:                         Normal Ranges: PV Accel Time: 67 msec  (>120ms) PV Max Ian:    0.8 m/s  (0.6-0.9m/s) PV Max P.3 mmHg  [id number] Ministerio Napier DO Electronically signed on 2024 at 12:12:15 PM  ** Final **        Assessment/Plan   Principal Problem:    Hemorrhagic disorder due to extrinsic circulating anticoagulants (Multi)  Active Problems:    Anxiety    BPH (benign prostatic hyperplasia)    CAD (coronary artery disease)    HLD (hyperlipidemia)    HTN (hypertension)    Thrombocytopenia (CMS-HCC)    Atrial fibrillation with controlled ventricular response (Multi)    Stage 3a chronic kidney disease (Multi)    Gross hematuria    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)    Hematuria, unspecified type    -Patient with 4 days of gross hematuria shortly following initiation of Eliquis for atrial fibrillation and stroke prophylaxis as evidence of hemorrhagic disorder due to extrinsic circulating anticoagulants  -Patient's hemoglobin stable; holding DOAC  -Patient is slightly thrombocytopenic but no indication for transfusion at this time; will monitor closely  -Patient with history of TURP and BPH  -CT showing blood in the bladder with possible mass in bladder  -Urology consulted and recommended three-way Pandya catheter be placed for bladder irrigation; plan for cysto w/ intervention ; appreciate co-management  -Urinalysis not consistent with urinary tract infection  -Remainder home medications to be resumed as appropriate     Code: DNAR CCA DNI ok with ICU  DVT prophylaxis: Holding with active hemorrhage  GI prophylaxis: Not indicated  Diet: Cardiac    Marshal Ross MD

## 2024-08-06 NOTE — PROGRESS NOTES
08/06/24 1516   Discharge Planning   Living Arrangements Spouse/significant other   Support Systems Spouse/significant other   Assistance Needed no   Type of Residence Private residence   Home or Post Acute Services None   Expected Discharge Disposition Home   Does the patient need discharge transport arranged? No     Met with patient at bedside. He lives at home with his wife in [facility]. He is independent, no use of DME and drives. He goes to the local [facility] 6 days/week for 2.5 hours/day. Patient confirms understanding of how to manage his health at home and of his home medications. He plans to return home when medically ready for discharge. PCP is Isabel Ritter.

## 2024-08-06 NOTE — NURSING NOTE
Patients -150, attempted to give lopressor per protocol, patient refused. Pt was then educated by both nursing staff and cardiology residents, is still refusing to take anything. Attending/cardiology aware

## 2024-08-06 NOTE — PROGRESS NOTES
Minh Rickettslona . 84 y.o. male    Subjective  Patient seen and examined this morning.  Denies nausea and vomiting.  No fever or chills. 3 way barbosa catheter was clamped, urine dark pink to red tinged.  No suprapubic pressure. Has to be manually irrigated over night twice by nursing. With no other complaints.     Objective  PHYSICAL EXAM:  Physical Exam  Vitals reviewed.   Constitutional:       General: He is awake.      Appearance: Normal appearance.   Cardiovascular:      Rate and Rhythm: Normal rate and regular rhythm.      Pulses: Normal pulses.      Heart sounds: Normal heart sounds.   Pulmonary:      Effort: Pulmonary effort is normal.      Breath sounds: Normal breath sounds and air entry.   Abdominal:      General: Abdomen is flat. There is no distension.      Palpations: Abdomen is soft.      Tenderness: There is no abdominal tenderness. There is no guarding or rebound.   Genitourinary:     Comments: 3 way barbosa catheter with dark pink to red tinged urine, clot drained when CBI unclamped and flow wide open.  Musculoskeletal:         General: Normal range of motion.      Cervical back: Normal range of motion.   Skin:     General: Skin is warm and dry.   Neurological:      General: No focal deficit present.      Mental Status: He is alert and oriented to person, place, and time.   Psychiatric:         Behavior: Behavior is cooperative.         Vital signs in last 24 hours:  Vitals:    08/06/24 0800   BP: 116/64   Pulse: 96   Resp: 18   Temp: 36.4 °C (97.5 °F)   SpO2: 92%         Intake/Output this shift:    Intake/Output Summary (Last 24 hours) at 8/6/2024 1112  Last data filed at 8/6/2024 0925  Gross per 24 hour   Intake 0 ml   Output 1775 ml   Net -1775 ml        Allergies:  No Known Allergies     Medications:  Scheduled medications  [Held by provider] apixaban, 5 mg, oral, BID  atorvastatin, 20 mg, oral, Nightly  empagliflozin, 10 mg, oral, Daily  finasteride, 5 mg, oral, Daily  levETIRAcetam, 250  mg, oral, BID  lidocaine, 1 Application, urethral, Once  metoprolol succinate XL, 50 mg, oral, BID  mirtazapine, 15 mg, oral, Nightly  sacubitriL-valsartan, 1 tablet, oral, BID  sertraline, 100 mg, oral, Nightly      Continuous medications     PRN medications  PRN medications: ALPRAZolam, metoprolol       Labs:  Results for orders placed or performed during the hospital encounter of 08/05/24 (from the past 24 hour(s))   ECG 12 Lead   Result Value Ref Range    Ventricular Rate 108 BPM    Atrial Rate 117 BPM    QRS Duration 92 ms    QT Interval 332 ms    QTC Calculation(Bazett) 444 ms    R Axis 41 degrees    T Axis 100 degrees    QRS Count 17 beats    Q Onset 227 ms    T Offset 393 ms    QTC Fredericia 403 ms   CBC   Result Value Ref Range    WBC 11.1 4.4 - 11.3 x10*3/uL    nRBC 0.0 0.0 - 0.0 /100 WBCs    RBC 4.25 (L) 4.50 - 5.90 x10*6/uL    Hemoglobin 13.1 (L) 13.5 - 17.5 g/dL    Hematocrit 41.3 41.0 - 52.0 %    MCV 97 80 - 100 fL    MCH 30.8 26.0 - 34.0 pg    MCHC 31.7 (L) 32.0 - 36.0 g/dL    RDW 14.4 11.5 - 14.5 %    Platelets 109 (L) 150 - 450 x10*3/uL   Basic Metabolic Panel   Result Value Ref Range    Glucose 93 74 - 99 mg/dL    Sodium 136 136 - 145 mmol/L    Potassium 4.3 3.5 - 5.3 mmol/L    Chloride 105 98 - 107 mmol/L    Bicarbonate 23 21 - 32 mmol/L    Anion Gap 12 10 - 20 mmol/L    Urea Nitrogen 15 6 - 23 mg/dL    Creatinine 0.94 0.50 - 1.30 mg/dL    eGFR 79 >60 mL/min/1.73m*2    Calcium 8.3 (L) 8.6 - 10.3 mg/dL   Magnesium   Result Value Ref Range    Magnesium 2.17 1.60 - 2.40 mg/dL        Imaging:  ECG 12 Lead    Result Date: 8/6/2024  Atrial fibrillation with rapid ventricular response Nonspecific ST and T wave abnormality , probably digitalis effect Abnormal ECG When compared with ECG of 11-JUN-2024 11:05, Atrial fibrillation has replaced Sinus rhythm Nonspecific T wave abnormality, improved in Anterolateral leads    CT abdomen pelvis w IV contrast    Result Date: 8/5/2024  STUDY: CT Abdomen and Pelvis  with IV Contrast; 8/5/2024 at 9:50 AM. INDICATION: Painless hematuria. COMPARISON: XR left hip/pelvis 9/21/2022. ACCESSION NUMBER(S): [id number] ORDERING CLINICIAN: AR BARRERA TECHNIQUE: CT of the abdomen and pelvis was performed.  Contiguous axial images were obtained at 3 mm slice thickness through the abdomen and pelvis. Coronal and sagittal reconstructions at 3 mm slice thickness were performed.  Omnipaque 350 75 mL was administered intravenously.  FINDINGS: LOWER CHEST: Heart is enlarged with severe coronary calcifications  No pericardial effusion.  Small right pleural effusion.  ABDOMEN:  LIVER: No hepatomegaly.  Smooth surface contour.  Mild fatty infiltration of the liver.  BILE DUCTS: No intrahepatic or extrahepatic biliary ductal dilatation.  GALLBLADDER: The gallbladder is present without definite gallstones. STOMACH: No abnormalities identified.  PANCREAS: No masses or ductal dilatation.  SPLEEN: No splenomegaly or focal splenic lesion.  ADRENAL GLANDS: No thickening or nodules.  KIDNEYS AND URETERS: Kidneys are normal in size and location.  No renal or ureteral calculi.  Renal cysts noted measuring up to 5.4 cm on the left.  PELVIS:  BLADDER: Urinary bladder is mildly distended with associated wall trabeculation.  Large amount of heterogeneous blood in the bladder. Questionable lesion versus adherent blood along the right lateral wall of the bladder on image 132 measuring 3.3 cm x 1.1 cm.  REPRODUCTIVE ORGANS: Prostate gland is borderline enlarged measuring 5 cm.  BOWEL: Mild to moderate stool volume.  Appendix is not visualized.  VESSELS: No abnormalities identified.  Abdominal aorta is normal in caliber. Severe plaque along the distal aorta.  PERITONEUM/RETROPERITONEUM/LYMPH NODES: No free fluid.  No pneumoperitoneum. No lymphadenopathy.  ABDOMINAL WALL: No abnormalities identified. SOFT TISSUES: No abnormalities identified.  BONES: No acute fracture or aggressive osseous lesion.    1. Large  amount of heterogeneous blood in the bladder. Questionable lesion versus adherent blood along the right lateral wall of the bladder measuring 3.3 cm x 1.1 cm. Recommend further evaluation with CT urogram or cystoscopy. 2. Urinary bladder is mildly distended with associated wall trabeculation. Findings suggestive of chronic bladder outlet obstruction. 3. Borderline enlarged prostate. 4. Cardiomegaly with severe coronary artery calcifications. 5. Small right pleural effusion. 6. Mild hepatic steatosis. Signed by Giacomo Pandey MD      Plan  Hemorrhagic disorder due to extrinsic circulating anticoagulants (Multi)     #Hematuria  - Maintain 3 way barbosa catheter and CBI.  Manually irrigated overnight by nursing twice.   - CBI currently now wide open, large clot removed, urine clears quickly.  - OK to manually irrigated PRN to maintain patency.  Goal to keep urine clear to pink tinged.   - Continue to hold anticoagulation / eliquis  -CT A/P with questionable lesion versus adherent blood along the right lateral wall of the bladder measuring 3.3 cm x 1.1 cm.   - Patient may have diet today, NPO after midnight   - Will make NPO at midnight for possible cystoscopy, fulguration and clot evacuation on Wednesday.    - Given patient cardiac history, will consult cardiology for risk stratification for urology procedure.        Plan of care discussed with MERCEDES Avitia-CNP    I spent 25 minutes in the professional and overall care of this patient.

## 2024-08-06 NOTE — PROGRESS NOTES
Minh Harrington Jr. is a 84 y.o. male on day 1 of admission presenting with Hemorrhagic disorder due to extrinsic circulating anticoagulants (Multi).    Subjective   Acute events overnight.    Patient seen and examined this afternoon.  He is currently complaining of intermittent shortness of breath.  Telemetry reviewed.  He is currently in atrial fibrillation with rates ranging from 116-150.  He has no symptoms of chest pain.  He is currently eating lunch.  I had a discussion with the patient that given his uncontrolled rate, the cardiology team would recommend increasing his metoprolol succinate dose.  The patient explains that he does not want to take the increased dose of metoprolol.  He would like to follow-up with his home cardiologist, Dr. Knight, as it relates to increasing any medications relating to his heart rate. He has no other complaints at this time.    Objective     Physical Exam  VITALS: I have reviewed the vital signs.   GENERAL: Well developed adult in no acute distress.  Resting comfortably in bed. On RA  NEURO: Alert and oriented x3, able to answer questions and follow commands. No motor or sensory deficits. Moves all extremities. Face is symmetric and expressive. No tremor.  EYES: PERRL. No scleral icterus or conjunctival injection. No discharge.   HENT: Normocephalic, atraumatic. Hearing is grossly intact. Nares grossly patent and without discharge. Mucous membranes moist.   NECK: No JVD. Patient moves neck without restriction.   CARDIO: Irregularly irregular. Tachycardic rate. No murmur, rub, or gallop. Pulses equal bilaterally in the upper and lower extremity. No lower extremity edema.   PULM: Lungs clear to auscultation in all fields. No wheezes, rales, or rhonchi. No conversational dyspnea. No splinting, stridor, or accessory muscle use.    GI: Abdomen is soft and non-distended. No tenderness to palpation. No guarding or rigidity. Normoactive bowel sounds.   : No suprapubic  "tenderness. No CVA tenderness. 3-way barbosa in place with SBI running. Red coolaid urine  EXTREMITIES: Symmetric muscle bulk. No joint swelling. No clubbing, cyanosis, or deformity. Muscle strength 5/5 in b/l upper and lower extremities  SKIN: Warm and dry. Normal turgor. No rash or lesions appreciated.   PSYCH: Mood, affect, and interaction is appropriate to the setting. Not internally stimulated.  Last Recorded Vitals  Blood pressure 116/64, pulse 96, temperature 36.4 °C (97.5 °F), temperature source Temporal, resp. rate 18, height 1.778 m (5' 10\"), weight 79.8 kg (176 lb), SpO2 92%.  Intake/Output last 3 Shifts:  No intake/output data recorded.    Relevant Results  Scheduled medications  [Held by provider] apixaban, 5 mg, oral, BID  atorvastatin, 20 mg, oral, Nightly  empagliflozin, 10 mg, oral, Daily  finasteride, 5 mg, oral, Daily  levETIRAcetam, 250 mg, oral, BID  lidocaine, 1 Application, urethral, Once  metoprolol succinate XL, 50 mg, oral, BID  mirtazapine, 15 mg, oral, Nightly  sacubitriL-valsartan, 1 tablet, oral, BID  sertraline, 100 mg, oral, Nightly      Continuous medications     PRN medications  PRN medications: ALPRAZolam, metoprolol  Results from last 7 days   Lab Units 08/06/24  0715 08/05/24  0808   WBC AUTO x10*3/uL 11.1 7.1   RBC AUTO x10*6/uL 4.25* 4.83   HEMOGLOBIN g/dL 13.1* 15.0     Results from last 7 days   Lab Units 08/06/24  0715 08/05/24  0808   SODIUM mmol/L 136 137   POTASSIUM mmol/L 4.3 4.1   CHLORIDE mmol/L 105 103   CO2 mmol/L 23 27   BUN mg/dL 15 21   CREATININE mg/dL 0.94 1.07   CALCIUM mg/dL 8.3* 8.9   MAGNESIUM mg/dL 2.17  --    BILIRUBIN TOTAL mg/dL  --  1.0   ALT U/L  --  21   AST U/L  --  24       ECG 12 Lead    Result Date: 8/6/2024  Atrial fibrillation with rapid ventricular response Nonspecific ST and T wave abnormality , probably digitalis effect Abnormal ECG When compared with ECG of 11-JUN-2024 11:05, Atrial fibrillation has replaced Sinus rhythm Nonspecific T wave " abnormality, improved in Anterolateral leads    CT abdomen pelvis w IV contrast    Result Date: 8/5/2024  STUDY: CT Abdomen and Pelvis with IV Contrast; 8/5/2024 at 9:50 AM. INDICATION: Painless hematuria. COMPARISON: XR left hip/pelvis 9/21/2022. ACCESSION NUMBER(S): [id number] ORDERING CLINICIAN: AR BARRERA TECHNIQUE: CT of the abdomen and pelvis was performed.  Contiguous axial images were obtained at 3 mm slice thickness through the abdomen and pelvis. Coronal and sagittal reconstructions at 3 mm slice thickness were performed.  Omnipaque 350 75 mL was administered intravenously.  FINDINGS: LOWER CHEST: Heart is enlarged with severe coronary calcifications  No pericardial effusion.  Small right pleural effusion.  ABDOMEN:  LIVER: No hepatomegaly.  Smooth surface contour.  Mild fatty infiltration of the liver.  BILE DUCTS: No intrahepatic or extrahepatic biliary ductal dilatation.  GALLBLADDER: The gallbladder is present without definite gallstones. STOMACH: No abnormalities identified.  PANCREAS: No masses or ductal dilatation.  SPLEEN: No splenomegaly or focal splenic lesion.  ADRENAL GLANDS: No thickening or nodules.  KIDNEYS AND URETERS: Kidneys are normal in size and location.  No renal or ureteral calculi.  Renal cysts noted measuring up to 5.4 cm on the left.  PELVIS:  BLADDER: Urinary bladder is mildly distended with associated wall trabeculation.  Large amount of heterogeneous blood in the bladder. Questionable lesion versus adherent blood along the right lateral wall of the bladder on image 132 measuring 3.3 cm x 1.1 cm.  REPRODUCTIVE ORGANS: Prostate gland is borderline enlarged measuring 5 cm.  BOWEL: Mild to moderate stool volume.  Appendix is not visualized.  VESSELS: No abnormalities identified.  Abdominal aorta is normal in caliber. Severe plaque along the distal aorta.  PERITONEUM/RETROPERITONEUM/LYMPH NODES: No free fluid.  No pneumoperitoneum. No lymphadenopathy.  ABDOMINAL WALL: No  abnormalities identified. SOFT TISSUES: No abnormalities identified.  BONES: No acute fracture or aggressive osseous lesion.    1. Large amount of heterogeneous blood in the bladder. Questionable lesion versus adherent blood along the right lateral wall of the bladder measuring 3.3 cm x 1.1 cm. Recommend further evaluation with CT urogram or cystoscopy. 2. Urinary bladder is mildly distended with associated wall trabeculation. Findings suggestive of chronic bladder outlet obstruction. 3. Borderline enlarged prostate. 4. Cardiomegaly with severe coronary artery calcifications. 5. Small right pleural effusion. 6. Mild hepatic steatosis. 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  Most recent BP at 0800 116/64.  -Eliquis currently on hold given gross hematuria  -hemoglobin dropped today from 15-13.1.  No gross electrolyte disturbance.  Continue to optimize.  No renal or hepatic derangement.  Thrombocytopenia is stable though persistent at 109.  -TTE 4/25/2024: LVEF 30 to 35%. Global hypokinesis of left ventricle with minor regional variations.  Moderately increased LV septal thickness.  Mildly enlarged RV normal right ventricular global systolic function.   Moderate to severe dilation of the left atrium. Moderate to severe dilation of the right atrium.  Moderate MR.  Mild AR.  Severely elevated pulmonary artery pressure of 52.8 suggestive of pulmonary hypertension.  He does not have a right heart catheterization to confirm this.  Moderate dilation of ascending aorta.   -Recommendation to add additional 25 mg metoprolol succinate at this time and to increase metoprolol succinate to 75 mg twice daily with first dose starting tonight.  Patient refusing  Increased dose of medication at this time.  -will be made n.p.o. again for possible urologic procedure tomorrow      Discussed with Dr Riddhi Winter DO  IM PGY III

## 2024-08-06 NOTE — CARE PLAN
The patient's goals for the shift include      The clinical goals for the shift include get better      Problem: Skin  Goal: Decreased wound size/increased tissue granulation at next dressing change  Outcome: Progressing  Goal: Participates in plan/prevention/treatment measures  Outcome: Progressing  Goal: Prevent/manage excess moisture  Outcome: Progressing  Goal: Prevent/minimize sheer/friction injuries  Outcome: Progressing  Goal: Promote/optimize nutrition  Outcome: Progressing  Goal: Promote skin healing  Outcome: Progressing     Problem: Fall/Injury  Goal: Not fall by end of shift  Outcome: Progressing  Goal: Be free from injury by end of the shift  Outcome: Progressing  Goal: Verbalize understanding of personal risk factors for fall in the hospital  Outcome: Progressing  Goal: Verbalize understanding of risk factor reduction measures to prevent injury from fall in the home  Outcome: Progressing  Goal: Use assistive devices by end of the shift  Outcome: Progressing  Goal: Pace activities to prevent fatigue by end of the shift  Outcome: Progressing     Problem: Pain  Goal: Takes deep breaths with improved pain control throughout the shift  Outcome: Progressing  Goal: Turns in bed with improved pain control throughout the shift  Outcome: Progressing  Goal: Walks with improved pain control throughout the shift  Outcome: Progressing  Goal: Performs ADL's with improved pain control throughout shift  Outcome: Progressing  Goal: Participates in PT with improved pain control throughout the shift  Outcome: Progressing  Goal: Free from opioid side effects throughout the shift  Outcome: Progressing  Goal: Free from acute confusion related to pain meds throughout the shift  Outcome: Progressing     Problem: Pain - Adult  Goal: Verbalizes/displays adequate comfort level or baseline comfort level  Outcome: Progressing     Problem: Safety - Adult  Goal: Free from fall injury  Outcome: Progressing     Problem: Discharge  Planning  Goal: Discharge to home or other facility with appropriate resources  Outcome: Progressing     Problem: Chronic Conditions and Co-morbidities  Goal: Patient's chronic conditions and co-morbidity symptoms are monitored and maintained or improved  Outcome: Progressing

## 2024-08-06 NOTE — PROGRESS NOTES
Spiritual Care Visit    Clinical Encounter Type  Visited With: Patient  Routine Visit: Introduction  Continue Visiting: Yes    Mormonism Encounters  Mormonism Needs: Prayer         Sacramental Encounters  Communion: Patient wants communion  Communion Given Indicator: Yes  Sacrament of Sick-Anointing: Anointed, Patient requested anointing                             Taxonomy  Intended Effects: Establish rapport and connectedness, Meaning-making, Zoraida affirmation, Build relationship of care and support, Demonstrate caring and concern, Promote sense of peace

## 2024-08-06 NOTE — SIGNIFICANT EVENT
Patient is seen on repeat rounds this afternoon with cardiology attending, Dr. Hannon    After discussion with cardiologist and patient regarding the metoprolol dosing to help control his heart rate given that he is in atrial fibrillation with rapid ventricular response, the patient elects to not be on metoprolol succinate 50mg BID or at even increased dose for better rate control. He wishes to be placed back on metoprolol succinate 25 mg twice daily.  Patient was counseled on the adverse effects that could occur secondary to this to including heart fatigue 2/2 persistent tachycardia, worsening heart failure, shortness of breath, and ultimately death.      After discussing the risks, the patient still elects to be placed back on metoprolol succinate 25 mg twice daily.  He elects to follow-up with his home cardiologist, Dr. Valle, in the outpatient setting.      Patient seen and discussed with attending physician, Dr. Riddhi Winter, DO  Internal Medicine, PGY-III

## 2024-08-07 ENCOUNTER — APPOINTMENT (OUTPATIENT)
Dept: RADIOLOGY | Facility: HOSPITAL | Age: 84
End: 2024-08-07
Payer: MEDICARE

## 2024-08-07 LAB
ANION GAP SERPL CALC-SCNC: 11 MMOL/L (ref 10–20)
BUN SERPL-MCNC: 17 MG/DL (ref 6–23)
CALCIUM SERPL-MCNC: 8.3 MG/DL (ref 8.6–10.3)
CHLORIDE SERPL-SCNC: 104 MMOL/L (ref 98–107)
CO2 SERPL-SCNC: 26 MMOL/L (ref 21–32)
CREAT SERPL-MCNC: 1.02 MG/DL (ref 0.5–1.3)
EGFRCR SERPLBLD CKD-EPI 2021: 72 ML/MIN/1.73M*2
ERYTHROCYTE [DISTWIDTH] IN BLOOD BY AUTOMATED COUNT: 14.3 % (ref 11.5–14.5)
GLUCOSE SERPL-MCNC: 98 MG/DL (ref 74–99)
HCT VFR BLD AUTO: 36 % (ref 41–52)
HGB BLD-MCNC: 11.5 G/DL (ref 13.5–17.5)
MAGNESIUM SERPL-MCNC: 2.08 MG/DL (ref 1.6–2.4)
MCH RBC QN AUTO: 31 PG (ref 26–34)
MCHC RBC AUTO-ENTMCNC: 31.9 G/DL (ref 32–36)
MCV RBC AUTO: 97 FL (ref 80–100)
NRBC BLD-RTO: 0 /100 WBCS (ref 0–0)
PLATELET # BLD AUTO: 98 X10*3/UL (ref 150–450)
POTASSIUM SERPL-SCNC: 4 MMOL/L (ref 3.5–5.3)
RBC # BLD AUTO: 3.71 X10*6/UL (ref 4.5–5.9)
SODIUM SERPL-SCNC: 137 MMOL/L (ref 136–145)
WBC # BLD AUTO: 8.5 X10*3/UL (ref 4.4–11.3)

## 2024-08-07 PROCEDURE — 2550000001 HC RX 255 CONTRASTS: Performed by: INTERNAL MEDICINE

## 2024-08-07 PROCEDURE — 2500000001 HC RX 250 WO HCPCS SELF ADMINISTERED DRUGS (ALT 637 FOR MEDICARE OP)

## 2024-08-07 PROCEDURE — 2500000001 HC RX 250 WO HCPCS SELF ADMINISTERED DRUGS (ALT 637 FOR MEDICARE OP): Performed by: INTERNAL MEDICINE

## 2024-08-07 PROCEDURE — 1200000002 HC GENERAL ROOM WITH TELEMETRY DAILY

## 2024-08-07 PROCEDURE — 83735 ASSAY OF MAGNESIUM: CPT | Performed by: INTERNAL MEDICINE

## 2024-08-07 PROCEDURE — 85027 COMPLETE CBC AUTOMATED: CPT | Performed by: INTERNAL MEDICINE

## 2024-08-07 PROCEDURE — 99233 SBSQ HOSP IP/OBS HIGH 50: CPT | Performed by: INTERNAL MEDICINE

## 2024-08-07 PROCEDURE — 76376 3D RENDER W/INTRP POSTPROCES: CPT | Performed by: RADIOLOGY

## 2024-08-07 PROCEDURE — 2500000002 HC RX 250 W HCPCS SELF ADMINISTERED DRUGS (ALT 637 FOR MEDICARE OP, ALT 636 FOR OP/ED): Performed by: INTERNAL MEDICINE

## 2024-08-07 PROCEDURE — 99232 SBSQ HOSP IP/OBS MODERATE 35: CPT

## 2024-08-07 PROCEDURE — 76377 3D RENDER W/INTRP POSTPROCES: CPT

## 2024-08-07 PROCEDURE — 99232 SBSQ HOSP IP/OBS MODERATE 35: CPT | Performed by: UROLOGY

## 2024-08-07 PROCEDURE — 82374 ASSAY BLOOD CARBON DIOXIDE: CPT | Performed by: INTERNAL MEDICINE

## 2024-08-07 PROCEDURE — 74178 CT ABD&PLV WO CNTR FLWD CNTR: CPT | Performed by: RADIOLOGY

## 2024-08-07 PROCEDURE — 36415 COLL VENOUS BLD VENIPUNCTURE: CPT | Performed by: INTERNAL MEDICINE

## 2024-08-07 RX ORDER — METOPROLOL TARTRATE 50 MG/1
50 TABLET ORAL ONCE
Status: COMPLETED | OUTPATIENT
Start: 2024-08-07 | End: 2024-08-07

## 2024-08-07 ASSESSMENT — COGNITIVE AND FUNCTIONAL STATUS - GENERAL
MOBILITY SCORE: 22
CLIMB 3 TO 5 STEPS WITH RAILING: A LITTLE
DAILY ACTIVITIY SCORE: 24
WALKING IN HOSPITAL ROOM: A LITTLE

## 2024-08-07 ASSESSMENT — PAIN - FUNCTIONAL ASSESSMENT
PAIN_FUNCTIONAL_ASSESSMENT: 0-10
PAIN_FUNCTIONAL_ASSESSMENT: 0-10

## 2024-08-07 ASSESSMENT — PAIN SCALES - GENERAL
PAINLEVEL_OUTOF10: 0 - NO PAIN

## 2024-08-07 NOTE — NURSING NOTE
Dr fraga rounded on patient and order placed to discontinue barbosa cath. Patient barbosa cathed removed. Patient tolerated well. Assessment and vitals maintained.

## 2024-08-07 NOTE — CARE PLAN
The patient's goals for the shift include      The clinical goals for the shift include see poc

## 2024-08-07 NOTE — PROGRESS NOTES
Spiritual Care Visit    Clinical Encounter Type  Visited With: Patient and family together  Routine Visit: Follow-up  Continue Visiting: Yes    Yazidism Encounters  Yazidism Needs: Prayer                                       Taxonomy  Intended Effects: Establish rapport and connectedness, Zoraida affirmation, Build relationship of care and support, Demonstrate caring and concern

## 2024-08-07 NOTE — PROGRESS NOTES
Minh Harrington Jr. is a 84 y.o. male on day 2 of admission presenting with Hemorrhagic disorder due to extrinsic circulating anticoagulants (Multi).      Subjective   Patient doing well. Urine is more clear today. States with higher doses of beta blocker, which was recommended yesterday given afib w/ RVR, he feels very tired and lethargic. Patient with no new complaints today.      Objective     Last Recorded Vitals  /80   Pulse 102   Temp 37.1 °C (98.8 °F) (Temporal)   Resp 16   Wt 79.8 kg (176 lb)   SpO2 96%   Intake/Output last 3 Shifts:    Intake/Output Summary (Last 24 hours) at 8/7/2024 0920  Last data filed at 8/7/2024 0809  Gross per 24 hour   Intake 240 ml   Output 4850 ml   Net -4610 ml       Admission Weight  Weight: 79.8 kg (176 lb) (08/05/24 0750)    Daily Weight  08/05/24 : 79.8 kg (176 lb)    Image Results  ECG 12 Lead  Atrial fibrillation with rapid ventricular response  Nonspecific ST and T wave abnormality , probably digitalis effect  Abnormal ECG  When compared with ECG of 11-JUN-2024 11:05,  Atrial fibrillation has replaced Sinus rhythm  Nonspecific T wave abnormality, improved in Anterolateral leads      Physical Exam  Constitutional:       General: He is not in acute distress.     Appearance: Normal appearance.   HENT:      Head: Normocephalic and atraumatic.      Mouth/Throat:      Mouth: Mucous membranes are moist.   Eyes:      Extraocular Movements: Extraocular movements intact.      Conjunctiva/sclera: Conjunctivae normal.   Cardiovascular:      Rate and Rhythm: Normal rate and regular rhythm.      Heart sounds: Normal heart sounds.   Pulmonary:      Effort: Pulmonary effort is normal.      Breath sounds: Normal breath sounds. No wheezing, rhonchi or rales.   Abdominal:      General: Abdomen is flat. Bowel sounds are normal.      Palpations: Abdomen is soft.   Genitourinary:     Comments: 3 way barbosa with yellow/pink appearing urine  Musculoskeletal:         General: No  swelling or tenderness. Normal range of motion.      Cervical back: Normal range of motion and neck supple.   Skin:     General: Skin is warm and dry.      Findings: No rash.   Neurological:      General: No focal deficit present.      Mental Status: He is alert and oriented to person, place, and time.      Cranial Nerves: No cranial nerve deficit.      Sensory: No sensory deficit.   Psychiatric:         Mood and Affect: Mood normal.         Behavior: Behavior normal.         Relevant Results  Scheduled medications  [Held by provider] apixaban, 5 mg, oral, BID  atorvastatin, 20 mg, oral, Nightly  empagliflozin, 10 mg, oral, Daily  finasteride, 5 mg, oral, Daily  levETIRAcetam, 250 mg, oral, BID  lidocaine, 1 Application, urethral, Once  metoprolol succinate XL, 25 mg, oral, BID  mirtazapine, 15 mg, oral, Nightly  sacubitriL-valsartan, 1 tablet, oral, BID  sertraline, 100 mg, oral, Nightly      Continuous medications     PRN medications  PRN medications: ALPRAZolam, metoprolol    Results for orders placed or performed during the hospital encounter of 08/05/24 (from the past 24 hour(s))   CBC   Result Value Ref Range    WBC 8.5 4.4 - 11.3 x10*3/uL    nRBC 0.0 0.0 - 0.0 /100 WBCs    RBC 3.71 (L) 4.50 - 5.90 x10*6/uL    Hemoglobin 11.5 (L) 13.5 - 17.5 g/dL    Hematocrit 36.0 (L) 41.0 - 52.0 %    MCV 97 80 - 100 fL    MCH 31.0 26.0 - 34.0 pg    MCHC 31.9 (L) 32.0 - 36.0 g/dL    RDW 14.3 11.5 - 14.5 %    Platelets 98 (L) 150 - 450 x10*3/uL   Basic Metabolic Panel   Result Value Ref Range    Glucose 98 74 - 99 mg/dL    Sodium 137 136 - 145 mmol/L    Potassium 4.0 3.5 - 5.3 mmol/L    Chloride 104 98 - 107 mmol/L    Bicarbonate 26 21 - 32 mmol/L    Anion Gap 11 10 - 20 mmol/L    Urea Nitrogen 17 6 - 23 mg/dL    Creatinine 1.02 0.50 - 1.30 mg/dL    eGFR 72 >60 mL/min/1.73m*2    Calcium 8.3 (L) 8.6 - 10.3 mg/dL   Magnesium   Result Value Ref Range    Magnesium 2.08 1.60 - 2.40 mg/dL       CT abdomen pelvis w IV  contrast    Result Date: 8/5/2024  STUDY: CT Abdomen and Pelvis with IV Contrast; 8/5/2024 at 9:50 AM. INDICATION: Painless hematuria. COMPARISON: XR left hip/pelvis 9/21/2022. ACCESSION NUMBER(S): [id number] ORDERING CLINICIAN: AR BARRERA TECHNIQUE: CT of the abdomen and pelvis was performed.  Contiguous axial images were obtained at 3 mm slice thickness through the abdomen and pelvis. Coronal and sagittal reconstructions at 3 mm slice thickness were performed.  Omnipaque 350 75 mL was administered intravenously.  FINDINGS: LOWER CHEST: Heart is enlarged with severe coronary calcifications  No pericardial effusion.  Small right pleural effusion.  ABDOMEN:  LIVER: No hepatomegaly.  Smooth surface contour.  Mild fatty infiltration of the liver.  BILE DUCTS: No intrahepatic or extrahepatic biliary ductal dilatation.  GALLBLADDER: The gallbladder is present without definite gallstones. STOMACH: No abnormalities identified.  PANCREAS: No masses or ductal dilatation.  SPLEEN: No splenomegaly or focal splenic lesion.  ADRENAL GLANDS: No thickening or nodules.  KIDNEYS AND URETERS: Kidneys are normal in size and location.  No renal or ureteral calculi.  Renal cysts noted measuring up to 5.4 cm on the left.  PELVIS:  BLADDER: Urinary bladder is mildly distended with associated wall trabeculation.  Large amount of heterogeneous blood in the bladder. Questionable lesion versus adherent blood along the right lateral wall of the bladder on image 132 measuring 3.3 cm x 1.1 cm.  REPRODUCTIVE ORGANS: Prostate gland is borderline enlarged measuring 5 cm.  BOWEL: Mild to moderate stool volume.  Appendix is not visualized.  VESSELS: No abnormalities identified.  Abdominal aorta is normal in caliber. Severe plaque along the distal aorta.  PERITONEUM/RETROPERITONEUM/LYMPH NODES: No free fluid.  No pneumoperitoneum. No lymphadenopathy.  ABDOMINAL WALL: No abnormalities identified. SOFT TISSUES: No abnormalities identified.   BONES: No acute fracture or aggressive osseous lesion.    1. Large amount of heterogeneous blood in the bladder. Questionable lesion versus adherent blood along the right lateral wall of the bladder measuring 3.3 cm x 1.1 cm. Recommend further evaluation with CT urogram or cystoscopy. 2. Urinary bladder is mildly distended with associated wall trabeculation. Findings suggestive of chronic bladder outlet obstruction. 3. Borderline enlarged prostate. 4. Cardiomegaly with severe coronary artery calcifications. 5. Small right pleural effusion. 6. Mild hepatic steatosis. Signed by Giacomo Pandey MD    Transthoracic Echo Complete    Result Date: 7/26/2024              [address], [address], [place] [address]          Tel [phone] Fax [phone] TRANSTHORACIC ECHOCARDIOGRAM REPORT Patient Name:      EAMON ORR LULÚ Bond Physician:    [id number] Ministerio Napier DO Study Date:        7/25/2024            Ordering Provider:    [id number] SAIMA BAIG MRN/PID:           [id number]             Fellow: Accession#:        [id number]         Nurse: Date of Birth/Age: 1940 / 84 years Sonographer:          Ministerio Taylor Gender:            M                    Additional Staff: Height:            177.80 cm            Admit Date:           7/25/2024 Weight:            83.92 kg             Admission Status:     Outpatient BSA / BMI:         2.02 m2 / 26.54      Department Location:  [facility]                    [address] Blood Pressure: 110 /60 mmHg Study Type:    TRANSTHORACIC ECHO (TTE) COMPLETE Diagnosis/ICD: Atherosclerotic heart disease of native coronary artery without                angina pectoris-I25.10; Acute combined systolic (congestive) and                diastolic (congestive) heart  failure (CHF)-I50.41 Indication:    CAD, Systolic and Diastolic HF CPT Codes:     Echo Complete w Full [id number] Patient History: Smoker:            Former. Pertinent History: A-Fib, CAD, CHF, HTN, Hyperlipidemia and Systolic and                    Diastolic HF, VT, MR, CABG. Study Detail: The following Echo studies were performed: 2D, M-Mode, Doppler and               color flow. The patient was awake.  PHYSICIAN INTERPRETATION: Left Ventricle: Left ventricular ejection fraction is moderately decreased, by visual estimate at 30-35%. There is global hypokinesis of the left ventricle with minor regional variations. The left ventricular cavity size is normal. The left ventricular septal wall thickness is moderately increased. There is mild to moderate concentric left ventricular hypertrophy. Left ventricular diastolic filling was not assessed. Left Atrium: The left atrium is moderate to severely dilated. Right Ventricle: The right ventricle is mildly enlarged. There is normal right ventricular global systolic function. Right Atrium: The right atrium is moderately to severely dilated. Aortic Valve: The aortic valve appears structurally normal. The aortic valve appears tricuspid. There is mild to moderate aortic valve cusp calcification. The aortic valve dimensionless index is 0.57. There is mild aortic valve regurgitation. The peak instantaneous gradient of the aortic valve is 6.2 mmHg. The mean gradient of the aortic valve is 4.0 mmHg. Mitral Valve: The mitral valve is normal in structure. There is mild thickening and calcification of the anterior and posterior mitral valve leaflets. There is no evidence of mitral valve stenosis. The doppler estimated mean and peak diastolic pressure gradients are 3.0 mmHg and 5.3 mmHg respectively. There is normal mitral valve leaflet mobility. There is moderate mitral valve regurgitation. Tricuspid Valve: The tricuspid valve is structurally normal. There is normal tricuspid  valve leaflet mobility. There is mild tricuspid regurgitation. Pulmonic Valve: The pulmonic valve is structurally normal. There is mild pulmonic valve regurgitation. Pericardium: There is no pericardial effusion noted. Aorta: The aortic root is normal. There is moderate dilatation of the ascending aorta. There is mild dilatation of the aortic root. Pulmonary Artery: Pulmonary hypertension is present. The tricuspid regurgitant velocity is 3.53 m/s, and with an estimated right atrial pressure of 3 mmHg, the estimated pulmonary artery pressure is severely elevated with the RVSP at 52.8 mmHg. In comparison to the previous echocardiogram(s): The left ventricular function is unchanged.  CONCLUSIONS:  1. Left ventricular ejection fraction is moderately decreased, by visual estimate at 30-35%.  2. There is global hypokinesis of the left ventricle with minor regional variations.  3. Moderately increased left ventricular septal thickness.  4. Mildly enlarged right ventricle.  5. There is normal right ventricular global systolic function.  6. The left atrium is moderate to severely dilated.  7. The right atrium is moderately to severely dilated.  8. There is no evidence of mitral valve stenosis.  9. Moderate mitral valve regurgitation. 10. Mild aortic valve regurgitation. 11. Pulmonary hypertension is present. 12. Severely elevated pulmonary artery pressure. 13. There is moderate dilatation of the ascending aorta. QUANTITATIVE DATA SUMMARY: 2D MEASUREMENTS:                           Normal Ranges: Ao Root d:     3.80 cm    (2.0-3.7cm) LAs:           4.90 cm    (2.7-4.0cm) RVIDd:         3.30 cm    (0.9-3.6cm) IVSd:          1.50 cm    (0.6-1.1cm) LVPWd:         1.10 cm    (0.6-1.1cm) LVIDd:         4.80 cm    (3.9-5.9cm) LVIDs:         4.30 cm LV Mass Index: 121.7 g/m2 LV % FS        10.4 % LA VOLUME:                             Normal Ranges: LA Area A4C:     35.1 cm2 LA Area A2C:     34.6 cm2 LA Volume Index: 64.9 ml/m2 LA  Vol A4C:      128.0 ml LA Vol A2C:      127.0 ml LA Vol BP:       131.0 ml RA VOLUME BY A/L METHOD:                               Normal Ranges: RA Vol A4C:        92.2 ml    (8.3-19.5ml) RA Vol Index A4C:  45.7 ml/m2 RA Area A4C:       28.9 cm2 RA Major Axis A4C: 7.7 cm AORTA MEASUREMENTS:                    Normal Ranges: Asc Ao, d: 4.30 cm (2.1-3.4cm) LV SYSTOLIC FUNCTION BY 2D PLANIMETRY (MOD):                      Normal Ranges: EF-A4C View:    35 % (>=55%) EF-A2C View:    31 % EF-Biplane:     31 % EF-Visual:      33 % LV EF Reported: 33 % LV DIASTOLIC FUNCTION:                         Normal Ranges: MV Peak E:    0.89 m/s  (0.7-1.2 m/s) MV e'         0.118 m/s (>8.0) MV lateral e' 0.16 m/s MV medial e'  0.07 m/s E/e' Ratio:   7.50      (<8.0) MITRAL VALVE:                      Normal Ranges: MV Vmax:    1.15 m/s (<=1.3m/s) MV peak P.3 mmHg (<5mmHg) MV mean PG: 3.0 mmHg (<48mmHg) MV DT:      236 msec (150-240msec) AORTIC VALVE:                                   Normal Ranges: AoV Vmax:                1.25 m/s (<=1.7m/s) AoV Peak P.2 mmHg (<20mmHg) AoV Mean P.0 mmHg (1.7-11.5mmHg) LVOT Max Ian:            0.68 m/s (<=1.1m/s) AoV VTI:                 23.00 cm (18-25cm) LVOT VTI:                13.10 cm LVOT Diameter:           2.50 cm  (1.8-2.4cm) AoV Area, VTI:           2.80 cm2 (2.5-5.5cm2) AoV Area,Vmax:           2.69 cm2 (2.5-4.5cm2) AoV Dimensionless Index: 0.57  RIGHT VENTRICLE: RV Basal 4.70 cm RV Mid   3.10 cm RV Major 6.9 cm TRICUSPID VALVE/RVSP:                             Normal Ranges: Peak TR Velocity: 3.53 m/s Est. RA Pressure: 3 mmHg RV Syst Pressure: 52.8 mmHg (< 30mmHg) PULMONIC VALVE:                         Normal Ranges: PV Accel Time: 67 msec  (>120ms) PV Max Ian:    0.8 m/s  (0.6-0.9m/s) PV Max P.3 mmHg  [id number] Ministerio Napier DO Electronically signed on 2024 at 12:12:15 PM  ** Final **        Assessment/Plan   Principal Problem:     Hemorrhagic disorder due to extrinsic circulating anticoagulants (Multi)  Active Problems:    Anxiety    BPH (benign prostatic hyperplasia)    CAD (coronary artery disease)    HLD (hyperlipidemia)    HTN (hypertension)    Thrombocytopenia (CMS-HCC)    Atrial fibrillation with controlled ventricular response (Multi)    Stage 3a chronic kidney disease (Multi)    Gross hematuria    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)    Hematuria, unspecified type    -Patient with 4 days of gross hematuria shortly following initiation of Eliquis for atrial fibrillation and stroke prophylaxis as evidence of hemorrhagic disorder due to extrinsic circulating anticoagulants  -Patient's hemoglobin stable; holding DOAC  -Patient is slightly thrombocytopenic but no indication for transfusion at this time; will monitor closely  -Patient with history of TURP and BPH  -CT showing blood in the bladder with possible mass in bladder  -Urology consulted and recommended three-way Pandya catheter be placed for bladder irrigation; plan for cysto w/ intervention however this has been delayed due to power outage; appreciate co-management  -Urinalysis not consistent with urinary tract infection  -Remainder home medications to be resumed as appropriate     Code: DNAR CCA DNI ok with ICU  DVT prophylaxis: Holding with active hemorrhage  GI prophylaxis: Not indicated  Diet: Cardiac    Marshal Ross MD

## 2024-08-07 NOTE — PROGRESS NOTES
Minh Harrington . 84 y.o. male    Subjective  Patient reports no significant abdominal pain, no need for Barbosa catheter irrigation overnight.  No chest pain or shortness of breath    Objective  PHYSICAL EXAM:  Physical Exam  Vitals reviewed.   Constitutional:       General: He is awake.      Appearance: Normal appearance.   Cardiovascular:      Rate and Rhythm: Normal rate and regular rhythm.      Pulses: Normal pulses.      Heart sounds: Normal heart sounds.   Pulmonary:      Effort: Pulmonary effort is normal.      Breath sounds: Normal breath sounds and air entry.   Abdominal:      General: Abdomen is flat. There is no distension.      Palpations: Abdomen is soft.      Tenderness: There is no abdominal tenderness. There is no guarding or rebound.   Genitourinary:     Comments: 3 way barbosa catheter with dark pink to red tinged urine, clot drained when CBI unclamped and flow wide open.  Musculoskeletal:         General: Normal range of motion.      Cervical back: Normal range of motion.   Skin:     General: Skin is warm and dry.   Neurological:      General: No focal deficit present.      Mental Status: He is alert and oriented to person, place, and time.   Psychiatric:         Behavior: Behavior is cooperative.     : Barbosa is draining clear yellow urine with slight pink tinge without significant clots.  CBI has been held for 1 hour.    Vital signs in last 24 hours:  Vitals:    08/07/24 0800   BP: 133/80   Pulse: 102   Resp: 16   Temp: 37.1 °C (98.8 °F)   SpO2: 96%         Intake/Output this shift:    Intake/Output Summary (Last 24 hours) at 8/7/2024 1014  Last data filed at 8/7/2024 0809  Gross per 24 hour   Intake 240 ml   Output 4850 ml   Net -4610 ml        Allergies:  No Known Allergies     Medications:  Scheduled medications  [Held by provider] apixaban, 5 mg, oral, BID  atorvastatin, 20 mg, oral, Nightly  empagliflozin, 10 mg, oral, Daily  finasteride, 5 mg, oral, Daily  levETIRAcetam, 250 mg,  oral, BID  lidocaine, 1 Application, urethral, Once  metoprolol succinate XL, 25 mg, oral, BID  mirtazapine, 15 mg, oral, Nightly  sacubitriL-valsartan, 1 tablet, oral, BID  sertraline, 100 mg, oral, Nightly      Continuous medications     PRN medications  PRN medications: ALPRAZolam, metoprolol       Labs:  Results for orders placed or performed during the hospital encounter of 08/05/24 (from the past 24 hour(s))   CBC   Result Value Ref Range    WBC 8.5 4.4 - 11.3 x10*3/uL    nRBC 0.0 0.0 - 0.0 /100 WBCs    RBC 3.71 (L) 4.50 - 5.90 x10*6/uL    Hemoglobin 11.5 (L) 13.5 - 17.5 g/dL    Hematocrit 36.0 (L) 41.0 - 52.0 %    MCV 97 80 - 100 fL    MCH 31.0 26.0 - 34.0 pg    MCHC 31.9 (L) 32.0 - 36.0 g/dL    RDW 14.3 11.5 - 14.5 %    Platelets 98 (L) 150 - 450 x10*3/uL   Basic Metabolic Panel   Result Value Ref Range    Glucose 98 74 - 99 mg/dL    Sodium 137 136 - 145 mmol/L    Potassium 4.0 3.5 - 5.3 mmol/L    Chloride 104 98 - 107 mmol/L    Bicarbonate 26 21 - 32 mmol/L    Anion Gap 11 10 - 20 mmol/L    Urea Nitrogen 17 6 - 23 mg/dL    Creatinine 1.02 0.50 - 1.30 mg/dL    eGFR 72 >60 mL/min/1.73m*2    Calcium 8.3 (L) 8.6 - 10.3 mg/dL   Magnesium   Result Value Ref Range    Magnesium 2.08 1.60 - 2.40 mg/dL        Imaging:  ECG 12 Lead    Result Date: 8/6/2024  Atrial fibrillation with rapid ventricular response Nonspecific ST and T wave abnormality , probably digitalis effect Abnormal ECG When compared with ECG of 11-JUN-2024 11:05, Atrial fibrillation has replaced Sinus rhythm Nonspecific T wave abnormality, improved in Anterolateral leads      Plan  Hemorrhagic disorder due to extrinsic circulating anticoagulants (Multi)     #Hematuria  -Hold CBI  -Continue to hold anticoagulation, prefer 1 week of no anticoagulation  -Office cystoscopy  -Remove Pandya catheter, as long as patient is able to void, no need for postvoid residual  -No OR today given lack of power in ORs, fortunately hematuria has resolved  -Okay for  discharge later today  -CT urogram for recharacterization of bladder mass and check for upper tract source    Anthony Washington MD

## 2024-08-07 NOTE — PROGRESS NOTES
Minh Harrington Jr. is a 84 y.o. male on day 2 of admission presenting with Hemorrhagic disorder due to extrinsic circulating anticoagulants (Multi).    Subjective   Patient reports slight delirium ON, stating he thought he was at home. HR ON 80s-low 100s. Currently on succinate 25mg BID at his request from yesterday. Today, states that if his HR elevates, he would be agreeable to increasing his dose to 50mg bid. No SOB or angina. No new complaints at this time.    Objective     Physical Exam  VITALS: I have reviewed the vital signs.   GENERAL: Well developed adult in no acute distress.  Resting comfortably in bed. On RA  NEURO: Alert and oriented x3, able to answer questions and follow commands. No motor or sensory deficits. Moves all extremities. Face is symmetric and expressive. No tremor.  EYES: PERRL. No scleral icterus or conjunctival injection. No discharge.   HENT: Normocephalic, atraumatic. Hearing is grossly intact. Nares grossly patent and without discharge. Mucous membranes moist.   NECK: No JVD. Patient moves neck without restriction.   CARDIO: Irregularly irregular. Regular/borderline tachycardic rate. No murmur, rub, or gallop. Pulses equal bilaterally in the upper and lower extremity. No lower extremity edema.   PULM: Lungs clear to auscultation in all fields. No wheezes, rales, or rhonchi. No conversational dyspnea. No splinting, stridor, or accessory muscle use.    GI: Abdomen is soft and non-distended. No tenderness to palpation. No guarding or rigidity. Normoactive bowel sounds.   : No suprapubic tenderness. No CVA tenderness. 3-way barbosa in place with SBI running. Light pink coolaid urine  EXTREMITIES: Symmetric muscle bulk. No joint swelling. No clubbing, cyanosis, or deformity. Muscle strength 5/5 in b/l upper and lower extremities  SKIN: Warm and dry. Normal turgor. No rash or lesions appreciated.     Last Recorded Vitals  Blood pressure 133/80, pulse 102, temperature 37.1 °C (98.8 °F),  "temperature source Temporal, resp. rate 16, height 1.778 m (5' 10\"), weight 79.8 kg (176 lb), SpO2 96%.  Intake/Output last 3 Shifts:  I/O last 3 completed shifts:  In: 240 (3 mL/kg) [P.O.:240]  Out: 2750 (34.4 mL/kg) [Urine:2750 (1 mL/kg/hr)]  Weight: 79.8 kg     Relevant Results  Scheduled medications  [Held by provider] apixaban, 5 mg, oral, BID  atorvastatin, 20 mg, oral, Nightly  empagliflozin, 10 mg, oral, Daily  finasteride, 5 mg, oral, Daily  levETIRAcetam, 250 mg, oral, BID  lidocaine, 1 Application, urethral, Once  metoprolol succinate XL, 25 mg, oral, BID  mirtazapine, 15 mg, oral, Nightly  sacubitriL-valsartan, 1 tablet, oral, BID  sertraline, 100 mg, oral, Nightly      Continuous medications     PRN medications  PRN medications: ALPRAZolam, metoprolol  Results from last 7 days   Lab Units 08/07/24  0609 08/06/24  0715 08/05/24  0808   WBC AUTO x10*3/uL 8.5 11.1 7.1   RBC AUTO x10*6/uL 3.71* 4.25* 4.83   HEMOGLOBIN g/dL 11.5* 13.1* 15.0     Results from last 7 days   Lab Units 08/07/24  0609 08/06/24  0715 08/05/24  0808   SODIUM mmol/L 137 136 137   POTASSIUM mmol/L 4.0 4.3 4.1   CHLORIDE mmol/L 104 105 103   CO2 mmol/L 26 23 27   BUN mg/dL 17 15 21   CREATININE mg/dL 1.02 0.94 1.07   CALCIUM mg/dL 8.3* 8.3* 8.9   MAGNESIUM mg/dL 2.08 2.17  --    BILIRUBIN TOTAL mg/dL  --   --  1.0   ALT U/L  --   --  21   AST U/L  --   --  24       ECG 12 Lead    Result Date: 8/6/2024  Atrial fibrillation with rapid ventricular response Nonspecific ST and T wave abnormality , probably digitalis effect Abnormal ECG When compared with ECG of 11-JUN-2024 11:05, Atrial fibrillation has replaced Sinus rhythm Nonspecific T wave abnormality, improved in Anterolateral leads    CT abdomen pelvis w IV contrast    Result Date: 8/5/2024  STUDY: CT Abdomen and Pelvis with IV Contrast; 8/5/2024 at 9:50 AM. INDICATION: Painless hematuria. COMPARISON: XR left hip/pelvis 9/21/2022. ACCESSION NUMBER(S): [id number] ORDERING CLINICIAN: " AR SPIRNAK TECHNIQUE: CT of the abdomen and pelvis was performed.  Contiguous axial images were obtained at 3 mm slice thickness through the abdomen and pelvis. Coronal and sagittal reconstructions at 3 mm slice thickness were performed.  Omnipaque 350 75 mL was administered intravenously.  FINDINGS: LOWER CHEST: Heart is enlarged with severe coronary calcifications  No pericardial effusion.  Small right pleural effusion.  ABDOMEN:  LIVER: No hepatomegaly.  Smooth surface contour.  Mild fatty infiltration of the liver.  BILE DUCTS: No intrahepatic or extrahepatic biliary ductal dilatation.  GALLBLADDER: The gallbladder is present without definite gallstones. STOMACH: No abnormalities identified.  PANCREAS: No masses or ductal dilatation.  SPLEEN: No splenomegaly or focal splenic lesion.  ADRENAL GLANDS: No thickening or nodules.  KIDNEYS AND URETERS: Kidneys are normal in size and location.  No renal or ureteral calculi.  Renal cysts noted measuring up to 5.4 cm on the left.  PELVIS:  BLADDER: Urinary bladder is mildly distended with associated wall trabeculation.  Large amount of heterogeneous blood in the bladder. Questionable lesion versus adherent blood along the right lateral wall of the bladder on image 132 measuring 3.3 cm x 1.1 cm.  REPRODUCTIVE ORGANS: Prostate gland is borderline enlarged measuring 5 cm.  BOWEL: Mild to moderate stool volume.  Appendix is not visualized.  VESSELS: No abnormalities identified.  Abdominal aorta is normal in caliber. Severe plaque along the distal aorta.  PERITONEUM/RETROPERITONEUM/LYMPH NODES: No free fluid.  No pneumoperitoneum. No lymphadenopathy.  ABDOMINAL WALL: No abnormalities identified. SOFT TISSUES: No abnormalities identified.  BONES: No acute fracture or aggressive osseous lesion.    1. Large amount of heterogeneous blood in the bladder. Questionable lesion versus adherent blood along the right lateral wall of the bladder measuring 3.3 cm x 1.1 cm. Recommend  further evaluation with CT urogram or cystoscopy. 2. Urinary bladder is mildly distended with associated wall trabeculation. Findings suggestive of chronic bladder outlet obstruction. 3. Borderline enlarged prostate. 4. Cardiomegaly with severe coronary artery calcifications. 5. Small right pleural effusion. 6. Mild hepatic steatosis. 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 Continue to hold eliquis on DC given lack of source control at this time  -hemoglobin continues to drop: 15-13.1-11.5.  No gross electrolyte disturbance.  Continue to optimize.  No renal or hepatic derangement.  Thrombocytopenia is stable though persistent at 98.  -TTE 4/25/2024: LVEF 30 to 35%. Global hypokinesis of left ventricle with minor regional variations.  Moderately increased LV septal thickness. Mildly enlarged RV normal right ventricular global systolic function.  Moderate to severe dilation of the left atrium. Moderate to severe dilation of the right atrium.  Moderate MR.  Mild AR.  Severely elevated pulmonary artery pressure of 52.8 suggestive of pulmonary hypertension.  He does not have a right heart catheterization to confirm this.  Moderate dilation of ascending aorta.    -Continues succinate 25mg bid. Current HR trend 80's-low 100's. Pt now agreeable to increasing if HR elevates          Discussed with Dr Riddhi Winter DO  IM PGY III

## 2024-08-07 NOTE — CARE PLAN
The patient's goals for the shift include  Patient to remain safe and free from injury with pain and comfort managed and maintained    The clinical goals for the shift include Patient to remain hemodynamically stable throughout the shift    Over the shift, the patient did not make progress toward the following goals. Barriers to progression include bladder irrigation, weakness, and pain. Recommendations to address these barriers include   Problem: Skin  Goal: Decreased wound size/increased tissue granulation at next dressing change  Outcome: Progressing  Flowsheets (Taken 8/7/2024 0032)  Decreased wound size/increased tissue granulation at next dressing change: Protective dressings over bony prominences  Goal: Participates in plan/prevention/treatment measures  Outcome: Progressing  Flowsheets (Taken 8/7/2024 0032)  Participates in plan/prevention/treatment measures:   Elevate heels   Increase activity/out of bed for meals  Goal: Prevent/manage excess moisture  Outcome: Progressing  Flowsheets (Taken 8/7/2024 0032)  Prevent/manage excess moisture:   Cleanse incontinence/protect with barrier cream   Monitor for/manage infection if present   Follow provider orders for dressing changes  Goal: Prevent/minimize sheer/friction injuries  Outcome: Progressing  Flowsheets (Taken 8/7/2024 0032)  Prevent/minimize sheer/friction injuries:   Complete micro-shifts as needed if patient unable. Adjust patient position to relieve pressure points, not a full turn   Increase activity/out of bed for meals   Use pull sheet   HOB 30 degrees or less   Turn/reposition every 2 hours/use positioning/transfer devices  Goal: Promote/optimize nutrition  Outcome: Progressing  Flowsheets (Taken 8/7/2024 0032)  Promote/optimize nutrition: Monitor/record intake including meals  Goal: Promote skin healing  Outcome: Progressing  Flowsheets (Taken 8/7/2024 0032)  Promote skin healing:   Protective dressings over bony prominences   Turn/reposition  every 2 hours/use positioning/transfer devices   Assess skin/pad under line(s)/device(s)   Ensure correct size (line/device) and apply per  instructions   Rotate device position/do not position patient on device     Problem: Fall/Injury  Goal: Not fall by end of shift  Outcome: Progressing  Goal: Be free from injury by end of the shift  Outcome: Progressing  Goal: Verbalize understanding of personal risk factors for fall in the hospital  Outcome: Progressing  Goal: Verbalize understanding of risk factor reduction measures to prevent injury from fall in the home  Outcome: Progressing  Goal: Use assistive devices by end of the shift  Outcome: Progressing  Goal: Pace activities to prevent fatigue by end of the shift  Outcome: Progressing     Problem: Pain  Goal: Takes deep breaths with improved pain control throughout the shift  Outcome: Progressing  Goal: Turns in bed with improved pain control throughout the shift  Outcome: Progressing  Goal: Walks with improved pain control throughout the shift  Outcome: Progressing  Goal: Performs ADL's with improved pain control throughout shift  Outcome: Progressing  Goal: Participates in PT with improved pain control throughout the shift  Outcome: Progressing  Goal: Free from opioid side effects throughout the shift  Outcome: Progressing  Goal: Free from acute confusion related to pain meds throughout the shift  Outcome: Progressing     Problem: Pain - Adult  Goal: Verbalizes/displays adequate comfort level or baseline comfort level  Outcome: Progressing     Problem: Safety - Adult  Goal: Free from fall injury  Outcome: Progressing     Problem: Discharge Planning  Goal: Discharge to home or other facility with appropriate resources  Outcome: Progressing     Problem: Chronic Conditions and Co-morbidities  Goal: Patient's chronic conditions and co-morbidity symptoms are monitored and maintained or improved  Outcome: Progressing   .

## 2024-08-07 NOTE — NURSING NOTE
End of shift note, no changes this shift. Patient barbosa cath removed this am and has voided multiple times this shift. Patient heart rate controlled afib. Spoke with dr Ross this shift and plan for possible discharge tomorrow if hr remains controlled over night.

## 2024-08-08 VITALS
HEIGHT: 70 IN | RESPIRATION RATE: 18 BRPM | BODY MASS INDEX: 25.2 KG/M2 | TEMPERATURE: 97.3 F | SYSTOLIC BLOOD PRESSURE: 96 MMHG | OXYGEN SATURATION: 98 % | HEART RATE: 90 BPM | WEIGHT: 176 LBS | DIASTOLIC BLOOD PRESSURE: 62 MMHG

## 2024-08-08 PROBLEM — R31.0 GROSS HEMATURIA: Status: RESOLVED | Noted: 2024-08-05 | Resolved: 2024-08-08

## 2024-08-08 PROCEDURE — 99239 HOSP IP/OBS DSCHRG MGMT >30: CPT | Performed by: INTERNAL MEDICINE

## 2024-08-08 PROCEDURE — 2500000001 HC RX 250 WO HCPCS SELF ADMINISTERED DRUGS (ALT 637 FOR MEDICARE OP): Performed by: INTERNAL MEDICINE

## 2024-08-08 RX ORDER — METOPROLOL SUCCINATE 25 MG/1
25 TABLET, EXTENDED RELEASE ORAL 2 TIMES DAILY
Qty: 60 TABLET | Refills: 0 | Status: SHIPPED | OUTPATIENT
Start: 2024-08-08 | End: 2024-09-07

## 2024-08-08 ASSESSMENT — COGNITIVE AND FUNCTIONAL STATUS - GENERAL
DAILY ACTIVITIY SCORE: 24
MOBILITY SCORE: 23
CLIMB 3 TO 5 STEPS WITH RAILING: A LITTLE

## 2024-08-08 ASSESSMENT — PAIN SCALES - GENERAL
PAINLEVEL_OUTOF10: 0 - NO PAIN
PAINLEVEL_OUTOF10: 0 - NO PAIN

## 2024-08-08 ASSESSMENT — PAIN - FUNCTIONAL ASSESSMENT
PAIN_FUNCTIONAL_ASSESSMENT: 0-10
PAIN_FUNCTIONAL_ASSESSMENT: 0-10

## 2024-08-08 NOTE — NURSING NOTE
1152: Pt. Discharged home at this time with wife. Discharge instructions reviewed with pt at this time. Pt. Educated on home going medications and pt. States he has no questions or concerns at this time. IV removed. Tele removed. All belongings taken with patient at this time. Pt. Wheeled down to car to go home at this time.

## 2024-08-08 NOTE — NURSING NOTE
2023: Notified covering provider about pt question regarding his Metoprolol. Covering provider came to answer all questions and increased pt night time dose to 50mg.      0506: Pt was able to have an uneventful night. Pt was able to rest overnight. Pt refused bed alarm this shift. Pt had some urine that was bloody this shift. Pt safety been maintained.

## 2024-08-08 NOTE — NURSING NOTE
"0800: RN assumed care of pt. Pt. Asking to speak with nursing regarding metoprolol dosage. Pt. States he will not take 25mg dosage this morning as he \"does not feel this will control his heart rate and needs to be increased to 50mg\" MD and cardiology team notified of pt. Concerns. Per night shift nurse PM dosage increased to 50mg last night and pt . Tolerating with heart rate in 80s.     0832: pt. Refusing bed alarm at this time.  "

## 2024-08-08 NOTE — DISCHARGE SUMMARY
Discharge Diagnosis  Hemorrhagic disorder due to extrinsic circulating anticoagulants (Multi)    Issues Requiring Follow-Up  Follow up with urology for cystoscopy  Resume eliquis in 1 week    Discharge Meds     Your medication list        START taking these medications        Instructions Last Dose Given Next Dose Due   metoprolol succinate XL 25 mg 24 hr tablet  Commonly known as: Toprol-XL      Take 1 tablet (25 mg) by mouth 2 times a day. Do not crush or chew.              CHANGE how you take these medications        Instructions Last Dose Given Next Dose Due   sertraline 100 mg tablet  Commonly known as: Zoloft  What changed: when to take this      Take 1 tablet (100 mg) by mouth once daily.              CONTINUE taking these medications        Instructions Last Dose Given Next Dose Due   ALPRAZolam 0.5 mg tablet  Commonly known as: Xanax      Take 1 tablet (0.5 mg) by mouth 3 times a day as needed for anxiety.       atorvastatin 20 mg tablet  Commonly known as: Lipitor      TAKE 1 TABLET BY MOUTH ONCE DAILY AS DIRECTED       b complex vitamins capsule           cholecalciferol 125 MCG (5000 UT) capsule  Commonly known as: Vitamin D-3           dutasteride 0.5 mg capsule  Commonly known as: Avodart      Take 1 capsule (0.5 mg) by mouth once daily.       empagliflozin 10 mg  Commonly known as: Jardiance      Take 1 tablet (10 mg) by mouth once daily.       furosemide 20 mg tablet  Commonly known as: Lasix      Take one tablet PRN for swelling       levETIRAcetam 500 mg tablet  Commonly known as: Keppra      TAKE 1/2 (ONE-HALF) OF A TABLET BY MOUTH DAILY       mirtazapine 15 mg tablet  Commonly known as: Remeron      TAKE 1 TABLET BY MOUTH AT BEDTIME       sacubitriL-valsartan 49-51 mg tablet  Commonly known as: Entresto      Take 1 tablet by mouth 2 times a day.              STOP taking these medications      apixaban 5 mg tablet  Commonly known as: Eliquis        metoprolol tartrate 25 mg tablet  Commonly known  [FreeTextEntry1] : Patient with chronic low back and left leg pain. She is s/p SCS implant on 4/17/23. Reports improvement in pain symptoms and ability to tolerate ADLs and ambulation. The patient has significant pain in the left foot related to swelling that becomes worse with prolonged ambulation.  She has had a negative vascular work up.  We would like the patient to obtain an MRI of the left ankle to determine the cause of her ambulation related pain and swelling.   She has failed conservative measure of nsaids, rest, activity modification.  She was reprogrammed with rep in office to see if that provides better coverage. Patient to follow up after MRI.\par \par  as: Lopressor                  Where to Get Your Medications        These medications were sent to [id number] - [facility], [place] - [address]  [address] [facility] [address]      Phone: [phone]   metoprolol succinate XL 25 mg 24 hr tablet         Test Results Pending At Discharge  Pending Labs       No current pending labs.            Hospital Course   History Of Present Illness  Minh Harrington Jr. is a 84 y.o. male presenting with gross hematuria.  Patient states that he had a TURP back in 2008.  Since then patient occasionally passes a blood clot here and there but not very frequently.  Patient noted to he had significant amount of gross hematuria for the last few days.  Patient recently started Eliquis for atrial fibrillation and stroke prophylaxis.  Patient denies any pain or discomfort.  He denies dysuria or flank pain.  Patient does not have history of kidney stones.  He currently denies chest pain shortness of breath nausea vomiting diarrhea constipation fever chills dysuria or headache.     In the emergency department his vital signs are stable.  CMP was unremarkable.  INR is 1.4 with an elevated pro time and APTT.  CBC was unremarkable with a stable hemoglobin of 15.0.  Platelets were slightly low at 116.  Urinalysis showed gross hematuria and was negative for leukocyte esterase nitrites and bacteria.  CT of abdomen pelvis shows blood in bladder along with a possible mass on lateral wall of bladder.  Case was discussed with urology who recommended three-way Pandya catheter be placed for bladder irrigation.  Patient may require cystoscopy/intervention during this hospitalization but reportedly no procedure planned imminently.    Hospital course: Patient was admitted to the hospital with gross hematuria.  Urology was consulted from the emergency department.  Three-way Pandya catheter was placed for continuous bladder irrigation which started on 8/6 and finished  "on 8/7.  Patient's urine cleared.  Pandya catheter was removed.  Patient was able to void on his own without any significant urinary retention.  Patient to have cystoscopy with fulguration done in the outpatient setting with Dr. Hoffman.  Patient also dealt with A-fib with RVR during this hospitalization.  Patient adamant about not wanting to take 50 mg of metoprolol as it makes him feel \"old.\"  Patient was agreeable to starting metoprolol succinate 25 mg twice daily and prescription for this was done.  He has upcoming follow-up appointment with his cardiologist Dr. Valle.  He is being discharged home in stable condition.  He has been told not to take his Eliquis for 7 more days at discharge.    Discharge time greater than 35 minutes. Greater than 50% of time spent on counseling patient, coordination of care, medication reconciliation, transmitting medications to pharmacy and clarifying plan of care with nursing staff and case management.    Pertinent Physical Exam At Time of Discharge  Physical Exam  Constitutional:       General: He is not in acute distress.     Appearance: Normal appearance.   HENT:      Head: Normocephalic and atraumatic.      Mouth/Throat:      Mouth: Mucous membranes are moist.   Eyes:      Extraocular Movements: Extraocular movements intact.      Conjunctiva/sclera: Conjunctivae normal.   Cardiovascular:      Rate and Rhythm: Normal rate. Rhythm irregular.      Heart sounds: Normal heart sounds.   Pulmonary:      Effort: Pulmonary effort is normal.      Breath sounds: Normal breath sounds. No wheezing, rhonchi or rales.   Abdominal:      General: Abdomen is flat. Bowel sounds are normal.      Palpations: Abdomen is soft.   Musculoskeletal:         General: No swelling or tenderness. Normal range of motion.      Cervical back: Normal range of motion and neck supple.   Skin:     General: Skin is warm and dry.      Findings: No rash.   Neurological:      General: No focal deficit " present.      Mental Status: He is alert and oriented to person, place, and time.      Cranial Nerves: No cranial nerve deficit.      Sensory: No sensory deficit.   Psychiatric:         Mood and Affect: Mood normal.         Behavior: Behavior normal.         Outpatient Follow-Up  Future Appointments   Date Time Provider Department Center   8/12/2024 11:20 AM Anthony Washington MD [id number] [place]   10/10/2024  1:30 PM Heriberto Valle MD [id number] [place]   10/30/2024  2:30 PM Isabel Ritter DO [id number] [place]         Marshal Ross MD

## 2024-08-09 ENCOUNTER — PATIENT OUTREACH (OUTPATIENT)
Dept: PRIMARY CARE | Facility: CLINIC | Age: 84
End: 2024-08-09
Payer: MEDICARE

## 2024-08-09 ENCOUNTER — TELEPHONE (OUTPATIENT)
Dept: PRIMARY CARE | Facility: CLINIC | Age: 84
End: 2024-08-09
Payer: MEDICARE

## 2024-08-09 ENCOUNTER — PROCEDURE VISIT (OUTPATIENT)
Dept: SLEEP MEDICINE | Facility: HOSPITAL | Age: 84
End: 2024-08-09
Payer: MEDICARE

## 2024-08-09 DIAGNOSIS — G47.33 OBSTRUCTIVE SLEEP APNEA SYNDROME: ICD-10-CM

## 2024-08-09 PROCEDURE — 95806 SLEEP STUDY UNATT&RESP EFFT: CPT | Performed by: INTERNAL MEDICINE

## 2024-08-09 NOTE — TELEPHONE ENCOUNTER
Spoke with patient he is waiting to make follow up  with urology first, then he will call back to set up appointment with us.  He don't want any conflicting appts at this time.

## 2024-08-09 NOTE — PROGRESS NOTES
Discharge Facility: [facility]  Discharge Diagnosis: Hemorrhagic disorder due to extrinsic circulating anticoagulants   Admission Date:8/5/2024  Discharge Date: 8/8/2024    PCP Appointment Date: none  Specialist Appointment Date:   -cardiology 10/10/2024    Hospital Encounter and Summary Linked: Yes  See discharge assessment below for further details    Issues Requiring Follow-Up  Follow up with urology for cystoscopy  Resume eliquis in 1 week    START taking:   metoprolol succinate XL (Toprol-XL)      STOP taking:   apixaban 5 mg tablet (Eliquis)   metoprolol tartrate 25 mg tablet (Lopressor    Engagement  Call Start Time: 1003 (8/9/2024 10:03 AM)    Medications  Medications reviewed with patient/caregiver?: Yes (8/9/2024 10:03 AM)  Is the patient having any side effects they believe may be caused by any medication additions or changes?: No (8/9/2024 10:03 AM)  Does the patient have all medications ordered at discharge?: Yes (8/9/2024 10:03 AM)  Care Management Interventions: No intervention needed (8/9/2024 10:03 AM)  Prescription Comments: new Toprol xl. stop eliquis and lopressor (8/9/2024 10:03 AM)  Is the patient taking all medications as directed (includes completed medication regime)?: Yes (8/9/2024 10:03 AM)  Medication Comments: see med list- no issues with obtaining meds. (8/9/2024 10:03 AM)    Appointments  Does the patient have a primary care provider?: Yes (8/9/2024 10:03 AM)  Care Management Interventions: Advised patient to make appointment (8/9/2024 10:03 AM)  Has the patient kept scheduled appointments due by today?: Yes (8/9/2024 10:03 AM)  Care Management Interventions: Advised patient to keep appointment (8/9/2024 10:03 AM)    Self Management  What is the home health agency?: none (8/9/2024 10:03 AM)  Has home health visited the patient within 72 hours of discharge?: Not applicable (8/9/2024 10:03 AM)    Patient Teaching  Does the patient have access to their discharge instructions?: Yes (8/9/2024  10:03 AM)  Care Management Interventions: Reviewed instructions with patient (8/9/2024 10:03 AM)  What is the patient's perception of their health status since discharge?: Same (8/9/2024 10:03 AM)  Is the patient/caregiver able to teach back the hierarchy of who to call/visit for symptoms/problems? PCP, Specialist, Home Health nurse, Urgent Care, ED, 911: Yes (8/9/2024 10:03 AM)    Wrap Up  Wrap Up Additional Comments: CTS spoke with patient. He was admitted to [facility] on 8/5/2024 with Hemorrhagic disorder due to extrinsic circulating anticoagulants. Discharged on 8/8/2024 with no home care needs. Three-way Pandya catheter was placed for continuous bladder irrigation which started on 8/6 and finished on 8/7. Patient stated that he was doing about the same. Still having bloody urine however he states that it is no dofferent from when he left the hospital. A little fatigue. Denies any chest pains or shortness of breath. Reviewed medications. PAtient desnies any issues with obtaining medications. Understands discharge instructions. Patient did not want to schedule an appt with PCP . Patient denies any questions or concerns at this time. (8/9/2024 10:03 AM)  Call End Time: 1011 (8/9/2024 10:03 AM)

## 2024-08-09 NOTE — TELEPHONE ENCOUNTER
----- Message from Meghan Bliss sent at 8/9/2024 10:16 AM EDT -----  Regarding: tcm    Can you please contact pt to schedule hosp  follow up within 14 days of discharge. Patient did not want to schedule a follow up at this time. If he does schedule he would need to be seen on or before 8/21/2024.    Discharge Facility: [facility]  Discharge Diagnosis: Hemorrhagic disorder due to extrinsic circulating anticoagulants   Admission Date:8/5/2024  Discharge Date: 8/8/2024        Thank you

## 2024-08-12 ENCOUNTER — APPOINTMENT (OUTPATIENT)
Dept: UROLOGY | Facility: CLINIC | Age: 84
End: 2024-08-12
Payer: MEDICARE

## 2024-08-14 LAB
ATRIAL RATE: 117 BPM
Q ONSET: 227 MS
QRS COUNT: 17 BEATS
QRS DURATION: 92 MS
QT INTERVAL: 332 MS
QTC CALCULATION(BAZETT): 444 MS
QTC FREDERICIA: 403 MS
R AXIS: 41 DEGREES
T AXIS: 100 DEGREES
T OFFSET: 393 MS
VENTRICULAR RATE: 108 BPM

## 2024-08-19 DIAGNOSIS — I50.9 ACUTE CONGESTIVE HEART FAILURE, UNSPECIFIED HEART FAILURE TYPE (MULTI): Primary | ICD-10-CM

## 2024-08-22 ENCOUNTER — PATIENT OUTREACH (OUTPATIENT)
Dept: PRIMARY CARE | Facility: CLINIC | Age: 84
End: 2024-08-22
Payer: MEDICARE

## 2024-08-23 DIAGNOSIS — I48.91 ATRIAL FIBRILLATION BY ELECTROCARDIOGRAM (MULTI): ICD-10-CM

## 2024-08-23 RX ORDER — APIXABAN 5 MG/1
5 TABLET, FILM COATED ORAL 2 TIMES DAILY
Qty: 60 TABLET | Refills: 11 | Status: SHIPPED | OUTPATIENT
Start: 2024-08-23

## 2024-08-28 NOTE — PROGRESS NOTES
Pharmacy Post-Discharge Visit    Minh Harrington Jr. is a 84 y.o. male was referred to Clinical Pharmacy Team to complete a post-discharge medication optimization and monitoring visit.  The patient was referred for their Congestive Heart Failure.    Pt is here for First appointment.     Admission Date: 8.5.24  Discharge Date: 8.8.24    PCP/Referring Provider: Isabel Ritter DO  Last Visit: 6.27.24  Next visit: 10.30.24    Saw cardiology 8.1.24    Subjective   No Known Allergies    [id number] - [place], [place] - [address]  [address] [address]  Phone: [phone] Fax: [phone]    [facility]  [address]  [facility] [address]  Phone: [phone] Fax: [phone]      Social History     Social History Narrative    Not on file        Notable Medication changes following discharge:  Start: metoprolol XL 25mg BID  Stop: apixaban, metoprolol tartrate  Change: NONE    HPI  CHF ASSESSMENT  Staging:  Most recent ejection fraction: 30-35%    Symptom Assessment:  Weight changes/edema?: No - pt denies  Dyspnea?: None  Dizziness/syncope/palpitations?: No    Current Regimen:  ARNI/ACEi/ARB: Yes - Entresto 49-51mg BID  Beta Blocker: Yes - metoprolol XL 25mg BID  MRA: No  SGLT2i: Yes - Jardiance 10mg daily    Other therapy:  Furosemide 20mg PRN edema - seldom uses per patient    Secondary Prevention:  The ASCVD Risk score (Sky AZEVEDO, et al., 2019) failed to calculate for the following reasons:    The 2019 ASCVD risk score is only valid for ages 40 to 79    Aspirin 81mg? no  Statin?: Yes - 20mg daily  HTN?: Yes - controlled      Review of Systems    Objective     BP Readings from Last 4 Encounters:   08/08/24 96/62   08/01/24 114/64   06/27/24 112/68   06/11/24 110/76      There were no vitals filed for this visit.     LAB  Creatinine   Date Value Ref Range Status   08/07/2024 1.02 0.50 - 1.30 mg/dL Final     eGFR   Date Value Ref Range  Status   08/07/2024 72 >60 mL/min/1.73m*2 Final     Comment:     Calculations of estimated GFR are performed using the 2021 CKD-EPI Study Refit equation without the race variable for the IDMS-Traceable creatinine methods.  https://jasn.asnjournals.org/content/early/2021/09/22/ASN.1253531666     Sodium   Date Value Ref Range Status   08/07/2024 137 136 - 145 mmol/L Final     Potassium   Date Value Ref Range Status   08/07/2024 4.0 3.5 - 5.3 mmol/L Final     Chloride   Date Value Ref Range Status   08/07/2024 104 98 - 107 mmol/L Final     Urea Nitrogen   Date Value Ref Range Status   08/07/2024 17 6 - 23 mg/dL Final     AST   Date Value Ref Range Status   08/05/2024 24 9 - 39 U/L Final        Lab Results   Component Value Date    BILITOT 1.0 08/05/2024    CALCIUM 8.3 (L) 08/07/2024    CO2 26 08/07/2024     08/07/2024    CREATININE 1.02 08/07/2024    GLUCOSE 98 08/07/2024    ALKPHOS 70 08/05/2024    K 4.0 08/07/2024    PROT 7.2 08/05/2024     08/07/2024    AST 24 08/05/2024    ALT 21 08/05/2024    BUN 17 08/07/2024    ANIONGAP 11 08/07/2024    MG 2.08 08/07/2024    ALBUMIN 4.1 08/05/2024    GFRMALE 76 12/16/2022     Lab Results   Component Value Date    TRIG 55 03/28/2024    CHOL 110 03/28/2024    LDLCALC 52 03/28/2024    HDL 46.6 03/28/2024     Lab Results   Component Value Date    HGBA1C 5.2 10/11/2023         Current Outpatient Medications   Medication Instructions    ALPRAZolam (XANAX) 0.5 mg, oral, 3 times daily PRN    atorvastatin (LIPITOR) 20 mg, oral, Daily, as directed    b complex vitamins capsule 1 capsule, oral, Daily, After lunch    cholecalciferol (Vitamin D-3) 125 MCG (5000 UT) capsule 1 tablet, oral, Daily, After lunch    dutasteride (AVODART) 0.5 mg, oral, Daily    Eliquis 5 mg, oral, 2 times daily    empagliflozin (JARDIANCE) 10 mg, oral, Daily    furosemide (Lasix) 20 mg tablet Take one tablet PRN for swelling    levETIRAcetam (Keppra) 500 mg tablet TAKE 1/2 (ONE-HALF) OF A TABLET BY  MOUTH DAILY    metoprolol succinate XL (TOPROL-XL) 25 mg, oral, 2 times daily, Do not crush or chew.    mirtazapine (REMERON) 15 mg, oral, Nightly    sacubitriL-valsartan (Entresto) 49-51 mg tablet 1 tablet, oral, 2 times daily    sertraline (ZOLOFT) 100 mg, oral, Daily          DRUG INTERACTIONS  None requiring intervention at this time      Assessment/Plan   Problem List Items Addressed This Visit       Acute congestive heart failure (Multi)     Followed by cardiology. No symptoms of decompensation noted at this time. Patient is very fit and continues to go to the gym. No cost issues reported.     CONTINUE:  Entresto 49-51mg BID  Metoprolol XL 25mg BID  Jardiance 10mg daily  Furosemide PRN            Labs ordered: none    Follow-up: as needed     Patient was provided with PharmD phone number and encouraged to reach out with any questions or concerns In the future or ask provider for another pharmacy referral.    Time spent with pt: Total length of time 20 (minutes) of the encounter and more than 50% was spent counseling the patient.    Valeri Easton PharmD, YANELIS  Clinical Pharmacist  Pharmacy Services  [phone]    Continue all meds under the continuation of care with the referring provider and clinical pharmacy team.    Verbal consent to manage patient's drug therapy was obtained from the patient. They were informed they may decline to participate or withdraw from participation in pharmacy services at any time.

## 2024-08-29 ENCOUNTER — TELEMEDICINE (OUTPATIENT)
Dept: PHARMACY | Facility: HOSPITAL | Age: 84
End: 2024-08-29
Payer: MEDICARE

## 2024-08-29 ENCOUNTER — TELEPHONE (OUTPATIENT)
Dept: PRIMARY CARE | Facility: CLINIC | Age: 84
End: 2024-08-29

## 2024-08-29 DIAGNOSIS — I50.9 ACUTE CONGESTIVE HEART FAILURE, UNSPECIFIED HEART FAILURE TYPE (MULTI): ICD-10-CM

## 2024-08-29 DIAGNOSIS — G47.33 OSA (OBSTRUCTIVE SLEEP APNEA): Primary | ICD-10-CM

## 2024-08-29 NOTE — TELEPHONE ENCOUNTER
----- Message from Isabel Ritter sent at 8/29/2024 11:58 AM EDT -----  Call patient his sleep study is positive for sleep apnea  Want to refer to sleep med for treatment recommendations   Referral in

## 2024-08-29 NOTE — ASSESSMENT & PLAN NOTE
Followed by cardiology. No symptoms of decompensation noted at this time. Patient is very fit and continues to go to the gym. No cost issues reported.     CONTINUE:  Entresto 49-51mg BID  Metoprolol XL 25mg BID  Jardiance 10mg daily  Furosemide PRN

## 2024-08-29 NOTE — RESULT ENCOUNTER NOTE
Call patient his sleep study is positive for sleep apnea  Want to refer to sleep med for treatment recommendations   Referral in Detail Level: Detailed

## 2024-08-30 DIAGNOSIS — F41.9 ANXIETY: ICD-10-CM

## 2024-08-30 RX ORDER — ALPRAZOLAM 0.5 MG/1
0.5 TABLET ORAL 3 TIMES DAILY PRN
Qty: 30 TABLET | Refills: 0 | Status: SHIPPED | OUTPATIENT
Start: 2024-08-30 | End: 2025-04-27

## 2024-09-05 ENCOUNTER — OFFICE VISIT (OUTPATIENT)
Dept: SLEEP MEDICINE | Facility: CLINIC | Age: 84
End: 2024-09-05
Payer: MEDICARE

## 2024-09-05 VITALS
HEIGHT: 71 IN | HEART RATE: 86 BPM | BODY MASS INDEX: 25.34 KG/M2 | WEIGHT: 181 LBS | RESPIRATION RATE: 18 BRPM | SYSTOLIC BLOOD PRESSURE: 129 MMHG | DIASTOLIC BLOOD PRESSURE: 76 MMHG

## 2024-09-05 DIAGNOSIS — I10 PRIMARY HYPERTENSION: ICD-10-CM

## 2024-09-05 DIAGNOSIS — G47.33 OSA (OBSTRUCTIVE SLEEP APNEA): Primary | ICD-10-CM

## 2024-09-05 DIAGNOSIS — Z87.891 FORMER SMOKER: ICD-10-CM

## 2024-09-05 DIAGNOSIS — G47.37 CENTRAL SLEEP APNEA IN CONDITIONS CLASSIFIED ELSEWHERE(327.27): ICD-10-CM

## 2024-09-05 DIAGNOSIS — I48.91 ATRIAL FIBRILLATION WITH CONTROLLED VENTRICULAR RESPONSE (MULTI): ICD-10-CM

## 2024-09-05 PROCEDURE — 1157F ADVNC CARE PLAN IN RCRD: CPT | Performed by: NURSE PRACTITIONER

## 2024-09-05 PROCEDURE — 3078F DIAST BP <80 MM HG: CPT | Performed by: NURSE PRACTITIONER

## 2024-09-05 PROCEDURE — 1160F RVW MEDS BY RX/DR IN RCRD: CPT | Performed by: NURSE PRACTITIONER

## 2024-09-05 PROCEDURE — 1123F ACP DISCUSS/DSCN MKR DOCD: CPT | Performed by: NURSE PRACTITIONER

## 2024-09-05 PROCEDURE — 3074F SYST BP LT 130 MM HG: CPT | Performed by: NURSE PRACTITIONER

## 2024-09-05 PROCEDURE — 1159F MED LIST DOCD IN RCRD: CPT | Performed by: NURSE PRACTITIONER

## 2024-09-05 PROCEDURE — 99204 OFFICE O/P NEW MOD 45 MIN: CPT | Performed by: NURSE PRACTITIONER

## 2024-09-05 PROCEDURE — 1111F DSCHRG MED/CURRENT MED MERGE: CPT | Performed by: NURSE PRACTITIONER

## 2024-09-05 PROCEDURE — 1036F TOBACCO NON-USER: CPT | Performed by: NURSE PRACTITIONER

## 2024-09-05 ASSESSMENT — SLEEP AND FATIGUE QUESTIONNAIRES
SLEEP_PROBLEM_INTERFERES_DAILY_ACTIVITIES: A LITTLE
HOW LIKELY ARE YOU TO NOD OFF OR FALL ASLEEP WHILE WATCHING TV: WOULD NEVER DOZE
HOW LIKELY ARE YOU TO NOD OFF OR FALL ASLEEP WHILE SITTING AND READING: MODERATE CHANCE OF DOZING
SITING INACTIVE IN A PUBLIC PLACE LIKE A CLASS ROOM OR A MOVIE THEATER: WOULD NEVER DOZE
WORRIED_DISTRESSED_DUE_TO_SLEEP: A LITTLE
HOW LIKELY ARE YOU TO NOD OFF OR FALL ASLEEP WHILE SITTING AND TALKING TO SOMEONE: WOULD NEVER DOZE
DIFFICULTY_STAYING_ASLEEP: MILD
SLEEP_PROBLEM_NOTICEABLE_TO_OTHERS: A LITTLE
HOW LIKELY ARE YOU TO NOD OFF OR FALL ASLEEP WHEN YOU ARE A PASSENGER IN A CAR FOR AN HOUR WITHOUT A BREAK: MODERATE CHANCE OF DOZING
HOW LIKELY ARE YOU TO NOD OFF OR FALL ASLEEP IN A CAR, WHILE STOPPED FOR A FEW MINUTES IN TRAFFIC: WOULD NEVER DOZE
ESS-CHAD TOTAL SCORE: 4
HOW LIKELY ARE YOU TO NOD OFF OR FALL ASLEEP WHILE LYING DOWN TO REST IN THE AFTERNOON WHEN CIRCUMSTANCES PERMIT: WOULD NEVER DOZE
SATISFACTION_WITH_CURRENT_SLEEP_PATTERN: SATISFIED
HOW LIKELY ARE YOU TO NOD OFF OR FALL ASLEEP WHILE SITTING QUIETLY AFTER LUNCH WITHOUT ALCOHOL: WOULD NEVER DOZE

## 2024-09-05 ASSESSMENT — COLUMBIA-SUICIDE SEVERITY RATING SCALE - C-SSRS
6. HAVE YOU EVER DONE ANYTHING, STARTED TO DO ANYTHING, OR PREPARED TO DO ANYTHING TO END YOUR LIFE?: NO
2. HAVE YOU ACTUALLY HAD ANY THOUGHTS OF KILLING YOURSELF?: NO
1. IN THE PAST MONTH, HAVE YOU WISHED YOU WERE DEAD OR WISHED YOU COULD GO TO SLEEP AND NOT WAKE UP?: NO

## 2024-09-05 ASSESSMENT — ENCOUNTER SYMPTOMS
LOSS OF SENSATION IN FEET: 0
OCCASIONAL FEELINGS OF UNSTEADINESS: 1
DEPRESSION: 0

## 2024-09-05 ASSESSMENT — PATIENT HEALTH QUESTIONNAIRE - PHQ9
2. FEELING DOWN, DEPRESSED OR HOPELESS: NOT AT ALL
1. LITTLE INTEREST OR PLEASURE IN DOING THINGS: NOT AT ALL
SUM OF ALL RESPONSES TO PHQ9 QUESTIONS 1 AND 2: 0

## 2024-09-05 NOTE — PATIENT INSTRUCTIONS
[facility] Sleep Medicine   [facility]  [address] [address]  [phone]       NAME: Minh Harrington   DATE: 09/05/24    Your Sleep Provider Today: VANIA Nuñez  Your Primary Care Physician: Isabel Ritter DO       DIAGNOSIS:   1. WINNIE (obstructive sleep apnea)  Referral to Adult Sleep Medicine      2. 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 Atrial fibrillation with controlled ventricular response (Multi)        4. Primary hypertension            Thank you for coming to the Sleep Medicine Clinic today! Your sleep medicine provider today was: VANIA Nuñez Below is a summary of your treatment plan, other important information, and our contact numbers:      TREATMENT PLAN     - Follow-up as needed   - If not already done, sign up for 'My Chart' and send prescription requests or messages through this      IMPORTANT INFORMATION     Call 911 for medical emergencies.  Our offices are generally open from Monday-Friday, 9 am - 5 pm.  If you need to get in touch with me, you may either call me/my team (number is below) or you can use [employer].  If a referral for a test, for CPAP, or for another specialist was made, and you have not heard about scheduling this within a week, please call scheduling at [phone] ([phone]).  If you are unable to make your appointment for clinic or an overnight study, kindly call the office at least 48 hours in advance to cancel and reschedule.  If you are on CPAP, please bring your device's card or the device to each clinic appointment.   There are no supporting services by either the sleep doctors or their staff on weekends and Holidays, or after 5 PM on weekdays.     PRESCRIPTIONS     We require 7 days advanced notice for prescription refills. If we do not receive the request in this time, we cannot guarantee that your medication will be refilled in time.      IMPORTANT  "PHONE NUMBERS     [facility]: [phone]  [employer] (DME): [phone]  [employer] (DME): [phone]  [facility] (DME): [phone]    CONTACTING YOUR SLEEP MEDICINE PROVIDER AND SLEEP TEAM      For issues with your machine or mask interface, please call your DME provider first. DME stands for durable medical company. DME is the company who provides you the machine and/or PAP supplies / accessories.   To schedule, cancel, or reschedule SLEEP STUDY APPOINTMENTS, please call the Main Phone Line at [phone] - option 3.   To schedule, cancel, or reschedule CLINIC APPOINTMENTS, you can do it in \"[email or web address]\", call [phone] for [facility] office , [phone] for [facility] office to speak with my on site staff, or call the Main Phone Line at [phone] - option 2  For CLINICAL QUESTIONS or MEDICATION REFILLS, please call direct line for Adult Sleep Nurses at [phone].   Lastly, you can also send a message directly to your provider through \"My Chart\", which is the email service through your  Records Account: [email or web address]     Adult Sleep Nurses (Antonette Gotti, LUIS and Krystal Guzman RN):  For clinical questions and refilling prescriptions: [phone]  Email sleep diaries and other documents at: [email or web address]    Office locations for Nuha Oneill NP:    [address]   [address]  [place] [address] [phone]    [address]  [address]  [place] [address] [phone]      OUR SLEEP TESTING LOCATIONS     Our team will contact you to schedule your sleep study, however, you can contact us as follow:  Main Phone Line (scheduling only): [phone]), option 3    Sleep Testing Locations:   [place] (18 years and older): [address] [place] [address], 2nd floor   [place] (18 years and older): [address]; 4th floor  After hours line: [phone]   " [facility] (18 years and older) at [place]: [address] [place] [address]  After hours line: [phone]   [facility] (Main campus: All ages): [facility], 6th floor. After hours line: [phone]   [place] (5 years and older; younger considered on case-by-case basis): [address] [place] [address]; [address], [address]. Scheduling  After hours line: [phone]       Here at [facility], we wish you a restful sleep!    Your sleep medicine provider for this visit was: Nuha Oneill, APRN-CNP

## 2024-09-13 ENCOUNTER — APPOINTMENT (OUTPATIENT)
Dept: UROLOGY | Facility: CLINIC | Age: 84
End: 2024-09-13
Payer: MEDICARE

## 2024-09-16 ENCOUNTER — APPOINTMENT (OUTPATIENT)
Dept: PRIMARY CARE | Facility: CLINIC | Age: 84
End: 2024-09-16
Payer: MEDICARE

## 2024-09-16 VITALS
DIASTOLIC BLOOD PRESSURE: 78 MMHG | HEIGHT: 71 IN | BODY MASS INDEX: 25.31 KG/M2 | WEIGHT: 180.8 LBS | SYSTOLIC BLOOD PRESSURE: 142 MMHG | OXYGEN SATURATION: 97 % | HEART RATE: 64 BPM | RESPIRATION RATE: 21 BRPM

## 2024-09-16 DIAGNOSIS — E78.2 MIXED HYPERLIPIDEMIA: ICD-10-CM

## 2024-09-16 DIAGNOSIS — I48.91 ATRIAL FIBRILLATION WITH CONTROLLED VENTRICULAR RESPONSE (MULTI): ICD-10-CM

## 2024-09-16 DIAGNOSIS — I50.41 ACUTE COMBINED SYSTOLIC AND DIASTOLIC HEART FAILURE: Primary | ICD-10-CM

## 2024-09-16 DIAGNOSIS — I10 PRIMARY HYPERTENSION: ICD-10-CM

## 2024-09-16 DIAGNOSIS — F41.9 ANXIETY: ICD-10-CM

## 2024-09-16 DIAGNOSIS — I25.810 CORONARY ARTERY DISEASE INVOLVING CORONARY BYPASS GRAFT OF NATIVE HEART WITHOUT ANGINA PECTORIS: ICD-10-CM

## 2024-09-16 PROCEDURE — 1159F MED LIST DOCD IN RCRD: CPT | Performed by: INTERNAL MEDICINE

## 2024-09-16 PROCEDURE — 3078F DIAST BP <80 MM HG: CPT | Performed by: INTERNAL MEDICINE

## 2024-09-16 PROCEDURE — 1123F ACP DISCUSS/DSCN MKR DOCD: CPT | Performed by: INTERNAL MEDICINE

## 2024-09-16 PROCEDURE — 1157F ADVNC CARE PLAN IN RCRD: CPT | Performed by: INTERNAL MEDICINE

## 2024-09-16 PROCEDURE — 3077F SYST BP >= 140 MM HG: CPT | Performed by: INTERNAL MEDICINE

## 2024-09-16 PROCEDURE — 1160F RVW MEDS BY RX/DR IN RCRD: CPT | Performed by: INTERNAL MEDICINE

## 2024-09-16 PROCEDURE — 99214 OFFICE O/P EST MOD 30 MIN: CPT | Performed by: INTERNAL MEDICINE

## 2024-09-16 RX ORDER — METOPROLOL SUCCINATE 25 MG/1
25 TABLET, EXTENDED RELEASE ORAL 2 TIMES DAILY
Qty: 60 TABLET | Refills: 3 | Status: SHIPPED | OUTPATIENT
Start: 2024-09-16 | End: 2025-01-14

## 2024-09-16 RX ORDER — SERTRALINE HYDROCHLORIDE 100 MG/1
100 TABLET, FILM COATED ORAL DAILY
Qty: 90 TABLET | Refills: 3 | Status: SHIPPED | OUTPATIENT
Start: 2024-09-16 | End: 2025-09-16

## 2024-09-16 RX ORDER — ALPRAZOLAM 0.5 MG/1
0.5 TABLET ORAL 3 TIMES DAILY PRN
Qty: 30 TABLET | Refills: 0 | Status: SHIPPED | OUTPATIENT
Start: 2024-09-16 | End: 2025-05-14

## 2024-09-16 NOTE — PROGRESS NOTES
"Subjective   Patient ID: Minh Harrington Jr. is a 84 y.o. male who presents for Shortness of Breath.    HPI   C/o having difficulty breathing on occasions, with exertion especially, for about a month.    Review of Systems   Constitutional:  Negative for fatigue and fever.   Respiratory:  Negative for cough and shortness of breath.    Cardiovascular:  Negative for chest pain and leg swelling.   All other systems reviewed and are negative.      Objective   /78   Pulse 64   Resp 21   Ht 1.791 m (5' 10.5\")   Wt 82 kg (180 lb 12.8 oz)   SpO2 97%   BMI 25.58 kg/m²     Physical Exam  Vitals and nursing note reviewed.   Constitutional:       Appearance: Normal appearance.   HENT:      Head: Normocephalic.   Eyes:      Conjunctiva/sclera: Conjunctivae normal.      Pupils: Pupils are equal, round, and reactive to light.   Cardiovascular:      Rate and Rhythm: Normal rate and regular rhythm.      Pulses: Normal pulses.      Heart sounds: Normal heart sounds.   Pulmonary:      Effort: Pulmonary effort is normal.      Breath sounds: Normal breath sounds.   Musculoskeletal:         General: No swelling.      Cervical back: Neck supple.   Skin:     General: Skin is warm and dry.   Neurological:      General: No focal deficit present.      Mental Status: He is oriented to person, place, and time.         Assessment/Plan   Problem List Items Addressed This Visit             ICD-10-CM    Anxiety F41.9    Relevant Medications    ALPRAZolam (Xanax) 0.5 mg tablet    sertraline (Zoloft) 100 mg tablet    CAD (coronary artery disease) I25.10    Relevant Medications    metoprolol succinate XL (Toprol-XL) 25 mg 24 hr tablet    HLD (hyperlipidemia) E78.5    HTN (hypertension) I10    Atrial fibrillation with controlled ventricular response (Multi) I48.91    Relevant Medications    metoprolol succinate XL (Toprol-XL) 25 mg 24 hr tablet    Acute combined systolic and diastolic heart failure (Multi) - Primary I50.41    Relevant " Medications    metoprolol succinate XL (Toprol-XL) 25 mg 24 hr tablet     Reviewed records  And testing  Answered questions  I have personally reviewed patients OARRS report.  This report is scanned into the emr.  I have considered the risks of abuse, dependence, addiction and diversion.  Cont meds  Fu with cardiology as scheduled

## 2024-09-17 ASSESSMENT — ENCOUNTER SYMPTOMS
FEVER: 0
FATIGUE: 0
COUGH: 0
SHORTNESS OF BREATH: 0

## 2024-09-19 ENCOUNTER — TELEPHONE (OUTPATIENT)
Dept: PULMONOLOGY | Facility: CLINIC | Age: 84
End: 2024-09-19
Payer: MEDICARE

## 2024-09-19 NOTE — TELEPHONE ENCOUNTER
Patient called saying that he would like to know the next steps in doing a trial of the CPAP. Please advise

## 2024-09-20 ENCOUNTER — PATIENT OUTREACH (OUTPATIENT)
Dept: PRIMARY CARE | Facility: CLINIC | Age: 84
End: 2024-09-20
Payer: MEDICARE

## 2024-09-23 DIAGNOSIS — G47.33 OSA (OBSTRUCTIVE SLEEP APNEA): Primary | ICD-10-CM

## 2024-09-27 DIAGNOSIS — I25.10 CORONARY ARTERY DISEASE INVOLVING NATIVE CORONARY ARTERY OF NATIVE HEART WITHOUT ANGINA PECTORIS: ICD-10-CM

## 2024-09-27 DIAGNOSIS — I50.41 ACUTE COMBINED SYSTOLIC AND DIASTOLIC HEART FAILURE: ICD-10-CM

## 2024-09-27 NOTE — TELEPHONE ENCOUNTER
Received request for prescription refills for patient.   Patient follows with Dr. Valle     Request is for ENTRESTO 49-51MG BID  Is patient currently on medication yes    Last OV 8/1/24  Next OV 10/10/24    Pended for signing and sent to provider

## 2024-09-30 ENCOUNTER — TELEPHONE (OUTPATIENT)
Dept: SLEEP MEDICINE | Facility: CLINIC | Age: 84
End: 2024-09-30
Payer: MEDICARE

## 2024-09-30 RX ORDER — SACUBITRIL AND VALSARTAN 49; 51 MG/1; MG/1
1 TABLET, FILM COATED ORAL 2 TIMES DAILY
Qty: 180 TABLET | Refills: 3 | Status: SHIPPED | OUTPATIENT
Start: 2024-09-30

## 2024-09-30 NOTE — TELEPHONE ENCOUNTER
Patient called.    Has decided against the CPAP.    Would like to know what the next step regarding his care would be if he declines the use of the CPAP machine.    Please advise.

## 2024-10-05 DIAGNOSIS — I50.43 CHF (CONGESTIVE HEART FAILURE), NYHA CLASS I, ACUTE ON CHRONIC, COMBINED: ICD-10-CM

## 2024-10-05 DIAGNOSIS — F41.9 ANXIETY: ICD-10-CM

## 2024-10-07 RX ORDER — ALPRAZOLAM 0.5 MG/1
0.5 TABLET ORAL 3 TIMES DAILY PRN
Qty: 30 TABLET | Refills: 0 | Status: SHIPPED | OUTPATIENT
Start: 2024-10-07 | End: 2025-06-04

## 2024-10-07 RX ORDER — EMPAGLIFLOZIN 10 MG/1
10 TABLET, FILM COATED ORAL DAILY
Qty: 30 TABLET | Refills: 2 | Status: SHIPPED | OUTPATIENT
Start: 2024-10-07

## 2024-10-08 ENCOUNTER — LAB (OUTPATIENT)
Dept: LAB | Facility: LAB | Age: 84
End: 2024-10-08
Payer: MEDICARE

## 2024-10-08 DIAGNOSIS — I25.10 CORONARY ARTERY DISEASE INVOLVING NATIVE CORONARY ARTERY OF NATIVE HEART WITHOUT ANGINA PECTORIS: ICD-10-CM

## 2024-10-08 LAB
ANION GAP SERPL CALC-SCNC: 13 MMOL/L (ref 10–20)
BUN SERPL-MCNC: 25 MG/DL (ref 6–23)
CALCIUM SERPL-MCNC: 9.4 MG/DL (ref 8.6–10.3)
CHLORIDE SERPL-SCNC: 102 MMOL/L (ref 98–107)
CO2 SERPL-SCNC: 26 MMOL/L (ref 21–32)
CREAT SERPL-MCNC: 1.37 MG/DL (ref 0.5–1.3)
EGFRCR SERPLBLD CKD-EPI 2021: 50 ML/MIN/1.73M*2
GLUCOSE SERPL-MCNC: 127 MG/DL (ref 74–99)
POTASSIUM SERPL-SCNC: 4.4 MMOL/L (ref 3.5–5.3)
SODIUM SERPL-SCNC: 137 MMOL/L (ref 136–145)

## 2024-10-08 PROCEDURE — 36415 COLL VENOUS BLD VENIPUNCTURE: CPT

## 2024-10-08 PROCEDURE — 80048 BASIC METABOLIC PNL TOTAL CA: CPT

## 2024-10-09 NOTE — TELEPHONE ENCOUNTER
Nuha Oneill, APRN-CNP  You9 days ago       Called patient and spoke with him. He is agreeable to CPAP. Order was placed last week.

## 2024-10-10 ENCOUNTER — APPOINTMENT (OUTPATIENT)
Dept: CARDIOLOGY | Facility: CLINIC | Age: 84
End: 2024-10-10
Payer: MEDICARE

## 2024-10-10 VITALS
BODY MASS INDEX: 25.72 KG/M2 | HEART RATE: 88 BPM | WEIGHT: 183.7 LBS | SYSTOLIC BLOOD PRESSURE: 100 MMHG | DIASTOLIC BLOOD PRESSURE: 60 MMHG | HEIGHT: 71 IN

## 2024-10-10 DIAGNOSIS — Z95.1 HISTORY OF CORONARY ARTERY BYPASS GRAFT: ICD-10-CM

## 2024-10-10 DIAGNOSIS — E78.2 MIXED HYPERLIPIDEMIA: ICD-10-CM

## 2024-10-10 DIAGNOSIS — I25.5 CARDIOMYOPATHY, ISCHEMIC: ICD-10-CM

## 2024-10-10 DIAGNOSIS — I48.91 ATRIAL FIBRILLATION WITH CONTROLLED VENTRICULAR RESPONSE (MULTI): ICD-10-CM

## 2024-10-10 DIAGNOSIS — I50.22 CHRONIC SYSTOLIC HEART FAILURE: ICD-10-CM

## 2024-10-10 DIAGNOSIS — Z87.891 FORMER SMOKER: ICD-10-CM

## 2024-10-10 DIAGNOSIS — I71.21 ANEURYSM OF ASCENDING AORTA WITHOUT RUPTURE (CMS-HCC): ICD-10-CM

## 2024-10-10 DIAGNOSIS — I25.810 CORONARY ARTERY DISEASE INVOLVING CORONARY BYPASS GRAFT OF NATIVE HEART WITHOUT ANGINA PECTORIS: ICD-10-CM

## 2024-10-10 PROCEDURE — 3074F SYST BP LT 130 MM HG: CPT | Performed by: INTERNAL MEDICINE

## 2024-10-10 PROCEDURE — 1123F ACP DISCUSS/DSCN MKR DOCD: CPT | Performed by: INTERNAL MEDICINE

## 2024-10-10 PROCEDURE — 1036F TOBACCO NON-USER: CPT | Performed by: INTERNAL MEDICINE

## 2024-10-10 PROCEDURE — 3078F DIAST BP <80 MM HG: CPT | Performed by: INTERNAL MEDICINE

## 2024-10-10 PROCEDURE — 1159F MED LIST DOCD IN RCRD: CPT | Performed by: INTERNAL MEDICINE

## 2024-10-10 PROCEDURE — 99214 OFFICE O/P EST MOD 30 MIN: CPT | Performed by: INTERNAL MEDICINE

## 2024-10-10 PROCEDURE — 1157F ADVNC CARE PLAN IN RCRD: CPT | Performed by: INTERNAL MEDICINE

## 2024-10-10 NOTE — PROGRESS NOTES
CARDIOLOGY OFFICE VISIT      CHIEF COMPLAINT      HISTORY OF PRESENT ILLNESS  The patient states that he is doing about the same.  He states that he is not having any significant chest discomfort.  He does not have any dyspnea unless he overdoes things physically.  He does have some intermittent edema of his lower extremities.  He will take 1 small dose of Lasix and this resolves by the next day.  He wishes he had more energy.  He is still going for workouts at the gym but cannot obviously do is much as he used to.  He does have a feeling of palpitations once a while but nothing severe or bothersome.  He denies presyncope and syncope.  He denies any problem with his current medications.  He has not had any problems with bleeding.    Impression:  Coronary disease no angina  Remote CABG  Former smoker  Hyperlipidemia  Chronic systolic congestive heart failure  Permanent atrial fibrillation  Ischemic cardiomyopathy left ventricular ejection fraction 30 to 35% on echocardiogram   Overweight  History of aneurysm of ascending thoracic aorta     Please excuse any errors in grammar or translation related to this dictation.  Voice recognition software was utilized to prepare this document         Past Medical History  Past Medical History:   Diagnosis Date    Abnormal ECG     Arrhythmia     Atrial fibrillation (Multi)     CHF (congestive heart failure) (Multi)     Coronary artery disease     COVID-19 02/16/2022    COVID-19    Epilepsy, unspecified, not intractable, without status epilepticus (Multi) 11/18/2020    Epilepsy    Hyperlipidemia     Hypertension     Ischemic cardiomyopathy     Myocardial infarction (Multi)     Unspecified convulsions (Multi) 02/24/2021    Seizures       Social History  Social History     Tobacco Use    Smoking status: Former     Current packs/day: 0.00     Average packs/day: 1 pack/day for 40.0 years (40.0 ttl pk-yrs)     Types: Cigarettes     Start date: 6/14/1957     Quit date: 1997     Years  since quittin.7    Smokeless tobacco: Never   Vaping Use    Vaping status: Never Used   Substance Use Topics    Alcohol use: Yes     Alcohol/week: 14.0 standard drinks of alcohol     Types: 14 Standard drinks or equivalent per week     Comment: daily    Drug use: Never       Family History     Family History   Problem Relation Name Age of Onset    Heart failure Mother Dar     Emphysema Father Saroj John.     Lung disease Father Saroj John.     Bipolar disorder Brother          Allergies:  No Known Allergies     Outpatient Medications:  Current Outpatient Medications   Medication Instructions    ALPRAZolam (XANAX) 0.5 mg, oral, 3 times daily PRN    atorvastatin (LIPITOR) 20 mg, oral, Daily, as directed    b complex vitamins capsule 1 capsule, oral, Daily, After lunch    cholecalciferol (Vitamin D-3) 125 MCG (5000 UT) capsule 1 tablet, oral, Daily, After lunch    dutasteride (AVODART) 0.5 mg, oral, Daily    Eliquis 5 mg, oral, 2 times daily    Entresto 49-51 mg tablet 1 tablet, oral, 2 times daily    furosemide (Lasix) 20 mg tablet Take one tablet PRN for swelling    Jardiance 10 mg, oral, Daily    levETIRAcetam (Keppra) 500 mg tablet TAKE 1/2 (ONE-HALF) OF A TABLET BY MOUTH DAILY    metoprolol succinate XL (TOPROL-XL) 25 mg, oral, 2 times daily, Do not crush or chew.    mirtazapine (REMERON) 15 mg, oral, Nightly    sertraline (ZOLOFT) 100 mg, oral, Daily          REVIEW OF SYSTEMS  Review of Systems   All other systems reviewed and are negative.        VITALS  There were no vitals filed for this visit.    PHYSICAL EXAM  Constitutional:       Appearance: Healthy appearance. Not in distress.   Eyes:      Conjunctiva/sclera: Conjunctivae normal.      Pupils: Pupils are equal, round, and reactive to light.   Neck:      Vascular: No JVR. JVD normal.   Pulmonary:      Effort: Pulmonary effort is normal.      Breath sounds: Normal breath sounds. No wheezing. No rhonchi. No rales.   Chest:      Chest wall: Not tender  to palpatation.   Cardiovascular:      PMI at left midclavicular line. Normal rate. Regularly irregular rhythm. Normal S1. Normal S2.       Murmurs: There is a grade 3/6 systolic murmur at the apex.      No gallop.  No click. No rub.   Pulses:     Intact distal pulses.   Edema:     Peripheral edema present.     Pretibial: bilateral edema of the pretibial area.     Comments: 1-2+ bilateral pretibial edema  Abdominal:      Tenderness: There is no abdominal tenderness.   Musculoskeletal: Normal range of motion.         General: No tenderness.      Cervical back: Normal range of motion. Skin:     General: Skin is warm and dry.   Neurological:      General: No focal deficit present.      Mental Status: Alert and oriented to person, place and time.           ASSESSMENT AND PLAN  Diagnoses and all orders for this visit:  Atrial fibrillation with controlled ventricular response (Multi)  -     Referral to Cardiology  Coronary artery disease involving coronary bypass graft of native heart without angina pectoris  History of coronary artery bypass graft  Mixed hyperlipidemia  Chronic systolic heart failure  Cardiomyopathy, ischemic  Aneurysm of ascending aorta without rupture (CMS-[facility])  BMI 25.0-25.9,adult  Former smoker      [unfilled]

## 2024-10-10 NOTE — PATIENT INSTRUCTIONS
Follow up office visit in 3 months.    Continue same medications/treatment.  Patient educated on proper medication use.  Patient educated on risk factor modification.  Please bring any lab results from other providers / physicians to your next appointment.    Please bring all medicines, vitamins and herbal supplements with you when you come to the office.    Prescriptions will not be filled unless you are compliant with your follow up appointments or have a follow up  appointment scheduled as per instruction of your physician.  Refills should be requested at the time of  Your visit.    Rox LEE LPN, am scribing for and in the presence of Dr. Heriberto Valle MD, FACC

## 2024-10-22 ENCOUNTER — PATIENT OUTREACH (OUTPATIENT)
Dept: PRIMARY CARE | Facility: CLINIC | Age: 84
End: 2024-10-22
Payer: MEDICARE

## 2024-10-30 ENCOUNTER — APPOINTMENT (OUTPATIENT)
Dept: PRIMARY CARE | Facility: CLINIC | Age: 84
End: 2024-10-30
Payer: MEDICARE

## 2024-10-30 VITALS
OXYGEN SATURATION: 98 % | HEART RATE: 88 BPM | TEMPERATURE: 97.8 F | WEIGHT: 182 LBS | DIASTOLIC BLOOD PRESSURE: 64 MMHG | SYSTOLIC BLOOD PRESSURE: 120 MMHG | HEIGHT: 71 IN | BODY MASS INDEX: 25.48 KG/M2 | RESPIRATION RATE: 16 BRPM

## 2024-10-30 DIAGNOSIS — I48.91 ATRIAL FIBRILLATION WITH CONTROLLED VENTRICULAR RESPONSE (MULTI): ICD-10-CM

## 2024-10-30 DIAGNOSIS — G47.33 OSA (OBSTRUCTIVE SLEEP APNEA): ICD-10-CM

## 2024-10-30 DIAGNOSIS — R31.0 GROSS HEMATURIA: ICD-10-CM

## 2024-10-30 DIAGNOSIS — I50.21 ACUTE SYSTOLIC CONGESTIVE HEART FAILURE: ICD-10-CM

## 2024-10-30 DIAGNOSIS — Z79.899 HIGH RISK MEDICATION USE: ICD-10-CM

## 2024-10-30 DIAGNOSIS — F41.9 ANXIETY: ICD-10-CM

## 2024-10-30 DIAGNOSIS — F51.01 PRIMARY INSOMNIA: ICD-10-CM

## 2024-10-30 DIAGNOSIS — R31.9 HEMATURIA, UNSPECIFIED TYPE: Primary | ICD-10-CM

## 2024-10-30 LAB
APPEARANCE UR: ABNORMAL
BACTERIA #/AREA URNS AUTO: ABNORMAL /HPF
BILIRUB UR STRIP.AUTO-MCNC: NEGATIVE MG/DL
COLOR UR: ABNORMAL
GLUCOSE UR STRIP.AUTO-MCNC: ABNORMAL MG/DL
KETONES UR STRIP.AUTO-MCNC: NEGATIVE MG/DL
LEUKOCYTE ESTERASE UR QL STRIP.AUTO: ABNORMAL
NITRITE UR QL STRIP.AUTO: NEGATIVE
PH UR STRIP.AUTO: 5.5 [PH]
POC APPEARANCE, URINE: ABNORMAL
POC BILIRUBIN, URINE: ABNORMAL
POC BLOOD, URINE: ABNORMAL
POC COLOR, URINE: ABNORMAL
POC GLUCOSE, URINE: ABNORMAL MG/DL
POC KETONES, URINE: NEGATIVE MG/DL
POC LEUKOCYTES, URINE: NEGATIVE
POC NITRITE,URINE: NEGATIVE
POC PH, URINE: 5.5 PH
POC PROTEIN, URINE: ABNORMAL MG/DL
POC SPECIFIC GRAVITY, URINE: 1.01
POC UROBILINOGEN, URINE: 0.2 EU/DL
PROT UR STRIP.AUTO-MCNC: ABNORMAL MG/DL
RBC # UR STRIP.AUTO: ABNORMAL /UL
RBC #/AREA URNS AUTO: >20 /HPF
SP GR UR STRIP.AUTO: 1.01
UROBILINOGEN UR STRIP.AUTO-MCNC: NORMAL MG/DL
WBC #/AREA URNS AUTO: >50 /HPF

## 2024-10-30 PROCEDURE — 80346 BENZODIAZEPINES1-12: CPT

## 2024-10-30 PROCEDURE — 80307 DRUG TEST PRSMV CHEM ANLYZR: CPT

## 2024-10-30 PROCEDURE — 80373 DRUG SCREENING TRAMADOL: CPT

## 2024-10-30 PROCEDURE — G2211 COMPLEX E/M VISIT ADD ON: HCPCS | Performed by: INTERNAL MEDICINE

## 2024-10-30 PROCEDURE — 1159F MED LIST DOCD IN RCRD: CPT | Performed by: INTERNAL MEDICINE

## 2024-10-30 PROCEDURE — 81001 URINALYSIS AUTO W/SCOPE: CPT

## 2024-10-30 PROCEDURE — 80368 SEDATIVE HYPNOTICS: CPT

## 2024-10-30 PROCEDURE — 82570 ASSAY OF URINE CREATININE: CPT

## 2024-10-30 PROCEDURE — 1157F ADVNC CARE PLAN IN RCRD: CPT | Performed by: INTERNAL MEDICINE

## 2024-10-30 PROCEDURE — 1160F RVW MEDS BY RX/DR IN RCRD: CPT | Performed by: INTERNAL MEDICINE

## 2024-10-30 PROCEDURE — 80358 DRUG SCREENING METHADONE: CPT

## 2024-10-30 PROCEDURE — 99214 OFFICE O/P EST MOD 30 MIN: CPT | Performed by: INTERNAL MEDICINE

## 2024-10-30 PROCEDURE — 80365 DRUG SCREENING OXYCODONE: CPT

## 2024-10-30 PROCEDURE — 81002 URINALYSIS NONAUTO W/O SCOPE: CPT | Performed by: INTERNAL MEDICINE

## 2024-10-30 PROCEDURE — 3074F SYST BP LT 130 MM HG: CPT | Performed by: INTERNAL MEDICINE

## 2024-10-30 PROCEDURE — 87086 URINE CULTURE/COLONY COUNT: CPT

## 2024-10-30 PROCEDURE — 80361 OPIATES 1 OR MORE: CPT

## 2024-10-30 PROCEDURE — 80354 DRUG SCREENING FENTANYL: CPT

## 2024-10-30 PROCEDURE — 1123F ACP DISCUSS/DSCN MKR DOCD: CPT | Performed by: INTERNAL MEDICINE

## 2024-10-30 PROCEDURE — 3078F DIAST BP <80 MM HG: CPT | Performed by: INTERNAL MEDICINE

## 2024-10-30 RX ORDER — ALPRAZOLAM 0.5 MG/1
0.5 TABLET ORAL 3 TIMES DAILY PRN
Qty: 30 TABLET | Refills: 0 | Status: SHIPPED | OUTPATIENT
Start: 2024-10-30 | End: 2025-06-27

## 2024-10-31 LAB
AMPHETAMINES UR QL SCN: NORMAL
BARBITURATES UR QL SCN: NORMAL
BZE UR QL SCN: NORMAL
CANNABINOIDS UR QL SCN: NORMAL
CREAT UR-MCNC: 71.8 MG/DL (ref 20–370)
PCP UR QL SCN: NORMAL

## 2024-10-31 ASSESSMENT — ENCOUNTER SYMPTOMS
FATIGUE: 0
COUGH: 0
FEVER: 0
SHORTNESS OF BREATH: 0

## 2024-11-01 LAB — BACTERIA UR CULT: NORMAL

## 2024-11-06 LAB
1OH-MIDAZOLAM UR CFM-MCNC: <25 NG/ML
6MAM UR CFM-MCNC: <25 NG/ML
7AMINOCLONAZEPAM UR CFM-MCNC: <25 NG/ML
A-OH ALPRAZ UR CFM-MCNC: 89 NG/ML
ALPRAZ UR CFM-MCNC: 33 NG/ML
CHLORDIAZEP UR CFM-MCNC: <25 NG/ML
CLONAZEPAM UR CFM-MCNC: <25 NG/ML
CODEINE UR CFM-MCNC: <50 NG/ML
DIAZEPAM UR CFM-MCNC: <25 NG/ML
EDDP UR CFM-MCNC: <25 NG/ML
FENTANYL UR CFM-MCNC: <2.5 NG/ML
HYDROCODONE CTO UR CFM-MCNC: <25 NG/ML
HYDROMORPHONE UR CFM-MCNC: <25 NG/ML
LORAZEPAM UR CFM-MCNC: <25 NG/ML
METHADONE UR CFM-MCNC: <25 NG/ML
MIDAZOLAM UR CFM-MCNC: <25 NG/ML
MORPHINE UR CFM-MCNC: <50 NG/ML
NORDIAZEPAM UR CFM-MCNC: <25 NG/ML
NORFENTANYL UR CFM-MCNC: <2.5 NG/ML
NORHYDROCODONE UR CFM-MCNC: <25 NG/ML
NOROXYCODONE UR CFM-MCNC: <25 NG/ML
NORTRAMADOL UR-MCNC: <50 NG/ML
OXAZEPAM UR CFM-MCNC: <25 NG/ML
OXYCODONE UR CFM-MCNC: <25 NG/ML
OXYMORPHONE UR CFM-MCNC: <25 NG/ML
TEMAZEPAM UR CFM-MCNC: <25 NG/ML
TRAMADOL UR CFM-MCNC: <50 NG/ML
ZOLPIDEM UR CFM-MCNC: <25 NG/ML
ZOLPIDEM UR-MCNC: <25 NG/ML

## 2024-11-18 DIAGNOSIS — F41.9 ANXIETY: ICD-10-CM

## 2024-11-18 RX ORDER — ALPRAZOLAM 0.5 MG/1
0.5 TABLET ORAL 3 TIMES DAILY PRN
Qty: 45 TABLET | Refills: 0 | Status: SHIPPED | OUTPATIENT
Start: 2024-11-18 | End: 2024-12-18

## 2024-12-16 DIAGNOSIS — F41.9 ANXIETY: ICD-10-CM

## 2024-12-16 RX ORDER — ALPRAZOLAM 0.5 MG/1
0.5 TABLET ORAL 3 TIMES DAILY PRN
Qty: 45 TABLET | Refills: 0 | Status: SHIPPED | OUTPATIENT
Start: 2024-12-16 | End: 2025-01-15

## 2024-12-23 ENCOUNTER — APPOINTMENT (OUTPATIENT)
Dept: SLEEP MEDICINE | Facility: CLINIC | Age: 84
End: 2024-12-23
Payer: MEDICARE

## 2024-12-29 DIAGNOSIS — I10 PRIMARY HYPERTENSION: ICD-10-CM

## 2024-12-30 RX ORDER — FUROSEMIDE 20 MG/1
TABLET ORAL
Qty: 30 TABLET | Refills: 1 | Status: SHIPPED | OUTPATIENT
Start: 2024-12-30

## 2024-12-30 NOTE — TELEPHONE ENCOUNTER
Received request for prescription refills for patient.   Patient follows with Dr. Valle     Request is for Lasix   Is patient currently on medication yes    Last OV 10/10/2024  Next OV 1/30/2025    Pended for signing and sent to provider

## 2025-01-06 NOTE — PROGRESS NOTES
Patient: Minh Harrington Jr.  : 1940 AGE: 84 y.o. SEX:male   MRN: [id number]   Provider: MERCEDES Nuñez-CNP     Location [facility]   Service Date: 2025     PCP: Isabel Ritter DO   Referred by: No ref. provider found          [facility]  Follow Up Visit Note      HISTORY OF PRESENT ILLNESS     Minh Harrington Jr. is a 84 y.o. male with a h/o WINNIE, HTN, CHF, hyperlipidemia, atrial fibrillation, Anxiety, stroke, seizures, vitamin D deficiency,  who presents to [facility].    25: First follow-up since starting PAP therapy. Presents with wife who aides in interview today. Reports doing very well with PAP therapy, using every night with positive benefit. Reports he is comfortable with current pressure. Reports some under eye swelling since starting PAP. Sometimes wakes due to air leak. Denies any nasal symptoms on CPAP including rhinorrhea, nasal congestion, postnasal drip or sinusitis. Reports no snoring and much more refreshing sleep in comparison to before starting APAP. Now happy he decided to proceed with PAP and feels like he may have difficulty sleeping without it. ----> Fit with Jeri FFM large, sample mask provided         24: NPV, referred by PCP with concerns of sleep apnea found on recent HSAT. Patient presents with wife who aides in interview today. Sleep study was ordered due to patient waking with irregular breathing each time upon waking in the middle of the night and in the morning. Outside of that, he is happy with his sleep quality and daytime functioning. He wakes and goes to the gym 6 days per week for a 2 hour workout.     SLEEP STUDY HISTORY (personally reviewed raw data such as interpretation report, data sheet, hypnogram, and titration table if available and applicable)  - HSAT 24- showing moderate WINNIE with AHI 4% 26.5, SpO2 andres 78%. Baseline SpO2 was 89.1%, SpO2 <=88% for  183.4 min. Total obstructive apneas 131, central apneas 36, mixed apneas 13.  There were Cheyne-Hurley breathing and central apneic events especially in the supine position, so 1 needs to be careful and instituting Pap.  Heart rate increasing was detected during the study, may indicate periodic limb movements or and atrial arrhythmia.  Recommend in-lab PAP titration to prevent treatment emergent central sleep apnea and detect periodic leg movement.  Alternatively, consider APAP 5-12 cm H2O.      SLEEP-WAKE SCHEDULE    Sleep Patterns: He does not have a usual bed partner. In terms of the patient's sleep/wake cycle, he generally gets into bed at approximately 10 PM.   his latency to sleep onset after lights out is <5 min. During the night, the patient generally awakens 3-4 times nightly. These awakenings are usually brief in duration. Final wake time on weekday mornings is around 6 AM.    Compared to weekdays, the patient's sleep schedule is  similar on the weekends.    Breathing during sleep: snoring and witnessed apneas  Behaviors at night: No   Sleep paralysis: No   Hypnogogic or hypnopompic hallucinations: No   Cataplexy: No     Leg symptoms and timing:  - Sensations: Patient does not have unusual sensations in their extremities that cause an urge to move them   - Movement: Patient has not been told that their legs kick or jerk during sleep    Daytime Symptoms:  On awakening patient reports: wake unrefreshed, feels sleepy, and morning dry mouth  Patient report some daytime symptoms including: DAYTIME SYMPTOMS: reports sleep inertia difficulty with memory or concentration during the day    Sleep environment:  Preferred sleep position: side  Room is dark: Yes  Room is quiet: Yes  Room is cool: Yes  Bed comfort: good    SLEEP HABITS  Caffeine consumption: No  Alcohol consumption: Yes, 2-3 drinks/day  Smoking: No  Marijuana: No  Sleep aids: denies     WEIGHT: stable    ESS: 10      REVIEW OF SYSTEMS     All other  systems have been reviewed and are negative.    ALLERGIES     No Known Allergies    MEDICATIONS     Current Outpatient Medications   Medication Sig Dispense Refill    ALPRAZolam (Xanax) 0.5 mg tablet Take 1 tablet (0.5 mg) by mouth 3 times a day as needed for anxiety. 45 tablet 0    atorvastatin (Lipitor) 20 mg tablet TAKE 1 TABLET BY MOUTH ONCE DAILY AS DIRECTED 90 tablet 3    b complex vitamins capsule Take 1 capsule by mouth once daily. After lunch      cholecalciferol (Vitamin D-3) 125 MCG (5000 UT) capsule Take 1 tablet by mouth once daily. After lunch      dutasteride (Avodart) 0.5 mg capsule Take 1 capsule (0.5 mg) by mouth once daily. 90 capsule 3    Eliquis 5 mg tablet TAKE 1 TABLET BY MOUTH TWICE DAILY 60 tablet 11    Entresto 49-51 mg tablet Take 1 tablet by mouth 2 times a day. 180 tablet 3    furosemide (Lasix) 20 mg tablet TAKE 1 TABLET AS NEEDED for swelling 30 tablet 1    Jardiance 10 mg TAKE 1 TABLET BY MOUTH ONCE DAILY 30 tablet 2    levETIRAcetam (Keppra) 500 mg tablet TAKE 1/2 (ONE-HALF) OF A TABLET BY MOUTH DAILY 90 tablet 3    metoprolol succinate XL (Toprol-XL) 25 mg 24 hr tablet Take 1 tablet (25 mg) by mouth 2 times a day. Do not crush or chew. 60 tablet 3    mirtazapine (Remeron) 15 mg tablet TAKE 1 TABLET BY MOUTH AT BEDTIME 90 tablet 3    sertraline (Zoloft) 100 mg tablet Take 1 tablet (100 mg) by mouth once daily. 90 tablet 3     No current facility-administered medications for this visit.       PAST HISTORIES     PERTINENT PAST MEDICAL HISTORY: See HPI    PERTINENT PAST SURGICAL HISTORY for Sleep Medicine:  non-contributory    PERTINENT FAMILY HISTORY for Sleep Medicine:  Patient denies family history of any sleep disorder.    PERTINENT SOCIAL HISTORY:  He  reports that he quit smoking about 28 years ago. His smoking use included cigarettes. He started smoking about 67 years ago. He has a 40 pack-year smoking history. He has never used smokeless tobacco. He reports current alcohol use  "of about 14.0 standard drinks of alcohol per week. He reports that he does not use drugs. He currently lives with spouse.    Active Problems, Allergy List, Medication List, and PMH/PSH/FH/Social Hx have been reviewed and reconciled in chart. No significant changes unless documented in the pertinent chart section. Updates made when necessary.     PHYSICAL EXAM     VITAL SIGNS: /76   Pulse (!) 117   Resp 18   Ht 1.791 m (5' 10.5\")   Wt 85.8 kg (189 lb 3.2 oz)   SpO2 92%   BMI 26.76 kg/m²     CURRENT WEIGHT:   Vitals:    01/07/25 1035   Weight: 85.8 kg (189 lb 3.2 oz)        PREVIOUS WEIGHTS:  Wt Readings from Last 3 Encounters:   01/07/25 85.8 kg (189 lb 3.2 oz)   10/30/24 82.6 kg (182 lb)   10/10/24 83.3 kg (183 lb 11.2 oz)     Constitutional: Alert and oriented, cooperative, no obvious distress   HEENT: Non icteric or anemic, EOM WNL bilaterally   Neck: Supple, no JVD, no goiter, no adenopathy, no rigidity     RESULTS/DATA     No results found for: \"IRON\", \"TRANSFERRIN\", \"IRONSAT\", \"TIBC\", \"FERRITIN\"    Bicarbonate   Date Value Ref Range Status   10/08/2024 26 21 - 32 mmol/L Final       ASSESSMENT/PLAN     Mr. Harrington is a 84 y.o. male and He was referred to the [facility] for evaluation of WINNIE    Problem List, Orders, Assessment, Recommendations:    #WINNIE  - HSAT 8/9/24- showing moderate WINNIE with AHI 4% 26.5, SpO2 andres 78%. Baseline SpO2 was 89.1%, SpO2 <=88% for 183.4 min. Total obstructive apneas 131, central apneas 36, mixed apneas 13.  There were Cheyne-Hurley breathing and central apneic events especially in the supine position, so 1 needs to be careful and instituting Pap.  Heart rate increasing was detected during the study, may indicate periodic limb movements or and atrial arrhythmia.  Recommend in-lab PAP titration to prevent treatment emergent central sleep apnea and detect periodic leg movement.  Alternatively, consider APAP 5-12 cm H2O.  - Retrieved and " personally reviewed recent PAP adherence download data today. See HPI.  - excellent compliance to PAP therapy, residual AHI at goal, and good control of WINNIE symptoms  - continue current setting 5-12 CWP  - Fit with Jeri FFM, sample provided  - renew PAP supply orders, order placed to DME- [facility]  - diet, exercise, and weight loss were emphasized today in clinic, as were non-supine sleep, avoiding alcohol in the late evening, and driving or operating heavy machinery when sleepy. Patient verbalized understanding.     # ATRIAL FIBRILLATION/HTN/Ischemic cardiomyopathy/Chronic systolic congestive heart failure   - Remote CABG  - s/p cardioversion   - ECHO 7/25/24:   CONCLUSIONS:   1. Left ventricular ejection fraction is moderately decreased, by visual estimate at 30-35%.   2. There is global hypokinesis of the left ventricle with minor regional variations.   3. Moderately increased left ventricular septal thickness.   4. Mildly enlarged right ventricle.   5. There is normal right ventricular global systolic function.   6. The left atrium is moderate to severely dilated.   7. The right atrium is moderately to severely dilated.   8. There is no evidence of mitral valve stenosis.   9. Moderate mitral valve regurgitation.  10. Mild aortic valve regurgitation.  11. Pulmonary hypertension is present.  12. Severely elevated pulmonary artery pressure.  13. There is moderate dilatation of the ascending aorta.  - discuss relationship of WINNIE and Afib in regards to treatment, encouraged nightly use of CPAP as well as this is a long-term treatment, verbalized understanding  - on meds per Cardio  - Defer management to Cardio, Dr. Valle & EP Dr. Yanez (notes personally reviewed)       All of patient's questions were answered. He verbalizes understanding and agreement with my assessment and plan.    Disposition    Return to clinic in 12 months

## 2025-01-07 ENCOUNTER — OFFICE VISIT (OUTPATIENT)
Facility: CLINIC | Age: 85
End: 2025-01-07
Payer: MEDICARE

## 2025-01-07 VITALS
OXYGEN SATURATION: 92 % | BODY MASS INDEX: 26.49 KG/M2 | SYSTOLIC BLOOD PRESSURE: 125 MMHG | WEIGHT: 189.2 LBS | RESPIRATION RATE: 18 BRPM | HEART RATE: 117 BPM | DIASTOLIC BLOOD PRESSURE: 76 MMHG | HEIGHT: 71 IN

## 2025-01-07 DIAGNOSIS — I10 PRIMARY HYPERTENSION: ICD-10-CM

## 2025-01-07 DIAGNOSIS — G47.37 CENTRAL SLEEP APNEA IN CONDITIONS CLASSIFIED ELSEWHERE(327.27): ICD-10-CM

## 2025-01-07 DIAGNOSIS — Z87.891 FORMER SMOKER: ICD-10-CM

## 2025-01-07 DIAGNOSIS — I48.91 ATRIAL FIBRILLATION WITH CONTROLLED VENTRICULAR RESPONSE (MULTI): ICD-10-CM

## 2025-01-07 DIAGNOSIS — G47.33 OSA (OBSTRUCTIVE SLEEP APNEA): Primary | ICD-10-CM

## 2025-01-07 PROCEDURE — 1036F TOBACCO NON-USER: CPT | Performed by: NURSE PRACTITIONER

## 2025-01-07 PROCEDURE — 1123F ACP DISCUSS/DSCN MKR DOCD: CPT | Performed by: NURSE PRACTITIONER

## 2025-01-07 PROCEDURE — G2211 COMPLEX E/M VISIT ADD ON: HCPCS | Performed by: NURSE PRACTITIONER

## 2025-01-07 PROCEDURE — 99214 OFFICE O/P EST MOD 30 MIN: CPT | Performed by: NURSE PRACTITIONER

## 2025-01-07 PROCEDURE — 3078F DIAST BP <80 MM HG: CPT | Performed by: NURSE PRACTITIONER

## 2025-01-07 PROCEDURE — 1159F MED LIST DOCD IN RCRD: CPT | Performed by: NURSE PRACTITIONER

## 2025-01-07 PROCEDURE — 1160F RVW MEDS BY RX/DR IN RCRD: CPT | Performed by: NURSE PRACTITIONER

## 2025-01-07 PROCEDURE — 1157F ADVNC CARE PLAN IN RCRD: CPT | Performed by: NURSE PRACTITIONER

## 2025-01-07 PROCEDURE — 3074F SYST BP LT 130 MM HG: CPT | Performed by: NURSE PRACTITIONER

## 2025-01-07 ASSESSMENT — SLEEP AND FATIGUE QUESTIONNAIRES
HOW LIKELY ARE YOU TO NOD OFF OR FALL ASLEEP WHILE LYING DOWN TO REST IN THE AFTERNOON WHEN CIRCUMSTANCES PERMIT: MODERATE CHANCE OF DOZING
HOW LIKELY ARE YOU TO NOD OFF OR FALL ASLEEP WHILE WATCHING TV: MODERATE CHANCE OF DOZING
HOW LIKELY ARE YOU TO NOD OFF OR FALL ASLEEP WHEN YOU ARE A PASSENGER IN A CAR FOR AN HOUR WITHOUT A BREAK: MODERATE CHANCE OF DOZING
HOW LIKELY ARE YOU TO NOD OFF OR FALL ASLEEP IN A CAR, WHILE STOPPED FOR A FEW MINUTES IN TRAFFIC: WOULD NEVER DOZE
HOW LIKELY ARE YOU TO NOD OFF OR FALL ASLEEP WHILE SITTING AND TALKING TO SOMEONE: WOULD NEVER DOZE
SITING INACTIVE IN A PUBLIC PLACE LIKE A CLASS ROOM OR A MOVIE THEATER: MODERATE CHANCE OF DOZING
HOW LIKELY ARE YOU TO NOD OFF OR FALL ASLEEP WHILE SITTING QUIETLY AFTER LUNCH WITHOUT ALCOHOL: WOULD NEVER DOZE
ESS-CHAD TOTAL SCORE: 10
HOW LIKELY ARE YOU TO NOD OFF OR FALL ASLEEP WHILE SITTING AND READING: MODERATE CHANCE OF DOZING

## 2025-01-07 ASSESSMENT — ENCOUNTER SYMPTOMS
DEPRESSION: 0
OCCASIONAL FEELINGS OF UNSTEADINESS: 1
LOSS OF SENSATION IN FEET: 0

## 2025-01-07 ASSESSMENT — COLUMBIA-SUICIDE SEVERITY RATING SCALE - C-SSRS
1. IN THE PAST MONTH, HAVE YOU WISHED YOU WERE DEAD OR WISHED YOU COULD GO TO SLEEP AND NOT WAKE UP?: NO
6. HAVE YOU EVER DONE ANYTHING, STARTED TO DO ANYTHING, OR PREPARED TO DO ANYTHING TO END YOUR LIFE?: NO
2. HAVE YOU ACTUALLY HAD ANY THOUGHTS OF KILLING YOURSELF?: NO

## 2025-01-15 ENCOUNTER — APPOINTMENT (OUTPATIENT)
Dept: CARDIOLOGY | Facility: CLINIC | Age: 85
End: 2025-01-15
Payer: MEDICARE

## 2025-01-15 ENCOUNTER — LAB (OUTPATIENT)
Dept: LAB | Facility: LAB | Age: 85
End: 2025-01-15
Payer: MEDICARE

## 2025-01-15 VITALS
HEART RATE: 100 BPM | DIASTOLIC BLOOD PRESSURE: 62 MMHG | BODY MASS INDEX: 26.81 KG/M2 | SYSTOLIC BLOOD PRESSURE: 118 MMHG | HEIGHT: 70 IN | WEIGHT: 187.3 LBS

## 2025-01-15 DIAGNOSIS — I71.21 ANEURYSM OF ASCENDING AORTA WITHOUT RUPTURE (CMS-HCC): ICD-10-CM

## 2025-01-15 DIAGNOSIS — I34.0 MODERATE MITRAL REGURGITATION: ICD-10-CM

## 2025-01-15 DIAGNOSIS — Z87.891 FORMER SMOKER: ICD-10-CM

## 2025-01-15 DIAGNOSIS — Z95.1 HISTORY OF CORONARY ARTERY BYPASS GRAFT: ICD-10-CM

## 2025-01-15 DIAGNOSIS — I25.5 CARDIOMYOPATHY, ISCHEMIC: ICD-10-CM

## 2025-01-15 DIAGNOSIS — I10 PRIMARY HYPERTENSION: ICD-10-CM

## 2025-01-15 DIAGNOSIS — I25.810 CORONARY ARTERY DISEASE INVOLVING CORONARY BYPASS GRAFT OF NATIVE HEART WITHOUT ANGINA PECTORIS: ICD-10-CM

## 2025-01-15 DIAGNOSIS — F41.9 ANXIETY: ICD-10-CM

## 2025-01-15 DIAGNOSIS — E78.2 MIXED HYPERLIPIDEMIA: ICD-10-CM

## 2025-01-15 DIAGNOSIS — I50.22 CHRONIC SYSTOLIC HEART FAILURE: ICD-10-CM

## 2025-01-15 DIAGNOSIS — I48.91 ATRIAL FIBRILLATION WITH CONTROLLED VENTRICULAR RESPONSE (MULTI): ICD-10-CM

## 2025-01-15 DIAGNOSIS — G47.33 OSA (OBSTRUCTIVE SLEEP APNEA): ICD-10-CM

## 2025-01-15 PROBLEM — I50.9 ACUTE CONGESTIVE HEART FAILURE: Status: RESOLVED | Noted: 2024-04-15 | Resolved: 2025-01-15

## 2025-01-15 LAB
ANION GAP SERPL CALC-SCNC: 9 MMOL/L (ref 10–20)
BNP SERPL-MCNC: 1652 PG/ML (ref 0–99)
BUN SERPL-MCNC: 24 MG/DL (ref 6–23)
CALCIUM SERPL-MCNC: 8.8 MG/DL (ref 8.6–10.3)
CHLORIDE SERPL-SCNC: 106 MMOL/L (ref 98–107)
CO2 SERPL-SCNC: 28 MMOL/L (ref 21–32)
CREAT SERPL-MCNC: 1.33 MG/DL (ref 0.5–1.3)
EGFRCR SERPLBLD CKD-EPI 2021: 52 ML/MIN/1.73M*2
ERYTHROCYTE [DISTWIDTH] IN BLOOD BY AUTOMATED COUNT: 17.1 % (ref 11.5–14.5)
GLUCOSE SERPL-MCNC: 96 MG/DL (ref 74–99)
HCT VFR BLD AUTO: 39.3 % (ref 41–52)
HGB BLD-MCNC: 12.3 G/DL (ref 13.5–17.5)
MCH RBC QN AUTO: 28.7 PG (ref 26–34)
MCHC RBC AUTO-ENTMCNC: 31.3 G/DL (ref 32–36)
MCV RBC AUTO: 92 FL (ref 80–100)
NRBC BLD-RTO: 0 /100 WBCS (ref 0–0)
PLATELET # BLD AUTO: 104 X10*3/UL (ref 150–450)
POTASSIUM SERPL-SCNC: 5.2 MMOL/L (ref 3.5–5.3)
RBC # BLD AUTO: 4.29 X10*6/UL (ref 4.5–5.9)
SODIUM SERPL-SCNC: 138 MMOL/L (ref 136–145)
WBC # BLD AUTO: 7.9 X10*3/UL (ref 4.4–11.3)

## 2025-01-15 PROCEDURE — 3074F SYST BP LT 130 MM HG: CPT | Performed by: INTERNAL MEDICINE

## 2025-01-15 PROCEDURE — 1036F TOBACCO NON-USER: CPT | Performed by: INTERNAL MEDICINE

## 2025-01-15 PROCEDURE — 83880 ASSAY OF NATRIURETIC PEPTIDE: CPT

## 2025-01-15 PROCEDURE — 99214 OFFICE O/P EST MOD 30 MIN: CPT | Performed by: INTERNAL MEDICINE

## 2025-01-15 PROCEDURE — 1157F ADVNC CARE PLAN IN RCRD: CPT | Performed by: INTERNAL MEDICINE

## 2025-01-15 PROCEDURE — 3078F DIAST BP <80 MM HG: CPT | Performed by: INTERNAL MEDICINE

## 2025-01-15 PROCEDURE — 80048 BASIC METABOLIC PNL TOTAL CA: CPT

## 2025-01-15 PROCEDURE — 85027 COMPLETE CBC AUTOMATED: CPT

## 2025-01-15 PROCEDURE — 1123F ACP DISCUSS/DSCN MKR DOCD: CPT | Performed by: INTERNAL MEDICINE

## 2025-01-15 PROCEDURE — 1159F MED LIST DOCD IN RCRD: CPT | Performed by: INTERNAL MEDICINE

## 2025-01-15 RX ORDER — FUROSEMIDE 40 MG/1
40 TABLET ORAL DAILY
Qty: 90 TABLET | Refills: 3 | Status: SHIPPED | OUTPATIENT
Start: 2025-01-15 | End: 2026-01-15

## 2025-01-15 RX ORDER — ALPRAZOLAM 0.5 MG/1
0.5 TABLET ORAL 3 TIMES DAILY PRN
Qty: 45 TABLET | Refills: 0 | Status: SHIPPED | OUTPATIENT
Start: 2025-01-15 | End: 2025-02-14

## 2025-01-15 RX ORDER — SPIRONOLACTONE 25 MG/1
25 TABLET ORAL DAILY
Qty: 90 TABLET | Refills: 3 | Status: SHIPPED | OUTPATIENT
Start: 2025-01-15 | End: 2026-01-15

## 2025-01-15 ASSESSMENT — ENCOUNTER SYMPTOMS
DYSPNEA ON EXERTION: 1
SHORTNESS OF BREATH: 1

## 2025-01-15 NOTE — PROGRESS NOTES
CARDIOLOGY OFFICE VISIT      CHIEF COMPLAINT      HISTORY OF PRESENT ILLNESS  The patient states she has not been doing well recently.  He states that he is more short of breath and fatigue with activities.  He denies orthopnea and paroxysmal nocturnal dyspnea.  He states he does sleep with CPAP.  He is having significant swelling of his lower extremities bilaterally.  He has gained about 10 pounds of weight since he was seen in October.  He is not having any significant problem with chest discomfort.  He denies presyncope and syncope.  He denies any problem with bleeding.  I told him he has had some worsening of his chronic systolic congestive heart failure.  His cardiac murmur does sound louder.  I told her like to obtain an echocardiogram and some baseline lab work today.  I am going to start him on spironolactone 25 mg a day and increase his Lasix to 80 mg a day for 3 to 4 days depending on how much urine output he has and then go back to 40 mg a day.  I would like to repeat his BNP and renal profile beginning next week.  I told him I will see him back in 2 weeks.  He will let me know if he has any problems with the change in the medical management.    Impression:  Coronary disease no angina  Remote CABG  Former smoker  Hyperlipidemia  Chronic systolic congestive heart failure  Permanent atrial fibrillation  Ischemic cardiomyopathy left ventricular ejection fraction 30 to 35% on echocardiogram   Overweight  History of aneurysm of ascending thoracic aorta  Obstructive Sleep Apnea with CPAP   Moderate Mitral Regurgitation by Echo August 2024      Please excuse any errors in grammar or translation related to this dictation.  Voice recognition software was utilized to prepare this document    Past Medical History  Past Medical History:   Diagnosis Date    Abnormal ECG     Arrhythmia     Atrial fibrillation (Multi)     CHF (congestive heart failure)     Coronary artery disease     COVID-19 02/16/2022    COVID-19     Epilepsy, unspecified, not intractable, without status epilepticus 2020    Epilepsy    Hyperlipidemia     Hypertension     Ischemic cardiomyopathy     Myocardial infarction (Multi)     Unspecified convulsions (Multi) 2021    Seizures       Social History  Social History     Tobacco Use    Smoking status: Former     Current packs/day: 0.00     Average packs/day: 1 pack/day for 40.0 years (40.0 ttl pk-yrs)     Types: Cigarettes     Start date: 1957     Quit date:      Years since quittin.0    Smokeless tobacco: Never   Vaping Use    Vaping status: Never Used   Substance Use Topics    Alcohol use: Yes     Alcohol/week: 14.0 standard drinks of alcohol     Types: 14 Standard drinks or equivalent per week     Comment: daily    Drug use: Never       Family History     Family History   Problem Relation Name Age of Onset    Heart failure Mother Dar     Emphysema Father Saroj John.     Lung disease Father Saroj Jonh.     Bipolar disorder Brother          Allergies:  No Known Allergies     Outpatient Medications:  Current Outpatient Medications   Medication Instructions    ALPRAZolam (XANAX) 0.5 mg, oral, 3 times daily PRN    atorvastatin (LIPITOR) 20 mg, oral, Daily, as directed    b complex vitamins capsule 1 capsule, Daily    cholecalciferol (Vitamin D-3) 125 MCG (5000 UT) capsule 1 tablet, Daily    dutasteride (AVODART) 0.5 mg, oral, Daily    Eliquis 5 mg, oral, 2 times daily    Entresto 49-51 mg tablet 1 tablet, oral, 2 times daily    furosemide (Lasix) 20 mg tablet TAKE 1 TABLET AS NEEDED for swelling    Jardiance 10 mg, oral, Daily    levETIRAcetam (Keppra) 500 mg tablet TAKE 1/2 (ONE-HALF) OF A TABLET BY MOUTH DAILY    metoprolol succinate XL (TOPROL-XL) 25 mg, oral, 2 times daily, Do not crush or chew.    mirtazapine (REMERON) 15 mg, oral, Nightly    sertraline (ZOLOFT) 100 mg, oral, Daily          REVIEW OF SYSTEMS  Review of Systems   Cardiovascular:  Positive for dyspnea on exertion and  leg swelling.   Respiratory:  Positive for shortness of breath.    All other systems reviewed and are negative.        VITALS  Vitals:    01/15/25 1433   BP: 118/62   Pulse: 100       PHYSICAL EXAM  Vitals reviewed.   Constitutional:       Appearance: Normal and healthy appearance. Well-developed and not in distress.   Eyes:      Conjunctiva/sclera: Conjunctivae normal.      Pupils: Pupils are equal, round, and reactive to light.   Neck:      Vascular: No JVR. JVD normal.   Pulmonary:      Effort: Pulmonary effort is normal.      Breath sounds: Normal breath sounds. No wheezing. No rhonchi. No rales.   Chest:      Chest wall: Not tender to palpatation.   Cardiovascular:      PMI at left midclavicular line. Normal rate. Regularly irregular rhythm. Normal S1. Normal S2.       Murmurs: There is a grade 3/6 systolic murmur. base      No gallop.  No click. No rub.   Pulses:     Intact distal pulses.   Edema:     Pretibial: bilateral 1+ edema of the pretibial area.     Ankle: bilateral 1+ edema of the ankle.     Feet: bilateral 1+ edema of the feet.  Abdominal:      Tenderness: There is no abdominal tenderness.   Musculoskeletal: Normal range of motion.         General: No tenderness.      Cervical back: Normal range of motion. Skin:     General: Skin is warm and dry.   Neurological:      General: No focal deficit present.      Mental Status: Alert and oriented to person, place and time.   Psychiatric:         Behavior: Behavior is cooperative.           ASSESSMENT AND PLAN  Diagnoses and all orders for this visit:  Coronary artery disease involving coronary bypass graft of native heart without angina pectoris  History of coronary artery bypass graft  Primary hypertension  Mixed hyperlipidemia  Chronic systolic heart failure  Cardiomyopathy, ischemic  Atrial fibrillation with controlled ventricular response (Multi)  Aneurysm of ascending aorta without rupture ([facility])  BMI 27.0-27.9,adult  WINNIE (obstructive sleep  apnea)  Former smoker      [unfilled]

## 2025-01-15 NOTE — PATIENT INSTRUCTIONS
Patient to follow up in 2 weeks with Dr. Heriberto Burdick MD      Please STOP Furosemide (Lasix) 20mg   Please START Furosemide (Lasix) 40mg once daily. However, for the next 3-4 days- take 80mg daily to get rid of the leg fluid  You can utilize some of your 20mg tablets to use them up at home if you want to get rid of them.     Please START Spironolactone 25mg once daily     Office will arrange Echo in near future.   Please complete lab work today- orders in system.   Please complete more lab work on Monday or Tuesday next week- orders in system.     No other changes today.   Continue same medications and treatments.   Patient educated on proper medication use.   Patient educated on risk factor modification.   Please bring any lab results from other providers / physicians to your next appointment.     Please bring all medicines, vitamins, and herbal supplements with you when you come to the office.     Prescriptions will not be filled unless you are compliant with your follow up appointments or have a follow up appointment scheduled as per instruction of your physician. Refills should be requested at the time of your visit.    ITalib RN am scribing for and in the presence of Dr. Heriberto Valle MD

## 2025-01-17 ENCOUNTER — TELEPHONE (OUTPATIENT)
Dept: CARDIOLOGY | Facility: CLINIC | Age: 85
End: 2025-01-17
Payer: MEDICARE

## 2025-01-17 DIAGNOSIS — I50.22 CHRONIC SYSTOLIC HEART FAILURE: ICD-10-CM

## 2025-01-17 NOTE — TELEPHONE ENCOUNTER
Call placed to patient and advised.  Patient verbalized understanding.  Lab orders placed to be done either Monday or Tuesday.

## 2025-01-17 NOTE — TELEPHONE ENCOUNTER
----- Message from Heriberto Valle sent at 1/16/2025  8:26 AM EST -----  BNP severely elevated consistent with significant fluid overload, sodium potassium okay, mild decreased renal function unchanged in the past, not clinically significant  ----- Message -----  From: Lab, Background User  Sent: 1/15/2025   8:14 PM EST  To: Heriberto Valle MD

## 2025-01-21 ENCOUNTER — LAB (OUTPATIENT)
Dept: LAB | Facility: LAB | Age: 85
End: 2025-01-21
Payer: MEDICARE

## 2025-01-21 DIAGNOSIS — I50.22 CHRONIC SYSTOLIC HEART FAILURE: ICD-10-CM

## 2025-01-21 LAB
ANION GAP SERPL CALC-SCNC: 10 MMOL/L (ref 10–20)
BNP SERPL-MCNC: 1234 PG/ML (ref 0–99)
BUN SERPL-MCNC: 36 MG/DL (ref 6–23)
CALCIUM SERPL-MCNC: 8.8 MG/DL (ref 8.6–10.3)
CHLORIDE SERPL-SCNC: 99 MMOL/L (ref 98–107)
CO2 SERPL-SCNC: 33 MMOL/L (ref 21–32)
CREAT SERPL-MCNC: 1.55 MG/DL (ref 0.5–1.3)
EGFRCR SERPLBLD CKD-EPI 2021: 43 ML/MIN/1.73M*2
GLUCOSE SERPL-MCNC: 100 MG/DL (ref 74–99)
POTASSIUM SERPL-SCNC: 4.6 MMOL/L (ref 3.5–5.3)
SODIUM SERPL-SCNC: 137 MMOL/L (ref 136–145)

## 2025-01-21 PROCEDURE — 83880 ASSAY OF NATRIURETIC PEPTIDE: CPT

## 2025-01-21 PROCEDURE — 80048 BASIC METABOLIC PNL TOTAL CA: CPT

## 2025-01-22 ENCOUNTER — TELEPHONE (OUTPATIENT)
Dept: CARDIOLOGY | Facility: CLINIC | Age: 85
End: 2025-01-22

## 2025-01-22 ENCOUNTER — HOSPITAL ENCOUNTER (OUTPATIENT)
Dept: CARDIOLOGY | Facility: HOSPITAL | Age: 85
Discharge: HOME | End: 2025-01-22
Payer: MEDICARE

## 2025-01-22 DIAGNOSIS — I50.22 CHRONIC SYSTOLIC HEART FAILURE: ICD-10-CM

## 2025-01-22 DIAGNOSIS — I34.0 MODERATE MITRAL REGURGITATION: ICD-10-CM

## 2025-01-22 LAB
AORTIC VALVE MEAN GRADIENT: 4 MMHG
AORTIC VALVE PEAK VELOCITY: 1.39 M/S
AV PEAK GRADIENT: 8 MMHG
AVA (PEAK VEL): 1.49 CM2
AVA (VTI): 1.52 CM2
EJECTION FRACTION APICAL 4 CHAMBER: 26
EJECTION FRACTION: 33 %
LEFT ATRIUM VOLUME AREA LENGTH INDEX BSA: 75.8 ML/M2
LEFT VENTRICLE INTERNAL DIMENSION DIASTOLE: 5.52 CM (ref 3.5–6)
LEFT VENTRICULAR OUTFLOW TRACT DIAMETER: 2 CM
LV EJECTION FRACTION BIPLANE: 28 %
RIGHT VENTRICLE FREE WALL PEAK S': 10.2 CM/S
RIGHT VENTRICLE PEAK SYSTOLIC PRESSURE: 73.4 MMHG
TRICUSPID ANNULAR PLANE SYSTOLIC EXCURSION: 1.2 CM

## 2025-01-22 PROCEDURE — 93306 TTE W/DOPPLER COMPLETE: CPT | Performed by: INTERNAL MEDICINE

## 2025-01-22 PROCEDURE — 93306 TTE W/DOPPLER COMPLETE: CPT

## 2025-01-22 NOTE — TELEPHONE ENCOUNTER
----- Message from Nurse Rox TONY sent at 1/22/2025  1:30 PM EST -----    ----- Message -----  From: Heriberto Valle MD  Sent: 1/21/2025  12:38 PM EST  To: Rox Marte, LPN    Sodium and potassium okay, mild decrease in renal function, not unusual when treating with the medications needed for his fluid overload  ----- Message -----  From: Lab, Background User  Sent: 1/21/2025  12:23 PM EST  To: Heriberto Valle MD

## 2025-01-30 ENCOUNTER — OFFICE VISIT (OUTPATIENT)
Dept: CARDIOLOGY | Facility: CLINIC | Age: 85
End: 2025-01-30
Payer: MEDICARE

## 2025-01-30 ENCOUNTER — APPOINTMENT (OUTPATIENT)
Dept: PRIMARY CARE | Facility: CLINIC | Age: 85
End: 2025-01-30
Payer: MEDICARE

## 2025-01-30 VITALS
DIASTOLIC BLOOD PRESSURE: 76 MMHG | BODY MASS INDEX: 25.6 KG/M2 | HEART RATE: 68 BPM | SYSTOLIC BLOOD PRESSURE: 118 MMHG | HEIGHT: 70 IN | WEIGHT: 178.8 LBS

## 2025-01-30 DIAGNOSIS — Z87.891 FORMER SMOKER: ICD-10-CM

## 2025-01-30 DIAGNOSIS — G47.33 OSA (OBSTRUCTIVE SLEEP APNEA): ICD-10-CM

## 2025-01-30 DIAGNOSIS — I10 PRIMARY HYPERTENSION: ICD-10-CM

## 2025-01-30 DIAGNOSIS — I34.0 NONRHEUMATIC MITRAL VALVE REGURGITATION: ICD-10-CM

## 2025-01-30 DIAGNOSIS — I50.22 CHRONIC SYSTOLIC HEART FAILURE: ICD-10-CM

## 2025-01-30 DIAGNOSIS — I25.10 CORONARY ARTERY DISEASE INVOLVING NATIVE CORONARY ARTERY OF NATIVE HEART WITHOUT ANGINA PECTORIS: ICD-10-CM

## 2025-01-30 DIAGNOSIS — E78.2 MIXED HYPERLIPIDEMIA: ICD-10-CM

## 2025-01-30 DIAGNOSIS — I48.91 ATRIAL FIBRILLATION WITH CONTROLLED VENTRICULAR RESPONSE (MULTI): ICD-10-CM

## 2025-01-30 DIAGNOSIS — Z95.1 HISTORY OF CORONARY ARTERY BYPASS GRAFT: ICD-10-CM

## 2025-01-30 PROCEDURE — 3074F SYST BP LT 130 MM HG: CPT | Performed by: INTERNAL MEDICINE

## 2025-01-30 PROCEDURE — 1159F MED LIST DOCD IN RCRD: CPT | Performed by: INTERNAL MEDICINE

## 2025-01-30 PROCEDURE — 3078F DIAST BP <80 MM HG: CPT | Performed by: INTERNAL MEDICINE

## 2025-01-30 PROCEDURE — 1157F ADVNC CARE PLAN IN RCRD: CPT | Performed by: INTERNAL MEDICINE

## 2025-01-30 PROCEDURE — 1036F TOBACCO NON-USER: CPT | Performed by: INTERNAL MEDICINE

## 2025-01-30 PROCEDURE — 99214 OFFICE O/P EST MOD 30 MIN: CPT | Performed by: INTERNAL MEDICINE

## 2025-01-30 PROCEDURE — 1123F ACP DISCUSS/DSCN MKR DOCD: CPT | Performed by: INTERNAL MEDICINE

## 2025-01-30 RX ORDER — SACUBITRIL AND VALSARTAN 97; 103 MG/1; MG/1
1 TABLET, FILM COATED ORAL 2 TIMES DAILY
Qty: 180 TABLET | Refills: 3 | Status: SHIPPED | OUTPATIENT
Start: 2025-01-30 | End: 2026-01-30

## 2025-01-30 RX ORDER — FUROSEMIDE 20 MG/1
TABLET ORAL
Qty: 30 TABLET | Refills: 11 | OUTPATIENT
Start: 2025-01-30

## 2025-01-30 ASSESSMENT — ENCOUNTER SYMPTOMS
NEUROLOGICAL NEGATIVE: 1
RESPIRATORY NEGATIVE: 1
CONSTITUTIONAL NEGATIVE: 1
CARDIOVASCULAR NEGATIVE: 1

## 2025-01-30 NOTE — PROGRESS NOTES
CARDIOLOGY OFFICE VISIT      CHIEF COMPLAINT      HISTORY OF PRESENT ILLNESS    The patient states that he is doing much better.  He had a little confusion and has not been taking his Lasix for the last 5 or 6 days.  He lost quite a bit of weight initially.  He is now down about 9 or 10 pounds since I saw him last time.  I am going to place him back on Lasix 20 mg a day.  I did talk to him and his wife about the importance of weighing every day and what to do if he gains 3 pounds or 5 pounds.  He states the swelling of his lower extremities much improved.  His shortness of breath is resolved at rest and with mild activities if he tries to do too much he gets fatigued and short of breath much quicker than he used to before he had this current problem.  He denies chest discomfort.  He denies palpitations or syncope.  He is not having any problems medications.  I told him I would like to increase his Entresto to the maximum dose.  He will let me know if any problems with this.  He will check his blood pressure once a while to make sure it is okay.  I told him I would like to get renal profile and a BNP in 2 weeks and see him back in 4 weeks.  I did go over his echocardiogram report with him.      Impression:  Coronary disease no angina  Remote CABG  Former smoker  Hyperlipidemia  Chronic systolic congestive heart failure  Permanent atrial fibrillation  Ischemic cardiomyopathy left ventricular ejection fraction 30 to 35% on echocardiogram   Overweight  History of aneurysm of ascending thoracic aorta  Obstructive Sleep Apnea with CPAP   Moderate Mitral Regurgitation  Moderate tricuspid regurgitation  Pulmonary hypertension        Please excuse any errors in grammar or translation related to this dictation.  Voice recognition software was utilized to prepare this document      Past Medical History  Past Medical History:   Diagnosis Date    Abnormal ECG     Arrhythmia     Atrial fibrillation (Multi)     CHF (congestive  heart failure)     Coronary artery disease     COVID-19 2022    COVID-19    Epilepsy, unspecified, not intractable, without status epilepticus 2020    Epilepsy    Hyperlipidemia     Hypertension     Ischemic cardiomyopathy     Myocardial infarction (Multi)     Unspecified convulsions (Multi) 2021    Seizures       Social History  Social History     Tobacco Use    Smoking status: Former     Current packs/day: 0.00     Average packs/day: 1 pack/day for 40.0 years (40.0 ttl pk-yrs)     Types: Cigarettes     Start date: 1957     Quit date:      Years since quittin.0    Smokeless tobacco: Never   Vaping Use    Vaping status: Never Used   Substance Use Topics    Alcohol use: Yes     Alcohol/week: 14.0 standard drinks of alcohol     Types: 14 Standard drinks or equivalent per week     Comment: daily    Drug use: Never       Family History     Family History   Problem Relation Name Age of Onset    Heart failure Mother Dar     Emphysema Father Saroj John.     Lung disease Father Saroj John.     Bipolar disorder Brother          Allergies:  No Known Allergies     Outpatient Medications:  Current Outpatient Medications   Medication Instructions    ALPRAZolam (XANAX) 0.5 mg, oral, 3 times daily PRN    atorvastatin (LIPITOR) 20 mg, oral, Daily, as directed    b complex vitamins capsule 1 capsule, Daily    cholecalciferol (Vitamin D-3) 125 MCG (5000 UT) capsule 1 tablet, Daily    dutasteride (AVODART) 0.5 mg, oral, Daily    Eliquis 5 mg, oral, 2 times daily    Entresto 49-51 mg tablet 1 tablet, oral, 2 times daily    furosemide (LASIX) 40 mg, oral, Daily    Jardiance 10 mg, oral, Daily    levETIRAcetam (Keppra) 500 mg tablet TAKE 1/2 (ONE-HALF) OF A TABLET BY MOUTH DAILY    metoprolol succinate XL (TOPROL-XL) 25 mg, oral, 2 times daily, Do not crush or chew.    mirtazapine (REMERON) 15 mg, oral, Nightly    sertraline (ZOLOFT) 100 mg, oral, Daily    spironolactone (ALDACTONE) 25 mg, oral, Daily           REVIEW OF SYSTEMS  Review of Systems   Constitutional: Negative.   Cardiovascular: Negative.    Respiratory: Negative.     Neurological: Negative.    All other systems reviewed and are negative.        VITALS  Vitals:    01/30/25 1304   BP: 118/76   Pulse: 68       PHYSICAL EXAM  Constitutional:       Appearance: Healthy appearance.   Eyes:      Pupils: Pupils are equal, round, and reactive to light.   Pulmonary:      Effort: Pulmonary effort is normal.      Breath sounds: Normal breath sounds.   Cardiovascular:      PMI at left midclavicular line. Normal rate. Regular rhythm.      Murmurs:  2/6  systolic murmur at apex      No gallop.  No click. No rub.   Pulses:     Intact distal pulses.   Edema:     Comments: Trace to 1+ bilateral lower extremity edema      Musculoskeletal: Normal range of motion.      Cervical back: Normal range of motion. Skin:     General: Skin is warm and dry.   Neurological:      General: No focal deficit present.      Mental Status: Alert and oriented to person, place and time.           ASSESSMENT AND PLAN  Problem List Items Addressed This Visit       CAD (coronary artery disease)    HLD (hyperlipidemia)    HTN (hypertension)    Mitral valve insufficiency    Atrial fibrillation with controlled ventricular response (Multi)    History of coronary artery bypass graft    BMI 26.0-26.9,adult    Former smoker    Chronic systolic heart failure    WINNIE (obstructive sleep apnea)       [unfilled]

## 2025-02-04 ENCOUNTER — APPOINTMENT (OUTPATIENT)
Dept: PRIMARY CARE | Facility: CLINIC | Age: 85
End: 2025-02-04
Payer: MEDICARE

## 2025-02-04 VITALS
BODY MASS INDEX: 25.48 KG/M2 | HEIGHT: 70 IN | TEMPERATURE: 98.2 F | OXYGEN SATURATION: 95 % | HEART RATE: 80 BPM | DIASTOLIC BLOOD PRESSURE: 68 MMHG | WEIGHT: 178 LBS | SYSTOLIC BLOOD PRESSURE: 126 MMHG | RESPIRATION RATE: 16 BRPM

## 2025-02-04 DIAGNOSIS — G47.33 OSA (OBSTRUCTIVE SLEEP APNEA): ICD-10-CM

## 2025-02-04 DIAGNOSIS — N18.31 STAGE 3A CHRONIC KIDNEY DISEASE (MULTI): ICD-10-CM

## 2025-02-04 DIAGNOSIS — I25.5 CARDIOMYOPATHY, ISCHEMIC: ICD-10-CM

## 2025-02-04 DIAGNOSIS — I71.21 ANEURYSM OF ASCENDING AORTA WITHOUT RUPTURE (CMS-HCC): ICD-10-CM

## 2025-02-04 DIAGNOSIS — I10 PRIMARY HYPERTENSION: ICD-10-CM

## 2025-02-04 DIAGNOSIS — R73.9 HYPERGLYCEMIA: ICD-10-CM

## 2025-02-04 DIAGNOSIS — I48.91 ATRIAL FIBRILLATION WITH CONTROLLED VENTRICULAR RESPONSE (MULTI): ICD-10-CM

## 2025-02-04 DIAGNOSIS — E78.2 MIXED HYPERLIPIDEMIA: ICD-10-CM

## 2025-02-04 DIAGNOSIS — Z23 ENCOUNTER FOR IMMUNIZATION: ICD-10-CM

## 2025-02-04 DIAGNOSIS — G40.009 PARTIAL IDIOPATHIC EPILEPSY WITH SEIZURES OF LOCALIZED ONSET, NOT INTRACTABLE, WITHOUT STATUS EPILEPTICUS (MULTI): Primary | ICD-10-CM

## 2025-02-04 DIAGNOSIS — F33.1 MODERATE EPISODE OF RECURRENT MAJOR DEPRESSIVE DISORDER: ICD-10-CM

## 2025-02-04 DIAGNOSIS — E55.9 VITAMIN D DEFICIENCY: ICD-10-CM

## 2025-02-04 DIAGNOSIS — I25.810 CORONARY ARTERY DISEASE INVOLVING CORONARY BYPASS GRAFT OF NATIVE HEART WITHOUT ANGINA PECTORIS: ICD-10-CM

## 2025-02-04 DIAGNOSIS — Z00.00 HEALTHCARE MAINTENANCE: ICD-10-CM

## 2025-02-04 DIAGNOSIS — I50.23 ACUTE ON CHRONIC SYSTOLIC (CONGESTIVE) HEART FAILURE: ICD-10-CM

## 2025-02-04 DIAGNOSIS — D69.6 THROMBOCYTOPENIA (CMS-HCC): ICD-10-CM

## 2025-02-04 DIAGNOSIS — F41.9 ANXIETY: ICD-10-CM

## 2025-02-04 PROCEDURE — 99214 OFFICE O/P EST MOD 30 MIN: CPT | Performed by: INTERNAL MEDICINE

## 2025-02-04 PROCEDURE — G0439 PPPS, SUBSEQ VISIT: HCPCS | Performed by: INTERNAL MEDICINE

## 2025-02-04 PROCEDURE — 1170F FXNL STATUS ASSESSED: CPT | Performed by: INTERNAL MEDICINE

## 2025-02-04 PROCEDURE — 3078F DIAST BP <80 MM HG: CPT | Performed by: INTERNAL MEDICINE

## 2025-02-04 PROCEDURE — 1123F ACP DISCUSS/DSCN MKR DOCD: CPT | Performed by: INTERNAL MEDICINE

## 2025-02-04 PROCEDURE — 1158F ADVNC CARE PLAN TLK DOCD: CPT | Performed by: INTERNAL MEDICINE

## 2025-02-04 PROCEDURE — 1160F RVW MEDS BY RX/DR IN RCRD: CPT | Performed by: INTERNAL MEDICINE

## 2025-02-04 PROCEDURE — G0008 ADMIN INFLUENZA VIRUS VAC: HCPCS | Performed by: INTERNAL MEDICINE

## 2025-02-04 PROCEDURE — 1157F ADVNC CARE PLAN IN RCRD: CPT | Performed by: INTERNAL MEDICINE

## 2025-02-04 PROCEDURE — 1159F MED LIST DOCD IN RCRD: CPT | Performed by: INTERNAL MEDICINE

## 2025-02-04 PROCEDURE — 90662 IIV NO PRSV INCREASED AG IM: CPT | Performed by: INTERNAL MEDICINE

## 2025-02-04 PROCEDURE — 3074F SYST BP LT 130 MM HG: CPT | Performed by: INTERNAL MEDICINE

## 2025-02-04 ASSESSMENT — ACTIVITIES OF DAILY LIVING (ADL)
BATHING: INDEPENDENT
TAKING_MEDICATION: INDEPENDENT
MANAGING_FINANCES: INDEPENDENT
DRESSING: INDEPENDENT
DOING_HOUSEWORK: INDEPENDENT
GROCERY_SHOPPING: INDEPENDENT

## 2025-02-04 ASSESSMENT — PATIENT HEALTH QUESTIONNAIRE - PHQ9
SUM OF ALL RESPONSES TO PHQ9 QUESTIONS 1 AND 2: 0
1. LITTLE INTEREST OR PLEASURE IN DOING THINGS: NOT AT ALL
2. FEELING DOWN, DEPRESSED OR HOPELESS: NOT AT ALL

## 2025-02-04 ASSESSMENT — ENCOUNTER SYMPTOMS
OCCASIONAL FEELINGS OF UNSTEADINESS: 0
LOSS OF SENSATION IN FEET: 0
DEPRESSION: 0

## 2025-02-04 NOTE — PROGRESS NOTES
"Subjective   Reason for Visit: Minh Harrington Jr. is an 85 y.o. male here for a Medicare Wellness visit.     Past Medical, Surgical, and Family History reviewed and updated in chart.         HPI  Influenza 2025  Covid 2021, 2022  PCV13 2016  PPSV23 2020  Shingrix  Tdap  Colonoscopy age  Adv Dir yes  Bmi 25  Depression screen 25  Eye exam 6/24  Fall risk 25      Reports chest tightness yesterday.  \"Like I had a belt around my chest.\"  A Fib sx's decreasing.  Saw Cardiology 1/30/25  Started Spironolactone 25 mg daily.   Increased Lasix 40 mg x4 days, then back to 20 mg daily PRN for weight gain.  Entresto increased.  To repeat BNP and BMP.    OARRS:  Isabel Ritter DO on 2/4/2025  2:47 PM  I have personally reviewed the OARRS report for Minh Harrington Jr.. I have considered the risks of abuse, dependence, addiction and diversion    Is the patient prescribed a combination of a benzodiazepine and opioid?  No    Last Urine Drug Screen / ordered today: No  Recent Results (from the past 8760 hours)   Confirmation Opiate/Opioid/Benzo Prescription Compliance    Collection Time: 10/30/24  3:29 PM   Result Value Ref Range    Clonazepam <25 <25 ng/mL    7-Aminoclonazepam <25 <25 ng/mL    Alprazolam 33 (H) <25 ng/mL    Alpha-Hydroxyalprazolam 89 (H) <25 ng/mL    Midazolam <25 <25 ng/mL    Alpha-Hydroxymidazolam <25 <25 ng/mL    Chlordiazepoxide <25 <25 ng/mL    Diazepam <25 <25 ng/mL    Nordiazepam <25 <25 ng/mL    Temazepam <25 <25 ng/mL    Oxazepam <25 <25 ng/mL    Lorazepam <25 <25 ng/mL    Methadone <25 <25 ng/mL    EDDP <25 <25 ng/mL    6-Acetylmorphine <25 <25 ng/mL    Codeine <50 <50 ng/mL    Hydrocodone <25 <25 ng/mL    Hydromorphone <25 <25 ng/mL    Morphine  <50 <50 ng/mL    Norhydrocodone <25 <25 ng/mL    Noroxycodone <25 <25 ng/mL    Oxycodone <25 <25 ng/mL    Oxymorphone <25 <25 ng/mL    Fentanyl <2.5 <2.5 ng/mL    Norfentanyl <2.5 <2.5 ng/mL    Tramadol <50 <50 ng/mL    O-Desmethyltramadol <50 <50 " ng/mL    Zolpidem <25 <25 ng/mL    Zolpidem Metabolite (ZCA) <25 <25 ng/mL   Screen Opiate/Opioid/Benzo Prescription Compliance    Collection Time: 10/30/24  3:29 PM   Result Value Ref Range    Creatinine, Urine Random 71.8 20.0 - 370.0 mg/dL    Amphetamine Screen, Urine Presumptive Negative Presumptive Negative    Barbiturate Screen, Urine Presumptive Negative Presumptive Negative    Cannabinoid Screen, Urine Presumptive Negative Presumptive Negative    Cocaine Metabolite Screen, Urine Presumptive Negative Presumptive Negative    PCP Screen, Urine Presumptive Negative Presumptive Negative     Results are as expected.         Controlled Substance Agreement:  Date of the Last Agreement: 10/30/2024  Reviewed Controlled Substance Agreement including but not limited to the benefits, risks, and alternatives to treatment with a Controlled Substance medication(s).    Benzodiazepines:  What is the patient's goal of therapy? Anxiety relief  Is this being achieved with current treatment? Yes    SANFORD-7:  No data recorded    Activities of Daily Living:   Is your overall impression that this patient is benefiting (symptom reduction outweighs side effects) from benzodiazepine therapy? Yes     1. Physical Functioning: Better  2. Family Relationship: Better  3. Social Relationship: Better  4. Mood: Better  5. Sleep Patterns: Better  6. Overall Function: Better  Patient Care Team:  Isabel Ritter DO as PCP - General  Isabel Ritter DO as PCP - [employer]vantage PCP  Curt Shine MD as Consulting Physician (Cardiology)  Heriberto Valle MD as Cardiologist (Interventional Cardiology)     Review of Systems   Constitutional:  Negative for fatigue and fever.   Respiratory:  Negative for cough and shortness of breath.    Cardiovascular:  Negative for chest pain and leg swelling.   All other systems reviewed and are negative.      Objective   Vitals:  /68   Pulse 80   Temp 36.8 °C (98.2 °F)   Resp 16    "Ht 1.765 m (5' 9.5\")   Wt 80.7 kg (178 lb)   SpO2 95%   BMI 25.91 kg/m²       Physical Exam  Vitals and nursing note reviewed.   Constitutional:       Appearance: Normal appearance.   HENT:      Head: Normocephalic.   Eyes:      Conjunctiva/sclera: Conjunctivae normal.      Pupils: Pupils are equal, round, and reactive to light.   Cardiovascular:      Rate and Rhythm: Normal rate and regular rhythm.      Pulses: Normal pulses.      Heart sounds: Normal heart sounds.   Pulmonary:      Effort: Pulmonary effort is normal.      Breath sounds: Normal breath sounds.   Musculoskeletal:         General: No swelling.      Cervical back: Neck supple.   Skin:     General: Skin is warm and dry.   Neurological:      General: No focal deficit present.      Mental Status: He is oriented to person, place, and time.         Assessment & Plan  Encounter for immunization    Orders:    Flu vaccine, trivalent, preservative free, HIGH-DOSE, age 65y+ (Fluzone)    Partial idiopathic epilepsy with seizures of localized onset, not intractable, without status epilepticus (Multi)         Moderate episode of recurrent major depressive disorder         Stage 3a chronic kidney disease (Multi)         Acute on chronic systolic (congestive) heart failure         Aneurysm of ascending aorta without rupture (CMS-HCC)         Atrial fibrillation with controlled ventricular response (Multi)         Coronary artery disease involving coronary bypass graft of native heart without angina pectoris         Cardiomyopathy, ischemic         Mixed hyperlipidemia    Orders:    Comprehensive Metabolic Panel; Future    Lipid Panel; Future    Thyroid Stimulating Hormone; Future    Primary hypertension    Orders:    CBC; Future    Thrombocytopenia (CMS-HCC)         Vitamin D deficiency    Orders:    Vitamin D 25-Hydroxy,Total (for eval of Vitamin D levels); Future    Vitamin B12; Future    Anxiety         WINNIE (obstructive sleep apnea)         Healthcare " maintenance         Hyperglycemia    Orders:    Hemoglobin A1C; Future     I have personally reviewed patients OARRS report.  This report is scanned into the emr.  I have considered the risks of abuse, dependence, addiction and diversion.  Stable based on symptoms and exam.  Continue established treatment plan and follow up at least yearly.  Cont meds  Check labs  Update preventive

## 2025-02-05 ASSESSMENT — ENCOUNTER SYMPTOMS
FEVER: 0
FATIGUE: 0
SHORTNESS OF BREATH: 0
COUGH: 0

## 2025-02-12 DIAGNOSIS — N40.0 BENIGN PROSTATIC HYPERPLASIA WITHOUT LOWER URINARY TRACT SYMPTOMS: ICD-10-CM

## 2025-02-12 RX ORDER — DUTASTERIDE 0.5 MG/1
0.5 CAPSULE, LIQUID FILLED ORAL DAILY
Qty: 90 CAPSULE | Refills: 3 | Status: SHIPPED | OUTPATIENT
Start: 2025-02-12

## 2025-02-18 DIAGNOSIS — F41.9 ANXIETY: ICD-10-CM

## 2025-02-18 RX ORDER — ALPRAZOLAM 0.5 MG/1
0.5 TABLET ORAL 3 TIMES DAILY PRN
Qty: 45 TABLET | Refills: 0 | Status: SHIPPED | OUTPATIENT
Start: 2025-02-18 | End: 2025-03-20

## 2025-02-22 LAB
ANION GAP SERPL CALCULATED.4IONS-SCNC: 10 MMOL/L (CALC) (ref 7–17)
BNP SERPL-MCNC: NORMAL PG/ML
BUN SERPL-MCNC: 28 MG/DL (ref 7–25)
BUN/CREAT SERPL: 21 (CALC) (ref 6–22)
CALCIUM SERPL-MCNC: 8.9 MG/DL (ref 8.6–10.3)
CHLORIDE SERPL-SCNC: 103 MMOL/L (ref 98–110)
CO2 SERPL-SCNC: 24 MMOL/L (ref 20–32)
CREAT SERPL-MCNC: 1.34 MG/DL (ref 0.7–1.22)
EGFRCR SERPLBLD CKD-EPI 2021: 51 ML/MIN/1.73M2
GLUCOSE SERPL-MCNC: 96 MG/DL (ref 65–99)
POTASSIUM SERPL-SCNC: 5.4 MMOL/L (ref 3.5–5.3)
SODIUM SERPL-SCNC: 137 MMOL/L (ref 135–146)

## 2025-02-24 DIAGNOSIS — I48.91 ATRIAL FIBRILLATION WITH CONTROLLED VENTRICULAR RESPONSE (MULTI): ICD-10-CM

## 2025-02-24 LAB
ANION GAP SERPL CALCULATED.4IONS-SCNC: 10 MMOL/L (CALC) (ref 7–17)
BNP SERPL-MCNC: 1684 PG/ML
BUN SERPL-MCNC: 28 MG/DL (ref 7–25)
BUN/CREAT SERPL: 21 (CALC) (ref 6–22)
CALCIUM SERPL-MCNC: 8.9 MG/DL (ref 8.6–10.3)
CHLORIDE SERPL-SCNC: 103 MMOL/L (ref 98–110)
CO2 SERPL-SCNC: 24 MMOL/L (ref 20–32)
CREAT SERPL-MCNC: 1.34 MG/DL (ref 0.7–1.22)
EGFRCR SERPLBLD CKD-EPI 2021: 51 ML/MIN/1.73M2
GLUCOSE SERPL-MCNC: 96 MG/DL (ref 65–99)
POTASSIUM SERPL-SCNC: 5.4 MMOL/L (ref 3.5–5.3)
SODIUM SERPL-SCNC: 137 MMOL/L (ref 135–146)

## 2025-02-24 RX ORDER — METOPROLOL SUCCINATE 25 MG/1
25 TABLET, EXTENDED RELEASE ORAL 2 TIMES DAILY
Qty: 60 TABLET | Refills: 3 | Status: ON HOLD | OUTPATIENT
Start: 2025-02-24

## 2025-02-27 ENCOUNTER — APPOINTMENT (OUTPATIENT)
Dept: CARDIOLOGY | Facility: CLINIC | Age: 85
End: 2025-02-27
Payer: MEDICARE

## 2025-02-27 VITALS
SYSTOLIC BLOOD PRESSURE: 94 MMHG | HEIGHT: 70 IN | HEART RATE: 84 BPM | DIASTOLIC BLOOD PRESSURE: 56 MMHG | WEIGHT: 178.8 LBS | BODY MASS INDEX: 25.6 KG/M2

## 2025-02-27 DIAGNOSIS — I34.0 MITRAL VALVE INSUFFICIENCY, UNSPECIFIED ETIOLOGY: ICD-10-CM

## 2025-02-27 DIAGNOSIS — Z95.1 HISTORY OF CORONARY ARTERY BYPASS GRAFT: ICD-10-CM

## 2025-02-27 DIAGNOSIS — Z87.891 FORMER SMOKER: ICD-10-CM

## 2025-02-27 DIAGNOSIS — G47.33 OSA (OBSTRUCTIVE SLEEP APNEA): ICD-10-CM

## 2025-02-27 DIAGNOSIS — I27.20 PULMONARY HYPERTENSION (MULTI): ICD-10-CM

## 2025-02-27 DIAGNOSIS — I48.91 ATRIAL FIBRILLATION WITH CONTROLLED VENTRICULAR RESPONSE (MULTI): ICD-10-CM

## 2025-02-27 DIAGNOSIS — I71.21 ANEURYSM OF ASCENDING AORTA WITHOUT RUPTURE (CMS-HCC): ICD-10-CM

## 2025-02-27 DIAGNOSIS — E78.2 MIXED HYPERLIPIDEMIA: ICD-10-CM

## 2025-02-27 DIAGNOSIS — I25.810 CORONARY ARTERY DISEASE INVOLVING CORONARY BYPASS GRAFT OF NATIVE HEART WITHOUT ANGINA PECTORIS: ICD-10-CM

## 2025-02-27 DIAGNOSIS — I50.23 ACUTE ON CHRONIC SYSTOLIC (CONGESTIVE) HEART FAILURE: ICD-10-CM

## 2025-02-27 DIAGNOSIS — I36.1 NONRHEUMATIC TRICUSPID VALVE REGURGITATION: ICD-10-CM

## 2025-02-27 DIAGNOSIS — I25.5 CARDIOMYOPATHY, ISCHEMIC: ICD-10-CM

## 2025-02-27 PROCEDURE — 1157F ADVNC CARE PLAN IN RCRD: CPT | Performed by: INTERNAL MEDICINE

## 2025-02-27 PROCEDURE — 99214 OFFICE O/P EST MOD 30 MIN: CPT | Performed by: INTERNAL MEDICINE

## 2025-02-27 PROCEDURE — 1036F TOBACCO NON-USER: CPT | Performed by: INTERNAL MEDICINE

## 2025-02-27 PROCEDURE — 3074F SYST BP LT 130 MM HG: CPT | Performed by: INTERNAL MEDICINE

## 2025-02-27 PROCEDURE — 1123F ACP DISCUSS/DSCN MKR DOCD: CPT | Performed by: INTERNAL MEDICINE

## 2025-02-27 PROCEDURE — 1159F MED LIST DOCD IN RCRD: CPT | Performed by: INTERNAL MEDICINE

## 2025-02-27 PROCEDURE — 3078F DIAST BP <80 MM HG: CPT | Performed by: INTERNAL MEDICINE

## 2025-02-27 NOTE — PROGRESS NOTES
CARDIOLOGY OFFICE VISIT      CHIEF COMPLAINT  Chief complaint: No chief complaint on file.       HISTORY OF PRESENT ILLNESS  The patient states that he is doing very well as far as his concern.  He states his weight has been very stable.  He has not had to take any extra diuretics.  He is tolerating the full dose Entresto without any problems.  He denies chest discomfort or symptoms of myocardial ischemia.  He states that his breathing is satisfactory.  He still has a little bit of swelling in his lower extremities bilaterally but much improved from her previous.  He denies palpitations and syncope.  He denies any problem with his current medication.  I did go over his recent lab work with him.  Sodium 137, potassium 5.4, GFR 51, GFR unchanged for the past.  BNP still elevated at 1684.      Impression:  Coronary disease no angina  Remote CABG  Former smoker  Hyperlipidemia  Chronic systolic congestive heart failure  Permanent atrial fibrillation  Ischemic cardiomyopathy left ventricular ejection fraction 30 to 35% on echocardiogram   Overweight  History of aneurysm of ascending thoracic aorta  Obstructive Sleep Apnea with CPAP   Moderate Mitral Regurgitation  Moderate tricuspid regurgitation  Pulmonary hypertension        Please excuse any errors in grammar or translation related to this dictation.  Voice recognition software was utilized to prepare this document           Impression:  Coronary disease no angina  Remote CABG  Former smoker  Hyperlipidemia  Chronic systolic congestive heart failure  Permanent atrial fibrillation  Ischemic cardiomyopathy left ventricular ejection fraction 30 to 35% on echocardiogram   Overweight  History of aneurysm of ascending thoracic aorta  Obstructive Sleep Apnea with CPAP   Moderate Mitral Regurgitation  Moderate tricuspid regurgitation  Pulmonary hypertension        Please excuse any errors in grammar or translation related to this dictation.  Voice recognition software was  utilized to prepare this document       Past Medical History  Past Medical History:   Diagnosis Date    Abnormal ECG     Arrhythmia     Atrial fibrillation (Multi)     CHF (congestive heart failure)     Coronary artery disease     COVID-19 2022    COVID-19    Epilepsy, unspecified, not intractable, without status epilepticus 2020    Epilepsy    Hyperlipidemia     Hypertension     Ischemic cardiomyopathy     Myocardial infarction (Multi)     Unspecified convulsions (Multi) 2021    Seizures       Social History  Social History     Tobacco Use    Smoking status: Former     Current packs/day: 0.00     Average packs/day: 1 pack/day for 40.0 years (40.0 ttl pk-yrs)     Types: Cigarettes     Start date: 1957     Quit date:      Years since quittin.1    Smokeless tobacco: Never   Vaping Use    Vaping status: Never Used   Substance Use Topics    Alcohol use: Yes     Alcohol/week: 12.0 standard drinks of alcohol     Types: 12 Standard drinks or equivalent per week     Comment: daily    Drug use: Never       Family History     Family History   Problem Relation Name Age of Onset    Heart failure Mother Nana1     Emphysema Father Saroj .     Lung disease Father Saroj Sr.     Bipolar disorder Brother          Allergies:  No Known Allergies     Outpatient Medications:  Current Outpatient Medications   Medication Instructions    ALPRAZolam (XANAX) 0.5 mg, oral, 3 times daily PRN    atorvastatin (LIPITOR) 20 mg, oral, Daily, as directed    b complex vitamins capsule 1 capsule, Daily    cholecalciferol (Vitamin D-3) 125 MCG (5000 UT) capsule 1 tablet, Daily    dutasteride (AVODART) 0.5 mg, oral, Daily    Eliquis 5 mg, oral, 2 times daily    furosemide (Lasix) 20 mg tablet Take one daily ,if weight gain of 3 lbs in one day or 5 lbs in one week take another tablet that day until weight is back to normal    Jardiance 10 mg, oral, Daily    levETIRAcetam (Keppra) 500 mg tablet TAKE 1/2 (ONE-HALF) OF A  TABLET BY MOUTH DAILY    metoprolol succinate XL (TOPROL-XL) 25 mg, oral, 2 times daily    mirtazapine (REMERON) 15 mg, oral, Nightly    sacubitriL-valsartan (Entresto)  mg tablet 1 tablet, oral, 2 times daily    sertraline (ZOLOFT) 100 mg, oral, Daily    spironolactone (ALDACTONE) 25 mg, oral, Daily          REVIEW OF SYSTEMS  Review of Systems   All other systems reviewed and are negative.        VITALS  There were no vitals filed for this visit.    PHYSICAL EXAM  Constitutional:       Appearance: Healthy appearance. Not in distress.   Eyes:      Conjunctiva/sclera: Conjunctivae normal.      Pupils: Pupils are equal, round, and reactive to light.   Neck:      Vascular: No JVR. JVD normal.   Pulmonary:      Effort: Pulmonary effort is normal.      Breath sounds: Normal breath sounds. No wheezing. No rhonchi. No rales.   Chest:      Chest wall: Not tender to palpatation.   Cardiovascular:      PMI at left midclavicular line. Normal rate. Irregularly irregular rhythm. Normal S1. Normal S2.       Murmurs: There is a grade 2/6 systolic murmur at the apex.      No gallop.  No click. No rub.   Pulses:     Intact distal pulses.   Edema:     Peripheral edema present.     Pretibial: bilateral 1+ edema of the pretibial area.  Abdominal:      Tenderness: There is no abdominal tenderness.   Musculoskeletal: Normal range of motion.         General: No tenderness.      Cervical back: Normal range of motion. Skin:     General: Skin is warm and dry.   Neurological:      General: No focal deficit present.      Mental Status: Alert and oriented to person, place and time.           ASSESSMENT AND PLAN  Diagnoses and all orders for this visit:  Coronary artery disease involving coronary bypass graft of native heart without angina pectoris  History of coronary artery bypass graft  Mixed hyperlipidemia  Acute on chronic systolic (congestive) heart failure  Atrial fibrillation with controlled ventricular response  (Multi)  Cardiomyopathy, ischemic  Aneurysm of ascending aorta without rupture (CMS-HCC)  Mitral valve insufficiency, unspecified etiology  Nonrheumatic tricuspid valve regurgitation  Pulmonary hypertension (Multi)  BMI 26.0-26.9,adult  Former smoker  WINNIE (obstructive sleep apnea)      [unfilled]      I,Rox Marte LPN am scribing for, and in the presence of Dr. Heriberto Valle.    I, Dr. Heriberto Valle, personally performed the services described in the documentation as scribed by Rox Marte LPN in my presence, and confirm it is both accurate and complete.      Dr. Heriberto Martinez MD  Thank you for allowing me to participate in the care of this patient. Please do not hesitate to contact me with any further questions or concerns.

## 2025-02-28 ENCOUNTER — APPOINTMENT (OUTPATIENT)
Dept: RADIOLOGY | Facility: HOSPITAL | Age: 85
End: 2025-02-28
Payer: MEDICARE

## 2025-02-28 ENCOUNTER — HOSPITAL ENCOUNTER (INPATIENT)
Facility: HOSPITAL | Age: 85
End: 2025-02-28
Attending: EMERGENCY MEDICINE | Admitting: INTERNAL MEDICINE
Payer: MEDICARE

## 2025-02-28 DIAGNOSIS — R31.0 GROSS HEMATURIA: Primary | ICD-10-CM

## 2025-02-28 DIAGNOSIS — I48.91 ATRIAL FIBRILLATION BY ELECTROCARDIOGRAM (MULTI): ICD-10-CM

## 2025-02-28 PROBLEM — R31.9 HEMATURIA: Status: ACTIVE | Noted: 2025-02-28

## 2025-02-28 LAB
ALBUMIN SERPL BCP-MCNC: 3.8 G/DL (ref 3.4–5)
ALP SERPL-CCNC: 78 U/L (ref 33–136)
ALT SERPL W P-5'-P-CCNC: 12 U/L (ref 10–52)
ANION GAP SERPL CALC-SCNC: 11 MMOL/L (ref 10–20)
APPEARANCE UR: ABNORMAL
APTT PPP: 43 SECONDS (ref 26–36)
AST SERPL W P-5'-P-CCNC: 20 U/L (ref 9–39)
BASOPHILS # BLD AUTO: 0.02 X10*3/UL (ref 0–0.1)
BASOPHILS NFR BLD AUTO: 0.3 %
BILIRUB SERPL-MCNC: 0.7 MG/DL (ref 0–1.2)
BILIRUB UR STRIP.AUTO-MCNC: NEGATIVE MG/DL
BUN SERPL-MCNC: 33 MG/DL (ref 6–23)
CALCIUM SERPL-MCNC: 8.7 MG/DL (ref 8.6–10.3)
CHLORIDE SERPL-SCNC: 105 MMOL/L (ref 98–107)
CO2 SERPL-SCNC: 26 MMOL/L (ref 21–32)
COLOR UR: ABNORMAL
CREAT SERPL-MCNC: 1.51 MG/DL (ref 0.5–1.3)
EGFRCR SERPLBLD CKD-EPI 2021: 44 ML/MIN/1.73M*2
EOSINOPHIL # BLD AUTO: 0.05 X10*3/UL (ref 0–0.4)
EOSINOPHIL NFR BLD AUTO: 0.8 %
ERYTHROCYTE [DISTWIDTH] IN BLOOD BY AUTOMATED COUNT: 17.4 % (ref 11.5–14.5)
GLUCOSE SERPL-MCNC: 106 MG/DL (ref 74–99)
GLUCOSE UR STRIP.AUTO-MCNC: ABNORMAL MG/DL
HCT VFR BLD AUTO: 35.2 % (ref 41–52)
HGB BLD-MCNC: 10.8 G/DL (ref 13.5–17.5)
HOLD SPECIMEN: NORMAL
IMM GRANULOCYTES # BLD AUTO: 0.02 X10*3/UL (ref 0–0.5)
IMM GRANULOCYTES NFR BLD AUTO: 0.3 % (ref 0–0.9)
INR PPP: 1.6 (ref 0.9–1.1)
KETONES UR STRIP.AUTO-MCNC: NEGATIVE MG/DL
LEUKOCYTE ESTERASE UR QL STRIP.AUTO: ABNORMAL
LYMPHOCYTES # BLD AUTO: 0.86 X10*3/UL (ref 0.8–3)
LYMPHOCYTES NFR BLD AUTO: 14.1 %
MCH RBC QN AUTO: 28.6 PG (ref 26–34)
MCHC RBC AUTO-ENTMCNC: 30.7 G/DL (ref 32–36)
MCV RBC AUTO: 93 FL (ref 80–100)
MONOCYTES # BLD AUTO: 0.8 X10*3/UL (ref 0.05–0.8)
MONOCYTES NFR BLD AUTO: 13.1 %
MUCOUS THREADS #/AREA URNS AUTO: ABNORMAL /LPF
NEUTROPHILS # BLD AUTO: 4.37 X10*3/UL (ref 1.6–5.5)
NEUTROPHILS NFR BLD AUTO: 71.4 %
NITRITE UR QL STRIP.AUTO: NEGATIVE
NRBC BLD-RTO: 0 /100 WBCS (ref 0–0)
PH UR STRIP.AUTO: 6 [PH]
PLATELET # BLD AUTO: 106 X10*3/UL (ref 150–450)
POTASSIUM SERPL-SCNC: 5 MMOL/L (ref 3.5–5.3)
PROT SERPL-MCNC: 6.6 G/DL (ref 6.4–8.2)
PROT UR STRIP.AUTO-MCNC: ABNORMAL MG/DL
PROTHROMBIN TIME: 17.6 SECONDS (ref 9.8–12.4)
RBC # BLD AUTO: 3.77 X10*6/UL (ref 4.5–5.9)
RBC # UR STRIP.AUTO: ABNORMAL MG/DL
RBC #/AREA URNS AUTO: >20 /HPF
SODIUM SERPL-SCNC: 137 MMOL/L (ref 136–145)
SP GR UR STRIP.AUTO: >1.03
UROBILINOGEN UR STRIP.AUTO-MCNC: ABNORMAL MG/DL
WBC # BLD AUTO: 6.1 X10*3/UL (ref 4.4–11.3)
WBC #/AREA URNS AUTO: >50 /HPF

## 2025-02-28 PROCEDURE — 96365 THER/PROPH/DIAG IV INF INIT: CPT | Mod: 59

## 2025-02-28 PROCEDURE — 51702 INSERT TEMP BLADDER CATH: CPT

## 2025-02-28 PROCEDURE — 2550000001 HC RX 255 CONTRASTS: Performed by: EMERGENCY MEDICINE

## 2025-02-28 PROCEDURE — 80053 COMPREHEN METABOLIC PANEL: CPT

## 2025-02-28 PROCEDURE — 81001 URINALYSIS AUTO W/SCOPE: CPT | Performed by: EMERGENCY MEDICINE

## 2025-02-28 PROCEDURE — G0378 HOSPITAL OBSERVATION PER HR: HCPCS

## 2025-02-28 PROCEDURE — 87086 URINE CULTURE/COLONY COUNT: CPT | Mod: STJLAB | Performed by: EMERGENCY MEDICINE

## 2025-02-28 PROCEDURE — 74177 CT ABD & PELVIS W/CONTRAST: CPT

## 2025-02-28 PROCEDURE — 2500000002 HC RX 250 W HCPCS SELF ADMINISTERED DRUGS (ALT 637 FOR MEDICARE OP, ALT 636 FOR OP/ED): Performed by: INTERNAL MEDICINE

## 2025-02-28 PROCEDURE — 99223 1ST HOSP IP/OBS HIGH 75: CPT | Performed by: NURSE PRACTITIONER

## 2025-02-28 PROCEDURE — 85730 THROMBOPLASTIN TIME PARTIAL: CPT

## 2025-02-28 PROCEDURE — 2500000004 HC RX 250 GENERAL PHARMACY W/ HCPCS (ALT 636 FOR OP/ED)

## 2025-02-28 PROCEDURE — 2500000001 HC RX 250 WO HCPCS SELF ADMINISTERED DRUGS (ALT 637 FOR MEDICARE OP): Performed by: INTERNAL MEDICINE

## 2025-02-28 PROCEDURE — 36415 COLL VENOUS BLD VENIPUNCTURE: CPT

## 2025-02-28 PROCEDURE — 99285 EMERGENCY DEPT VISIT HI MDM: CPT | Performed by: EMERGENCY MEDICINE

## 2025-02-28 PROCEDURE — 85025 COMPLETE CBC W/AUTO DIFF WBC: CPT

## 2025-02-28 PROCEDURE — 51700 IRRIGATION OF BLADDER: CPT

## 2025-02-28 PROCEDURE — 99285 EMERGENCY DEPT VISIT HI MDM: CPT | Mod: 25 | Performed by: EMERGENCY MEDICINE

## 2025-02-28 RX ORDER — SPIRONOLACTONE 25 MG/1
25 TABLET ORAL DAILY
Status: DISCONTINUED | OUTPATIENT
Start: 2025-02-28 | End: 2025-03-10 | Stop reason: HOSPADM

## 2025-02-28 RX ORDER — ALPRAZOLAM 0.5 MG/1
0.5 TABLET ORAL 3 TIMES DAILY PRN
Status: DISCONTINUED | OUTPATIENT
Start: 2025-02-28 | End: 2025-03-10 | Stop reason: HOSPADM

## 2025-02-28 RX ORDER — MIRTAZAPINE 15 MG/1
15 TABLET, FILM COATED ORAL NIGHTLY
Status: DISCONTINUED | OUTPATIENT
Start: 2025-02-28 | End: 2025-03-10 | Stop reason: HOSPADM

## 2025-02-28 RX ORDER — ACETAMINOPHEN 160 MG/5ML
650 SOLUTION ORAL EVERY 6 HOURS PRN
Status: DISCONTINUED | OUTPATIENT
Start: 2025-02-28 | End: 2025-03-10 | Stop reason: HOSPADM

## 2025-02-28 RX ORDER — ATORVASTATIN CALCIUM 20 MG/1
20 TABLET, FILM COATED ORAL DAILY
Status: DISCONTINUED | OUTPATIENT
Start: 2025-02-28 | End: 2025-03-10 | Stop reason: HOSPADM

## 2025-02-28 RX ORDER — ACETAMINOPHEN 500 MG
5000 TABLET ORAL DAILY
Status: DISCONTINUED | OUTPATIENT
Start: 2025-02-28 | End: 2025-03-10 | Stop reason: HOSPADM

## 2025-02-28 RX ORDER — METOPROLOL SUCCINATE 25 MG/1
25 TABLET, EXTENDED RELEASE ORAL 2 TIMES DAILY
Status: DISCONTINUED | OUTPATIENT
Start: 2025-02-28 | End: 2025-03-07

## 2025-02-28 RX ORDER — ACETAMINOPHEN 325 MG/1
650 TABLET ORAL EVERY 6 HOURS PRN
Status: DISCONTINUED | OUTPATIENT
Start: 2025-02-28 | End: 2025-03-10 | Stop reason: HOSPADM

## 2025-02-28 RX ORDER — LEVETIRACETAM 500 MG/1
500 TABLET ORAL DAILY
Status: DISCONTINUED | OUTPATIENT
Start: 2025-02-28 | End: 2025-03-10 | Stop reason: HOSPADM

## 2025-02-28 RX ORDER — B-COMPLEX WITH VITAMIN C
1 TABLET ORAL DAILY
Status: DISCONTINUED | OUTPATIENT
Start: 2025-02-28 | End: 2025-03-10 | Stop reason: HOSPADM

## 2025-02-28 RX ORDER — ONDANSETRON 4 MG/1
4 TABLET, FILM COATED ORAL EVERY 8 HOURS PRN
Status: DISCONTINUED | OUTPATIENT
Start: 2025-02-28 | End: 2025-03-10 | Stop reason: HOSPADM

## 2025-02-28 RX ORDER — ACETAMINOPHEN 650 MG/1
650 SUPPOSITORY RECTAL EVERY 6 HOURS PRN
Status: DISCONTINUED | OUTPATIENT
Start: 2025-02-28 | End: 2025-03-10 | Stop reason: HOSPADM

## 2025-02-28 RX ORDER — SERTRALINE HYDROCHLORIDE 100 MG/1
100 TABLET, FILM COATED ORAL DAILY
Status: DISCONTINUED | OUTPATIENT
Start: 2025-02-28 | End: 2025-03-08

## 2025-02-28 RX ORDER — PROCHLORPERAZINE 25 MG/1
25 SUPPOSITORY RECTAL EVERY 12 HOURS PRN
Status: DISCONTINUED | OUTPATIENT
Start: 2025-02-28 | End: 2025-03-06

## 2025-02-28 RX ORDER — PROCHLORPERAZINE EDISYLATE 5 MG/ML
10 INJECTION INTRAMUSCULAR; INTRAVENOUS EVERY 6 HOURS PRN
Status: DISCONTINUED | OUTPATIENT
Start: 2025-02-28 | End: 2025-03-06

## 2025-02-28 RX ORDER — CEFTRIAXONE 1 G/50ML
1 INJECTION, SOLUTION INTRAVENOUS ONCE
Status: COMPLETED | OUTPATIENT
Start: 2025-02-28 | End: 2025-02-28

## 2025-02-28 RX ORDER — POLYETHYLENE GLYCOL 3350 17 G/17G
17 POWDER, FOR SOLUTION ORAL DAILY PRN
Status: DISCONTINUED | OUTPATIENT
Start: 2025-02-28 | End: 2025-03-10 | Stop reason: HOSPADM

## 2025-02-28 RX ORDER — GUAIFENESIN 600 MG/1
600 TABLET, EXTENDED RELEASE ORAL EVERY 12 HOURS PRN
Status: DISCONTINUED | OUTPATIENT
Start: 2025-02-28 | End: 2025-03-10 | Stop reason: HOSPADM

## 2025-02-28 RX ORDER — FINASTERIDE 5 MG/1
5 TABLET, FILM COATED ORAL DAILY
Status: DISCONTINUED | OUTPATIENT
Start: 2025-02-28 | End: 2025-03-10 | Stop reason: HOSPADM

## 2025-02-28 RX ORDER — SODIUM CHLORIDE 9 MG/ML
100 INJECTION, SOLUTION INTRAVENOUS CONTINUOUS
Status: CANCELLED | OUTPATIENT
Start: 2025-02-28 | End: 2025-03-01

## 2025-02-28 RX ORDER — PROCHLORPERAZINE MALEATE 10 MG
10 TABLET ORAL EVERY 6 HOURS PRN
Status: DISCONTINUED | OUTPATIENT
Start: 2025-02-28 | End: 2025-03-06

## 2025-02-28 RX ORDER — ONDANSETRON HYDROCHLORIDE 2 MG/ML
4 INJECTION, SOLUTION INTRAVENOUS EVERY 8 HOURS PRN
Status: DISCONTINUED | OUTPATIENT
Start: 2025-02-28 | End: 2025-03-10 | Stop reason: HOSPADM

## 2025-02-28 RX ADMIN — IOHEXOL 75 ML: 350 INJECTION, SOLUTION INTRAVENOUS at 14:30

## 2025-02-28 RX ADMIN — METOPROLOL SUCCINATE 25 MG: 25 TABLET, EXTENDED RELEASE ORAL at 20:59

## 2025-02-28 RX ADMIN — ATORVASTATIN CALCIUM 20 MG: 20 TABLET, FILM COATED ORAL at 18:15

## 2025-02-28 RX ADMIN — CHOLECALCIFEROL TAB 125 MCG (5000 UNIT) 5000 UNITS: 125 TAB at 18:11

## 2025-02-28 RX ADMIN — SACUBITRIL AND VALSARTAN 1 TABLET: 97; 103 TABLET, FILM COATED ORAL at 20:59

## 2025-02-28 RX ADMIN — LEVETIRACETAM 500 MG: 500 TABLET, FILM COATED ORAL at 18:15

## 2025-02-28 RX ADMIN — EMPAGLIFLOZIN 10 MG: 10 TABLET, FILM COATED ORAL at 18:13

## 2025-02-28 RX ADMIN — SPIRONOLACTONE 25 MG: 25 TABLET ORAL at 18:15

## 2025-02-28 RX ADMIN — ALPRAZOLAM 0.5 MG: 0.5 TABLET ORAL at 21:02

## 2025-02-28 RX ADMIN — FINASTERIDE 5 MG: 5 TABLET, FILM COATED ORAL at 18:15

## 2025-02-28 RX ADMIN — MIRTAZAPINE 15 MG: 15 TABLET, FILM COATED ORAL at 20:58

## 2025-02-28 RX ADMIN — CEFTRIAXONE SODIUM 1 G: 1 INJECTION, SOLUTION INTRAVENOUS at 12:52

## 2025-02-28 RX ADMIN — Medication 1 TABLET: at 18:10

## 2025-02-28 SDOH — SOCIAL STABILITY: SOCIAL INSECURITY: HAVE YOU HAD THOUGHTS OF HARMING ANYONE ELSE?: NO

## 2025-02-28 SDOH — SOCIAL STABILITY: SOCIAL INSECURITY: WERE YOU ABLE TO COMPLETE ALL THE BEHAVIORAL HEALTH SCREENINGS?: YES

## 2025-02-28 ASSESSMENT — ENCOUNTER SYMPTOMS
VOMITING: 0
CARDIOVASCULAR NEGATIVE: 1
BLOOD IN STOOL: 0
CONSTITUTIONAL NEGATIVE: 1
RESPIRATORY NEGATIVE: 1
HEMATURIA: 1
NAUSEA: 0
DYSURIA: 1
ABDOMINAL PAIN: 1
MUSCULOSKELETAL NEGATIVE: 1
PSYCHIATRIC NEGATIVE: 1
NEUROLOGICAL NEGATIVE: 1

## 2025-02-28 ASSESSMENT — COGNITIVE AND FUNCTIONAL STATUS - GENERAL
PATIENT BASELINE BEDBOUND: NO
DAILY ACTIVITIY SCORE: 24
MOBILITY SCORE: 24
DAILY ACTIVITIY SCORE: 24
MOBILITY SCORE: 24

## 2025-02-28 ASSESSMENT — LIFESTYLE VARIABLES
HOW OFTEN DO YOU HAVE A DRINK CONTAINING ALCOHOL: 2-4 TIMES A MONTH
HOW MANY STANDARD DRINKS CONTAINING ALCOHOL DO YOU HAVE ON A TYPICAL DAY: 1 OR 2
TOTAL SCORE: 0
AUDIT-C TOTAL SCORE: 4
HAVE PEOPLE ANNOYED YOU BY CRITICIZING YOUR DRINKING: NO
HOW OFTEN DO YOU HAVE A DRINK CONTAINING ALCOHOL: 2-3 TIMES A WEEK
HOW OFTEN DO YOU HAVE 6 OR MORE DRINKS ON ONE OCCASION: MONTHLY
EVER FELT BAD OR GUILTY ABOUT YOUR DRINKING: NO
AUDIT-C TOTAL SCORE: 4
HOW MANY STANDARD DRINKS CONTAINING ALCOHOL DO YOU HAVE ON A TYPICAL DAY: 3 OR 4
SKIP TO QUESTIONS 9-10: 0
EVER HAD A DRINK FIRST THING IN THE MORNING TO STEADY YOUR NERVES TO GET RID OF A HANGOVER: NO
HAVE YOU EVER FELT YOU SHOULD CUT DOWN ON YOUR DRINKING: NO

## 2025-02-28 ASSESSMENT — ACTIVITIES OF DAILY LIVING (ADL)
BATHING: INDEPENDENT
TOILETING: INDEPENDENT
DRESSING YOURSELF: INDEPENDENT
ADEQUATE_TO_COMPLETE_ADL: YES
JUDGMENT_ADEQUATE_SAFELY_COMPLETE_DAILY_ACTIVITIES: YES
WALKS IN HOME: INDEPENDENT
HEARING - LEFT EAR: FUNCTIONAL
GROOMING: INDEPENDENT
ASSISTIVE_DEVICE: EYEGLASSES
HEARING - RIGHT EAR: FUNCTIONAL
FEEDING YOURSELF: INDEPENDENT
PATIENT'S MEMORY ADEQUATE TO SAFELY COMPLETE DAILY ACTIVITIES?: YES

## 2025-02-28 ASSESSMENT — PAIN - FUNCTIONAL ASSESSMENT: PAIN_FUNCTIONAL_ASSESSMENT: 0-10

## 2025-02-28 ASSESSMENT — PAIN SCALES - GENERAL
PAINLEVEL_OUTOF10: 0 - NO PAIN

## 2025-02-28 ASSESSMENT — PATIENT HEALTH QUESTIONNAIRE - PHQ9
1. LITTLE INTEREST OR PLEASURE IN DOING THINGS: NOT AT ALL
SUM OF ALL RESPONSES TO PHQ9 QUESTIONS 1 & 2: 0
2. FEELING DOWN, DEPRESSED OR HOPELESS: NOT AT ALL

## 2025-02-28 NOTE — ED PROVIDER NOTES
History of Present Illness     History provided by: Patient  Limitations to History: None  External Records Reviewed with Brief Summary: Outpatient progress note from 2/27/2025 which showed patient's cardiology appointment, updated med list ; discharge summary from 8/5/2024 to 8/8/2024 during which patient was hospitalized for gross hematuria.    HPI:  Minh Harrington Jr. is a 85 y.o. male with past medical history significant for CAD s/p CABG, A-fib on Eliquis, chronic systolic heart failure, CKD 3 AA, thrombocytopenia, epilepsy BPH s/p TURP in 2008, gross hematuria who presents with several days of blood in the urine, clots in the urine.  Patient states that since his TURP in 2008 he is intermittently had blood in his urine and he normally last 2 to 3 days.  He was instructed by urologist in the past to follow-up if this lasted more than 3 days.  Patient's last dose of Eliquis was last night.  He has not taken any of his medications today.    Of note, patient was hospitalized from 8/5/24 to 8/8/24 for gross hematuria and was found to have bladder wall mass/thickening that was supposed to have workup via cystoscopy but due to the parotid to the hospital this did not happen.  Patient was lost to follow-up outpatient.    Physical Exam   Triage vitals:  T 36.1 °C (97 °F)  HR 64  /71  RR 20  O2 95 % None (Room air)    Physical Exam  Vitals and nursing note reviewed.   Constitutional:       General: He is not in acute distress.     Appearance: Normal appearance. He is not ill-appearing.   HENT:      Head: Normocephalic and atraumatic.      Right Ear: External ear normal.      Left Ear: External ear normal.      Nose: No congestion or rhinorrhea.      Mouth/Throat:      Mouth: Mucous membranes are moist.      Pharynx: Oropharynx is clear.   Eyes:      Conjunctiva/sclera: Conjunctivae normal.   Cardiovascular:      Rate and Rhythm: Normal rate and regular rhythm.   Pulmonary:      Effort: Pulmonary effort is  normal. No respiratory distress.      Breath sounds: Normal breath sounds. No wheezing.   Abdominal:      General: Abdomen is flat. Bowel sounds are normal. There is no distension.      Palpations: Abdomen is soft.      Tenderness: There is no abdominal tenderness. There is no right CVA tenderness, left CVA tenderness, guarding or rebound.   Musculoskeletal:      Cervical back: Neck supple.      Right lower leg: No edema.      Left lower leg: No edema.   Skin:     General: Skin is warm and dry.      Capillary Refill: Capillary refill takes less than 2 seconds.      Coloration: Skin is not pale.      Findings: No bruising or erythema.   Neurological:      General: No focal deficit present.      Mental Status: He is alert and oriented to person, place, and time. Mental status is at baseline.   Psychiatric:         Mood and Affect: Mood normal.         Behavior: Behavior normal.         Thought Content: Thought content normal.          Medical Decision Making & ED Course   Medical Decision Makin y.o. male with past medical history significant for CAD s/p CABG, A-fib on Eliquis, chronic systolic heart failure, CKD3A, thrombocytopenia, epilepsy BPH s/p TURP in , gross hematuria who presents with several days of blood in the urine, clots in the urine.  Patient hemodynamically stable, afebrile, 97% SpO2 on room air.  Patient CBC with hemoglobin 10.8, dropped from 12.3 on outpatient labs.  No leukocytosis.  Platelets stable at 106.  Patient CMP with creatinine 1.51 which is at his near baseline.  Patient's UA with 2+ protein, 3+ glucose, 3+ blood, 250 leuk esterase, negative nitrates, greater than 50 WBCs microscopically concerning for infection.  Patient given IV Rocephin empirically for UTI.  CTAP with IV contrast ordered to evaluate possible cause of gross hematuria.  CTAP with interval worsening of masslike bladder wall thickening as well as possible direct tumor invasion at the prostate and shanon  inflammation.  Urology, Dr. Washington was consulted for these findings who agreed with admission to the hospital for further evaluation management.  While patient was in the ED, three-way catheter placed for irrigation.  Patient tolerated this well and remained hemodynamically stable.  Patient was admitted to hospital service for further evaluation management with plans for urology consult, holding anticoagulation, CBI, plans to be n.p.o. at midnight in case of surgical intervention needed tomorrow.  Patient was updated on imaging findings and agreed to plan of care.  ----    Differential diagnoses considered include but are not limited to: UTI, gross hematuria, malignancy, trauma hemorrhagic complication of anticoagulation therapy     Social Determinants of Health which Significantly Impact Care: None identified   EKG Independent Interpretation: EKG not obtained    Independent Result Review and Interpretation: Relevant laboratory and radiographic results were reviewed and independently interpreted by myself.  As necessary, they are commented on in the ED Course.    Chronic conditions affecting the patient's care: As documented above in [facility]    The patient was discussed with the following consultants/services: Hospitalist/Admitting Provider who accepted the patient for admission and Urology regarding gross hematuria, bladder wall thickening/mass    Care Considerations: As documented above in [facility]    ED Course:  ED Course as of 02/28/25 1913 Fri Feb 28, 2025   1249 CBC and Auto Differential(!)  CBC with no leukocytosis.  Hemoglobin 10.8 which is lower from last known baseline of 12.3 1 month ago.  Platelets at 106, at baseline. [DS]   1250 Urinalysis with Reflex Culture and Microscopic(!)  UA with 2+ protein, 3+ glucose, 3+ blood, 2 and 50 leuk esterase.  Microscopic shows greater than 50 WBCs empirically treat with IV Rocephin for UTI.  CTAP with IV contrast ordered to evaluate gross hematuria. [DS]   1315  Comprehensive metabolic panel(!)  CMP with sCr 1.51 from 1.55 1 month ago. Recent baseline 1.3-1.5.  [DS]   1513 CT abdomen pelvis w IV contrast   Interval worsening of masslike bladder wall thickening, most predominant posteriorly and along the right lateral wall. Findings  are suspicious for underlying neoplasm. Interval increase in size and heterogeneity of the prostate, which direct tumor invasion is not excluded. Stable subcentimeter para-aortic and bilateral iliac chain. Though  nonenlarged by size criteria, this area bears watching on future follow-up evaluations to evaluate for shanon metastasis.  Cardiomegaly with interstitial edema and small bilateral pleural effusions, right-greater-than-left.  Mottled appearance of the liver, suspicious for hepatic venous congestion in the setting of cardiomegaly. - Will plan to consult urology.  [DS]   1540 Spoke with Dr. Washington, urology, patient to be admitted.  N.p.o. at midnight.  Hold anticoagulation.  Continue CBI. [DS]      ED Course User Index  [DS] Jennifer Campa DO         Diagnoses as of 02/28/25 1913   Gross hematuria     Disposition   As a result of their workup, the patient will require admission to the hospital.  The patient was informed of his diagnosis.  The patient was given the opportunity to ask questions and I answered them. The patient agreed to be admitted to the hospital.    Procedures   Procedures    Patient seen and discussed with ED attending physician.    Jennifer Campa DO  Family Medicine     This note has been transcribed using Dragon voice recognition system and there is a possibility of unintentional typing misprints.  Any information found to be copied from previous providers is done in the best interest of the patient to provide accurate, quality, and continuity of care.       Jennifer Campa DO  Resident  02/28/25 1913

## 2025-02-28 NOTE — CARE PLAN
The patient's goals for the shift include stay comfortable    The clinical goals for the shift include pox will remain 92% or greater through end of shift

## 2025-02-28 NOTE — H&P
History Of Present Illness  Minh Harrington Jr. is a 85 y.o. male with history of  CAD s/p CABG, A-fib on Eliquis, chronic systolic heart failure, CKD 3 AA, thrombocytopenia, epilepsy BPH s/p TURP in 2008 presents to [facility] ER with chief complaint of several days of blood in his urine. He states all has been well with his urine until Monday when he was urinating all blood. He was instructed by his Urologist in the past if continues > than 3 days to come to hospital. Last dose of Eliquis was last night. He denies any shortness of breath, chest pain, abd pain, nausea, vomiting. Ct abdomen pelvis-Interval worsening of masslike bladder wall thickening, most predominant posteriorly and along the right lateral wall. Findings  are suspicious for underlying neoplasm. Urology was placed on consult. 3 way barbosa with bladder irrigation was placed in the ED. He developed some abdominal discomfort, attending irrigated Barbosa and discomfort was alleviated.      Past Medical History  He has a past medical history of Abnormal ECG, Arrhythmia, Atrial fibrillation (Multi), CHF (congestive heart failure), Coronary artery disease, COVID-19 (02/16/2022), Epilepsy, unspecified, not intractable, without status epilepticus (11/18/2020), Hyperlipidemia, Hypertension, Ischemic cardiomyopathy, Myocardial infarction (Multi), and Unspecified convulsions (Multi) (02/24/2021).    Surgical History  He has a past surgical history that includes Other surgical history (02/12/2019); Other surgical history (02/12/2019); Other surgical history (02/12/2019); Cardiac catheterization; Arterial bypass surgry; and Coronary angioplasty.     Social History  He reports that he quit smoking about 28 years ago. His smoking use included cigarettes. He started smoking about 67 years ago. He has a 40 pack-year smoking history. He has never used smokeless tobacco. He reports current alcohol use of about 12.0 standard drinks of alcohol per  "week. He reports that he does not use drugs.    Family History  Family History   Problem Relation Name Age of Onset    Heart failure Mother Nana1     Emphysema Father Saroj Sr.     Lung disease Father Saroj Sr.     Bipolar disorder Brother          Allergies  Patient has no known allergies.    Review of Systems   Constitutional: Negative.    HENT: Negative.     Respiratory: Negative.     Cardiovascular: Negative.    Gastrointestinal:  Positive for abdominal pain. Negative for blood in stool, nausea and vomiting.   Genitourinary:  Positive for dysuria and hematuria.   Musculoskeletal: Negative.    Skin: Negative.    Neurological: Negative.    Psychiatric/Behavioral: Negative.        ROS: 12 systems reviewed and negative except per HPI above     Physical Exam by System:     Constitutional: Well developed, awake/alert/oriented x3, no distress, alert and cooperative   ENMT: mucous membranes moist   Head/Neck: Neck supple   Respiratory/Thorax: Patent airways, CTAB, normal breath sounds with good chest expansion, thorax symmetric   Cardiovascular: IRR, no murmurs, 2+ equal pulses of the extremities, normal S 1and S 2   Gastrointestinal: Nondistended, soft, non-tender, no rebound tenderness or guarding, no masses palpable, no organomegaly, +BS, no bruits  : 3 way irrigation Pandya-Hematuria with clots   Musculoskeletal: ROM intact, no joint swelling, normal strength   Extremities: normal extremities, no cyanosis edema, contusions or wounds, no clubbing   Neurological: alert and oriented x3, intact senses, motor, response and reflexes, normal strength       Last Recorded Vitals  Blood pressure (!) 141/94, pulse 77, temperature 36.7 °C (98.1 °F), resp. rate 20, height 1.753 m (5' 9\"), weight 80.7 kg (178 lb), SpO2 98%.    Relevant Results  Results for orders placed or performed during the hospital encounter of 02/28/25 (from the past 24 hours)   Urinalysis with Reflex Culture and Microscopic   Result Value Ref Range    " Color, Urine Red (N) Straw, Yellow    Appearance, Urine Turbid (N) Clear    Specific Gravity, Urine >1.030 (N) 1.005 - 1.035    pH, Urine 6.0 5.0, 5.5, 6.0, 6.5, 7.0, 7.5, 8.0    Protein, Urine 100 (2+) (A) NEGATIVE, 10 (TRACE) mg/dL    Glucose, Urine 300 (3+) (A) NEGATIVE mg/dL    Blood, Urine 1.0 (3+) (A) NEGATIVE mg/dL    Ketones, Urine NEGATIVE NEGATIVE mg/dL    Bilirubin, Urine NEGATIVE NEGATIVE mg/dL    Urobilinogen, Urine NORM NORM mg/dL    Nitrite, Urine NEGATIVE NEGATIVE    Leukocyte Esterase, Urine 250 Cynthia/uL (A) NEGATIVE   Microscopic Only, Urine   Result Value Ref Range    WBC, Urine >50 (A) 1-5, NONE /HPF    RBC, Urine >20 (A) NONE, 1-2, 3-5 /HPF    Mucus, Urine 4+ Reference range not established. /LPF   CBC and Auto Differential   Result Value Ref Range    WBC 6.1 4.4 - 11.3 x10*3/uL    nRBC 0.0 0.0 - 0.0 /100 WBCs    RBC 3.77 (L) 4.50 - 5.90 x10*6/uL    Hemoglobin 10.8 (L) 13.5 - 17.5 g/dL    Hematocrit 35.2 (L) 41.0 - 52.0 %    MCV 93 80 - 100 fL    MCH 28.6 26.0 - 34.0 pg    MCHC 30.7 (L) 32.0 - 36.0 g/dL    RDW 17.4 (H) 11.5 - 14.5 %    Platelets 106 (L) 150 - 450 x10*3/uL    Neutrophils % 71.4 40.0 - 80.0 %    Immature Granulocytes %, Automated 0.3 0.0 - 0.9 %    Lymphocytes % 14.1 13.0 - 44.0 %    Monocytes % 13.1 2.0 - 10.0 %    Eosinophils % 0.8 0.0 - 6.0 %    Basophils % 0.3 0.0 - 2.0 %    Neutrophils Absolute 4.37 1.60 - 5.50 x10*3/uL    Immature Granulocytes Absolute, Automated 0.02 0.00 - 0.50 x10*3/uL    Lymphocytes Absolute 0.86 0.80 - 3.00 x10*3/uL    Monocytes Absolute 0.80 0.05 - 0.80 x10*3/uL    Eosinophils Absolute 0.05 0.00 - 0.40 x10*3/uL    Basophils Absolute 0.02 0.00 - 0.10 x10*3/uL   Comprehensive metabolic panel   Result Value Ref Range    Glucose 106 (H) 74 - 99 mg/dL    Sodium 137 136 - 145 mmol/L    Potassium 5.0 3.5 - 5.3 mmol/L    Chloride 105 98 - 107 mmol/L    Bicarbonate 26 21 - 32 mmol/L    Anion Gap 11 10 - 20 mmol/L    Urea Nitrogen 33 (H) 6 - 23 mg/dL    Creatinine  1.51 (H) 0.50 - 1.30 mg/dL    eGFR 44 (L) >60 mL/min/1.73m*2    Calcium 8.7 8.6 - 10.3 mg/dL    Albumin 3.8 3.4 - 5.0 g/dL    Alkaline Phosphatase 78 33 - 136 U/L    Total Protein 6.6 6.4 - 8.2 g/dL    AST 20 9 - 39 U/L    Bilirubin, Total 0.7 0.0 - 1.2 mg/dL    ALT 12 10 - 52 U/L   Coagulation Screen   Result Value Ref Range    Protime 17.6 (H) 9.8 - 12.4 seconds    INR 1.6 (H) 0.9 - 1.1    aPTT 43 (H) 26 - 36 seconds      Scheduled medications    Continuous medications    PRN medications       CT abdomen pelvis w IV contrast    Result Date: 2/28/2025  Interpreted By:  Flaquita Redd, STUDY: CT ABDOMEN PELVIS W IV CONTRAST;  2/28/2025 2:41 pm   INDICATION: Signs/Symptoms:painless hematuria.     COMPARISON: CT urography 08/07/2024 CT abdomen pelvis 08/05/2024   ACCESSION NUMBER(S): [id number]   ORDERING CLINICIAN: FRANK SHEEHAN   TECHNIQUE: CT of the abdomen and pelvis was performed.  Standard contiguous axial images were obtained at 3 mm slice thickness through the abdomen and pelvis. Coronal and sagittal reconstructions at 3 mm slice thickness were performed.  75 ML of Omnipaque 350 was administered intravenously without immediate complication.   FINDINGS: LOWER CHEST: Unchanged cardiomegaly without evidence of pericardial effusion. Severe coronary artery calcifications versus stent. Stable small right and trace left pleural effusions. Right right-greater-than-left lower lobe dependent ground-glass opacities. Similar smooth interlobular septal thickening is seen, right-greater-than-left, compatible with interstitial edema. The visualized distal esophagus appears unremarkable. Status post median sternotomy.   ABDOMEN:   LIVER: The liver is normal in size. Mottled appearance of the liver, suspicious for hepatic venous congestion in the setting of cardiomegaly. No suspicious liver lesion.   BILE DUCTS: No biliary duct dilatation.   GALLBLADDER: The gallbladder is nondistended and without evidence of radiopaque  stones.   PANCREAS: The pancreas appears unremarkable without evidence of ductal dilatation or masses.   SPLEEN: The spleen is normal in size.   ADRENAL GLANDS: No adrenal nodularity or thickening.   KIDNEYS AND URETERS: The kidneys are normal in size. Stable hypoattenuating lesions in the bilateral kidneys, likely benign. Stable punctate nonobstructing calculus in the left kidney. No hydroureteronephrosis or right nephroureterolithiasis is identified.   PELVIS:   BLADDER: Interval worsening of masslike bladder wall thickening, most predominant posteriorly and along the right lateral wall.   REPRODUCTIVE ORGANS: Interval increase in prostatomegaly and heterogeneity of the prostate, measuring 52 mm in the transaxial dimension, previously 48 mm.   BOWEL: The stomach is unremarkable. The small and large bowel are normal in caliber and demonstrate no wall thickening. The appendix is not definitely visualized. There is however no pericecal stranding or fluid. Colonic diverticulosis without acute diverticulitis.   VESSELS: Severe atherosclerotic calcifications of the aortoiliac arteries. The IVC appears normal.   PERITONEUM/RETROPERITONEUM/LYMPH NODES: No ascites or fluid collection. No measurable peritoneal nodular thickening or deposits. Stable subcentimeter para-aortic and bilateral iliac chain, which are nonenlarged by size criteria, and bears watching on future follow-up evaluations, for example a 8 mm left common iliac lymph node on series 2, image 112 measured in the short axis.   ABDOMINAL WALL: The abdominal wall soft tissues appear normal.   BONES: No acute fracture. No suspicious osseous lesions. Discogenic degenerative changes in the lower thoracic and lumbar spine.       1. Interval worsening of masslike bladder wall thickening, most predominant posteriorly and along the right lateral wall. Findings are suspicious for underlying neoplasm, correlation with cystoscopy and tissue diagnosis is recommended. 2.  Interval increase in size and heterogeneity of the prostate, which direct tumor invasion is not excluded. 3. Stable subcentimeter para-aortic and bilateral iliac chain. Though nonenlarged by size criteria, this area bears watching on future follow-up evaluations to evaluate for shanon metastasis. 4. Similar cardiomegaly with interstitial edema and small bilateral pleural effusions, right-greater-than-left. 5. Mottled appearance of the liver, suspicious for hepatic venous congestion in the setting of cardiomegaly. 6. Additional chronic and incidental findings as detailed above.     MACRO: None   Signed by: Flaquita Redd 2/28/2025 3:11 PM Dictation workstation:   [id number]         Assessment/Plan     Plan:  #Gross hematuria  #anemia secondary to acute blood loss  #History of atrial fibrillation on Eliquis  #BPH s/p TURP in 2008   #history of CKD III  #history of systolic heart failure    Plan:    -Admit to observation  -Ct abdomen pelvis-Interval worsening of masslike bladder wall thickening, most predominant posteriorly and along the right lateral wall. Findings  are suspicious for underlying neoplasm.  -Urology Dr. Washington placed on consult  -urinalysis 250 leuks, > 50 WBC, hold antibiotics, follow up on urine culture  -Continue CBI 3 way barbosa irrigation  -Hemoglobin 10.8 was 12.3 in 1/15/25  -Regular diet, NPO after midnight  -Resume patient medications  -Hold Eliquis  -Cardiology placed on consult-hx atrial fib on Eliquis  -Am labs including cbc, bmp, mag  -POC discussed with patient and attending  -Dispo: Likely require less than 2 midnight stays to adequately treat and safe dc plan    Code status: Confirmed with patient at bedside DNR no intubation    I spent >50 minutes in the professional and overall care of this patient.    SIGNATURE: MERCEDES Gama-CNP PATIENT NAME: Minh Harrington Jr.   DATE: February 28, 2025 MRN: [id number]   TIME: 3:54 PM

## 2025-03-01 ENCOUNTER — APPOINTMENT (OUTPATIENT)
Dept: CARDIOLOGY | Facility: HOSPITAL | Age: 85
DRG: 668 | End: 2025-03-01
Payer: MEDICARE

## 2025-03-01 PROBLEM — R31.0 GROSS HEMATURIA: Status: ACTIVE | Noted: 2025-03-01

## 2025-03-01 LAB
ANION GAP SERPL CALC-SCNC: 10 MMOL/L (ref 10–20)
BACTERIA UR CULT: NORMAL
BASOPHILS # BLD AUTO: 0.03 X10*3/UL (ref 0–0.1)
BASOPHILS NFR BLD AUTO: 0.3 %
BUN SERPL-MCNC: 23 MG/DL (ref 6–23)
CALCIUM SERPL-MCNC: 8.7 MG/DL (ref 8.6–10.3)
CHLORIDE SERPL-SCNC: 107 MMOL/L (ref 98–107)
CO2 SERPL-SCNC: 24 MMOL/L (ref 21–32)
CREAT SERPL-MCNC: 1.13 MG/DL (ref 0.5–1.3)
EGFRCR SERPLBLD CKD-EPI 2021: 63 ML/MIN/1.73M*2
EOSINOPHIL # BLD AUTO: 0.03 X10*3/UL (ref 0–0.4)
EOSINOPHIL NFR BLD AUTO: 0.3 %
ERYTHROCYTE [DISTWIDTH] IN BLOOD BY AUTOMATED COUNT: 17.2 % (ref 11.5–14.5)
GLUCOSE SERPL-MCNC: 97 MG/DL (ref 74–99)
HCT VFR BLD AUTO: 32.6 % (ref 41–52)
HGB BLD-MCNC: 10.3 G/DL (ref 13.5–17.5)
IMM GRANULOCYTES # BLD AUTO: 0.04 X10*3/UL (ref 0–0.5)
IMM GRANULOCYTES NFR BLD AUTO: 0.4 % (ref 0–0.9)
LYMPHOCYTES # BLD AUTO: 0.76 X10*3/UL (ref 0.8–3)
LYMPHOCYTES NFR BLD AUTO: 8 %
MAGNESIUM SERPL-MCNC: 2.07 MG/DL (ref 1.6–2.4)
MCH RBC QN AUTO: 28.3 PG (ref 26–34)
MCHC RBC AUTO-ENTMCNC: 31.6 G/DL (ref 32–36)
MCV RBC AUTO: 90 FL (ref 80–100)
MONOCYTES # BLD AUTO: 0.92 X10*3/UL (ref 0.05–0.8)
MONOCYTES NFR BLD AUTO: 9.7 %
NEUTROPHILS # BLD AUTO: 7.72 X10*3/UL (ref 1.6–5.5)
NEUTROPHILS NFR BLD AUTO: 81.3 %
NRBC BLD-RTO: 0 /100 WBCS (ref 0–0)
PLATELET # BLD AUTO: 97 X10*3/UL (ref 150–450)
POTASSIUM SERPL-SCNC: 4.4 MMOL/L (ref 3.5–5.3)
RBC # BLD AUTO: 3.64 X10*6/UL (ref 4.5–5.9)
SODIUM SERPL-SCNC: 137 MMOL/L (ref 136–145)
WBC # BLD AUTO: 9.5 X10*3/UL (ref 4.4–11.3)

## 2025-03-01 PROCEDURE — 83735 ASSAY OF MAGNESIUM: CPT | Performed by: INTERNAL MEDICINE

## 2025-03-01 PROCEDURE — 93010 ELECTROCARDIOGRAM REPORT: CPT | Performed by: INTERNAL MEDICINE

## 2025-03-01 PROCEDURE — 2500000002 HC RX 250 W HCPCS SELF ADMINISTERED DRUGS (ALT 637 FOR MEDICARE OP, ALT 636 FOR OP/ED): Performed by: INTERNAL MEDICINE

## 2025-03-01 PROCEDURE — 80048 BASIC METABOLIC PNL TOTAL CA: CPT | Performed by: INTERNAL MEDICINE

## 2025-03-01 PROCEDURE — 85025 COMPLETE CBC W/AUTO DIFF WBC: CPT | Performed by: INTERNAL MEDICINE

## 2025-03-01 PROCEDURE — 1200000002 HC GENERAL ROOM WITH TELEMETRY DAILY

## 2025-03-01 PROCEDURE — 36415 COLL VENOUS BLD VENIPUNCTURE: CPT | Performed by: INTERNAL MEDICINE

## 2025-03-01 PROCEDURE — 93005 ELECTROCARDIOGRAM TRACING: CPT

## 2025-03-01 PROCEDURE — 99233 SBSQ HOSP IP/OBS HIGH 50: CPT | Performed by: INTERNAL MEDICINE

## 2025-03-01 PROCEDURE — 2500000001 HC RX 250 WO HCPCS SELF ADMINISTERED DRUGS (ALT 637 FOR MEDICARE OP): Performed by: INTERNAL MEDICINE

## 2025-03-01 PROCEDURE — 99222 1ST HOSP IP/OBS MODERATE 55: CPT | Performed by: UROLOGY

## 2025-03-01 RX ADMIN — METOPROLOL SUCCINATE 25 MG: 25 TABLET, EXTENDED RELEASE ORAL at 11:07

## 2025-03-01 RX ADMIN — LEVETIRACETAM 500 MG: 500 TABLET, FILM COATED ORAL at 11:07

## 2025-03-01 RX ADMIN — ALPRAZOLAM 0.5 MG: 0.5 TABLET ORAL at 03:42

## 2025-03-01 RX ADMIN — Medication 1 TABLET: at 11:07

## 2025-03-01 RX ADMIN — CHOLECALCIFEROL TAB 125 MCG (5000 UNIT) 5000 UNITS: 125 TAB at 11:07

## 2025-03-01 RX ADMIN — SACUBITRIL AND VALSARTAN 1 TABLET: 97; 103 TABLET, FILM COATED ORAL at 20:06

## 2025-03-01 RX ADMIN — METOPROLOL SUCCINATE 25 MG: 25 TABLET, EXTENDED RELEASE ORAL at 20:06

## 2025-03-01 RX ADMIN — ALPRAZOLAM 0.5 MG: 0.5 TABLET ORAL at 20:06

## 2025-03-01 RX ADMIN — SACUBITRIL AND VALSARTAN 1 TABLET: 97; 103 TABLET, FILM COATED ORAL at 11:10

## 2025-03-01 RX ADMIN — SPIRONOLACTONE 25 MG: 25 TABLET ORAL at 11:06

## 2025-03-01 RX ADMIN — ATORVASTATIN CALCIUM 20 MG: 20 TABLET, FILM COATED ORAL at 11:06

## 2025-03-01 RX ADMIN — MIRTAZAPINE 15 MG: 15 TABLET, FILM COATED ORAL at 20:06

## 2025-03-01 RX ADMIN — FINASTERIDE 5 MG: 5 TABLET, FILM COATED ORAL at 11:07

## 2025-03-01 RX ADMIN — SERTRALINE HYDROCHLORIDE 100 MG: 100 TABLET ORAL at 11:07

## 2025-03-01 RX ADMIN — EMPAGLIFLOZIN 10 MG: 10 TABLET, FILM COATED ORAL at 11:10

## 2025-03-01 ASSESSMENT — PAIN SCALES - GENERAL
PAINLEVEL_OUTOF10: 0 - NO PAIN
PAINLEVEL_OUTOF10: 0 - NO PAIN

## 2025-03-01 NOTE — PROGRESS NOTES
Minh Harrington Jr. is a 85 y.o. male on day 0 of admission presenting with Hematuria.      Subjective   Continues to have significant hematuria.  Kidney function is better.  Stated that did not get much sleep last night with not receiving his Xanax at time       Objective     Last Recorded Vitals  /68 (BP Location: Left arm, Patient Position: Lying)   Pulse 99   Temp 37.4 °C (99.3 °F) (Temporal)   Resp 18   Wt 76.5 kg (168 lb 10.4 oz)   SpO2 93%   Intake/Output last 3 Shifts:    Intake/Output Summary (Last 24 hours) at 3/1/2025 1032  Last data filed at 2/28/2025 2109  Gross per 24 hour   Intake 5380 ml   Output 5300 ml   Net 80 ml       Admission Weight  Weight: 80.7 kg (178 lb) (02/28/25 0946)    Daily Weight  02/28/25 : 76.5 kg (168 lb 10.4 oz)      Physical Exam:  General: Not in acute distress, alert  HEENT: PERRLA, head intact and normocephalic  Neck: Normal to inspection  Lungs: Clear to auscultation, work of breathing within normal limit  Cardiac: Regular rate and rhythm  Abdomen: Soft nontender, positive bowel sounds  : Pandya catheter in place with CBI running with bright red urine  Skin: Intact  Hematology: No petechia or excessive ecchymosis  Musculoskeletal: Without significant trauma  Neurological: Alert awake oriented, no focal deficit, cranial nerves grossly intact  Psych: No suicidal ideation or homicidal ideation    Relevant Results  Scheduled medications  atorvastatin, 20 mg, oral, Daily  B complex-vitamin C, 1 tablet, oral, Daily  cholecalciferol, 5,000 Units, oral, Daily  empagliflozin, 10 mg, oral, Daily  finasteride, 5 mg, oral, Daily  levETIRAcetam, 500 mg, oral, Daily  metoprolol succinate XL, 25 mg, oral, BID  mirtazapine, 15 mg, oral, Nightly  sacubitriL-valsartan, 1 tablet, oral, BID  sertraline, 100 mg, oral, Daily  spironolactone, 25 mg, oral, Daily      Continuous medications     PRN medications  PRN medications: acetaminophen **OR** acetaminophen **OR** acetaminophen,  ALPRAZolam, benzocaine-menthol, guaiFENesin, ondansetron **OR** ondansetron, polyethylene glycol, prochlorperazine **OR** prochlorperazine **OR** prochlorperazine   Labs  Results from last 7 days   Lab Units 03/01/25  0610 02/28/25  1238   WBC AUTO x10*3/uL 9.5 6.1   HEMOGLOBIN g/dL 10.3* 10.8*   HEMATOCRIT % 32.6* 35.2*   PLATELETS AUTO x10*3/uL 97* 106*     Results from last 7 days   Lab Units 03/01/25  0610 02/28/25  1238   SODIUM mmol/L 137 137   POTASSIUM mmol/L 4.4 5.0   CHLORIDE mmol/L 107 105   CO2 mmol/L 24 26   BUN mg/dL 23 33*   CREATININE mg/dL 1.13 1.51*   CALCIUM mg/dL 8.7 8.7   PROTEIN TOTAL g/dL  --  6.6   BILIRUBIN TOTAL mg/dL  --  0.7   ALK PHOS U/L  --  78   ALT U/L  --  12   AST U/L  --  20   GLUCOSE mg/dL 97 106*       CT abdomen pelvis w IV contrast    Result Date: 2/28/2025  Interpreted By:  Flaquita Redd, STUDY: CT ABDOMEN PELVIS W IV CONTRAST;  2/28/2025 2:41 pm   INDICATION: Signs/Symptoms:painless hematuria.     COMPARISON: CT urography 08/07/2024 CT abdomen pelvis 08/05/2024   ACCESSION NUMBER(S): [id number]   ORDERING CLINICIAN: FRANK SHEEHAN   TECHNIQUE: CT of the abdomen and pelvis was performed.  Standard contiguous axial images were obtained at 3 mm slice thickness through the abdomen and pelvis. Coronal and sagittal reconstructions at 3 mm slice thickness were performed.  75 ML of Omnipaque 350 was administered intravenously without immediate complication.   FINDINGS: LOWER CHEST: Unchanged cardiomegaly without evidence of pericardial effusion. Severe coronary artery calcifications versus stent. Stable small right and trace left pleural effusions. Right right-greater-than-left lower lobe dependent ground-glass opacities. Similar smooth interlobular septal thickening is seen, right-greater-than-left, compatible with interstitial edema. The visualized distal esophagus appears unremarkable. Status post median sternotomy.   ABDOMEN:   LIVER: The liver is normal in size. Mottled  appearance of the liver, suspicious for hepatic venous congestion in the setting of cardiomegaly. No suspicious liver lesion.   BILE DUCTS: No biliary duct dilatation.   GALLBLADDER: The gallbladder is nondistended and without evidence of radiopaque stones.   PANCREAS: The pancreas appears unremarkable without evidence of ductal dilatation or masses.   SPLEEN: The spleen is normal in size.   ADRENAL GLANDS: No adrenal nodularity or thickening.   KIDNEYS AND URETERS: The kidneys are normal in size. Stable hypoattenuating lesions in the bilateral kidneys, likely benign. Stable punctate nonobstructing calculus in the left kidney. No hydroureteronephrosis or right nephroureterolithiasis is identified.   PELVIS:   BLADDER: Interval worsening of masslike bladder wall thickening, most predominant posteriorly and along the right lateral wall.   REPRODUCTIVE ORGANS: Interval increase in prostatomegaly and heterogeneity of the prostate, measuring 52 mm in the transaxial dimension, previously 48 mm.   BOWEL: The stomach is unremarkable. The small and large bowel are normal in caliber and demonstrate no wall thickening. The appendix is not definitely visualized. There is however no pericecal stranding or fluid. Colonic diverticulosis without acute diverticulitis.   VESSELS: Severe atherosclerotic calcifications of the aortoiliac arteries. The IVC appears normal.   PERITONEUM/RETROPERITONEUM/LYMPH NODES: No ascites or fluid collection. No measurable peritoneal nodular thickening or deposits. Stable subcentimeter para-aortic and bilateral iliac chain, which are nonenlarged by size criteria, and bears watching on future follow-up evaluations, for example a 8 mm left common iliac lymph node on series 2, image 112 measured in the short axis.   ABDOMINAL WALL: The abdominal wall soft tissues appear normal.   BONES: No acute fracture. No suspicious osseous lesions. Discogenic degenerative changes in the lower thoracic and lumbar  spine.       1. Interval worsening of masslike bladder wall thickening, most predominant posteriorly and along the right lateral wall. Findings are suspicious for underlying neoplasm, correlation with cystoscopy and tissue diagnosis is recommended. 2. Interval increase in size and heterogeneity of the prostate, which direct tumor invasion is not excluded. 3. Stable subcentimeter para-aortic and bilateral iliac chain. Though nonenlarged by size criteria, this area bears watching on future follow-up evaluations to evaluate for shanon metastasis. 4. Similar cardiomegaly with interstitial edema and small bilateral pleural effusions, right-greater-than-left. 5. Mottled appearance of the liver, suspicious for hepatic venous congestion in the setting of cardiomegaly. 6. Additional chronic and incidental findings as detailed above.     MACRO: None   Signed by: Flaquita Redd 2/28/2025 3:11 PM Dictation workstation:   [id number]                    Assessment/Plan   85-year-old male with past medical history of CAD status post CABG, atrial fibrillation on Eliquis, chronic systolic heart failure, CKD stage III, thrombocytopenia, BPH status post TURP in 2008 presented due to gross hematuria and was found to have worsening of bladder mass and require initiation of CBI and holding anticoagulation.  Assessment & Plan  Hematuria    Gross hematuria    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  CBI is running  Continue to hold anticoagulation  Will hold off on antibiotics  Urology is on consult  Okay to eat today  May need procedure on Wednesday which could be done as outpatient depending on how patient CBI response    Atrial fibrillation on Eliquis  Patient's anticoagulation will need to be held  Cardiology on consult regarding long-term anticoagulation management  Will hold all anticoagulation short run  To consider Watchman device as outpatient  Patient follows up with Dr. Valle    HFrEF  Euvolemic  Continue with Entresto,  spironolactone, metoprolol, Jardiance    CKD stage III  Creatinine stable  Continue to monitor    Hyperlipidemia  Continue atorvastatin    Anxiety disorder  Continue with Xanax as needed    DVT prophylaxis  SCDs      Disposition: Convert patient to inpatient stay as anticipate he will require greater than 2 midnight stay.  Will need to have improvement in his hematuria before considering discharge at this point      Plan discussed with patient at bedside    High Level of MDM based on above issue and discussing plan    This note is created using voice recognition software. All efforts are made to minimize errors, if there are errors there due to transcription.    Ruiz Lechuga  Hospitalist

## 2025-03-01 NOTE — CARE PLAN
Problem: Skin  Goal: Prevent/manage excess moisture  3/1/2025 0508 by Vi Garnett RN  Outcome: Progressing  3/1/2025 0506 by Vi Ganrett RN  Outcome: Progressing  Goal: Prevent/minimize sheer/friction injuries  3/1/2025 0508 by Vi Garnett RN  Outcome: Progressing  3/1/2025 0506 by Vi Garnett RN  Outcome: Progressing  Goal: Promote/optimize nutrition  3/1/2025 0508 by Vi Garnett RN  Outcome: Progressing  3/1/2025 0506 by Vi Garnett RN  Outcome: Progressing  Goal: Participates in plan/prevention/treatment measures  Outcome: Progressing     Problem: Pain - Adult  Goal: Verbalizes/displays adequate comfort level or baseline comfort level  3/1/2025 0508 by Vi Garnett RN  Outcome: Progressing  3/1/2025 0506 by Vi Garnett RN  Outcome: Progressing     Problem: Safety - Adult  Goal: Free from fall injury  3/1/2025 0508 by Vi Garnett RN  Outcome: Progressing  3/1/2025 0506 by Vi Garnett RN  Outcome: Progressing     Problem: Fall/Injury  Goal: Not fall by end of shift  Outcome: Progressing  Goal: Be free from injury by end of the shift  Outcome: Progressing  Goal: Verbalize understanding of personal risk factors for fall in the hospital  Outcome: Progressing  Goal: Verbalize understanding of risk factor reduction measures to prevent injury from fall in the home  Outcome: Progressing  Goal: Use assistive devices by end of the shift  Outcome: Progressing   The patient's goals for the shift include      The clinical goals for the shift include HEMATURIA WILL IMPROVE/URINE  LESS BLOODY TO CLEAR IN THE WAN      Problem: Skin  Goal: Prevent/manage excess moisture  3/1/2025 0508 by Vi Garnett RN  Outcome: Progressing  3/1/2025 0506 by Vi Garnett RN  Outcome: Progressing  Goal: Prevent/minimize sheer/friction injuries  3/1/2025 0508 by Vi Garnett RN  Outcome: Progressing  3/1/2025 0506 by Vi Garnett RN  Outcome: Progressing  Goal: Promote/optimize nutrition  3/1/2025 0508 by Vi Garnett  RN  Outcome: Progressing  3/1/2025 0506 by Vi Garnett RN  Outcome: Progressing  Goal: Participates in plan/prevention/treatment measures  Outcome: Progressing     Problem: Pain - Adult  Goal: Verbalizes/displays adequate comfort level or baseline comfort level  3/1/2025 0508 by Vi Garnett RN  Outcome: Progressing  3/1/2025 0506 by Vi Garnett RN  Outcome: Progressing     Problem: Safety - Adult  Goal: Free from fall injury  3/1/2025 0508 by Vi Garnett RN  Outcome: Progressing  3/1/2025 0506 by Vi Garnett RN  Outcome: Progressing     Problem: Fall/Injury  Goal: Not fall by end of shift  Outcome: Progressing  Goal: Be free from injury by end of the shift  Outcome: Progressing  Goal: Verbalize understanding of personal risk factors for fall in the hospital  Outcome: Progressing  Goal: Verbalize understanding of risk factor reduction measures to prevent injury from fall in the home  Outcome: Progressing  Goal: Use assistive devices by end of the shift  Outcome: Progressing

## 2025-03-01 NOTE — CARE PLAN
The patient's goals for the shift include      The clinical goals for the shift include HEMATURIA WILL IMPROVE/URINE  LESS BLOODY TO CLEAR IN THE WAN    Problem: Skin  Goal: Prevent/manage excess moisture  Outcome: Progressing  Goal: Prevent/minimize sheer/friction injuries  Outcome: Progressing  Goal: Promote/optimize nutrition  Outcome: Progressing     Problem: Pain - Adult  Goal: Verbalizes/displays adequate comfort level or baseline comfort level  Outcome: Progressing     Problem: Safety - Adult  Goal: Free from fall injury  Outcome: Progressing

## 2025-03-01 NOTE — NURSING NOTE
Pt. Admitted w/ Hematuria, has CBI w/ nss, urine has been cherry red to pinkish clear.pt. was medicate prn xanax as ordered x 2 this shift. Urine kept pinkish to yellow clear.Had 1 small blood clot. Whenhe got up to BR w/ 2 assist .

## 2025-03-01 NOTE — CONSULTS
Consults  I25.1 CAD status post CABG Z95.1  I48.91 Atrial fibrillation on Eliquis  I50.92 Congestive heart failure  N18.3 Stage IIIa chronic kidney disease    Hold Eliquis  HFrEF 30 to 35%  Chronic atrial fibrillation on metoprolol Entresto Aldactone and SL TG 2 agent  Coronary disease on a statin  Long-term I would very strongly consider referring this patient for a Watchman device to eliminate stroke risk and need for anticoagulation the problem with watchman is it requires 4 weeks anticoagulation preprocedure in 4 to 5 weeks post procedure and verification of complete seal by YORDY 5 weeks after implant before stopping the Eliquis    Since he has a bladder mass with hematuria on Eliquis a cystoscopy on Wednesday is not an elective procedure but a necessary procedure show will optimize medication and follow him with out a nuclear stress test for clearance  His echo done a month ago has been reviewed    History Of Present Illness:    Minh Harrington Jr. is a 85 y.o. male presenting with with multiple medical issues presenting with blood in urine for several days.  He is on Eliquis for atrial fibrillation and cardiology is consulted.  He was told by his urologist if the bleeding were to continue more than 3 days to come to the hospital.  His last dose of Eliquis was February 27.  He denied chest pain dyspnea.  CT of the abdomen showed masslike bladder wall thickening posteriorly in the right lateral wall suspicious for underlying neoplasm.  Pandya catheter was placed for irrigation and urology consulted.  He has a remote history of CAD heart failure stage III chronic kidney disease epilepsy status post TURP 2008 and a previous bypass operation.  His last echocardiogram showed ejection fraction 30 to 35%.  It is similar presentation in August of last year.  His hemoglobin is 10.3 creatinine 1.13.  There is a tentative plan for cystoscopy and possible transurethral resection next Wednesday.    Dr. Yanez did a  Results have been released via [employer]. Please verify that these have been viewed by patient. If not, please call patient with results.      I have reviewed your recent results.    CBC NORMAL-The CBC appears to be stable at this time with no sign of major anemia, abnormal white count or platelet abnormality.  CMP/BMP NORMAL-The electrolytes all appear stable at this time.  This includes kidney functions along with routine electrolytes like sugar, potassium and sodium.  If it was a CMP test, the liver enzymes were noted to be stable also.  LIPID-ABNORMAL-Your cholesterol is slightly elevated, however your overall risk of cardiovascular disease is still low.  I would still recommend working on lifestyle modification with low fat, low carb diet and exercise to improve these numbers and further decrease your risk of heart disease.  VIT D NORMAL-The Vitamin D test shows that there is a normal level of vitamin D at this time in the blood.      The stool study was normal. Do you want me to go forward with the econsult for a possible upper endoscopy for work up of your abdominal bloating?    Please let me know if you have any additional questions or concerns about above. cardioversion June of last year but by August was back in atrial fibrillation the echo was done in January of this year by [facility] Dr. Ministerio Napier.  He follows with Dr. Heriberto poole.  He had a remote history of MI and bypass at the [facility] he has a history of CVA x 2 transient global amnesia prior TUR BP.  He has HFrEF managed with Jardiance 10 Toprol 25 twice daily Entresto 97 103 twice daily Aldactone 25 daily he is on atorvastatin 20.  His last dose of Eliquis was last Wednesday.  A planned nuclear stress test in 2024 was canceled at patient's request because he has a mass in the bladder with persistent gross hematuria on his Eliquis which is needed because of stroke history x 2 this is not a elective type procedure and the results of a nuclear stress test would not affect management in this situation to optimize medication and follow him during his stay    Coronary disease no angina  Remote CABG  Former smoker  Hyperlipidemia  Chronic systolic congestive heart failure  Permanent atrial fibrillation  Ischemic cardiomyopathy left ventricular ejection fraction 30 to 35% on echocardiogram   Overweight  History of aneurysm of ascending thoracic aorta  Obstructive Sleep Apnea with CPAP   Moderate Mitral Regurgitation  Moderate tricuspid regurgitation  Pulmonary hypertension      January 15, 2025 echocardiogram by Dr. Ministerio Napier    CONCLUSIONS:   1. The left ventricular systolic function is moderately decreased, with a visually estimated ejection fraction of 30-35%.   2. There is global hypokinesis of the left ventricle with minor regional variations.   3. There is normal right ventricular global systolic function.   4. The left atrium is moderate to severely dilated.   5. The right atrium is moderately dilated.   6. The mitral valve is moderately to severely thickened.   7. There is no evidence of mitral valve stenosis.   8. Moderate to severe mitral valve regurgitation.   9. Moderate to  severe tricuspid regurgitation.  10. Mild aortic valve stenosis.  11. Mild aortic valve regurgitation.  12. Pulmonary hypertension is present.  13. Severely elevated pulmonary artery pressure.  14. The inferior vena cava appears moderately dilated.  15. As compared to the July 2024 study current study is relatively similar in terms of LV function and general chamber dimensions. However the aorta does appear to be enlarging substantially since that time. Clinical correlation advised.  16. There is moderate dilatation of the aortic root.  17. There is moderately increased septal and mildly increased posterior left ventricular wall thickness.      Cardioversion April 2024 by Dr. Fausto Yanez    Procedure Details:    Cardioversion  Summary:  ·            External electric cardioversion of atrial fibrillation to normal sinus rhythm was achieved using 200 J synchronized biphasic.   Recommendations:  1.          A 12 lead ECG should be performed prior to discharge from the hospital.   2.          The patient should continue with the present medications.   Discharge:   1.          The patient recovered uneventfully from the effects of conscious sedation. The patient left the EP laboratory hemodynamically stable and without neurological deficits.   Follow up:   1.          The patient will be discharged on the day of the procedure, following bed rest and subsequent ambulation, provided the recovery parameters are appropriate. The patient should call the electrophysiologist immediately if symptoms recur, or for any problems. The patient has been instructed accordingly.   ________________________________________  Procedures:  Cardioversion.   ________________________________________  Patient history:  Please refer to the detailed history and physical on the patient's medical chart.  Palpitations were recurrence of atrial fibrillation.  Patient is a scheduled for electrical  cardioversion  ________________________________________  Procedure narrative:  The risks, benefits, and alternatives to the procedure and sedation were explained to the patient, and informed consent was obtained. The patient was in the fasting state. A grounding pad was placed. Self-adhesive anterior-posterior defibrillation pads were applied. A ZOLL defibrillator was used for monitoring and the defibrillator waveform was set to biphasic. The patient was set up for continuous monitoring of surface 12 lead ECG and pulse oximetry. Blood pressure was monitored with automatic cuff measurements. The procedure was performed under IV conscious sedation performed by anesthesiology service.   1.          External electric cardioversion of atrial fibrillation to normal sinus rhythm was achieved using 200 J synchronized biphasic.   ________________________________________  Complications:  The patient tolerated the procedure without any complications or incident.   ________________________________________  Prepared and signed by     Last Recorded Vitals:  Vitals:    02/28/25 2059 03/01/25 0000 03/01/25 0417 03/01/25 0800   BP: 114/64 127/56 125/64 113/68   BP Location:  Right arm Right arm Left arm   Patient Position:  Lying Lying Lying   Pulse: (!) 113 105 105 99   Resp:  16 16 18   Temp:  36.5 °C (97.7 °F) 37 °C (98.6 °F) 37.4 °C (99.3 °F)   TempSrc:  Temporal Temporal Temporal   SpO2:  95% 94% 93%   Weight:       Height:           Last Labs:  CBC - 3/1/2025:  6:10 AM  9.5 10.3 97    32.6      CMP - 3/1/2025:  6:10 AM  8.7 6.6 20 --- 0.7   _ 3.8 12 78      PTT - 2/28/2025: 12:38 PM  1.6   17.6 43     Troponin I, High Sensitivity   Date/Time Value Ref Range Status   04/15/2024 05:47 PM 32 (H) 0 - 20 ng/L Final   04/15/2024 04:11 PM 32 (H) 0 - 20 ng/L Final     B TYPE NATRIURETIC PEPTIDE (BNP)   Date/Time Value Ref Range Status   02/21/2025 12:24 PM 1,684 (H) <100 pg/mL Final     Comment:        BNP levels increase with age in  the general  population with the highest values seen in  individuals greater than 75 years of age.  Reference: J. Am. Michael. Cardiol. 2002; 40:976-982.          BNP   Date/Time Value Ref Range Status   01/21/2025 08:06 AM 1,234 (H) 0 - 99 pg/mL Final   01/15/2025 03:12 PM 1,652 (H) 0 - 99 pg/mL Final     Hemoglobin A1C   Date/Time Value Ref Range Status   10/11/2023 01:20 PM 5.2 see below % Final   12/16/2022 10:43 AM 5.3 % Final     Comment:          Diagnosis of Diabetes-Adults   Non-Diabetic: < or = 5.6%   Increased risk for developing diabetes: 5.7-6.4%   Diagnostic of diabetes: > or = 6.5%  .       Monitoring of Diabetes                Age (y)     Therapeutic Goal (%)   Adults:          >18           <7.0   Pediatrics:    13-18           <7.5                   7-12           <8.0                   0- 6            7.5-8.5   American Diabetes Association. Diabetes Care 33(S1), Jan 2010.     11/18/2020 04:29 PM 5.2 % Final     Comment:          Diagnosis of Diabetes-Adults   Non-Diabetic: < or = 5.6%   Increased risk for developing diabetes: 5.7-6.4%   Diagnostic of diabetes: > or = 6.5%  .       Monitoring of Diabetes                Age (y)     Therapeutic Goal (%)   Adults:          >18           <7.0   Pediatrics:    13-18           <7.5                   7-12           <8.0                   0- 6            7.5-8.5   American Diabetes Association. Diabetes Care 33(S1), Jan 2010.       LDL Calculated   Date/Time Value Ref Range Status   03/28/2024 07:45 AM 52 <=99 mg/dL Final     Comment:                                 Near   Borderline      AGE      Desirable  Optimal    High     High     Very High     0-19 Y     0 - 109     ---    110-129   >/= 130     ----    20-24 Y     0 - 119     ---    120-159   >/= 160     ----      >24 Y     0 -  99   100-129  130-159   160-189     >/=190     10/11/2023 01:20 PM 72 (L) 140 - 190 mg/dL Final     Comment:                                 Near   Borderline      AGE       Desirable  Optimal    High     High     Very High     0-19 Y     0 - 109     ---    110-129   >/= 130     ----    20-24 Y     0 - 119     ---    120-159   >/= 160     ----      >24 Y     0 -  99   100-129  130-159   160-189     >/=190       VLDL   Date/Time Value Ref Range Status   03/28/2024 07:45 AM 11 0 - 40 mg/dL Final   10/11/2023 01:20 PM 11 0 - 40 mg/dL Final   11/07/2022 01:04 PM 11 0 - 40 mg/dL Final   11/17/2021 02:58 PM 18 0 - 40 mg/dL Final   11/18/2020 04:29 PM 20 0 - 40 mg/dL Final      Last I/O:  I/O last 3 completed shifts:  In: 5380 (70.3 mL/kg) [P.O.:580; Other:4800]  Out: 5300 (69.3 mL/kg) [Urine:5300 (1.9 mL/kg/hr)]  Weight: 76.5 kg     Past Cardiology Tests (Last 3 Years):  EKG:  ECG 12 Lead 08/05/2024      ECG 12 lead 06/11/2024      ECG 12 Lead 06/11/2024      ECG 12 lead 04/15/2024      ECG 12 lead 04/15/2024      ECG 12 lead (Clinic Performed) 03/26/2024      ECG 12 lead (Clinic Performed) 03/25/2024    Echo:  Transthoracic Echo (TTE) Complete 01/22/2025      Transthoracic Echo Complete 07/25/2024      Transthoracic Echo (TTE) Complete 04/16/2024    Ejection Fractions:  EF   Date/Time Value Ref Range Status   01/22/2025 09:33 AM 33 %    07/25/2024 02:35 PM 33 %      Cath:  No results found for this or any previous visit from the past 1095 days.    Stress Test:  No results found for this or any previous visit from the past 1095 days.    Cardiac Imaging:  No results found for this or any previous visit from the past 1095 days.      Past Medical History:  He has a past medical history of Abnormal ECG, Arrhythmia, Atrial fibrillation (Multi), CHF (congestive heart failure), Coronary artery disease, COVID-19 (02/16/2022), Epilepsy, unspecified, not intractable, without status epilepticus (11/18/2020), Hyperlipidemia, Hypertension, Ischemic cardiomyopathy, Myocardial infarction (Multi), and Unspecified convulsions (Multi) (02/24/2021).    Past Surgical History:  He has a past surgical history that  includes Other surgical history (02/12/2019); Other surgical history (02/12/2019); Other surgical history (02/12/2019); Cardiac catheterization; Arterial bypass surgry; and Coronary angioplasty.      Social History:  He reports that he quit smoking about 28 years ago. His smoking use included cigarettes. He started smoking about 67 years ago. He has a 40 pack-year smoking history. He has never used smokeless tobacco. He reports current alcohol use of about 12.0 standard drinks of alcohol per week. He reports that he does not use drugs.    Family History:  Family History   Problem Relation Name Age of Onset    Heart failure Mother Dar     Emphysema Father Saroj John.     Lung disease Father Saroj John.     Bipolar disorder Brother          Allergies:  Patient has no known allergies.    Inpatient Medications:  Scheduled medications   Medication Dose Route Frequency    atorvastatin  20 mg oral Daily    B complex-vitamin C  1 tablet oral Daily    cholecalciferol  5,000 Units oral Daily    empagliflozin  10 mg oral Daily    finasteride  5 mg oral Daily    levETIRAcetam  500 mg oral Daily    metoprolol succinate XL  25 mg oral BID    mirtazapine  15 mg oral Nightly    sacubitriL-valsartan  1 tablet oral BID    sertraline  100 mg oral Daily    spironolactone  25 mg oral Daily     PRN medications   Medication    acetaminophen    Or    acetaminophen    Or    acetaminophen    ALPRAZolam    benzocaine-menthol    guaiFENesin    ondansetron    Or    ondansetron    polyethylene glycol    prochlorperazine    Or    prochlorperazine    Or    prochlorperazine     Continuous Medications   Medication Dose Last Rate     Outpatient Medications:  Current Outpatient Medications   Medication Instructions    ALPRAZolam (XANAX) 0.5 mg, oral, 3 times daily PRN    atorvastatin (LIPITOR) 20 mg, oral, Daily, as directed    b complex vitamins capsule 1 capsule, Daily    cholecalciferol (Vitamin D-3) 125 MCG (5000 UT) capsule 1 tablet, Daily     dutasteride (AVODART) 0.5 mg, oral, Daily    Eliquis 5 mg, oral, 2 times daily    furosemide (Lasix) 20 mg tablet Take one daily ,if weight gain of 3 lbs in one day or 5 lbs in one week take another tablet that day until weight is back to normal    Jardiance 10 mg, oral, Daily    levETIRAcetam (Keppra) 500 mg tablet TAKE 1/2 (ONE-HALF) OF A TABLET BY MOUTH DAILY    metoprolol succinate XL (TOPROL-XL) 25 mg, oral, 2 times daily    mirtazapine (REMERON) 15 mg, oral, Nightly    sacubitriL-valsartan (Entresto)  mg tablet 1 tablet, oral, 2 times daily    sertraline (ZOLOFT) 100 mg, oral, Daily    spironolactone (ALDACTONE) 25 mg, oral, Daily     Results for orders placed or performed during the hospital encounter of 02/28/25 (from the past 96 hours)   Urinalysis with Reflex Culture and Microscopic   Result Value Ref Range    Color, Urine Red (N) Straw, Yellow    Appearance, Urine Turbid (N) Clear    Specific Gravity, Urine >1.030 (N) 1.005 - 1.035    pH, Urine 6.0 5.0, 5.5, 6.0, 6.5, 7.0, 7.5, 8.0    Protein, Urine 100 (2+) (A) NEGATIVE, 10 (TRACE) mg/dL    Glucose, Urine 300 (3+) (A) NEGATIVE mg/dL    Blood, Urine 1.0 (3+) (A) NEGATIVE mg/dL    Ketones, Urine NEGATIVE NEGATIVE mg/dL    Bilirubin, Urine NEGATIVE NEGATIVE mg/dL    Urobilinogen, Urine NORM NORM mg/dL    Nitrite, Urine NEGATIVE NEGATIVE    Leukocyte Esterase, Urine 250 Cynthia/uL (A) NEGATIVE   Extra Urine Gray Tube   Result Value Ref Range    Extra Tube Hold for add-ons.    Microscopic Only, Urine   Result Value Ref Range    WBC, Urine >50 (A) 1-5, NONE /HPF    RBC, Urine >20 (A) NONE, 1-2, 3-5 /HPF    Mucus, Urine 4+ Reference range not established. /LPF   Urine Culture    Specimen: Clean Catch/Voided; Urine   Result Value Ref Range    Urine Culture       Clinically insignificant growth based on current clinical standards.   CBC and Auto Differential   Result Value Ref Range    WBC 6.1 4.4 - 11.3 x10*3/uL    nRBC 0.0 0.0 - 0.0 /100 WBCs    RBC 3.77 (L)  4.50 - 5.90 x10*6/uL    Hemoglobin 10.8 (L) 13.5 - 17.5 g/dL    Hematocrit 35.2 (L) 41.0 - 52.0 %    MCV 93 80 - 100 fL    MCH 28.6 26.0 - 34.0 pg    MCHC 30.7 (L) 32.0 - 36.0 g/dL    RDW 17.4 (H) 11.5 - 14.5 %    Platelets 106 (L) 150 - 450 x10*3/uL    Neutrophils % 71.4 40.0 - 80.0 %    Immature Granulocytes %, Automated 0.3 0.0 - 0.9 %    Lymphocytes % 14.1 13.0 - 44.0 %    Monocytes % 13.1 2.0 - 10.0 %    Eosinophils % 0.8 0.0 - 6.0 %    Basophils % 0.3 0.0 - 2.0 %    Neutrophils Absolute 4.37 1.60 - 5.50 x10*3/uL    Immature Granulocytes Absolute, Automated 0.02 0.00 - 0.50 x10*3/uL    Lymphocytes Absolute 0.86 0.80 - 3.00 x10*3/uL    Monocytes Absolute 0.80 0.05 - 0.80 x10*3/uL    Eosinophils Absolute 0.05 0.00 - 0.40 x10*3/uL    Basophils Absolute 0.02 0.00 - 0.10 x10*3/uL   Comprehensive metabolic panel   Result Value Ref Range    Glucose 106 (H) 74 - 99 mg/dL    Sodium 137 136 - 145 mmol/L    Potassium 5.0 3.5 - 5.3 mmol/L    Chloride 105 98 - 107 mmol/L    Bicarbonate 26 21 - 32 mmol/L    Anion Gap 11 10 - 20 mmol/L    Urea Nitrogen 33 (H) 6 - 23 mg/dL    Creatinine 1.51 (H) 0.50 - 1.30 mg/dL    eGFR 44 (L) >60 mL/min/1.73m*2    Calcium 8.7 8.6 - 10.3 mg/dL    Albumin 3.8 3.4 - 5.0 g/dL    Alkaline Phosphatase 78 33 - 136 U/L    Total Protein 6.6 6.4 - 8.2 g/dL    AST 20 9 - 39 U/L    Bilirubin, Total 0.7 0.0 - 1.2 mg/dL    ALT 12 10 - 52 U/L   Coagulation Screen   Result Value Ref Range    Protime 17.6 (H) 9.8 - 12.4 seconds    INR 1.6 (H) 0.9 - 1.1    aPTT 43 (H) 26 - 36 seconds   Basic metabolic panel   Result Value Ref Range    Glucose 97 74 - 99 mg/dL    Sodium 137 136 - 145 mmol/L    Potassium 4.4 3.5 - 5.3 mmol/L    Chloride 107 98 - 107 mmol/L    Bicarbonate 24 21 - 32 mmol/L    Anion Gap 10 10 - 20 mmol/L    Urea Nitrogen 23 6 - 23 mg/dL    Creatinine 1.13 0.50 - 1.30 mg/dL    eGFR 63 >60 mL/min/1.73m*2    Calcium 8.7 8.6 - 10.3 mg/dL   CBC and Auto Differential   Result Value Ref Range    WBC 9.5  4.4 - 11.3 x10*3/uL    nRBC 0.0 0.0 - 0.0 /100 WBCs    RBC 3.64 (L) 4.50 - 5.90 x10*6/uL    Hemoglobin 10.3 (L) 13.5 - 17.5 g/dL    Hematocrit 32.6 (L) 41.0 - 52.0 %    MCV 90 80 - 100 fL    MCH 28.3 26.0 - 34.0 pg    MCHC 31.6 (L) 32.0 - 36.0 g/dL    RDW 17.2 (H) 11.5 - 14.5 %    Platelets 97 (L) 150 - 450 x10*3/uL    Neutrophils % 81.3 40.0 - 80.0 %    Immature Granulocytes %, Automated 0.4 0.0 - 0.9 %    Lymphocytes % 8.0 13.0 - 44.0 %    Monocytes % 9.7 2.0 - 10.0 %    Eosinophils % 0.3 0.0 - 6.0 %    Basophils % 0.3 0.0 - 2.0 %    Neutrophils Absolute 7.72 (H) 1.60 - 5.50 x10*3/uL    Immature Granulocytes Absolute, Automated 0.04 0.00 - 0.50 x10*3/uL    Lymphocytes Absolute 0.76 (L) 0.80 - 3.00 x10*3/uL    Monocytes Absolute 0.92 (H) 0.05 - 0.80 x10*3/uL    Eosinophils Absolute 0.03 0.00 - 0.40 x10*3/uL    Basophils Absolute 0.03 0.00 - 0.10 x10*3/uL   Magnesium   Result Value Ref Range    Magnesium 2.07 1.60 - 2.40 mg/dL       Physical Exam:  Subjective:   Examination:   General Examination:   General Appearance: Well developed, well nourished, in no acute distress.  Elderly  Head: normocephalic, atraumatic   Eyes: Anicteric sclera. Pupils are equally round and reactive to light.  Extraocular movements are intact.    Ears: normal   Oral: Cavity: mucosa moist.   Throat: clear.   Neck/Thyroid: neck supple, full range of motion, no cervical lymphadenopathy.   Skin: warm and dry, no suspicious lesions.    Heart: regular rate and rhythm, S1, S2 normal, no murmurs.   Lungs: clear to auscultation bilaterally.   Abdomen: soft, non-tender, non-distended, bowl sound present, normal.   Extremities: no clubbing, no cyanosis, no edema.   Neuro: non-focal, motor strength normal upper lower extremities, sensory exam intact.       Assessment/Plan     I25.1 CAD status post CABG Z95.1  I48.91 Atrial fibrillation on Eliquis  I50.92 Congestive heart failure  N18.3 Stage IIIa chronic kidney disease    Hold Eliquis  HFrEF 30 to  35%  Chronic atrial fibrillation on metoprolol Entresto Aldactone and SL TG 2 agent  Coronary disease on a statin    Since he has a bladder mass with hematuria on Eliquis a cystoscopy on Wednesday is not an elective procedure but a necessary procedure show will optimize medication and follow him with out a nuclear stress test for clearance  His echo done a month ago has been reviewed    Long-term I would very strongly consider referring this patient for a Watchman device to eliminate stroke risk and need for anticoagulation the problem with watchman is it requires 4 weeks anticoagulation preprocedure in 4 to 5 weeks post procedure and verification of complete seal by YORDY 5 weeks after implant before stopping the Eliquis    Code Status:  DNR and No Intubation    I spent 60 minutes in the professional and overall care of this patient.        Claudio Perales MD

## 2025-03-01 NOTE — CARE PLAN
The patient's goals for the shift include minimal to not clots in irrigation    The clinical goals for the shift include pox will remain 92% or greater through end of shift

## 2025-03-01 NOTE — CONSULTS
Reason For Consult  Gross hematuria    History Of Present Illness  Minh Harrington Jr. is a 85 y.o. male with a history of BPH with TURP in 2008, intermittent gross hematuria since at least July, A-fib on Eliquis, systolic heart failure with EF 30 to 35%, hypertension, hyperlipidemia prior MI, CKD 3a and is currently admitted with gross hematuria.  He was admitted with a similar presentation in August and was scheduled for outpatient follow-up however this did not happen.  Prior to admission he had several days of worsening gross hematuria prompting admission.  Denies any fevers or chills, dysuria, flank pain.    In the ER, three-way Pandya catheter was placed and CBI was initiated, Eliquis was held.  Hemoglobin 10.3, stable  -Creatinine 1.13 (baseline)  -Urine culture pending  -UA with greater than 50 WBCs, greater than 20 RBCs     Past Medical History  He has a past medical history of Abnormal ECG, Arrhythmia, Atrial fibrillation (Multi), CHF (congestive heart failure), Coronary artery disease, COVID-19 (02/16/2022), Epilepsy, unspecified, not intractable, without status epilepticus (11/18/2020), Hyperlipidemia, Hypertension, Ischemic cardiomyopathy, Myocardial infarction (Multi), and Unspecified convulsions (Multi) (02/24/2021).    Surgical History  He has a past surgical history that includes Other surgical history (02/12/2019); Other surgical history (02/12/2019); Other surgical history (02/12/2019); Cardiac catheterization; Arterial bypass surgry; and Coronary angioplasty.     Social History  He reports that he quit smoking about 28 years ago. His smoking use included cigarettes. He started smoking about 67 years ago. He has a 40 pack-year smoking history. He has never used smokeless tobacco. He reports current alcohol use of about 12.0 standard drinks of alcohol per week. He reports that he does not use drugs.    Family History  Family History   Problem Relation Name Age of Onset    Heart failure Mother  "Nana1     Emphysema Father Saroj Sr.     Lung disease Father Saroj John.     Bipolar disorder Brother          Allergies  Patient has no known allergies.    Review of Systems  A 12 system review was completed and is negative with the exception of those signs and symptoms noted in the history of present illness.     Physical Exam  General: in NAD, appears stated age  Head: normocephalic, atraumatic  Respiratory: normal effort, no use of accessory muscles  Cardiovascular: no edema noted  Skin: normal turgor, no rashes  Neurologic: grossly intact, oriented to person/place/time  Psychiatric: mode and affect appropriate  Abdomen: Soft, nontender, nondistended, no CVA tenderness  Pandya catheter in place draining fruit punch colored urine off of CBI  CBI reinitiated at slow drip, draining light pink     Last Recorded Vitals  Blood pressure 113/68, pulse 99, temperature 37.4 °C (99.3 °F), temperature source Temporal, resp. rate 18, height 1.778 m (5' 10\"), weight 76.5 kg (168 lb 10.4 oz), SpO2 93%.    Relevant Results      See HPI     Assessment/Plan     # Gross hematuria  -Suspect secondary to bladder mass, possibly secondary to BPH with fragile vessels  -Continue to hold Eliquis  -Continue CBI, wean to light pink  -Tentatively plan for cystoscopy, likely transurethral resection of bladder tumor this coming Wednesday  -If catheter clots offers difficulty irrigate we can take him earlier  -Okay for diet  -If hematuria clears and we are able to wean CBI off, okay to go home prior to returning for procedure        Anthony Washington MD    "

## 2025-03-01 NOTE — NURSING NOTE
PT. C/O LOWER ABDOMINAL PAIN, WAN CHECK DRAINING PALE PINK URINE, ABDOMEN DISTENDED TYMPANITIC ON PRCUSSION. PT. HAD SMALL SOFT STOOL IN THE BED, THE ASSISTED TO BR, HAD A LARGED FORMED BM AND PASS GAS.ASSISTED BACK TO BED, PT. CLAIMS FELT A LOT BETTER. ALSO PASSES 1 BIG CLOT WHILE GETTING OUT OF THE TOILET.

## 2025-03-02 LAB
ANION GAP SERPL CALC-SCNC: 10 MMOL/L (ref 10–20)
BASOPHILS # BLD AUTO: 0.02 X10*3/UL (ref 0–0.1)
BASOPHILS NFR BLD AUTO: 0.3 %
BUN SERPL-MCNC: 17 MG/DL (ref 6–23)
CALCIUM SERPL-MCNC: 8.5 MG/DL (ref 8.6–10.3)
CHLORIDE SERPL-SCNC: 105 MMOL/L (ref 98–107)
CO2 SERPL-SCNC: 26 MMOL/L (ref 21–32)
CREAT SERPL-MCNC: 1.07 MG/DL (ref 0.5–1.3)
EGFRCR SERPLBLD CKD-EPI 2021: 67 ML/MIN/1.73M*2
EOSINOPHIL # BLD AUTO: 0.09 X10*3/UL (ref 0–0.4)
EOSINOPHIL NFR BLD AUTO: 1.3 %
ERYTHROCYTE [DISTWIDTH] IN BLOOD BY AUTOMATED COUNT: 17.2 % (ref 11.5–14.5)
GLUCOSE SERPL-MCNC: 87 MG/DL (ref 74–99)
HCT VFR BLD AUTO: 31.7 % (ref 41–52)
HGB BLD-MCNC: 9.9 G/DL (ref 13.5–17.5)
IMM GRANULOCYTES # BLD AUTO: 0.04 X10*3/UL (ref 0–0.5)
IMM GRANULOCYTES NFR BLD AUTO: 0.6 % (ref 0–0.9)
LYMPHOCYTES # BLD AUTO: 0.95 X10*3/UL (ref 0.8–3)
LYMPHOCYTES NFR BLD AUTO: 13.3 %
MAGNESIUM SERPL-MCNC: 1.96 MG/DL (ref 1.6–2.4)
MCH RBC QN AUTO: 28.3 PG (ref 26–34)
MCHC RBC AUTO-ENTMCNC: 31.2 G/DL (ref 32–36)
MCV RBC AUTO: 91 FL (ref 80–100)
MONOCYTES # BLD AUTO: 0.97 X10*3/UL (ref 0.05–0.8)
MONOCYTES NFR BLD AUTO: 13.6 %
NEUTROPHILS # BLD AUTO: 5.06 X10*3/UL (ref 1.6–5.5)
NEUTROPHILS NFR BLD AUTO: 70.9 %
NRBC BLD-RTO: 0 /100 WBCS (ref 0–0)
PLATELET # BLD AUTO: 87 X10*3/UL (ref 150–450)
POTASSIUM SERPL-SCNC: 4 MMOL/L (ref 3.5–5.3)
RBC # BLD AUTO: 3.5 X10*6/UL (ref 4.5–5.9)
SODIUM SERPL-SCNC: 137 MMOL/L (ref 136–145)
WBC # BLD AUTO: 7.1 X10*3/UL (ref 4.4–11.3)

## 2025-03-02 PROCEDURE — 2500000002 HC RX 250 W HCPCS SELF ADMINISTERED DRUGS (ALT 637 FOR MEDICARE OP, ALT 636 FOR OP/ED): Performed by: INTERNAL MEDICINE

## 2025-03-02 PROCEDURE — 1200000002 HC GENERAL ROOM WITH TELEMETRY DAILY

## 2025-03-02 PROCEDURE — 85025 COMPLETE CBC W/AUTO DIFF WBC: CPT | Performed by: INTERNAL MEDICINE

## 2025-03-02 PROCEDURE — 80048 BASIC METABOLIC PNL TOTAL CA: CPT | Performed by: INTERNAL MEDICINE

## 2025-03-02 PROCEDURE — 99231 SBSQ HOSP IP/OBS SF/LOW 25: CPT | Performed by: NURSE PRACTITIONER

## 2025-03-02 PROCEDURE — 99233 SBSQ HOSP IP/OBS HIGH 50: CPT | Performed by: INTERNAL MEDICINE

## 2025-03-02 PROCEDURE — 36415 COLL VENOUS BLD VENIPUNCTURE: CPT | Performed by: INTERNAL MEDICINE

## 2025-03-02 PROCEDURE — 2500000001 HC RX 250 WO HCPCS SELF ADMINISTERED DRUGS (ALT 637 FOR MEDICARE OP): Performed by: INTERNAL MEDICINE

## 2025-03-02 PROCEDURE — 83735 ASSAY OF MAGNESIUM: CPT | Performed by: INTERNAL MEDICINE

## 2025-03-02 RX ORDER — OXYBUTYNIN CHLORIDE 5 MG/1
5 TABLET ORAL ONCE
Status: COMPLETED | OUTPATIENT
Start: 2025-03-02 | End: 2025-03-02

## 2025-03-02 RX ADMIN — ATORVASTATIN CALCIUM 20 MG: 20 TABLET, FILM COATED ORAL at 11:08

## 2025-03-02 RX ADMIN — Medication 1 TABLET: at 11:08

## 2025-03-02 RX ADMIN — CHOLECALCIFEROL TAB 125 MCG (5000 UNIT) 5000 UNITS: 125 TAB at 11:09

## 2025-03-02 RX ADMIN — LEVETIRACETAM 500 MG: 500 TABLET, FILM COATED ORAL at 11:09

## 2025-03-02 RX ADMIN — SACUBITRIL AND VALSARTAN 1 TABLET: 97; 103 TABLET, FILM COATED ORAL at 11:10

## 2025-03-02 RX ADMIN — METOPROLOL SUCCINATE 25 MG: 25 TABLET, EXTENDED RELEASE ORAL at 11:08

## 2025-03-02 RX ADMIN — OXYBUTYNIN CHLORIDE 5 MG: 5 TABLET ORAL at 23:08

## 2025-03-02 RX ADMIN — SPIRONOLACTONE 25 MG: 25 TABLET ORAL at 11:10

## 2025-03-02 RX ADMIN — MIRTAZAPINE 15 MG: 15 TABLET, FILM COATED ORAL at 20:48

## 2025-03-02 RX ADMIN — SACUBITRIL AND VALSARTAN 1 TABLET: 97; 103 TABLET, FILM COATED ORAL at 20:48

## 2025-03-02 RX ADMIN — ALPRAZOLAM 0.5 MG: 0.5 TABLET ORAL at 20:48

## 2025-03-02 RX ADMIN — FINASTERIDE 5 MG: 5 TABLET, FILM COATED ORAL at 11:09

## 2025-03-02 RX ADMIN — SERTRALINE HYDROCHLORIDE 100 MG: 100 TABLET ORAL at 11:09

## 2025-03-02 RX ADMIN — METOPROLOL SUCCINATE 25 MG: 25 TABLET, EXTENDED RELEASE ORAL at 20:48

## 2025-03-02 RX ADMIN — EMPAGLIFLOZIN 10 MG: 10 TABLET, FILM COATED ORAL at 11:10

## 2025-03-02 ASSESSMENT — PAIN SCALES - GENERAL
PAINLEVEL_OUTOF10: 0 - NO PAIN
PAINLEVEL_OUTOF10: 0 - NO PAIN

## 2025-03-02 ASSESSMENT — PAIN - FUNCTIONAL ASSESSMENT: PAIN_FUNCTIONAL_ASSESSMENT: 0-10

## 2025-03-02 NOTE — PROGRESS NOTES
Minh Harrington Jr. 85 y.o. male referred for gross hematuria.    Subjective  Minh is comfortable in bed. CBI with clear to pinkish urine output.     Objective  PHYSICAL EXAM:  Physical Exam  Constitutional:       General: He is not in acute distress.  Abdominal:      General: Abdomen is flat.      Palpations: Abdomen is soft.   Skin:     General: Skin is warm and dry.   Neurological:      Mental Status: He is alert. Mental status is at baseline.   Psychiatric:         Mood and Affect: Mood normal.         Vital signs in last 24 hours:  Vitals:    03/02/25 0800   BP: 104/58   Pulse: 92   Resp: 18   Temp: 36.2 °C (97.2 °F)   SpO2: 95%         Intake/Output this shift:    Intake/Output Summary (Last 24 hours) at 3/2/2025 1037  Last data filed at 3/2/2025 0825  Gross per 24 hour   Intake 960 ml   Output 4350 ml   Net -3390 ml        Allergies:  No Known Allergies     Medications:  Scheduled medications  atorvastatin, 20 mg, oral, Daily  B complex-vitamin C, 1 tablet, oral, Daily  cholecalciferol, 5,000 Units, oral, Daily  empagliflozin, 10 mg, oral, Daily  finasteride, 5 mg, oral, Daily  levETIRAcetam, 500 mg, oral, Daily  metoprolol succinate XL, 25 mg, oral, BID  mirtazapine, 15 mg, oral, Nightly  sacubitriL-valsartan, 1 tablet, oral, BID  sertraline, 100 mg, oral, Daily  spironolactone, 25 mg, oral, Daily      Continuous medications     PRN medications  PRN medications: acetaminophen **OR** acetaminophen **OR** acetaminophen, ALPRAZolam, benzocaine-menthol, guaiFENesin, ondansetron **OR** ondansetron, polyethylene glycol, prochlorperazine **OR** prochlorperazine **OR** prochlorperazine       Labs:  Results for orders placed or performed during the hospital encounter of 02/28/25 (from the past 24 hours)   Basic Metabolic Panel   Result Value Ref Range    Glucose 87 74 - 99 mg/dL    Sodium 137 136 - 145 mmol/L    Potassium 4.0 3.5 - 5.3 mmol/L    Chloride 105 98 - 107 mmol/L    Bicarbonate 26 21 - 32 mmol/L     Anion Gap 10 10 - 20 mmol/L    Urea Nitrogen 17 6 - 23 mg/dL    Creatinine 1.07 0.50 - 1.30 mg/dL    eGFR 67 >60 mL/min/1.73m*2    Calcium 8.5 (L) 8.6 - 10.3 mg/dL   CBC and Auto Differential   Result Value Ref Range    WBC 7.1 4.4 - 11.3 x10*3/uL    nRBC 0.0 0.0 - 0.0 /100 WBCs    RBC 3.50 (L) 4.50 - 5.90 x10*6/uL    Hemoglobin 9.9 (L) 13.5 - 17.5 g/dL    Hematocrit 31.7 (L) 41.0 - 52.0 %    MCV 91 80 - 100 fL    MCH 28.3 26.0 - 34.0 pg    MCHC 31.2 (L) 32.0 - 36.0 g/dL    RDW 17.2 (H) 11.5 - 14.5 %    Platelets 87 (L) 150 - 450 x10*3/uL    Neutrophils % 70.9 40.0 - 80.0 %    Immature Granulocytes %, Automated 0.6 0.0 - 0.9 %    Lymphocytes % 13.3 13.0 - 44.0 %    Monocytes % 13.6 2.0 - 10.0 %    Eosinophils % 1.3 0.0 - 6.0 %    Basophils % 0.3 0.0 - 2.0 %    Neutrophils Absolute 5.06 1.60 - 5.50 x10*3/uL    Immature Granulocytes Absolute, Automated 0.04 0.00 - 0.50 x10*3/uL    Lymphocytes Absolute 0.95 0.80 - 3.00 x10*3/uL    Monocytes Absolute 0.97 (H) 0.05 - 0.80 x10*3/uL    Eosinophils Absolute 0.09 0.00 - 0.40 x10*3/uL    Basophils Absolute 0.02 0.00 - 0.10 x10*3/uL   Magnesium   Result Value Ref Range    Magnesium 1.96 1.60 - 2.40 mg/dL        Imaging:  CT abdomen pelvis w IV contrast    Result Date: 2/28/2025  Interpreted By:  Flaquita Redd, STUDY: CT ABDOMEN PELVIS W IV CONTRAST;  2/28/2025 2:41 pm   INDICATION: Signs/Symptoms:painless hematuria.     COMPARISON: CT urography 08/07/2024 CT abdomen pelvis 08/05/2024   ACCESSION NUMBER(S): [id number]   ORDERING CLINICIAN: FRANK SHEEHAN   TECHNIQUE: CT of the abdomen and pelvis was performed.  Standard contiguous axial images were obtained at 3 mm slice thickness through the abdomen and pelvis. Coronal and sagittal reconstructions at 3 mm slice thickness were performed.  75 ML of Omnipaque 350 was administered intravenously without immediate complication.   FINDINGS: LOWER CHEST: Unchanged cardiomegaly without evidence of pericardial effusion. Severe  coronary artery calcifications versus stent. Stable small right and trace left pleural effusions. Right right-greater-than-left lower lobe dependent ground-glass opacities. Similar smooth interlobular septal thickening is seen, right-greater-than-left, compatible with interstitial edema. The visualized distal esophagus appears unremarkable. Status post median sternotomy.   ABDOMEN:   LIVER: The liver is normal in size. Mottled appearance of the liver, suspicious for hepatic venous congestion in the setting of cardiomegaly. No suspicious liver lesion.   BILE DUCTS: No biliary duct dilatation.   GALLBLADDER: The gallbladder is nondistended and without evidence of radiopaque stones.   PANCREAS: The pancreas appears unremarkable without evidence of ductal dilatation or masses.   SPLEEN: The spleen is normal in size.   ADRENAL GLANDS: No adrenal nodularity or thickening.   KIDNEYS AND URETERS: The kidneys are normal in size. Stable hypoattenuating lesions in the bilateral kidneys, likely benign. Stable punctate nonobstructing calculus in the left kidney. No hydroureteronephrosis or right nephroureterolithiasis is identified.   PELVIS:   BLADDER: Interval worsening of masslike bladder wall thickening, most predominant posteriorly and along the right lateral wall.   REPRODUCTIVE ORGANS: Interval increase in prostatomegaly and heterogeneity of the prostate, measuring 52 mm in the transaxial dimension, previously 48 mm.   BOWEL: The stomach is unremarkable. The small and large bowel are normal in caliber and demonstrate no wall thickening. The appendix is not definitely visualized. There is however no pericecal stranding or fluid. Colonic diverticulosis without acute diverticulitis.   VESSELS: Severe atherosclerotic calcifications of the aortoiliac arteries. The IVC appears normal.   PERITONEUM/RETROPERITONEUM/LYMPH NODES: No ascites or fluid collection. No measurable peritoneal nodular thickening or deposits. Stable  subcentimeter para-aortic and bilateral iliac chain, which are nonenlarged by size criteria, and bears watching on future follow-up evaluations, for example a 8 mm left common iliac lymph node on series 2, image 112 measured in the short axis.   ABDOMINAL WALL: The abdominal wall soft tissues appear normal.   BONES: No acute fracture. No suspicious osseous lesions. Discogenic degenerative changes in the lower thoracic and lumbar spine.       1. Interval worsening of masslike bladder wall thickening, most predominant posteriorly and along the right lateral wall. Findings are suspicious for underlying neoplasm, correlation with cystoscopy and tissue diagnosis is recommended. 2. Interval increase in size and heterogeneity of the prostate, which direct tumor invasion is not excluded. 3. Stable subcentimeter para-aortic and bilateral iliac chain. Though nonenlarged by size criteria, this area bears watching on future follow-up evaluations to evaluate for shanon metastasis. 4. Similar cardiomegaly with interstitial edema and small bilateral pleural effusions, right-greater-than-left. 5. Mottled appearance of the liver, suspicious for hepatic venous congestion in the setting of cardiomegaly. 6. Additional chronic and incidental findings as detailed above.     MACRO: None   Signed by: Flaquita Redd 2/28/2025 3:11 PM Dictation workstation:   [id number]       Plan  Hematuria     CBI with clear to pink output. Plan to wean off. If succesfull, may remove barbosa catheter.   Tentatively plan for cystoscopy, possible TUR-BT on Wednesday.     Plan of care discussed and with Dr. SHANNAN Washington.    Sylvie Man, MERCEDES-CNP    I spent 15 minutes in the professional and overall care of this patient.

## 2025-03-02 NOTE — PROGRESS NOTES
Minh Harrington Jr. is a 85 y.o. male on day 1 of admission presenting with Hematuria.      Subjective   Urology evaluated patient this morning and recommended weaning CBI and see how he does and we can potentially remove the Pandya catheter and see if his able to void       Objective     Last Recorded Vitals  /58 (BP Location: Left arm, Patient Position: Lying)   Pulse 92   Temp 36.2 °C (97.2 °F) (Temporal)   Resp 18   Wt 76.5 kg (168 lb 10.4 oz)   SpO2 95%   Intake/Output last 3 Shifts:    Intake/Output Summary (Last 24 hours) at 3/2/2025 1124  Last data filed at 3/2/2025 1100  Gross per 24 hour   Intake 920 ml   Output 2150 ml   Net -1230 ml       Admission Weight  Weight: 80.7 kg (178 lb) (02/28/25 0946)    Daily Weight  02/28/25 : 76.5 kg (168 lb 10.4 oz)      Physical Exam:  General: Not in acute distress, alert  HEENT: PERRLA, head intact and normocephalic  Neck: Normal to inspection  Lungs: Clear to auscultation, work of breathing within normal limit  Cardiac: Regular rate and rhythm  Abdomen: Soft nontender, positive bowel sounds  : Pandya catheter in place with pink urine without any active clots  Skin: Intact  Hematology: No petechia or excessive ecchymosis  Musculoskeletal: Without significant trauma  Neurological: Alert awake oriented, no focal deficit, cranial nerves grossly intact  Psych: No suicidal ideation or homicidal ideation    Relevant Results  Scheduled medications  atorvastatin, 20 mg, oral, Daily  B complex-vitamin C, 1 tablet, oral, Daily  cholecalciferol, 5,000 Units, oral, Daily  empagliflozin, 10 mg, oral, Daily  finasteride, 5 mg, oral, Daily  levETIRAcetam, 500 mg, oral, Daily  metoprolol succinate XL, 25 mg, oral, BID  mirtazapine, 15 mg, oral, Nightly  sacubitriL-valsartan, 1 tablet, oral, BID  sertraline, 100 mg, oral, Daily  spironolactone, 25 mg, oral, Daily      Continuous medications     PRN medications  PRN medications: acetaminophen **OR** acetaminophen **OR**  acetaminophen, ALPRAZolam, benzocaine-menthol, guaiFENesin, ondansetron **OR** ondansetron, polyethylene glycol, prochlorperazine **OR** prochlorperazine **OR** prochlorperazine   Labs  Results from last 7 days   Lab Units 03/02/25  0612 03/01/25  0610 02/28/25  1238   WBC AUTO x10*3/uL 7.1 9.5 6.1   HEMOGLOBIN g/dL 9.9* 10.3* 10.8*   HEMATOCRIT % 31.7* 32.6* 35.2*   PLATELETS AUTO x10*3/uL 87* 97* 106*     Results from last 7 days   Lab Units 03/02/25  0612 03/01/25  0610 02/28/25  1238   SODIUM mmol/L 137 137 137   POTASSIUM mmol/L 4.0 4.4 5.0   CHLORIDE mmol/L 105 107 105   CO2 mmol/L 26 24 26   BUN mg/dL 17 23 33*   CREATININE mg/dL 1.07 1.13 1.51*   CALCIUM mg/dL 8.5* 8.7 8.7   PROTEIN TOTAL g/dL  --   --  6.6   BILIRUBIN TOTAL mg/dL  --   --  0.7   ALK PHOS U/L  --   --  78   ALT U/L  --   --  12   AST U/L  --   --  20   GLUCOSE mg/dL 87 97 106*       CT abdomen pelvis w IV contrast    Result Date: 2/28/2025  Interpreted By:  Flaquita Redd, STUDY: CT ABDOMEN PELVIS W IV CONTRAST;  2/28/2025 2:41 pm   INDICATION: Signs/Symptoms:painless hematuria.     COMPARISON: CT urography 08/07/2024 CT abdomen pelvis 08/05/2024   ACCESSION NUMBER(S): [id number]   ORDERING CLINICIAN: FRANK SHEEHAN   TECHNIQUE: CT of the abdomen and pelvis was performed.  Standard contiguous axial images were obtained at 3 mm slice thickness through the abdomen and pelvis. Coronal and sagittal reconstructions at 3 mm slice thickness were performed.  75 ML of Omnipaque 350 was administered intravenously without immediate complication.   FINDINGS: LOWER CHEST: Unchanged cardiomegaly without evidence of pericardial effusion. Severe coronary artery calcifications versus stent. Stable small right and trace left pleural effusions. Right right-greater-than-left lower lobe dependent ground-glass opacities. Similar smooth interlobular septal thickening is seen, right-greater-than-left, compatible with interstitial edema. The visualized distal  esophagus appears unremarkable. Status post median sternotomy.   ABDOMEN:   LIVER: The liver is normal in size. Mottled appearance of the liver, suspicious for hepatic venous congestion in the setting of cardiomegaly. No suspicious liver lesion.   BILE DUCTS: No biliary duct dilatation.   GALLBLADDER: The gallbladder is nondistended and without evidence of radiopaque stones.   PANCREAS: The pancreas appears unremarkable without evidence of ductal dilatation or masses.   SPLEEN: The spleen is normal in size.   ADRENAL GLANDS: No adrenal nodularity or thickening.   KIDNEYS AND URETERS: The kidneys are normal in size. Stable hypoattenuating lesions in the bilateral kidneys, likely benign. Stable punctate nonobstructing calculus in the left kidney. No hydroureteronephrosis or right nephroureterolithiasis is identified.   PELVIS:   BLADDER: Interval worsening of masslike bladder wall thickening, most predominant posteriorly and along the right lateral wall.   REPRODUCTIVE ORGANS: Interval increase in prostatomegaly and heterogeneity of the prostate, measuring 52 mm in the transaxial dimension, previously 48 mm.   BOWEL: The stomach is unremarkable. The small and large bowel are normal in caliber and demonstrate no wall thickening. The appendix is not definitely visualized. There is however no pericecal stranding or fluid. Colonic diverticulosis without acute diverticulitis.   VESSELS: Severe atherosclerotic calcifications of the aortoiliac arteries. The IVC appears normal.   PERITONEUM/RETROPERITONEUM/LYMPH NODES: No ascites or fluid collection. No measurable peritoneal nodular thickening or deposits. Stable subcentimeter para-aortic and bilateral iliac chain, which are nonenlarged by size criteria, and bears watching on future follow-up evaluations, for example a 8 mm left common iliac lymph node on series 2, image 112 measured in the short axis.   ABDOMINAL WALL: The abdominal wall soft tissues appear normal.   BONES:  No acute fracture. No suspicious osseous lesions. Discogenic degenerative changes in the lower thoracic and lumbar spine.       1. Interval worsening of masslike bladder wall thickening, most predominant posteriorly and along the right lateral wall. Findings are suspicious for underlying neoplasm, correlation with cystoscopy and tissue diagnosis is recommended. 2. Interval increase in size and heterogeneity of the prostate, which direct tumor invasion is not excluded. 3. Stable subcentimeter para-aortic and bilateral iliac chain. Though nonenlarged by size criteria, this area bears watching on future follow-up evaluations to evaluate for shanon metastasis. 4. Similar cardiomegaly with interstitial edema and small bilateral pleural effusions, right-greater-than-left. 5. Mottled appearance of the liver, suspicious for hepatic venous congestion in the setting of cardiomegaly. 6. Additional chronic and incidental findings as detailed above.     MACRO: None   Signed by: Flaquiat Redd 2/28/2025 3:11 PM Dictation workstation:   [id number]                    Assessment/Plan   85-year-old male with past medical history of CAD status post CABG, atrial fibrillation on Eliquis, chronic systolic heart failure, CKD stage III, thrombocytopenia, BPH status post TURP in 2008 presented due to gross hematuria and was found to have worsening of bladder mass and require initiation of CBI and holding anticoagulation.  Assessment & Plan  Hematuria    Gross hematuria    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  CBI is running  Will have to wean this off  Outpatient TURBT scheduled for Wednesday  Will continue to hold anticoagulation  No need for antibiotics  Cardiology consultation is noted as well  Hold anticoagulation and outpatient referral for Watchman device placement  As per urology patient may be able to resume anticoagulation 3 to 4 days after TURBT  Later in the day CBI completely clotted off requiring irrigation.  As per  urology n.p.o. after midnight 4 OR tomorrow    Atrial fibrillation on Eliquis  Patient's anticoagulation will need to be held  Cardiology consultation is noted  To consider Watchman device as outpatient as outpatient and cardiology will refer patient  Patient follows up with Dr. Valle    HFrEF  Euvolemic  Continue with Entresto, spironolactone, metoprolol, Jardiance    CKD stage III  Creatinine stable  Continue to monitor    Hyperlipidemia  Continue atorvastatin    Anxiety disorder  Continue with Xanax as needed    DVT prophylaxis  SCDs      Disposition: Plan 4 OR tomorrow.  N.p.o. after midnight     Plan discussed with patient at bedside    High Level of MDM based on above issue and discussing plan    This note is created using voice recognition software. All efforts are made to minimize errors, if there are errors there due to transcription.    Ruiz Lechuga  Hospitalist

## 2025-03-02 NOTE — PROGRESS NOTES
Subjective Data:  I25.1 CAD status post CABG Z95.1  I48.91 Atrial fibrillation on Eliquis  I50.92 Congestive heart failure  N18.3 Stage IIIa chronic kidney disease     Hold Eliquis  HFrEF 30 to 35%  Chronic atrial fibrillation on metoprolol Entresto Aldactone and SL TG 2 agent  Coronary disease on a statin  Long-term I would very strongly consider referring this patient for a Watchman device to eliminate stroke risk and need for anticoagulation the problem with watchman is it requires 4 weeks anticoagulation preprocedure in 4 to 5 weeks post procedure and verification of complete seal by YORDY 5 weeks after implant before stopping the Eliquis     Since he has a bladder mass with hematuria on Eliquis a cystoscopy on Wednesday is not an elective procedure but a necessary procedure show will optimize medication and follow him with out a nuclear stress test for clearance  His echo done a month ago has been reviewed    Overnight Events:    Heart rate control adequate blood pressure remains soft  Eliquis on hold chronic heart failure systolic stage III chronic kidney disease thrombocytopenia BPH status post TURP presenting with hematuria bladder mass see my recommendations above and in my consult note     Objective Data:  Last Recorded Vitals:  Vitals:    03/02/25 0000 03/02/25 0330 03/02/25 0800 03/02/25 1600   BP: 95/52 (!) 95/45 104/58 127/80   BP Location: Left arm Left arm Left arm Left arm   Patient Position: Lying Lying Lying Lying   Pulse: 85 80 92 102   Resp: 20 18 18 18   Temp: 36.4 °C (97.5 °F) 37 °C (98.6 °F) 36.2 °C (97.2 °F) 36.8 °C (98.2 °F)   TempSrc: Temporal Temporal Temporal Temporal   SpO2: 94% 98% 95% 98%   Weight:       Height:           Last Labs:  CBC - 3/2/2025:  6:12 AM  7.1 9.9 87    31.7      CMP - 3/2/2025:  6:12 AM  8.5 6.6 20 --- 0.7   _ 3.8 12 78      PTT - 2/28/2025: 12:38 PM  1.6   17.6 43     TROPHS   Date/Time Value Ref Range Status   04/15/2024 05:47 PM 32 0 - 20 ng/L Final   04/15/2024  04:11 PM 32 0 - 20 ng/L Final     BNP   Date/Time Value Ref Range Status   02/21/2025 12:24 PM 1,684 <100 pg/mL Final     Comment:        BNP levels increase with age in the general  population with the highest values seen in  individuals greater than 75 years of age.  Reference: J. Am. Michael. Cardiol. 2002; 40:976-982.        01/21/2025 08:06 AM 1,234 0 - 99 pg/mL Final   01/15/2025 03:12 PM 1,652 0 - 99 pg/mL Final     HGBA1C   Date/Time Value Ref Range Status   10/11/2023 01:20 PM 5.2 see below % Final   12/16/2022 10:43 AM 5.3 % Final     Comment:          Diagnosis of Diabetes-Adults   Non-Diabetic: < or = 5.6%   Increased risk for developing diabetes: 5.7-6.4%   Diagnostic of diabetes: > or = 6.5%  .       Monitoring of Diabetes                Age (y)     Therapeutic Goal (%)   Adults:          >18           <7.0   Pediatrics:    13-18           <7.5                   7-12           <8.0                   0- 6            7.5-8.5   American Diabetes Association. Diabetes Care 33(S1), Jan 2010.     11/18/2020 04:29 PM 5.2 % Final     Comment:          Diagnosis of Diabetes-Adults   Non-Diabetic: < or = 5.6%   Increased risk for developing diabetes: 5.7-6.4%   Diagnostic of diabetes: > or = 6.5%  .       Monitoring of Diabetes                Age (y)     Therapeutic Goal (%)   Adults:          >18           <7.0   Pediatrics:    13-18           <7.5                   7-12           <8.0                   0- 6            7.5-8.5   American Diabetes Association. Diabetes Care 33(S1), Jan 2010.       LDLCALC   Date/Time Value Ref Range Status   03/28/2024 07:45 AM 52 <=99 mg/dL Final     Comment:                                 Near   Borderline      AGE      Desirable  Optimal    High     High     Very High     0-19 Y     0 - 109     ---    110-129   >/= 130     ----    20-24 Y     0 - 119     ---    120-159   >/= 160     ----      >24 Y     0 -  99   100-129  130-159   160-189     >/=190     10/11/2023 01:20 PM 72  140 - 190 mg/dL Final     Comment:                                 Near   Borderline      AGE      Desirable  Optimal    High     High     Very High     0-19 Y     0 - 109     ---    110-129   >/= 130     ----    20-24 Y     0 - 119     ---    120-159   >/= 160     ----      >24 Y     0 -  99   100-129  130-159   160-189     >/=190       VLDL   Date/Time Value Ref Range Status   03/28/2024 07:45 AM 11 0 - 40 mg/dL Final   10/11/2023 01:20 PM 11 0 - 40 mg/dL Final   11/07/2022 01:04 PM 11 0 - 40 mg/dL Final   11/17/2021 02:58 PM 18 0 - 40 mg/dL Final   11/18/2020 04:29 PM 20 0 - 40 mg/dL Final      Last I/O:  I/O last 3 completed shifts:  In: 1540 (20.1 mL/kg) [P.O.:1540]  Out: 2950 (38.6 mL/kg) [Urine:2950 (1.1 mL/kg/hr)]  Weight: 76.5 kg     Past Cardiology Tests (Last 3 Years):  EKG:  ECG 12 Lead 08/05/2024      ECG 12 lead 06/11/2024      ECG 12 Lead 06/11/2024      ECG 12 lead 04/15/2024      ECG 12 lead 04/15/2024      ECG 12 lead (Clinic Performed) 03/26/2024      ECG 12 lead (Clinic Performed) 03/25/2024    Echo:  Transthoracic Echo (TTE) Complete 01/22/2025      Transthoracic Echo Complete 07/25/2024      Transthoracic Echo (TTE) Complete 04/16/2024    Ejection Fractions:  EF   Date/Time Value Ref Range Status   01/22/2025 09:33 AM 33 %    07/25/2024 02:35 PM 33 %      Cath:  No results found for this or any previous visit from the past 1095 days.    Stress Test:  No results found for this or any previous visit from the past 1095 days.    Cardiac Imaging:  No results found for this or any previous visit from the past 1095 days.      Inpatient Medications:  Scheduled medications   Medication Dose Route Frequency    atorvastatin  20 mg oral Daily    B complex-vitamin C  1 tablet oral Daily    cholecalciferol  5,000 Units oral Daily    empagliflozin  10 mg oral Daily    finasteride  5 mg oral Daily    levETIRAcetam  500 mg oral Daily    metoprolol succinate XL  25 mg oral BID    mirtazapine  15 mg oral Nightly     sacubitriL-valsartan  1 tablet oral BID    sertraline  100 mg oral Daily    spironolactone  25 mg oral Daily     PRN medications   Medication    acetaminophen    Or    acetaminophen    Or    acetaminophen    ALPRAZolam    benzocaine-menthol    guaiFENesin    ondansetron    Or    ondansetron    polyethylene glycol    prochlorperazine    Or    prochlorperazine    Or    prochlorperazine     Continuous Medications   Medication Dose Last Rate     Results for orders placed or performed during the hospital encounter of 02/28/25 (from the past 24 hours)   Basic Metabolic Panel   Result Value Ref Range    Glucose 87 74 - 99 mg/dL    Sodium 137 136 - 145 mmol/L    Potassium 4.0 3.5 - 5.3 mmol/L    Chloride 105 98 - 107 mmol/L    Bicarbonate 26 21 - 32 mmol/L    Anion Gap 10 10 - 20 mmol/L    Urea Nitrogen 17 6 - 23 mg/dL    Creatinine 1.07 0.50 - 1.30 mg/dL    eGFR 67 >60 mL/min/1.73m*2    Calcium 8.5 (L) 8.6 - 10.3 mg/dL   CBC and Auto Differential   Result Value Ref Range    WBC 7.1 4.4 - 11.3 x10*3/uL    nRBC 0.0 0.0 - 0.0 /100 WBCs    RBC 3.50 (L) 4.50 - 5.90 x10*6/uL    Hemoglobin 9.9 (L) 13.5 - 17.5 g/dL    Hematocrit 31.7 (L) 41.0 - 52.0 %    MCV 91 80 - 100 fL    MCH 28.3 26.0 - 34.0 pg    MCHC 31.2 (L) 32.0 - 36.0 g/dL    RDW 17.2 (H) 11.5 - 14.5 %    Platelets 87 (L) 150 - 450 x10*3/uL    Neutrophils % 70.9 40.0 - 80.0 %    Immature Granulocytes %, Automated 0.6 0.0 - 0.9 %    Lymphocytes % 13.3 13.0 - 44.0 %    Monocytes % 13.6 2.0 - 10.0 %    Eosinophils % 1.3 0.0 - 6.0 %    Basophils % 0.3 0.0 - 2.0 %    Neutrophils Absolute 5.06 1.60 - 5.50 x10*3/uL    Immature Granulocytes Absolute, Automated 0.04 0.00 - 0.50 x10*3/uL    Lymphocytes Absolute 0.95 0.80 - 3.00 x10*3/uL    Monocytes Absolute 0.97 (H) 0.05 - 0.80 x10*3/uL    Eosinophils Absolute 0.09 0.00 - 0.40 x10*3/uL    Basophils Absolute 0.02 0.00 - 0.10 x10*3/uL   Magnesium   Result Value Ref Range    Magnesium 1.96 1.60 - 2.40 mg/dL         Physical  Exam:  Subjective:   Examination:   General Examination:   General Appearance: Well developed, well nourished, in no acute distress.  Elderly  Head: normocephalic, atraumatic   Eyes: Anicteric sclera. Pupils are equally round and reactive to light.  Extraocular movements are intact.    Ears: normal   Oral: Cavity: mucosa moist.   Throat: clear.   Neck/Thyroid: neck supple, full range of motion, no cervical lymphadenopathy.   Skin: warm and dry, no suspicious lesions.    Heart: regular rate and rhythm, S1, S2 normal, no murmurs.   Lungs: clear to auscultation bilaterally.   Abdomen: soft, non-tender, non-distended, bowl sound present, normal.   Extremities: no clubbing, no cyanosis, no edema.   Neuro: non-focal, motor strength normal upper lower extremities, sensory exam intact.       Assessment/Plan   I25.1 CAD status post CABG Z95.1  I48.91 Atrial fibrillation on Eliquis  I50.92 Congestive heart failure  N18.3 Stage IIIa chronic kidney disease     Hold Eliquis  HFrEF 30 to 35%  Chronic atrial fibrillation on metoprolol Entresto Aldactone and SL TG 2 agent  Coronary disease on a statin     Since he has a bladder mass with hematuria on Eliquis a cystoscopy on Wednesday is not an elective procedure but a necessary procedure show will optimize medication and follow him with out a nuclear stress test for clearance  His echo done a month ago has been reviewed     Long-term I would very strongly consider referring this patient for a Watchman device to eliminate stroke risk and need for anticoagulation the problem with watchman is it requires 4 weeks anticoagulation preprocedure in 4 to 5 weeks post procedure and verification of complete seal by YORDY 5 weeks after implant before stopping the Eliquis         Code Status:  DNR and No Intubation    I spent 40 minutes in the professional and overall care of this patient.        Claudio Perales MD

## 2025-03-02 NOTE — CARE PLAN
The patient's goals for the shift include remain comfortable    The clinical goals for the shift include urine with remain light pink/clear with no to minimal clots

## 2025-03-02 NOTE — CARE PLAN
The patient's goals for the shift include      The clinical goals for the shift include Pandya to CBI will have decreased redness and no clots this night    Goals progressing, safety maintained. Pandya continues to CBI draining red urine, no clots noted this night.

## 2025-03-03 VITALS
OXYGEN SATURATION: 94 % | RESPIRATION RATE: 18 BRPM | WEIGHT: 168.65 LBS | HEIGHT: 70 IN | DIASTOLIC BLOOD PRESSURE: 69 MMHG | SYSTOLIC BLOOD PRESSURE: 122 MMHG | BODY MASS INDEX: 24.14 KG/M2 | HEART RATE: 135 BPM | TEMPERATURE: 97.5 F

## 2025-03-03 LAB
ABO GROUP (TYPE) IN BLOOD: NORMAL
ANION GAP SERPL CALC-SCNC: 11 MMOL/L (ref 10–20)
ANTIBODY SCREEN: NORMAL
BASOPHILS # BLD AUTO: 0.02 X10*3/UL (ref 0–0.1)
BASOPHILS NFR BLD AUTO: 0.2 %
BUN SERPL-MCNC: 19 MG/DL (ref 6–23)
CALCIUM SERPL-MCNC: 8.2 MG/DL (ref 8.6–10.3)
CHLORIDE SERPL-SCNC: 105 MMOL/L (ref 98–107)
CO2 SERPL-SCNC: 24 MMOL/L (ref 21–32)
CREAT SERPL-MCNC: 1.13 MG/DL (ref 0.5–1.3)
EGFRCR SERPLBLD CKD-EPI 2021: 63 ML/MIN/1.73M*2
EOSINOPHIL # BLD AUTO: 0.07 X10*3/UL (ref 0–0.4)
EOSINOPHIL NFR BLD AUTO: 0.7 %
ERYTHROCYTE [DISTWIDTH] IN BLOOD BY AUTOMATED COUNT: 16.9 % (ref 11.5–14.5)
ERYTHROCYTE [DISTWIDTH] IN BLOOD BY AUTOMATED COUNT: 17.1 % (ref 11.5–14.5)
GLUCOSE BLD MANUAL STRIP-MCNC: 197 MG/DL (ref 74–99)
GLUCOSE SERPL-MCNC: 98 MG/DL (ref 74–99)
HCT VFR BLD AUTO: 30.2 % (ref 41–52)
HCT VFR BLD AUTO: 30.4 % (ref 41–52)
HGB BLD-MCNC: 9.2 G/DL (ref 13.5–17.5)
HGB BLD-MCNC: 9.5 G/DL (ref 13.5–17.5)
IMM GRANULOCYTES # BLD AUTO: 0.05 X10*3/UL (ref 0–0.5)
IMM GRANULOCYTES NFR BLD AUTO: 0.5 % (ref 0–0.9)
LYMPHOCYTES # BLD AUTO: 0.97 X10*3/UL (ref 0.8–3)
LYMPHOCYTES NFR BLD AUTO: 10.1 %
MAGNESIUM SERPL-MCNC: 1.84 MG/DL (ref 1.6–2.4)
MCH RBC QN AUTO: 28.5 PG (ref 26–34)
MCH RBC QN AUTO: 28.9 PG (ref 26–34)
MCHC RBC AUTO-ENTMCNC: 30.5 G/DL (ref 32–36)
MCHC RBC AUTO-ENTMCNC: 31.3 G/DL (ref 32–36)
MCV RBC AUTO: 91 FL (ref 80–100)
MCV RBC AUTO: 95 FL (ref 80–100)
MONOCYTES # BLD AUTO: 1.26 X10*3/UL (ref 0.05–0.8)
MONOCYTES NFR BLD AUTO: 13.1 %
NEUTROPHILS # BLD AUTO: 7.27 X10*3/UL (ref 1.6–5.5)
NEUTROPHILS NFR BLD AUTO: 75.4 %
NRBC BLD-RTO: 0 /100 WBCS (ref 0–0)
NRBC BLD-RTO: 0 /100 WBCS (ref 0–0)
PLATELET # BLD AUTO: 100 X10*3/UL (ref 150–450)
PLATELET # BLD AUTO: 103 X10*3/UL (ref 150–450)
POTASSIUM SERPL-SCNC: 4.2 MMOL/L (ref 3.5–5.3)
RBC # BLD AUTO: 3.18 X10*6/UL (ref 4.5–5.9)
RBC # BLD AUTO: 3.33 X10*6/UL (ref 4.5–5.9)
RH FACTOR (ANTIGEN D): NORMAL
SODIUM SERPL-SCNC: 136 MMOL/L (ref 136–145)
WBC # BLD AUTO: 11.7 X10*3/UL (ref 4.4–11.3)
WBC # BLD AUTO: 9.6 X10*3/UL (ref 4.4–11.3)

## 2025-03-03 PROCEDURE — 2500000002 HC RX 250 W HCPCS SELF ADMINISTERED DRUGS (ALT 637 FOR MEDICARE OP, ALT 636 FOR OP/ED): Performed by: INTERNAL MEDICINE

## 2025-03-03 PROCEDURE — 36415 COLL VENOUS BLD VENIPUNCTURE: CPT | Performed by: INTERNAL MEDICINE

## 2025-03-03 PROCEDURE — 1200000002 HC GENERAL ROOM WITH TELEMETRY DAILY

## 2025-03-03 PROCEDURE — 2500000002 HC RX 250 W HCPCS SELF ADMINISTERED DRUGS (ALT 637 FOR MEDICARE OP, ALT 636 FOR OP/ED): Performed by: NURSE PRACTITIONER

## 2025-03-03 PROCEDURE — 80048 BASIC METABOLIC PNL TOTAL CA: CPT | Performed by: INTERNAL MEDICINE

## 2025-03-03 PROCEDURE — 99231 SBSQ HOSP IP/OBS SF/LOW 25: CPT | Performed by: NURSE PRACTITIONER

## 2025-03-03 PROCEDURE — 2500000005 HC RX 250 GENERAL PHARMACY W/O HCPCS: Performed by: INTERNAL MEDICINE

## 2025-03-03 PROCEDURE — 2500000001 HC RX 250 WO HCPCS SELF ADMINISTERED DRUGS (ALT 637 FOR MEDICARE OP): Performed by: INTERNAL MEDICINE

## 2025-03-03 PROCEDURE — 2500000004 HC RX 250 GENERAL PHARMACY W/ HCPCS (ALT 636 FOR OP/ED): Performed by: INTERNAL MEDICINE

## 2025-03-03 PROCEDURE — 83735 ASSAY OF MAGNESIUM: CPT | Performed by: INTERNAL MEDICINE

## 2025-03-03 PROCEDURE — 86900 BLOOD TYPING SEROLOGIC ABO: CPT | Performed by: INTERNAL MEDICINE

## 2025-03-03 PROCEDURE — 99233 SBSQ HOSP IP/OBS HIGH 50: CPT | Performed by: INTERNAL MEDICINE

## 2025-03-03 PROCEDURE — 99232 SBSQ HOSP IP/OBS MODERATE 35: CPT | Performed by: INTERNAL MEDICINE

## 2025-03-03 PROCEDURE — 85027 COMPLETE CBC AUTOMATED: CPT | Performed by: INTERNAL MEDICINE

## 2025-03-03 PROCEDURE — 86923 COMPATIBILITY TEST ELECTRIC: CPT

## 2025-03-03 PROCEDURE — 82947 ASSAY GLUCOSE BLOOD QUANT: CPT

## 2025-03-03 PROCEDURE — 85025 COMPLETE CBC W/AUTO DIFF WBC: CPT | Performed by: INTERNAL MEDICINE

## 2025-03-03 RX ORDER — OXYBUTYNIN CHLORIDE 5 MG/1
5 TABLET ORAL ONCE
Status: COMPLETED | OUTPATIENT
Start: 2025-03-03 | End: 2025-03-03

## 2025-03-03 RX ORDER — HYOSCYAMINE SULFATE 0.12 MG/1
0.12 TABLET, ORALLY DISINTEGRATING ORAL EVERY 6 HOURS SCHEDULED
Status: DISCONTINUED | OUTPATIENT
Start: 2025-03-03 | End: 2025-03-06

## 2025-03-03 RX ADMIN — ALPRAZOLAM 0.5 MG: 0.5 TABLET ORAL at 20:20

## 2025-03-03 RX ADMIN — SODIUM CHLORIDE 500 ML: 9 INJECTION, SOLUTION INTRAVENOUS at 21:24

## 2025-03-03 RX ADMIN — METOPROLOL SUCCINATE 25 MG: 25 TABLET, EXTENDED RELEASE ORAL at 13:27

## 2025-03-03 RX ADMIN — ATORVASTATIN CALCIUM 20 MG: 20 TABLET, FILM COATED ORAL at 13:27

## 2025-03-03 RX ADMIN — HYOSCYAMINE SULFATE 0.12 MG: 0.12 TABLET SUBLINGUAL at 23:15

## 2025-03-03 RX ADMIN — OXYBUTYNIN CHLORIDE 5 MG: 5 TABLET ORAL at 13:27

## 2025-03-03 RX ADMIN — CEFTRIAXONE 1 G: 1 INJECTION, POWDER, FOR SOLUTION INTRAMUSCULAR; INTRAVENOUS at 20:20

## 2025-03-03 RX ADMIN — SODIUM CHLORIDE 500 ML: 9 INJECTION, SOLUTION INTRAVENOUS at 23:12

## 2025-03-03 RX ADMIN — LEVETIRACETAM 500 MG: 500 TABLET, FILM COATED ORAL at 13:27

## 2025-03-03 RX ADMIN — MIRTAZAPINE 15 MG: 15 TABLET, FILM COATED ORAL at 20:20

## 2025-03-03 RX ADMIN — SERTRALINE HYDROCHLORIDE 100 MG: 100 TABLET ORAL at 13:27

## 2025-03-03 RX ADMIN — HYOSCYAMINE SULFATE 0.12 MG: 0.12 TABLET SUBLINGUAL at 18:20

## 2025-03-03 RX ADMIN — Medication 1 TABLET: at 13:27

## 2025-03-03 RX ADMIN — OXYBUTYNIN CHLORIDE 5 MG: 5 TABLET ORAL at 06:34

## 2025-03-03 RX ADMIN — FINASTERIDE 5 MG: 5 TABLET, FILM COATED ORAL at 13:27

## 2025-03-03 RX ADMIN — ALPRAZOLAM 0.5 MG: 0.5 TABLET ORAL at 00:19

## 2025-03-03 RX ADMIN — HYOSCYAMINE SULFATE 0.12 MG: 0.12 TABLET SUBLINGUAL at 13:27

## 2025-03-03 RX ADMIN — CHOLECALCIFEROL TAB 125 MCG (5000 UNIT) 5000 UNITS: 125 TAB at 13:27

## 2025-03-03 SDOH — SOCIAL STABILITY: SOCIAL INSECURITY: WITHIN THE LAST YEAR, HAVE YOU BEEN HUMILIATED OR EMOTIONALLY ABUSED IN OTHER WAYS BY YOUR PARTNER OR EX-PARTNER?: NO

## 2025-03-03 SDOH — ECONOMIC STABILITY: FOOD INSECURITY: WITHIN THE PAST 12 MONTHS, YOU WORRIED THAT YOUR FOOD WOULD RUN OUT BEFORE YOU GOT THE MONEY TO BUY MORE.: NEVER TRUE

## 2025-03-03 SDOH — SOCIAL STABILITY: SOCIAL INSECURITY
WITHIN THE LAST YEAR, HAVE YOU BEEN RAPED OR FORCED TO HAVE ANY KIND OF SEXUAL ACTIVITY BY YOUR PARTNER OR EX-PARTNER?: NO

## 2025-03-03 SDOH — SOCIAL STABILITY: SOCIAL INSECURITY: WITHIN THE LAST YEAR, HAVE YOU BEEN AFRAID OF YOUR PARTNER OR EX-PARTNER?: NO

## 2025-03-03 SDOH — ECONOMIC STABILITY: HOUSING INSECURITY: IN THE LAST 12 MONTHS, WAS THERE A TIME WHEN YOU WERE NOT ABLE TO PAY THE MORTGAGE OR RENT ON TIME?: NO

## 2025-03-03 SDOH — ECONOMIC STABILITY: FOOD INSECURITY: WITHIN THE PAST 12 MONTHS, THE FOOD YOU BOUGHT JUST DIDN'T LAST AND YOU DIDN'T HAVE MONEY TO GET MORE.: NEVER TRUE

## 2025-03-03 SDOH — ECONOMIC STABILITY: FOOD INSECURITY: HOW HARD IS IT FOR YOU TO PAY FOR THE VERY BASICS LIKE FOOD, HOUSING, MEDICAL CARE, AND HEATING?: NOT HARD AT ALL

## 2025-03-03 SDOH — SOCIAL STABILITY: SOCIAL INSECURITY
WITHIN THE LAST YEAR, HAVE YOU BEEN KICKED, HIT, SLAPPED, OR OTHERWISE PHYSICALLY HURT BY YOUR PARTNER OR EX-PARTNER?: NO

## 2025-03-03 SDOH — ECONOMIC STABILITY: INCOME INSECURITY: IN THE PAST 12 MONTHS HAS THE ELECTRIC, GAS, OIL, OR WATER COMPANY THREATENED TO SHUT OFF SERVICES IN YOUR HOME?: NO

## 2025-03-03 SDOH — ECONOMIC STABILITY: HOUSING INSECURITY: IN THE PAST 12 MONTHS, HOW MANY TIMES HAVE YOU MOVED WHERE YOU WERE LIVING?: 1

## 2025-03-03 SDOH — ECONOMIC STABILITY: HOUSING INSECURITY: AT ANY TIME IN THE PAST 12 MONTHS, WERE YOU HOMELESS OR LIVING IN A SHELTER (INCLUDING NOW)?: NO

## 2025-03-03 SDOH — ECONOMIC STABILITY: TRANSPORTATION INSECURITY: IN THE PAST 12 MONTHS, HAS LACK OF TRANSPORTATION KEPT YOU FROM MEDICAL APPOINTMENTS OR FROM GETTING MEDICATIONS?: NO

## 2025-03-03 ASSESSMENT — COGNITIVE AND FUNCTIONAL STATUS - GENERAL
MOBILITY SCORE: 22
MOBILITY SCORE: 22
CLIMB 3 TO 5 STEPS WITH RAILING: A LITTLE
DAILY ACTIVITIY SCORE: 24
CLIMB 3 TO 5 STEPS WITH RAILING: A LITTLE
WALKING IN HOSPITAL ROOM: A LITTLE
WALKING IN HOSPITAL ROOM: A LITTLE
DAILY ACTIVITIY SCORE: 24

## 2025-03-03 ASSESSMENT — PAIN SCALES - GENERAL: PAINLEVEL_OUTOF10: 0 - NO PAIN

## 2025-03-03 ASSESSMENT — PAIN - FUNCTIONAL ASSESSMENT: PAIN_FUNCTIONAL_ASSESSMENT: 0-10

## 2025-03-03 ASSESSMENT — ACTIVITIES OF DAILY LIVING (ADL)
LACK_OF_TRANSPORTATION: NO
LACK_OF_TRANSPORTATION: NO

## 2025-03-03 NOTE — PROGRESS NOTES
03/03/25 1209   Discharge Planning   Living Arrangements Spouse/significant other   Support Systems Spouse/significant other   Assistance Needed None   Type of Residence Private residence   Expected Discharge Disposition Home   Does the patient need discharge transport arranged? No   Housing Stability   In the last 12 months, was there a time when you were not able to pay the mortgage or rent on time? N   In the past 12 months, how many times have you moved where you were living? 1   At any time in the past 12 months, were you homeless or living in a shelter (including now)? N   Transportation Needs   In the past 12 months, has lack of transportation kept you from medical appointments or from getting medications? no   In the past 12 months, has lack of transportation kept you from meetings, work, or from getting things needed for daily living? No   Stroke Family Assessment   Stroke Family Assessment Needed No   Intensity of Service   Intensity of Service 0-30 min     Met with patient and his wife at the bedside with patient permission. Patient lives at home with his wife. States he is independent with all care, ADLs, drives, without device. PCP is Dr Ritter-sees regularly every 3 months. Denies issues obtaining or affording medications. Plans to return home at [place] no needs.

## 2025-03-03 NOTE — DOCUMENTATION CLARIFICATION NOTE
"    PATIENT:               EAMON CHINO  ACCT #:                  [id number]  MRN:                       [id number]  :                       1940  ADMIT DATE:       2025 12:03 PM  DISCH DATE:  RESPONDING PROVIDER #:        [id number]          PROVIDER RESPONSE TEXT:    Hematuria due to or enhanced by Eliquis use    CDI QUERY TEXT:    Clarification    Instruction:    Based on your assessment of the patient and the clinical information, please provide the requested documentation by clicking on the appropriate radio button and enter any additional information if prompted.    Question: Please further clarify if a relationship exists between hematuria and Eliquis use    When answering this query, please exercise your independent professional judgment. The fact that a question is being asked, does not imply that any particular answer is desired or expected.    The patient's clinical indicators include:  Clinical Information:  Admitted for Gross Hematuria w/Bladder Mass.    Clinical Indicators:  INR 1.6 on .    Hgb: 10.8, 10.3, 9.9, 9.2 from -3/3.    HP by IM on  0354 PM states \"A-fib on Eliquis\", \"hematuria and was found to have bladder mass on anticoagulation likely causing significant gross hematuria\", \"Hold anticoagulation.\"    Treatment: PT/INR.  Lab monitoring of H/H.  Type/Screen.  Eliquis held.  Three way catheter w/irrigation, clot removal.  Urology consult.    Risk Factors: Elderly male w/hx of AFib on Eliquis.  Options provided:  -- Hematuria due to or enhanced by Eliquis use  -- Hematuria not due to or enhanced by Eliquis use  -- Other - I will add my own diagnosis  -- Refer to Clinical Documentation Reviewer    Query created by: Silva Alvarez on 3/3/2025 3:33 PM      Electronically signed by:  RICK DIEGO MD 3/3/2025 3:38 PM          "

## 2025-03-03 NOTE — PROGRESS NOTES
Minh Rickettslona . 85 y.o. male    Subjective  Patient seen and examined this morning.  Denies nausea and vomiting.  No fever or chills.  Reports bladder spasms since early this morning.  CBI at moderate rate, urine clear, no clots seen.  Per nursing, has not been manually irrigated.  Denies CP and SOB.  No acute events overnight.       Objective  PHYSICAL EXAM:  Physical Exam  Vitals reviewed.   Constitutional:       General: He is awake.      Appearance: Normal appearance.   Cardiovascular:      Rate and Rhythm: Normal rate and regular rhythm.      Pulses: Normal pulses.      Heart sounds: Normal heart sounds.   Pulmonary:      Effort: Pulmonary effort is normal.      Breath sounds: Normal breath sounds and air entry.   Abdominal:      General: Abdomen is flat. There is no distension.      Palpations: Abdomen is soft.      Tenderness: There is no abdominal tenderness. There is no right CVA tenderness or left CVA tenderness.   Genitourinary:     Comments: 3 way barbosa catheter, CBI at moderate rate. Urine clear in tubing, no clots.  Denies bladder spasms currently.   Musculoskeletal:         General: Normal range of motion.      Cervical back: Normal range of motion.   Skin:     General: Skin is warm and dry.   Neurological:      General: No focal deficit present.      Mental Status: He is alert and oriented to person, place, and time.   Psychiatric:         Behavior: Behavior is cooperative.         Vital signs in last 24 hours:  Vitals:    03/03/25 0657   BP: 121/56   Pulse: (!) 125   Resp:    Temp: 36.5 °C (97.7 °F)   SpO2: 95%         Intake/Output this shift:    Intake/Output Summary (Last 24 hours) at 3/3/2025 1122  Last data filed at 3/2/2025 1800  Gross per 24 hour   Intake 960 ml   Output 2100 ml   Net -1140 ml        Allergies:  No Known Allergies     Medications:  Scheduled medications  atorvastatin, 20 mg, oral, Daily  B complex-vitamin C, 1 tablet, oral, Daily  cholecalciferol, 5,000 Units, oral,  Daily  [Held by provider] empagliflozin, 10 mg, oral, Daily  finasteride, 5 mg, oral, Daily  hyoscyamine, 0.125 mg, oral, q6h BRISSA  levETIRAcetam, 500 mg, oral, Daily  metoprolol succinate XL, 25 mg, oral, BID  mirtazapine, 15 mg, oral, Nightly  [Held by provider] sacubitriL-valsartan, 1 tablet, oral, BID  sertraline, 100 mg, oral, Daily  [Held by provider] spironolactone, 25 mg, oral, Daily      Continuous medications     PRN medications  PRN medications: acetaminophen **OR** acetaminophen **OR** acetaminophen, ALPRAZolam, benzocaine-menthol, guaiFENesin, ondansetron **OR** ondansetron, polyethylene glycol, prochlorperazine **OR** prochlorperazine **OR** prochlorperazine       Labs:  Results for orders placed or performed during the hospital encounter of 02/28/25 (from the past 24 hours)   Basic Metabolic Panel   Result Value Ref Range    Glucose 98 74 - 99 mg/dL    Sodium 136 136 - 145 mmol/L    Potassium 4.2 3.5 - 5.3 mmol/L    Chloride 105 98 - 107 mmol/L    Bicarbonate 24 21 - 32 mmol/L    Anion Gap 11 10 - 20 mmol/L    Urea Nitrogen 19 6 - 23 mg/dL    Creatinine 1.13 0.50 - 1.30 mg/dL    eGFR 63 >60 mL/min/1.73m*2    Calcium 8.2 (L) 8.6 - 10.3 mg/dL   CBC and Auto Differential   Result Value Ref Range    WBC 9.6 4.4 - 11.3 x10*3/uL    nRBC 0.0 0.0 - 0.0 /100 WBCs    RBC 3.18 (L) 4.50 - 5.90 x10*6/uL    Hemoglobin 9.2 (L) 13.5 - 17.5 g/dL    Hematocrit 30.2 (L) 41.0 - 52.0 %    MCV 95 80 - 100 fL    MCH 28.9 26.0 - 34.0 pg    MCHC 30.5 (L) 32.0 - 36.0 g/dL    RDW 17.1 (H) 11.5 - 14.5 %    Platelets 100 (L) 150 - 450 x10*3/uL    Neutrophils % 75.4 40.0 - 80.0 %    Immature Granulocytes %, Automated 0.5 0.0 - 0.9 %    Lymphocytes % 10.1 13.0 - 44.0 %    Monocytes % 13.1 2.0 - 10.0 %    Eosinophils % 0.7 0.0 - 6.0 %    Basophils % 0.2 0.0 - 2.0 %    Neutrophils Absolute 7.27 (H) 1.60 - 5.50 x10*3/uL    Immature Granulocytes Absolute, Automated 0.05 0.00 - 0.50 x10*3/uL    Lymphocytes Absolute 0.97 0.80 - 3.00  x10*3/uL    Monocytes Absolute 1.26 (H) 0.05 - 0.80 x10*3/uL    Eosinophils Absolute 0.07 0.00 - 0.40 x10*3/uL    Basophils Absolute 0.02 0.00 - 0.10 x10*3/uL   Magnesium   Result Value Ref Range    Magnesium 1.84 1.60 - 2.40 mg/dL   POCT GLUCOSE   Result Value Ref Range    POCT Glucose 197 (H) 74 - 99 mg/dL     *Note: Due to a large number of results and/or encounters for the requested time period, some results have not been displayed. A complete set of results can be found in Results Review.        Imaging:  CT abdomen pelvis w IV contrast    Result Date: 2/28/2025  Interpreted By:  Flaquita Redd, STUDY: CT ABDOMEN PELVIS W IV CONTRAST;  2/28/2025 2:41 pm   INDICATION: Signs/Symptoms:painless hematuria.     COMPARISON: CT urography 08/07/2024 CT abdomen pelvis 08/05/2024   ACCESSION NUMBER(S): [id number]   ORDERING CLINICIAN: FRANK SHEEHAN   TECHNIQUE: CT of the abdomen and pelvis was performed.  Standard contiguous axial images were obtained at 3 mm slice thickness through the abdomen and pelvis. Coronal and sagittal reconstructions at 3 mm slice thickness were performed.  75 ML of Omnipaque 350 was administered intravenously without immediate complication.   FINDINGS: LOWER CHEST: Unchanged cardiomegaly without evidence of pericardial effusion. Severe coronary artery calcifications versus stent. Stable small right and trace left pleural effusions. Right right-greater-than-left lower lobe dependent ground-glass opacities. Similar smooth interlobular septal thickening is seen, right-greater-than-left, compatible with interstitial edema. The visualized distal esophagus appears unremarkable. Status post median sternotomy.   ABDOMEN:   LIVER: The liver is normal in size. Mottled appearance of the liver, suspicious for hepatic venous congestion in the setting of cardiomegaly. No suspicious liver lesion.   BILE DUCTS: No biliary duct dilatation.   GALLBLADDER: The gallbladder is nondistended and without evidence  of radiopaque stones.   PANCREAS: The pancreas appears unremarkable without evidence of ductal dilatation or masses.   SPLEEN: The spleen is normal in size.   ADRENAL GLANDS: No adrenal nodularity or thickening.   KIDNEYS AND URETERS: The kidneys are normal in size. Stable hypoattenuating lesions in the bilateral kidneys, likely benign. Stable punctate nonobstructing calculus in the left kidney. No hydroureteronephrosis or right nephroureterolithiasis is identified.   PELVIS:   BLADDER: Interval worsening of masslike bladder wall thickening, most predominant posteriorly and along the right lateral wall.   REPRODUCTIVE ORGANS: Interval increase in prostatomegaly and heterogeneity of the prostate, measuring 52 mm in the transaxial dimension, previously 48 mm.   BOWEL: The stomach is unremarkable. The small and large bowel are normal in caliber and demonstrate no wall thickening. The appendix is not definitely visualized. There is however no pericecal stranding or fluid. Colonic diverticulosis without acute diverticulitis.   VESSELS: Severe atherosclerotic calcifications of the aortoiliac arteries. The IVC appears normal.   PERITONEUM/RETROPERITONEUM/LYMPH NODES: No ascites or fluid collection. No measurable peritoneal nodular thickening or deposits. Stable subcentimeter para-aortic and bilateral iliac chain, which are nonenlarged by size criteria, and bears watching on future follow-up evaluations, for example a 8 mm left common iliac lymph node on series 2, image 112 measured in the short axis.   ABDOMINAL WALL: The abdominal wall soft tissues appear normal.   BONES: No acute fracture. No suspicious osseous lesions. Discogenic degenerative changes in the lower thoracic and lumbar spine.       1. Interval worsening of masslike bladder wall thickening, most predominant posteriorly and along the right lateral wall. Findings are suspicious for underlying neoplasm, correlation with cystoscopy and tissue diagnosis is  recommended. 2. Interval increase in size and heterogeneity of the prostate, which direct tumor invasion is not excluded. 3. Stable subcentimeter para-aortic and bilateral iliac chain. Though nonenlarged by size criteria, this area bears watching on future follow-up evaluations to evaluate for shanon metastasis. 4. Similar cardiomegaly with interstitial edema and small bilateral pleural effusions, right-greater-than-left. 5. Mottled appearance of the liver, suspicious for hepatic venous congestion in the setting of cardiomegaly. 6. Additional chronic and incidental findings as detailed above.     MACRO: None   Signed by: Flaquita Redd 2/28/2025 3:11 PM Dictation workstation:   [id number]           Plan  Hematuria     - 3 way barbosa catheter in place. CBI at moderate rate, try to wean off.  If urine remains clear, will plan to remove barbosa catheter.    - Continue to hold anticoagulation   - BUN/Scr 19/1.13  - If hematuria clears and are able to wean CBI, okay to discharge home prior to returning for procedure.   - Will continue to follow     Plan of care discussed with Dr. Felix Dalton, APRN-CNP    I spent 20 minutes in the professional and overall care of this patient.

## 2025-03-03 NOTE — PROGRESS NOTES
Cardiology Progress Note           Rounding Cardiologist:  Dr. Kala Kelly  Primary Cardiologist: Dr. Heriberto Valle    Date:  3/3/2025  Patient:  Minh Harrington Jr.  YOB: 1940  MRN:  [id number]   Admit Date:  2/28/2025      SUBJECTIVE    Minh Harrington Jr. was seen and examined today at bedside.     Patient seen earlier today. Still with hematuria and bladder irrigation. Some dizziness and lightheadedness when walking to bathroom. No chest pain or angina, no shortness of breath.     Review of Systems   No new complaints.   VITALS     Vitals:    03/03/25 0000 03/03/25 0358 03/03/25 0657 03/03/25 1200   BP: 122/69 98/60 121/56 (!) 94/48   BP Location: Left arm Left arm Left arm Left arm   Patient Position: Lying Lying Lying Sitting   Pulse: (!) 135 96 (!) 125 94   Resp: 18 18  17   Temp: 36.4 °C (97.5 °F) 36.2 °C (97.2 °F) 36.5 °C (97.7 °F) 36.3 °C (97.3 °F)   TempSrc: Temporal Temporal Temporal Temporal   SpO2: 94% 94% 95% 100%   Weight:       Height:           Intake/Output Summary (Last 24 hours) at 3/3/2025 1720  Last data filed at 3/3/2025 1358  Gross per 24 hour   Intake 480 ml   Output 7100 ml   Net -6620 ml       Vitals:    02/28/25 1714   Weight: 76.5 kg (168 lb 10.4 oz)       CURRENT MEDICATIONS    atorvastatin, 20 mg, oral, Daily  B complex-vitamin C, 1 tablet, oral, Daily  cholecalciferol, 5,000 Units, oral, Daily  [Held by provider] empagliflozin, 10 mg, oral, Daily  finasteride, 5 mg, oral, Daily  hyoscyamine, 0.125 mg, oral, q6h BRISSA  levETIRAcetam, 500 mg, oral, Daily  metoprolol succinate XL, 25 mg, oral, BID  mirtazapine, 15 mg, oral, Nightly  [Held by provider] sacubitriL-valsartan, 1 tablet, oral, BID  sertraline, 100 mg, oral, Daily  [Held by provider] spironolactone, 25 mg, oral, Daily         Current Outpatient Medications   Medication Instructions    ALPRAZolam (XANAX) 0.5 mg, oral, 3 times daily PRN    atorvastatin (LIPITOR) 20 mg, oral, Daily, as  directed    b complex vitamins capsule 1 capsule, Daily    cholecalciferol (Vitamin D-3) 125 MCG (5000 UT) capsule 1 tablet, Daily    dutasteride (AVODART) 0.5 mg, oral, Daily    Eliquis 5 mg, oral, 2 times daily    furosemide (Lasix) 20 mg tablet Take one daily ,if weight gain of 3 lbs in one day or 5 lbs in one week take another tablet that day until weight is back to normal    Jardiance 10 mg, oral, Daily    levETIRAcetam (Keppra) 500 mg tablet TAKE 1/2 (ONE-HALF) OF A TABLET BY MOUTH DAILY    metoprolol succinate XL (TOPROL-XL) 25 mg, oral, 2 times daily    mirtazapine (REMERON) 15 mg, oral, Nightly    sacubitriL-valsartan (Entresto)  mg tablet 1 tablet, oral, 2 times daily    sertraline (ZOLOFT) 100 mg, oral, Daily    spironolactone (ALDACTONE) 25 mg, oral, Daily        PHYSICAL EXAMINATION   GENERAL:  Well developed, well nourished, in no acute distress.  CHEST:  Symmetric and nontender.  NECK:  Supple, no JVD, no bruit.  LUNGS:  Clear to auscultation bilaterally, normal respiratory effort.  HEART:  Irregular with normal S1 and S2, no appreciable S3. EMMY RUSB  ABDOMEN: Soft, NT, ND without palpable organomegaly, normoactive bowel sounds.   EXTREMITIES:  Warm with good color, no clubbing or cyanosis.  There is tr /mild edema noted.  PERIPHERAL VASCULAR:  Pulses present and equally palpable.  NEURO/PSYCH:  Alert and oriented times three with approppriate behavior and responses.  INTEGUMENT: No rashes    LAB DATA     CBC:   Results from last 7 days   Lab Units 03/03/25  1659   WBC AUTO x10*3/uL 11.7*   RBC AUTO x10*6/uL 3.33*   HEMOGLOBIN g/dL 9.5*   HEMATOCRIT % 30.4*   PLATELETS AUTO x10*3/uL 103*        CMP:    Results from last 7 days   Lab Units 03/03/25  0531   SODIUM mmol/L 136   POTASSIUM mmol/L 4.2   CHLORIDE mmol/L 105   CO2 mmol/L 24   BUN mg/dL 19   CREATININE mg/dL 1.13   GLUCOSE mg/dL 98   CALCIUM mg/dL 8.2*       Cardiac Enzymes:    Lab Results   Component Value Date    TROPHS 32 (H)  "04/15/2024    TROPHS 32 (H) 04/15/2024       Magnesium:    Lab Results   Component Value Date    MG 1.84 03/03/2025       Lipid Profile:  No results found for: \"CHLPL\", \"TRIG\", \"HDL\", \"LDLCALC\", \"LDLDIRECT\"    TSH:  No results found for: \"TSH\"    BNP:   Lab Results   Component Value Date    BNP 1,684 (H) 02/21/2025        PT/INR:  No results found for: \"PROTIME\", \"INR\"    HgBA1c:    Lab Results   Component Value Date    HGBA1C 5.2 10/11/2023       CBC:  Lab Results   Component Value Date    WBC 11.7 (H) 03/03/2025    WBC 9.6 03/03/2025    WBC 7.1 03/02/2025    RBC 3.33 (L) 03/03/2025    RBC 3.18 (L) 03/03/2025    RBC 3.50 (L) 03/02/2025    HGB 9.5 (L) 03/03/2025    HGB 9.2 (L) 03/03/2025    HGB 9.9 (L) 03/02/2025    HCT 30.4 (L) 03/03/2025    HCT 30.2 (L) 03/03/2025    HCT 31.7 (L) 03/02/2025    MCV 91 03/03/2025    MCV 95 03/03/2025    MCV 91 03/02/2025    MCH 28.5 03/03/2025    MCH 28.9 03/03/2025    MCH 28.3 03/02/2025    MCHC 31.3 (L) 03/03/2025    MCHC 30.5 (L) 03/03/2025    MCHC 31.2 (L) 03/02/2025    RDW 16.9 (H) 03/03/2025    RDW 17.1 (H) 03/03/2025    RDW 17.2 (H) 03/02/2025     (L) 03/03/2025     (L) 03/03/2025    PLT 87 (L) 03/02/2025       BMP:  Lab Results   Component Value Date     03/03/2025     03/02/2025     03/01/2025    K 4.2 03/03/2025    K 4.0 03/02/2025    K 4.4 03/01/2025     03/03/2025     03/02/2025     03/01/2025    CO2 24 03/03/2025    CO2 26 03/02/2025    CO2 24 03/01/2025    BUN 19 03/03/2025    BUN 17 03/02/2025    BUN 23 03/01/2025    CREATININE 1.13 03/03/2025    CREATININE 1.07 03/02/2025    CREATININE 1.13 03/01/2025       Hepatic Function Panel:  No results found for: \"ALKPHOS\", \"ALT\", \"AST\", \"PROT\", \"BILITOT\", \"BILIDIR\"    Labs reviewed      DIAGNOSTIC TESTING RESULTS     ECG 12 Lead    Result Date: 3/3/2025  Atrial fibrillation Possible Inferior infarct , age undetermined ST & T wave abnormality, consider lateral ischemia or " digitalis effect Abnormal ECG When compared with ECG of 05-AUG-2024 18:10, Borderline criteria for Inferior infarct are now Present T wave inversion now evident in Lateral leads    CT abdomen pelvis w IV contrast    Result Date: 2/28/2025  Interpreted By:  Flaquita Redd, STUDY: CT ABDOMEN PELVIS W IV CONTRAST;  2/28/2025 2:41 pm   INDICATION: Signs/Symptoms:painless hematuria.     COMPARISON: CT urography 08/07/2024 CT abdomen pelvis 08/05/2024   ACCESSION NUMBER(S): [id number]   ORDERING CLINICIAN: FRANK SHEEHAN   TECHNIQUE: CT of the abdomen and pelvis was performed.  Standard contiguous axial images were obtained at 3 mm slice thickness through the abdomen and pelvis. Coronal and sagittal reconstructions at 3 mm slice thickness were performed.  75 ML of Omnipaque 350 was administered intravenously without immediate complication.   FINDINGS: LOWER CHEST: Unchanged cardiomegaly without evidence of pericardial effusion. Severe coronary artery calcifications versus stent. Stable small right and trace left pleural effusions. Right right-greater-than-left lower lobe dependent ground-glass opacities. Similar smooth interlobular septal thickening is seen, right-greater-than-left, compatible with interstitial edema. The visualized distal esophagus appears unremarkable. Status post median sternotomy.   ABDOMEN:   LIVER: The liver is normal in size. Mottled appearance of the liver, suspicious for hepatic venous congestion in the setting of cardiomegaly. No suspicious liver lesion.   BILE DUCTS: No biliary duct dilatation.   GALLBLADDER: The gallbladder is nondistended and without evidence of radiopaque stones.   PANCREAS: The pancreas appears unremarkable without evidence of ductal dilatation or masses.   SPLEEN: The spleen is normal in size.   ADRENAL GLANDS: No adrenal nodularity or thickening.   KIDNEYS AND URETERS: The kidneys are normal in size. Stable hypoattenuating lesions in the bilateral kidneys, likely  benign. Stable punctate nonobstructing calculus in the left kidney. No hydroureteronephrosis or right nephroureterolithiasis is identified.   PELVIS:   BLADDER: Interval worsening of masslike bladder wall thickening, most predominant posteriorly and along the right lateral wall.   REPRODUCTIVE ORGANS: Interval increase in prostatomegaly and heterogeneity of the prostate, measuring 52 mm in the transaxial dimension, previously 48 mm.   BOWEL: The stomach is unremarkable. The small and large bowel are normal in caliber and demonstrate no wall thickening. The appendix is not definitely visualized. There is however no pericecal stranding or fluid. Colonic diverticulosis without acute diverticulitis.   VESSELS: Severe atherosclerotic calcifications of the aortoiliac arteries. The IVC appears normal.   PERITONEUM/RETROPERITONEUM/LYMPH NODES: No ascites or fluid collection. No measurable peritoneal nodular thickening or deposits. Stable subcentimeter para-aortic and bilateral iliac chain, which are nonenlarged by size criteria, and bears watching on future follow-up evaluations, for example a 8 mm left common iliac lymph node on series 2, image 112 measured in the short axis.   ABDOMINAL WALL: The abdominal wall soft tissues appear normal.   BONES: No acute fracture. No suspicious osseous lesions. Discogenic degenerative changes in the lower thoracic and lumbar spine.       1. Interval worsening of masslike bladder wall thickening, most predominant posteriorly and along the right lateral wall. Findings are suspicious for underlying neoplasm, correlation with cystoscopy and tissue diagnosis is recommended. 2. Interval increase in size and heterogeneity of the prostate, which direct tumor invasion is not excluded. 3. Stable subcentimeter para-aortic and bilateral iliac chain. Though nonenlarged by size criteria, this area bears watching on future follow-up evaluations to evaluate for shanon metastasis. 4. Similar cardiomegaly  with interstitial edema and small bilateral pleural effusions, right-greater-than-left. 5. Mottled appearance of the liver, suspicious for hepatic venous congestion in the setting of cardiomegaly. 6. Additional chronic and incidental findings as detailed above.     MACRO: None   Signed by: Flqauita Redd 2/28/2025 3:11 PM Dictation workstation:   [id number]         RADIOLOGY     CT abdomen pelvis w IV contrast   Final Result   1. Interval worsening of masslike bladder wall thickening, most   predominant posteriorly and along the right lateral wall. Findings   are suspicious for underlying neoplasm, correlation with cystoscopy   and tissue diagnosis is recommended.   2. Interval increase in size and heterogeneity of the prostate, which   direct tumor invasion is not excluded.   3. Stable subcentimeter para-aortic and bilateral iliac chain. Though   nonenlarged by size criteria, this area bears watching on future   follow-up evaluations to evaluate for shanon metastasis.   4. Similar cardiomegaly with interstitial edema and small bilateral   pleural effusions, right-greater-than-left.   5. Mottled appearance of the liver, suspicious for hepatic venous   congestion in the setting of cardiomegaly.   6. Additional chronic and incidental findings as detailed above.             MACRO:   None        Signed by: Flaquita Redd 2/28/2025 3:11 PM   Dictation workstation:   [id number]            ASSESSMENT     Problem List Items Addressed This Visit       Gross hematuria - Primary       Patient Active Problem List   Diagnosis    Anxiety    BPH (benign prostatic hyperplasia)    CAD (coronary artery disease)    Carotid artery plaque    Herpes zoster    Post herpetic neuralgia    HLD (hyperlipidemia)    HTN (hypertension)    Hyperglycemia    Insomnia    Male erectile disorder of organic origin    Skin cancer    Thrombocytopenia (CMS-HCC)    Vitamin D deficiency    Moderate episode of recurrent major depressive disorder    Aneurysm  of ascending aorta without rupture (CMS-HCC)    V tach (Multi)    Mitral valve insufficiency    Atrial fibrillation with controlled ventricular response (Multi)    History of coronary artery bypass graft    Acute combined systolic and diastolic heart failure    BMI 26.0-26.9,adult    Former smoker    Stage 3a chronic kidney disease (Multi)    Acute on chronic systolic (congestive) heart failure    Hematuria, unspecified type    Hemorrhagic disorder due to extrinsic circulating anticoagulants (Multi)    WINNIE (obstructive sleep apnea)    Central sleep apnea in conditions classified elsewhere(327.27)    Cardiomyopathy, ischemic    Partial idiopathic epilepsy with seizures of localized onset, not intractable, without status epilepticus (Multi)    Nonrheumatic tricuspid valve regurgitation    Pulmonary hypertension (Multi)    Hematuria    Gross hematuria       PLAN     Hematuria - Per urology. Safe to proceed with cystoscopy as he is compensated at this time.   Anemia - secondary to #1. Would keep hgb > 8, anticoagulation to be held  Chronic systolic heart failure - stable and euvolemic. Monitor clinically.   Atrial fibrillation - rates acceptable, holding anticoagulation due to hematuria. Consider Watchman device as outpatient once recovered.     Will follow.     Please do not hesitate to call with questions.  Electronically signed by Kala Kelly MD, on 3/3/2025 at 5:20 PM

## 2025-03-03 NOTE — CARE PLAN
The patient's goals for the shift include      The clinical goals for the shift include pt will remain HDS this shift    Goals progressing, sasfety maintained. CBI continues with bloody drainage, no clots noted. Pt medicated for bladder spasms causing bloody leakage around barbosa site. Pt NPO for or today.

## 2025-03-03 NOTE — PROGRESS NOTES
Minh Harrington Jr. is a 85 y.o. male on day 2 of admission presenting with Hematuria.      Subjective   Having still significant hematuria.  Talked about getting symptomatic from anemia and may need transfusion sooner than later.  Patient got lightheaded and dizzy when he was walking to the bathroom.  Talked about staying n.p.o. until urology evaluates him or possibly intervention today    Objective     Last Recorded Vitals  /56 (BP Location: Left arm, Patient Position: Lying)   Pulse (!) 125   Temp 36.5 °C (97.7 °F) (Temporal)   Resp 18   Wt 76.5 kg (168 lb 10.4 oz)   SpO2 95%   Intake/Output last 3 Shifts:    Intake/Output Summary (Last 24 hours) at 3/3/2025 1158  Last data filed at 3/2/2025 1800  Gross per 24 hour   Intake 960 ml   Output 700 ml   Net 260 ml       Admission Weight  Weight: 80.7 kg (178 lb) (02/28/25 0946)    Daily Weight  02/28/25 : 76.5 kg (168 lb 10.4 oz)      Physical Exam:  General: Not in acute distress, alert  HEENT: PERRLA, head intact and normocephalic  Neck: Normal to inspection  Lungs: Clear to auscultation, work of breathing within normal limit  Cardiac: Irregularly irregular  Abdomen: Soft nontender, positive bowel sounds  : Pandya catheter in place with bright red bloody urine  Skin: Intact  Hematology: No petechia or excessive ecchymosis  Musculoskeletal: Without significant trauma  Neurological: Alert awake oriented, no focal deficit, cranial nerves grossly intact  Psych: No suicidal ideation or homicidal ideation    Relevant Results  Scheduled medications  atorvastatin, 20 mg, oral, Daily  B complex-vitamin C, 1 tablet, oral, Daily  cholecalciferol, 5,000 Units, oral, Daily  [Held by provider] empagliflozin, 10 mg, oral, Daily  finasteride, 5 mg, oral, Daily  hyoscyamine, 0.125 mg, oral, q6h BRISSA  levETIRAcetam, 500 mg, oral, Daily  metoprolol succinate XL, 25 mg, oral, BID  mirtazapine, 15 mg, oral, Nightly  [Held by provider] sacubitriL-valsartan, 1 tablet, oral,  BID  sertraline, 100 mg, oral, Daily  [Held by provider] spironolactone, 25 mg, oral, Daily      Continuous medications     PRN medications  PRN medications: acetaminophen **OR** acetaminophen **OR** acetaminophen, ALPRAZolam, benzocaine-menthol, guaiFENesin, ondansetron **OR** ondansetron, polyethylene glycol, prochlorperazine **OR** prochlorperazine **OR** prochlorperazine   Labs  Results from last 7 days   Lab Units 03/03/25  0531 03/02/25  0612 03/01/25  0610   WBC AUTO x10*3/uL 9.6 7.1 9.5   HEMOGLOBIN g/dL 9.2* 9.9* 10.3*   HEMATOCRIT % 30.2* 31.7* 32.6*   PLATELETS AUTO x10*3/uL 100* 87* 97*     Results from last 7 days   Lab Units 03/03/25  0531 03/02/25  0612 03/01/25  0610 02/28/25  1238   SODIUM mmol/L 136 137 137 137   POTASSIUM mmol/L 4.2 4.0 4.4 5.0   CHLORIDE mmol/L 105 105 107 105   CO2 mmol/L 24 26 24 26   BUN mg/dL 19 17 23 33*   CREATININE mg/dL 1.13 1.07 1.13 1.51*   CALCIUM mg/dL 8.2* 8.5* 8.7 8.7   PROTEIN TOTAL g/dL  --   --   --  6.6   BILIRUBIN TOTAL mg/dL  --   --   --  0.7   ALK PHOS U/L  --   --   --  78   ALT U/L  --   --   --  12   AST U/L  --   --   --  20   GLUCOSE mg/dL 98 87 97 106*       CT abdomen pelvis w IV contrast    Result Date: 2/28/2025  Interpreted By:  Flaquita Redd, STUDY: CT ABDOMEN PELVIS W IV CONTRAST;  2/28/2025 2:41 pm   INDICATION: Signs/Symptoms:painless hematuria.     COMPARISON: CT urography 08/07/2024 CT abdomen pelvis 08/05/2024   ACCESSION NUMBER(S): [id number]   ORDERING CLINICIAN: FRANK SHEEHAN   TECHNIQUE: CT of the abdomen and pelvis was performed.  Standard contiguous axial images were obtained at 3 mm slice thickness through the abdomen and pelvis. Coronal and sagittal reconstructions at 3 mm slice thickness were performed.  75 ML of Omnipaque 350 was administered intravenously without immediate complication.   FINDINGS: LOWER CHEST: Unchanged cardiomegaly without evidence of pericardial effusion. Severe coronary artery calcifications versus  stent. Stable small right and trace left pleural effusions. Right right-greater-than-left lower lobe dependent ground-glass opacities. Similar smooth interlobular septal thickening is seen, right-greater-than-left, compatible with interstitial edema. The visualized distal esophagus appears unremarkable. Status post median sternotomy.   ABDOMEN:   LIVER: The liver is normal in size. Mottled appearance of the liver, suspicious for hepatic venous congestion in the setting of cardiomegaly. No suspicious liver lesion.   BILE DUCTS: No biliary duct dilatation.   GALLBLADDER: The gallbladder is nondistended and without evidence of radiopaque stones.   PANCREAS: The pancreas appears unremarkable without evidence of ductal dilatation or masses.   SPLEEN: The spleen is normal in size.   ADRENAL GLANDS: No adrenal nodularity or thickening.   KIDNEYS AND URETERS: The kidneys are normal in size. Stable hypoattenuating lesions in the bilateral kidneys, likely benign. Stable punctate nonobstructing calculus in the left kidney. No hydroureteronephrosis or right nephroureterolithiasis is identified.   PELVIS:   BLADDER: Interval worsening of masslike bladder wall thickening, most predominant posteriorly and along the right lateral wall.   REPRODUCTIVE ORGANS: Interval increase in prostatomegaly and heterogeneity of the prostate, measuring 52 mm in the transaxial dimension, previously 48 mm.   BOWEL: The stomach is unremarkable. The small and large bowel are normal in caliber and demonstrate no wall thickening. The appendix is not definitely visualized. There is however no pericecal stranding or fluid. Colonic diverticulosis without acute diverticulitis.   VESSELS: Severe atherosclerotic calcifications of the aortoiliac arteries. The IVC appears normal.   PERITONEUM/RETROPERITONEUM/LYMPH NODES: No ascites or fluid collection. No measurable peritoneal nodular thickening or deposits. Stable subcentimeter para-aortic and bilateral iliac  chain, which are nonenlarged by size criteria, and bears watching on future follow-up evaluations, for example a 8 mm left common iliac lymph node on series 2, image 112 measured in the short axis.   ABDOMINAL WALL: The abdominal wall soft tissues appear normal.   BONES: No acute fracture. No suspicious osseous lesions. Discogenic degenerative changes in the lower thoracic and lumbar spine.       1. Interval worsening of masslike bladder wall thickening, most predominant posteriorly and along the right lateral wall. Findings are suspicious for underlying neoplasm, correlation with cystoscopy and tissue diagnosis is recommended. 2. Interval increase in size and heterogeneity of the prostate, which direct tumor invasion is not excluded. 3. Stable subcentimeter para-aortic and bilateral iliac chain. Though nonenlarged by size criteria, this area bears watching on future follow-up evaluations to evaluate for shanon metastasis. 4. Similar cardiomegaly with interstitial edema and small bilateral pleural effusions, right-greater-than-left. 5. Mottled appearance of the liver, suspicious for hepatic venous congestion in the setting of cardiomegaly. 6. Additional chronic and incidental findings as detailed above.     MACRO: None   Signed by: Flaquita Redd 2/28/2025 3:11 PM Dictation workstation:   [id number]                    Assessment/Plan   85-year-old male with past medical history of CAD status post CABG, atrial fibrillation on Eliquis, chronic systolic heart failure, CKD stage III, thrombocytopenia, BPH status post TURP in 2008 presented due to gross hematuria and was found to have worsening of bladder mass and require initiation of CBI and holding anticoagulation.  Assessment & Plan  Hematuria    Gross hematuria    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  CBI is running  Keeping n.p.o. for possible intervention  Patient is getting symptomatic anemia  Type and screen done  Will continue to hold anticoagulation  No  need for antibiotics  Cardiology consultation is noted as well  Hold anticoagulation and outpatient referral for Watchman device placement  As per urology patient may be able to resume anticoagulation 3 to 4 days after TURBT    Acute anemia  Acute blood loss anemia from hematuria from bladder mass  Type and screen done  Patient is symptomatic  May need transfusion sooner  Repeat H&H is ordered    Atrial fibrillation on Eliquis  Patient's anticoagulation will need to be held  Cardiology consultation is noted  To consider Watchman device as outpatient as outpatient and cardiology will refer patient  Patient follows up with Dr. Valle    HFrEF  Euvolemic  Continue metoprolol  Hold off on Entresto, spironolactone, Jardiance    CKD stage III  Creatinine stable  Continue to monitor    Hyperlipidemia  Continue atorvastatin    Anxiety disorder  Continue with Xanax as needed    DVT prophylaxis  SCDs       Plan discussed with patient, wife and primary nurse at bedside    High Level of MDM based on above issue and discussing plan    This note is created using voice recognition software. All efforts are made to minimize errors, if there are errors there due to transcription.    Ruiz Lechuga  Hospitalist

## 2025-03-03 NOTE — CARE PLAN
The patient's goals for the shift include      The clinical goals for the shift include pt will remain HDS this shift      Problem: Skin  Goal: Prevent/manage excess moisture  Outcome: Progressing  Goal: Prevent/minimize sheer/friction injuries  Outcome: Progressing  Goal: Promote/optimize nutrition  Outcome: Progressing  Goal: Decreased wound size/increased tissue granulation at next dressing change  Outcome: Progressing  Goal: Participates in plan/prevention/treatment measures  Outcome: Progressing  Goal: Promote skin healing  Outcome: Progressing     Problem: Pain - Adult  Goal: Verbalizes/displays adequate comfort level or baseline comfort level  Outcome: Progressing     Problem: Safety - Adult  Goal: Free from fall injury  Outcome: Progressing     Problem: Discharge Planning  Goal: Discharge to home or other facility with appropriate resources  Outcome: Progressing     Problem: Chronic Conditions and Co-morbidities  Goal: Patient's chronic conditions and co-morbidity symptoms are monitored and maintained or improved  Outcome: Progressing     Problem: Nutrition  Goal: Nutrient intake appropriate for maintaining nutritional needs  Outcome: Progressing     Problem: Fall/Injury  Goal: Not fall by end of shift  Outcome: Progressing  Goal: Be free from injury by end of the shift  Outcome: Progressing  Goal: Verbalize understanding of personal risk factors for fall in the hospital  Outcome: Progressing  Goal: Verbalize understanding of risk factor reduction measures to prevent injury from fall in the home  Outcome: Progressing  Goal: Use assistive devices by end of the shift  Outcome: Progressing  Goal: Pace activities to prevent fatigue by end of the shift  Outcome: Progressing

## 2025-03-03 NOTE — PROGRESS NOTES
Physical Therapy                 Therapy Communication Note    Patient Name: Minh Harrington Jr.  MRN: [id number]  Department: [facility] 3 S  Room: [facility]/3112-A  Today's Date: 3/3/2025     Discipline: Physical Therapy    PT Missed Visit: Yes     Missed Time: Attempt    Comment:  PT orders received, chart reviewed.  Per RN, pt was ambulating to bathroom earlier and fels dizzy and lightheaded.  RN requesting to hold therapy eval at this time.  RN also states pt is awaiting urology procedure during this admission.  Will re-attempt PT eval as appropriate.

## 2025-03-04 LAB
ANION GAP SERPL CALC-SCNC: 12 MMOL/L (ref 10–20)
ATRIAL RATE: 67 BPM
BASOPHILS # BLD AUTO: 0.03 X10*3/UL (ref 0–0.1)
BASOPHILS NFR BLD AUTO: 0.2 %
BLOOD EXPIRATION DATE: NORMAL
BNP SERPL-MCNC: 662 PG/ML (ref 0–99)
BUN SERPL-MCNC: 25 MG/DL (ref 6–23)
CALCIUM SERPL-MCNC: 8.2 MG/DL (ref 8.6–10.3)
CHLORIDE SERPL-SCNC: 106 MMOL/L (ref 98–107)
CO2 SERPL-SCNC: 22 MMOL/L (ref 21–32)
CREAT SERPL-MCNC: 1.44 MG/DL (ref 0.5–1.3)
DISPENSE STATUS: NORMAL
EGFRCR SERPLBLD CKD-EPI 2021: 47 ML/MIN/1.73M*2
EOSINOPHIL # BLD AUTO: 0.08 X10*3/UL (ref 0–0.4)
EOSINOPHIL NFR BLD AUTO: 0.7 %
ERYTHROCYTE [DISTWIDTH] IN BLOOD BY AUTOMATED COUNT: 16.9 % (ref 11.5–14.5)
GLUCOSE SERPL-MCNC: 105 MG/DL (ref 74–99)
HCT VFR BLD AUTO: 27.4 % (ref 41–52)
HGB BLD-MCNC: 8.4 G/DL (ref 13.5–17.5)
IMM GRANULOCYTES # BLD AUTO: 0.08 X10*3/UL (ref 0–0.5)
IMM GRANULOCYTES NFR BLD AUTO: 0.7 % (ref 0–0.9)
LYMPHOCYTES # BLD AUTO: 0.84 X10*3/UL (ref 0.8–3)
LYMPHOCYTES NFR BLD AUTO: 6.9 %
MAGNESIUM SERPL-MCNC: 1.91 MG/DL (ref 1.6–2.4)
MCH RBC QN AUTO: 28.7 PG (ref 26–34)
MCHC RBC AUTO-ENTMCNC: 30.7 G/DL (ref 32–36)
MCV RBC AUTO: 94 FL (ref 80–100)
MONOCYTES # BLD AUTO: 1.16 X10*3/UL (ref 0.05–0.8)
MONOCYTES NFR BLD AUTO: 9.6 %
NEUTROPHILS # BLD AUTO: 9.9 X10*3/UL (ref 1.6–5.5)
NEUTROPHILS NFR BLD AUTO: 81.9 %
NRBC BLD-RTO: 0 /100 WBCS (ref 0–0)
PLATELET # BLD AUTO: 109 X10*3/UL (ref 150–450)
POTASSIUM SERPL-SCNC: 4.2 MMOL/L (ref 3.5–5.3)
PRODUCT BLOOD TYPE: 5100
PRODUCT CODE: NORMAL
Q ONSET: 225 MS
QRS COUNT: 14 BEATS
QRS DURATION: 88 MS
QT INTERVAL: 374 MS
QTC CALCULATION(BAZETT): 447 MS
QTC FREDERICIA: 421 MS
R AXIS: 22 DEGREES
RBC # BLD AUTO: 2.93 X10*6/UL (ref 4.5–5.9)
SODIUM SERPL-SCNC: 136 MMOL/L (ref 136–145)
T AXIS: 163 DEGREES
T OFFSET: 412 MS
UNIT ABO: NORMAL
UNIT NUMBER: NORMAL
UNIT RH: NORMAL
UNIT VOLUME: 500
VENTRICULAR RATE: 86 BPM
WBC # BLD AUTO: 12.1 X10*3/UL (ref 4.4–11.3)
XM INTEP: NORMAL

## 2025-03-04 PROCEDURE — 80048 BASIC METABOLIC PNL TOTAL CA: CPT | Performed by: INTERNAL MEDICINE

## 2025-03-04 PROCEDURE — 99233 SBSQ HOSP IP/OBS HIGH 50: CPT | Performed by: INTERNAL MEDICINE

## 2025-03-04 PROCEDURE — P9016 RBC LEUKOCYTES REDUCED: HCPCS

## 2025-03-04 PROCEDURE — 99232 SBSQ HOSP IP/OBS MODERATE 35: CPT | Performed by: NURSE PRACTITIONER

## 2025-03-04 PROCEDURE — 2500000001 HC RX 250 WO HCPCS SELF ADMINISTERED DRUGS (ALT 637 FOR MEDICARE OP): Performed by: INTERNAL MEDICINE

## 2025-03-04 PROCEDURE — 36430 TRANSFUSION BLD/BLD COMPNT: CPT

## 2025-03-04 PROCEDURE — 1200000002 HC GENERAL ROOM WITH TELEMETRY DAILY

## 2025-03-04 PROCEDURE — 85025 COMPLETE CBC W/AUTO DIFF WBC: CPT | Performed by: INTERNAL MEDICINE

## 2025-03-04 PROCEDURE — 2500000004 HC RX 250 GENERAL PHARMACY W/ HCPCS (ALT 636 FOR OP/ED): Performed by: INTERNAL MEDICINE

## 2025-03-04 PROCEDURE — 83735 ASSAY OF MAGNESIUM: CPT | Performed by: INTERNAL MEDICINE

## 2025-03-04 PROCEDURE — 83880 ASSAY OF NATRIURETIC PEPTIDE: CPT | Performed by: INTERNAL MEDICINE

## 2025-03-04 PROCEDURE — 99232 SBSQ HOSP IP/OBS MODERATE 35: CPT | Performed by: INTERNAL MEDICINE

## 2025-03-04 PROCEDURE — 2500000005 HC RX 250 GENERAL PHARMACY W/O HCPCS: Performed by: INTERNAL MEDICINE

## 2025-03-04 PROCEDURE — 2500000002 HC RX 250 W HCPCS SELF ADMINISTERED DRUGS (ALT 637 FOR MEDICARE OP, ALT 636 FOR OP/ED): Performed by: INTERNAL MEDICINE

## 2025-03-04 PROCEDURE — 36415 COLL VENOUS BLD VENIPUNCTURE: CPT | Performed by: INTERNAL MEDICINE

## 2025-03-04 RX ORDER — METOPROLOL TARTRATE 1 MG/ML
5 INJECTION, SOLUTION INTRAVENOUS ONCE
Status: COMPLETED | OUTPATIENT
Start: 2025-03-04 | End: 2025-03-04

## 2025-03-04 RX ORDER — DIGOXIN 0.25 MG/ML
500 INJECTION INTRAMUSCULAR; INTRAVENOUS ONCE
Status: COMPLETED | OUTPATIENT
Start: 2025-03-04 | End: 2025-03-04

## 2025-03-04 RX ADMIN — HYOSCYAMINE SULFATE 0.12 MG: 0.12 TABLET SUBLINGUAL at 23:47

## 2025-03-04 RX ADMIN — METOPROLOL TARTRATE 5 MG: 5 INJECTION INTRAVENOUS at 18:11

## 2025-03-04 RX ADMIN — FINASTERIDE 5 MG: 5 TABLET, FILM COATED ORAL at 11:34

## 2025-03-04 RX ADMIN — LEVETIRACETAM 500 MG: 500 TABLET, FILM COATED ORAL at 11:34

## 2025-03-04 RX ADMIN — Medication 1 TABLET: at 11:34

## 2025-03-04 RX ADMIN — SERTRALINE HYDROCHLORIDE 100 MG: 100 TABLET ORAL at 11:34

## 2025-03-04 RX ADMIN — METOPROLOL SUCCINATE 25 MG: 25 TABLET, EXTENDED RELEASE ORAL at 16:44

## 2025-03-04 RX ADMIN — HYOSCYAMINE SULFATE 0.12 MG: 0.12 TABLET SUBLINGUAL at 18:11

## 2025-03-04 RX ADMIN — CEFTRIAXONE 1 G: 1 INJECTION, POWDER, FOR SOLUTION INTRAMUSCULAR; INTRAVENOUS at 18:11

## 2025-03-04 RX ADMIN — DIGOXIN 500 MCG: 0.25 INJECTION INTRAMUSCULAR; INTRAVENOUS at 21:14

## 2025-03-04 RX ADMIN — HYOSCYAMINE SULFATE 0.12 MG: 0.12 TABLET SUBLINGUAL at 06:09

## 2025-03-04 RX ADMIN — ALPRAZOLAM 0.5 MG: 0.5 TABLET ORAL at 21:21

## 2025-03-04 RX ADMIN — CHOLECALCIFEROL TAB 125 MCG (5000 UNIT) 5000 UNITS: 125 TAB at 11:34

## 2025-03-04 RX ADMIN — ALPRAZOLAM 0.5 MG: 0.5 TABLET ORAL at 01:07

## 2025-03-04 RX ADMIN — HYOSCYAMINE SULFATE 0.12 MG: 0.12 TABLET SUBLINGUAL at 11:39

## 2025-03-04 RX ADMIN — MIRTAZAPINE 15 MG: 15 TABLET, FILM COATED ORAL at 21:14

## 2025-03-04 RX ADMIN — ATORVASTATIN CALCIUM 20 MG: 20 TABLET, FILM COATED ORAL at 11:34

## 2025-03-04 ASSESSMENT — COGNITIVE AND FUNCTIONAL STATUS - GENERAL
MOBILITY SCORE: 21
DRESSING REGULAR LOWER BODY CLOTHING: A LITTLE
MOBILITY SCORE: 21
WALKING IN HOSPITAL ROOM: A LITTLE
STANDING UP FROM CHAIR USING ARMS: A LITTLE
DRESSING REGULAR UPPER BODY CLOTHING: A LITTLE
STANDING UP FROM CHAIR USING ARMS: A LITTLE
DRESSING REGULAR LOWER BODY CLOTHING: A LITTLE
DAILY ACTIVITIY SCORE: 19
CLIMB 3 TO 5 STEPS WITH RAILING: A LITTLE
TOILETING: A LITTLE
HELP NEEDED FOR BATHING: A LITTLE
PERSONAL GROOMING: A LITTLE
DAILY ACTIVITIY SCORE: 23
CLIMB 3 TO 5 STEPS WITH RAILING: A LITTLE
WALKING IN HOSPITAL ROOM: A LITTLE

## 2025-03-04 ASSESSMENT — PAIN SCALES - GENERAL
PAINLEVEL_OUTOF10: 0 - NO PAIN

## 2025-03-04 ASSESSMENT — ENCOUNTER SYMPTOMS
HEMATURIA: 1
FLANK PAIN: 1
PSYCHIATRIC NEGATIVE: 1
SHORTNESS OF BREATH: 0
CARDIOVASCULAR NEGATIVE: 1

## 2025-03-04 NOTE — CARE PLAN
The patient's goals for the shift include  sleep    The clinical goals for the shift include remain free of falls and injury  Problem: Skin  Goal: Prevent/manage excess moisture  Outcome: Progressing  Goal: Prevent/minimize sheer/friction injuries  Outcome: Progressing  Goal: Promote/optimize nutrition  Outcome: Progressing  Goal: Decreased wound size/increased tissue granulation at next dressing change  Outcome: Progressing  Goal: Participates in plan/prevention/treatment measures  Outcome: Progressing  Goal: Promote skin healing  Outcome: Progressing

## 2025-03-04 NOTE — PROGRESS NOTES
Minh Harrington . 85 y.o. male    Subjective  Patient seen and examined this morning.  Denies nausea and vomiting.  No fever or chills.  He is able to answer questions appropriately, but having periods of confusion.  3 way barbosa catheter with CBI currently wide open. Per nursing, was manually irrigated multiple times overnight for clots. Denies CP and SOB.       Objective  PHYSICAL EXAM:  Physical Exam  Vitals reviewed.   Constitutional:       General: He is awake.      Appearance: Normal appearance.   Cardiovascular:      Rate and Rhythm: Normal rate and regular rhythm.      Pulses: Normal pulses.      Heart sounds: Normal heart sounds.   Pulmonary:      Effort: Pulmonary effort is normal.      Breath sounds: Normal breath sounds and air entry.   Abdominal:      General: Abdomen is flat. There is no distension.      Palpations: Abdomen is soft.      Tenderness: There is no abdominal tenderness. There is no right CVA tenderness or left CVA tenderness.   Genitourinary:     Comments: 3 way barbosa catheter, CBI wide open. Urine currently hematuric with large amount of clots.   Musculoskeletal:         General: Normal range of motion.      Cervical back: Normal range of motion.   Skin:     General: Skin is warm and dry.   Neurological:      General: No focal deficit present.      Mental Status: He is alert and oriented to person, place, and time.   Psychiatric:         Behavior: Behavior is cooperative.         Vital signs in last 24 hours:  Vitals:    03/04/25 0800   BP: 90/58   Pulse: 65   Resp: 18   Temp: 36.2 °C (97.2 °F)   SpO2: 100%         Intake/Output this shift:    Intake/Output Summary (Last 24 hours) at 3/4/2025 0931  Last data filed at 3/4/2025 0148  Gross per 24 hour   Intake 1000 ml   Output 5150 ml   Net -4150 ml        Allergies:  No Known Allergies     Medications:  Scheduled medications  atorvastatin, 20 mg, oral, Daily  B complex-vitamin C, 1 tablet, oral, Daily  cefTRIAXone, 1 g, intravenous,  q24h  cholecalciferol, 5,000 Units, oral, Daily  [Held by provider] empagliflozin, 10 mg, oral, Daily  finasteride, 5 mg, oral, Daily  hyoscyamine, 0.125 mg, oral, q6h BRISSA  levETIRAcetam, 500 mg, oral, Daily  metoprolol succinate XL, 25 mg, oral, BID  mirtazapine, 15 mg, oral, Nightly  [Held by provider] sacubitriL-valsartan, 1 tablet, oral, BID  sertraline, 100 mg, oral, Daily  [Held by provider] spironolactone, 25 mg, oral, Daily      Continuous medications     PRN medications  PRN medications: acetaminophen **OR** acetaminophen **OR** acetaminophen, ALPRAZolam, benzocaine-menthol, guaiFENesin, ondansetron **OR** ondansetron, polyethylene glycol, prochlorperazine **OR** prochlorperazine **OR** prochlorperazine       Labs:  Results for orders placed or performed during the hospital encounter of 02/28/25 (from the past 24 hours)   POCT GLUCOSE   Result Value Ref Range    POCT Glucose 197 (H) 74 - 99 mg/dL   Type and screen   Result Value Ref Range    ABO TYPE O     Rh TYPE POS     ANTIBODY SCREEN NEG    CBC   Result Value Ref Range    WBC 11.7 (H) 4.4 - 11.3 x10*3/uL    nRBC 0.0 0.0 - 0.0 /100 WBCs    RBC 3.33 (L) 4.50 - 5.90 x10*6/uL    Hemoglobin 9.5 (L) 13.5 - 17.5 g/dL    Hematocrit 30.4 (L) 41.0 - 52.0 %    MCV 91 80 - 100 fL    MCH 28.5 26.0 - 34.0 pg    MCHC 31.3 (L) 32.0 - 36.0 g/dL    RDW 16.9 (H) 11.5 - 14.5 %    Platelets 103 (L) 150 - 450 x10*3/uL   Basic Metabolic Panel   Result Value Ref Range    Glucose 105 (H) 74 - 99 mg/dL    Sodium 136 136 - 145 mmol/L    Potassium 4.2 3.5 - 5.3 mmol/L    Chloride 106 98 - 107 mmol/L    Bicarbonate 22 21 - 32 mmol/L    Anion Gap 12 10 - 20 mmol/L    Urea Nitrogen 25 (H) 6 - 23 mg/dL    Creatinine 1.44 (H) 0.50 - 1.30 mg/dL    eGFR 47 (L) >60 mL/min/1.73m*2    Calcium 8.2 (L) 8.6 - 10.3 mg/dL   CBC and Auto Differential   Result Value Ref Range    WBC 12.1 (H) 4.4 - 11.3 x10*3/uL    nRBC 0.0 0.0 - 0.0 /100 WBCs    RBC 2.93 (L) 4.50 - 5.90 x10*6/uL    Hemoglobin 8.4  (L) 13.5 - 17.5 g/dL    Hematocrit 27.4 (L) 41.0 - 52.0 %    MCV 94 80 - 100 fL    MCH 28.7 26.0 - 34.0 pg    MCHC 30.7 (L) 32.0 - 36.0 g/dL    RDW 16.9 (H) 11.5 - 14.5 %    Platelets 109 (L) 150 - 450 x10*3/uL    Neutrophils % 81.9 40.0 - 80.0 %    Immature Granulocytes %, Automated 0.7 0.0 - 0.9 %    Lymphocytes % 6.9 13.0 - 44.0 %    Monocytes % 9.6 2.0 - 10.0 %    Eosinophils % 0.7 0.0 - 6.0 %    Basophils % 0.2 0.0 - 2.0 %    Neutrophils Absolute 9.90 (H) 1.60 - 5.50 x10*3/uL    Immature Granulocytes Absolute, Automated 0.08 0.00 - 0.50 x10*3/uL    Lymphocytes Absolute 0.84 0.80 - 3.00 x10*3/uL    Monocytes Absolute 1.16 (H) 0.05 - 0.80 x10*3/uL    Eosinophils Absolute 0.08 0.00 - 0.40 x10*3/uL    Basophils Absolute 0.03 0.00 - 0.10 x10*3/uL   Magnesium   Result Value Ref Range    Magnesium 1.91 1.60 - 2.40 mg/dL        Imaging:  CT abdomen pelvis w IV contrast    Result Date: 2/28/2025  Interpreted By:  Flaquita Redd, STUDY: CT ABDOMEN PELVIS W IV CONTRAST;  2/28/2025 2:41 pm   INDICATION: Signs/Symptoms:painless hematuria.     COMPARISON: CT urography 08/07/2024 CT abdomen pelvis 08/05/2024   ACCESSION NUMBER(S): [id number]   ORDERING CLINICIAN: FRANK SHEEHAN   TECHNIQUE: CT of the abdomen and pelvis was performed.  Standard contiguous axial images were obtained at 3 mm slice thickness through the abdomen and pelvis. Coronal and sagittal reconstructions at 3 mm slice thickness were performed.  75 ML of Omnipaque 350 was administered intravenously without immediate complication.   FINDINGS: LOWER CHEST: Unchanged cardiomegaly without evidence of pericardial effusion. Severe coronary artery calcifications versus stent. Stable small right and trace left pleural effusions. Right right-greater-than-left lower lobe dependent ground-glass opacities. Similar smooth interlobular septal thickening is seen, right-greater-than-left, compatible with interstitial edema. The visualized distal esophagus appears  unremarkable. Status post median sternotomy.   ABDOMEN:   LIVER: The liver is normal in size. Mottled appearance of the liver, suspicious for hepatic venous congestion in the setting of cardiomegaly. No suspicious liver lesion.   BILE DUCTS: No biliary duct dilatation.   GALLBLADDER: The gallbladder is nondistended and without evidence of radiopaque stones.   PANCREAS: The pancreas appears unremarkable without evidence of ductal dilatation or masses.   SPLEEN: The spleen is normal in size.   ADRENAL GLANDS: No adrenal nodularity or thickening.   KIDNEYS AND URETERS: The kidneys are normal in size. Stable hypoattenuating lesions in the bilateral kidneys, likely benign. Stable punctate nonobstructing calculus in the left kidney. No hydroureteronephrosis or right nephroureterolithiasis is identified.   PELVIS:   BLADDER: Interval worsening of masslike bladder wall thickening, most predominant posteriorly and along the right lateral wall.   REPRODUCTIVE ORGANS: Interval increase in prostatomegaly and heterogeneity of the prostate, measuring 52 mm in the transaxial dimension, previously 48 mm.   BOWEL: The stomach is unremarkable. The small and large bowel are normal in caliber and demonstrate no wall thickening. The appendix is not definitely visualized. There is however no pericecal stranding or fluid. Colonic diverticulosis without acute diverticulitis.   VESSELS: Severe atherosclerotic calcifications of the aortoiliac arteries. The IVC appears normal.   PERITONEUM/RETROPERITONEUM/LYMPH NODES: No ascites or fluid collection. No measurable peritoneal nodular thickening or deposits. Stable subcentimeter para-aortic and bilateral iliac chain, which are nonenlarged by size criteria, and bears watching on future follow-up evaluations, for example a 8 mm left common iliac lymph node on series 2, image 112 measured in the short axis.   ABDOMINAL WALL: The abdominal wall soft tissues appear normal.   BONES: No acute fracture.  No suspicious osseous lesions. Discogenic degenerative changes in the lower thoracic and lumbar spine.       1. Interval worsening of masslike bladder wall thickening, most predominant posteriorly and along the right lateral wall. Findings are suspicious for underlying neoplasm, correlation with cystoscopy and tissue diagnosis is recommended. 2. Interval increase in size and heterogeneity of the prostate, which direct tumor invasion is not excluded. 3. Stable subcentimeter para-aortic and bilateral iliac chain. Though nonenlarged by size criteria, this area bears watching on future follow-up evaluations to evaluate for shanon metastasis. 4. Similar cardiomegaly with interstitial edema and small bilateral pleural effusions, right-greater-than-left. 5. Mottled appearance of the liver, suspicious for hepatic venous congestion in the setting of cardiomegaly. 6. Additional chronic and incidental findings as detailed above.     MACRO: None   Signed by: Flaquita Redd 2/28/2025 3:11 PM Dictation workstation:   [id number]           Plan  Hematuria     - Maintain 3 way barbosa catheter.   Overnight, nursing had to irrigate multiple times to maintain patency.  This morning, clotted off again, I manually irrigated for a large amount of clots and CBI is currently wide open.   - Continue to hold anticoagulation   - BUN/Scr 25/1.44<-19/1.13  - H&H trending down 8.4/27.4<- 9.5/30.4   - NPO at midnight.   - Plan for cystoscopy, fulguration and clot evacuation tomorrow in OR 3/5     Plan of care discussed with Dr. Felix Dalton, APRN-CNP    I spent 20 minutes in the professional and overall care of this patient.

## 2025-03-04 NOTE — PROGRESS NOTES
Occupational Therapy                 Therapy Communication Note    Patient Name: Minh Harrington Jr.  MRN: [id number]  Department: [facility] 3 S  Room: 3112/3112-A  Today's Date: 3/4/2025     Discipline: Occupational Therapy    Comment: Updated chart review, and attempted OT eval this date. Pt receiving unit of PRBC at this time. Will hold OT eval and complete as appropriate.

## 2025-03-04 NOTE — CARE PLAN
The clinical goals for the shift include pt will remain free from falls and injury throughout the shift  Pt remained free from falls an injury throughout the shift

## 2025-03-04 NOTE — PROGRESS NOTES
Minh Harrington Jr. is a 85 y.o. male on day 3 of admission presenting with Hematuria.      Subjective   Patient  still having hematuria. New onset confusion hypotension. Unit of blood ordered, surgery on Wednesday, patient can eat today, n.p.o after midnight, holding Metoprolol,         Objective     Last Recorded Vitals  BP 90/58 (BP Location: Left arm, Patient Position: Lying)   Pulse 65   Temp 36.2 °C (97.2 °F) (Temporal)   Resp 18   Wt 76.5 kg (168 lb 10.4 oz)   SpO2 100%   Intake/Output last 3 Shifts:    Intake/Output Summary (Last 24 hours) at 3/4/2025 0910  Last data filed at 3/4/2025 0148  Gross per 24 hour   Intake 1000 ml   Output 5150 ml   Net -4150 ml       Admission Weight  Weight: 80.7 kg (178 lb) (02/28/25 0946)    Daily Weight  02/28/25 : 76.5 kg (168 lb 10.4 oz)    Image Results  ECG 12 Lead  Atrial fibrillation  Possible Inferior infarct , age undetermined  ST & T wave abnormality, consider lateral ischemia or digitalis effect  Abnormal ECG  When compared with ECG of 05-AUG-2024 18:10,  Borderline criteria for Inferior infarct are now Present  T wave inversion now evident in Lateral leads      Physical Exam    Relevant Results               Assessment/Plan          This patient has a urinary catheter   Reason for the urinary catheter remaining today? urinary retention/bladder outlet obstruction, acute or chronic          Assessment & Plan  Hematuria    Gross hematuria    85-year-old male with past medical history of CAD status post CABG, atrial fibrillation on Eliquis, chronic systolic heart failure, CKD stage III, thrombocytopenia, BPH status post TURP in 2008 presented due to gross hematuria and was found to have worsening of bladder mass and require initiation of CBI and holding anticoagulation.     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  CBI is running  Diet ordered, but N.P.O after midnight for possible surgery tomorrow   Patient is getting symptomatic anemia  Type and screen  done, unit of blood ordered   Will continue to hold anticoagulation  Started Rocephin 03/03/25  Cardiology consultation is noted as well  Hold anticoagulation and outpatient referral for possible Watchman device placement     Acute anemia  Acute blood loss anemia from hematuria from bladder mass  Type and screen done  Patient is symptomatic  Repeat H&H after blood transfusion      Atrial fibrillation on Eliquis  Patient's anticoagulation will need to be held  Cardiology consultation is noted  To consider Watchman device as outpatient as outpatient and cardiology will refer patient  Patient follows up with Dr. Valle     HFrEF  Euvolemic  Holding  metoprolol  Hold off on Entresto, spironolactone, Jardiance     CKD stage III  Creatinine 1.44  Continue to monitor     Hyperlipidemia  Continue atorvastatin     Anxiety disorder  Continue with Xanax as needed     DVT prophylaxis  SCDs     Plan discussed with patient, wife, attending and primary nurse at bedside.             Hanane Thurman RN

## 2025-03-04 NOTE — PROGRESS NOTES
Physical Therapy                       Therapy Communication Note    Patient Name: Minh Harrington Jr.  MRN: [id number]  Today's Date: 3/4/2025     Discipline: Physical Therapy    Missed Visit Reason: PT order received, chart reviewed. Attempted pt at 1134. Pt receiving blood transfusion. Will hold PT evaluation at this time and reattempt as appropriate.     Missed Time: Attempt

## 2025-03-04 NOTE — PROGRESS NOTES
Occupational Therapy                 Therapy Communication Note    Patient Name: Minh Harrington Jr.  MRN: [id number]  Department: [facility] S  Room: [facility]23112-A  Today's Date: 3/3/2025     Discipline: Occupational Therapy    Comment: Received orders for OT eval 2/28. Completed chart review, and attempted OT eval this date. Per RN, pt was dizzy and lightheaded while ambulating to restroom this am. Pt also with possible cystoscopy 3/5/25. Pt's RN would like OT to hold eval at this time. Will complete as appropriate.

## 2025-03-04 NOTE — PROGRESS NOTES
Cardiology Progress Note           Rounding Cardiologist:  Dr. Kala Kelly  Primary Cardiologist: Dr. Heriberto Valle    Date:  3/4/2025  Patient:  Minh Harrington Jr.  YOB: 1940  MRN:  [id number]   Admit Date:  2/28/2025      SUBJECTIVE    Minh Harrington Jr. was seen and examined today at bedside.     Patient stable. Having less bladder spasms but has had a lot more bleeding and clots (see Urology notes). Denies chest pain or discomfort, denies shortness of breath, orthopnea or PND. No palpitations. Just finished PRBC transfusion.     Review of Systems   Respiratory:  Negative for shortness of breath.    Cardiovascular: Negative.    Genitourinary:  Positive for flank pain (pelvic pain and spasms - better now) and hematuria.   Psychiatric/Behavioral: Negative.     All other systems reviewed and are negative.       VITALS     Vitals:    03/04/25 0800 03/04/25 1119 03/04/25 1134 03/04/25 1219   BP: 90/58 86/50 116/53 (!) 86/45   BP Location: Left arm Left arm Left arm Left arm   Patient Position: Lying Lying Lying Lying   Pulse: 65 105 (!) 120 103   Resp: 18 18 18 18   Temp: 36.2 °C (97.2 °F) 36.8 °C (98.2 °F) 36.5 °C (97.7 °F) 36.4 °C (97.5 °F)   TempSrc: Temporal Temporal Temporal Temporal   SpO2: 100% 93% 94% 95%   Weight:       Height:           Intake/Output Summary (Last 24 hours) at 3/4/2025 1456  Last data filed at 3/4/2025 0148  Gross per 24 hour   Intake 1000 ml   Output --   Net 1000 ml       Vitals:    02/28/25 1714   Weight: 76.5 kg (168 lb 10.4 oz)       CURRENT MEDICATIONS    atorvastatin, 20 mg, oral, Daily  B complex-vitamin C, 1 tablet, oral, Daily  cefTRIAXone, 1 g, intravenous, q24h  cholecalciferol, 5,000 Units, oral, Daily  [Held by provider] empagliflozin, 10 mg, oral, Daily  finasteride, 5 mg, oral, Daily  hyoscyamine, 0.125 mg, oral, q6h BRISSA  levETIRAcetam, 500 mg, oral, Daily  metoprolol succinate XL, 25 mg, oral, BID  mirtazapine, 15 mg, oral,  Nightly  [Held by provider] sacubitriL-valsartan, 1 tablet, oral, BID  sertraline, 100 mg, oral, Daily  [Held by provider] spironolactone, 25 mg, oral, Daily         Current Outpatient Medications   Medication Instructions    ALPRAZolam (XANAX) 0.5 mg, oral, 3 times daily PRN    atorvastatin (LIPITOR) 20 mg, oral, Daily, as directed    b complex vitamins capsule 1 capsule, Daily    cholecalciferol (Vitamin D-3) 125 MCG (5000 UT) capsule 1 tablet, Daily    dutasteride (AVODART) 0.5 mg, oral, Daily    Eliquis 5 mg, oral, 2 times daily    furosemide (Lasix) 20 mg tablet Take one daily ,if weight gain of 3 lbs in one day or 5 lbs in one week take another tablet that day until weight is back to normal    Jardiance 10 mg, oral, Daily    levETIRAcetam (Keppra) 500 mg tablet TAKE 1/2 (ONE-HALF) OF A TABLET BY MOUTH DAILY    metoprolol succinate XL (TOPROL-XL) 25 mg, oral, 2 times daily    mirtazapine (REMERON) 15 mg, oral, Nightly    sacubitriL-valsartan (Entresto)  mg tablet 1 tablet, oral, 2 times daily    sertraline (ZOLOFT) 100 mg, oral, Daily    spironolactone (ALDACTONE) 25 mg, oral, Daily        PHYSICAL EXAMINATION   GENERAL:  Well developed, well nourished, in no acute distress.  CHEST:  Symmetric and nontender.  NECK:  Supple, no JVD, no bruit.  LUNGS:  Clear to auscultation bilaterally, normal respiratory effort. Diminished at the bases, coarse on right.   HEART:  Irregular with normal S1 and S2, no S3. No edema.   ABDOMEN: NT, ND  EXTREMITIES:  Warm with good color, no clubbing or cyanosis.  There is no edema noted.  NEURO/PSYCH:  Alert and oriented times three with approppriate behavior and responses.  INTEGUMENT: No rashes    LAB DATA     CBC:   Results from last 7 days   Lab Units 03/04/25  0823   WBC AUTO x10*3/uL 12.1*   RBC AUTO x10*6/uL 2.93*   HEMOGLOBIN g/dL 8.4*   HEMATOCRIT % 27.4*   PLATELETS AUTO x10*3/uL 109*        CMP:    Results from last 7 days   Lab Units 03/04/25  0823   SODIUM mmol/L  "136   POTASSIUM mmol/L 4.2   CHLORIDE mmol/L 106   CO2 mmol/L 22   BUN mg/dL 25*   CREATININE mg/dL 1.44*   GLUCOSE mg/dL 105*   CALCIUM mg/dL 8.2*       Cardiac Enzymes:    Lab Results   Component Value Date    TROPHS 32 (H) 04/15/2024    TROPHS 32 (H) 04/15/2024       Magnesium:    Lab Results   Component Value Date    MG 1.91 03/04/2025       Lipid Profile:  No results found for: \"CHLPL\", \"TRIG\", \"HDL\", \"LDLCALC\", \"LDLDIRECT\"    TSH:  No results found for: \"TSH\"    BNP:   Lab Results   Component Value Date    BNP 1,684 (H) 02/21/2025        PT/INR:  No results found for: \"PROTIME\", \"INR\"    HgBA1c:    Lab Results   Component Value Date    HGBA1C 5.2 10/11/2023       CBC:  Lab Results   Component Value Date    WBC 12.1 (H) 03/04/2025    WBC 11.7 (H) 03/03/2025    WBC 9.6 03/03/2025    RBC 2.93 (L) 03/04/2025    RBC 3.33 (L) 03/03/2025    RBC 3.18 (L) 03/03/2025    HGB 8.4 (L) 03/04/2025    HGB 9.5 (L) 03/03/2025    HGB 9.2 (L) 03/03/2025    HCT 27.4 (L) 03/04/2025    HCT 30.4 (L) 03/03/2025    HCT 30.2 (L) 03/03/2025    MCV 94 03/04/2025    MCV 91 03/03/2025    MCV 95 03/03/2025    MCH 28.7 03/04/2025    MCH 28.5 03/03/2025    MCH 28.9 03/03/2025    MCHC 30.7 (L) 03/04/2025    MCHC 31.3 (L) 03/03/2025    MCHC 30.5 (L) 03/03/2025    RDW 16.9 (H) 03/04/2025    RDW 16.9 (H) 03/03/2025    RDW 17.1 (H) 03/03/2025     (L) 03/04/2025     (L) 03/03/2025     (L) 03/03/2025       BMP:  Lab Results   Component Value Date     03/04/2025     03/03/2025     03/02/2025    K 4.2 03/04/2025    K 4.2 03/03/2025    K 4.0 03/02/2025     03/04/2025     03/03/2025     03/02/2025    CO2 22 03/04/2025    CO2 24 03/03/2025    CO2 26 03/02/2025    BUN 25 (H) 03/04/2025    BUN 19 03/03/2025    BUN 17 03/02/2025    CREATININE 1.44 (H) 03/04/2025    CREATININE 1.13 03/03/2025    CREATININE 1.07 03/02/2025       Hepatic Function Panel:  No results found for: \"ALKPHOS\", \"ALT\", \"AST\", " "\"PROT\", \"BILITOT\", \"BILIDIR\"    Labs reviewed.     DIAGNOSTIC TESTING RESULTS     ECG 12 Lead    Result Date: 3/3/2025  Atrial fibrillation Possible Inferior infarct , age undetermined ST & T wave abnormality, consider lateral ischemia or digitalis effect Abnormal ECG When compared with ECG of 05-AUG-2024 18:10, Borderline criteria for Inferior infarct are now Present T wave inversion now evident in Lateral leads    CT abdomen pelvis w IV contrast    Result Date: 2/28/2025  Interpreted By:  Flaquita Redd, STUDY: CT ABDOMEN PELVIS W IV CONTRAST;  2/28/2025 2:41 pm   INDICATION: Signs/Symptoms:painless hematuria.     COMPARISON: CT urography 08/07/2024 CT abdomen pelvis 08/05/2024   ACCESSION NUMBER(S): [id number]   ORDERING CLINICIAN: FRANK SHEEHAN   TECHNIQUE: CT of the abdomen and pelvis was performed.  Standard contiguous axial images were obtained at 3 mm slice thickness through the abdomen and pelvis. Coronal and sagittal reconstructions at 3 mm slice thickness were performed.  75 ML of Omnipaque 350 was administered intravenously without immediate complication.   FINDINGS: LOWER CHEST: Unchanged cardiomegaly without evidence of pericardial effusion. Severe coronary artery calcifications versus stent. Stable small right and trace left pleural effusions. Right right-greater-than-left lower lobe dependent ground-glass opacities. Similar smooth interlobular septal thickening is seen, right-greater-than-left, compatible with interstitial edema. The visualized distal esophagus appears unremarkable. Status post median sternotomy.   ABDOMEN:   LIVER: The liver is normal in size. Mottled appearance of the liver, suspicious for hepatic venous congestion in the setting of cardiomegaly. No suspicious liver lesion.   BILE DUCTS: No biliary duct dilatation.   GALLBLADDER: The gallbladder is nondistended and without evidence of radiopaque stones.   PANCREAS: The pancreas appears unremarkable without evidence of ductal " dilatation or masses.   SPLEEN: The spleen is normal in size.   ADRENAL GLANDS: No adrenal nodularity or thickening.   KIDNEYS AND URETERS: The kidneys are normal in size. Stable hypoattenuating lesions in the bilateral kidneys, likely benign. Stable punctate nonobstructing calculus in the left kidney. No hydroureteronephrosis or right nephroureterolithiasis is identified.   PELVIS:   BLADDER: Interval worsening of masslike bladder wall thickening, most predominant posteriorly and along the right lateral wall.   REPRODUCTIVE ORGANS: Interval increase in prostatomegaly and heterogeneity of the prostate, measuring 52 mm in the transaxial dimension, previously 48 mm.   BOWEL: The stomach is unremarkable. The small and large bowel are normal in caliber and demonstrate no wall thickening. The appendix is not definitely visualized. There is however no pericecal stranding or fluid. Colonic diverticulosis without acute diverticulitis.   VESSELS: Severe atherosclerotic calcifications of the aortoiliac arteries. The IVC appears normal.   PERITONEUM/RETROPERITONEUM/LYMPH NODES: No ascites or fluid collection. No measurable peritoneal nodular thickening or deposits. Stable subcentimeter para-aortic and bilateral iliac chain, which are nonenlarged by size criteria, and bears watching on future follow-up evaluations, for example a 8 mm left common iliac lymph node on series 2, image 112 measured in the short axis.   ABDOMINAL WALL: The abdominal wall soft tissues appear normal.   BONES: No acute fracture. No suspicious osseous lesions. Discogenic degenerative changes in the lower thoracic and lumbar spine.       1. Interval worsening of masslike bladder wall thickening, most predominant posteriorly and along the right lateral wall. Findings are suspicious for underlying neoplasm, correlation with cystoscopy and tissue diagnosis is recommended. 2. Interval increase in size and heterogeneity of the prostate, which direct tumor  invasion is not excluded. 3. Stable subcentimeter para-aortic and bilateral iliac chain. Though nonenlarged by size criteria, this area bears watching on future follow-up evaluations to evaluate for shanon metastasis. 4. Similar cardiomegaly with interstitial edema and small bilateral pleural effusions, right-greater-than-left. 5. Mottled appearance of the liver, suspicious for hepatic venous congestion in the setting of cardiomegaly. 6. Additional chronic and incidental findings as detailed above.     MACRO: None   Signed by: Flaquita Redd 2/28/2025 3:11 PM Dictation workstation:   [id number]         RADIOLOGY     CT abdomen pelvis w IV contrast   Final Result   1. Interval worsening of masslike bladder wall thickening, most   predominant posteriorly and along the right lateral wall. Findings   are suspicious for underlying neoplasm, correlation with cystoscopy   and tissue diagnosis is recommended.   2. Interval increase in size and heterogeneity of the prostate, which   direct tumor invasion is not excluded.   3. Stable subcentimeter para-aortic and bilateral iliac chain. Though   nonenlarged by size criteria, this area bears watching on future   follow-up evaluations to evaluate for shaonn metastasis.   4. Similar cardiomegaly with interstitial edema and small bilateral   pleural effusions, right-greater-than-left.   5. Mottled appearance of the liver, suspicious for hepatic venous   congestion in the setting of cardiomegaly.   6. Additional chronic and incidental findings as detailed above.             MACRO:   None        Signed by: Flaquita Redd 2/28/2025 3:11 PM   Dictation workstation:   [id number]            ASSESSMENT     Problem List Items Addressed This Visit       Gross hematuria - Primary       Patient Active Problem List   Diagnosis    Anxiety    BPH (benign prostatic hyperplasia)    CAD (coronary artery disease)    Carotid artery plaque    Herpes zoster    Post herpetic neuralgia    HLD  (hyperlipidemia)    HTN (hypertension)    Hyperglycemia    Insomnia    Male erectile disorder of organic origin    Skin cancer    Thrombocytopenia (CMS-HCC)    Vitamin D deficiency    Moderate episode of recurrent major depressive disorder    Aneurysm of ascending aorta without rupture (CMS-HCC)    V tach (Multi)    Mitral valve insufficiency    Atrial fibrillation with controlled ventricular response (Multi)    History of coronary artery bypass graft    Acute combined systolic and diastolic heart failure    BMI 26.0-26.9,adult    Former smoker    Stage 3a chronic kidney disease (Multi)    Acute on chronic systolic (congestive) heart failure    Hematuria, unspecified type    Hemorrhagic disorder due to extrinsic circulating anticoagulants (Multi)    WINNIE (obstructive sleep apnea)    Central sleep apnea in conditions classified elsewhere(327.27)    Cardiomyopathy, ischemic    Partial idiopathic epilepsy with seizures of localized onset, not intractable, without status epilepticus (Multi)    Nonrheumatic tricuspid valve regurgitation    Pulmonary hypertension (Multi)    Hematuria    Gross hematuria       PLAN     Hematuria. All anticoagulants on hold. For Cystoscopy tomorrow.   Chronic systolic heart failure - ordered BNP but patient not SOB. Has had his CHF regimen held (appropriately) due to hypotension. Patient and family aware. Will need to slowly be restarted - possibly some after DC. They do have home BP cuff that they can use.   Atrial fibrillation - rates variable. Was tachycardic prior to transfusion, better now. Notified his outpatient cardiologist of event - to be considered for Watchman.     Will follow-up Thursday. Call if any issues.     Please do not hesitate to call with questions.  Electronically signed by Kala Kelly MD, on 3/4/2025 at 2:56 PM

## 2025-03-05 ENCOUNTER — ANESTHESIA (OUTPATIENT)
Dept: OPERATING ROOM | Facility: HOSPITAL | Age: 85
End: 2025-03-05
Payer: MEDICARE

## 2025-03-05 ENCOUNTER — ANESTHESIA EVENT (OUTPATIENT)
Dept: OPERATING ROOM | Facility: HOSPITAL | Age: 85
End: 2025-03-05
Payer: MEDICARE

## 2025-03-05 LAB
ANION GAP SERPL CALC-SCNC: 12 MMOL/L (ref 10–20)
BASOPHILS # BLD AUTO: 0.02 X10*3/UL (ref 0–0.1)
BASOPHILS NFR BLD AUTO: 0.2 %
BUN SERPL-MCNC: 30 MG/DL (ref 6–23)
CALCIUM SERPL-MCNC: 7.6 MG/DL (ref 8.6–10.3)
CHLORIDE SERPL-SCNC: 106 MMOL/L (ref 98–107)
CO2 SERPL-SCNC: 19 MMOL/L (ref 21–32)
CREAT SERPL-MCNC: 1.35 MG/DL (ref 0.5–1.3)
EGFRCR SERPLBLD CKD-EPI 2021: 51 ML/MIN/1.73M*2
EOSINOPHIL # BLD AUTO: 0.13 X10*3/UL (ref 0–0.4)
EOSINOPHIL NFR BLD AUTO: 1.5 %
ERYTHROCYTE [DISTWIDTH] IN BLOOD BY AUTOMATED COUNT: 17.6 % (ref 11.5–14.5)
ERYTHROCYTE [DISTWIDTH] IN BLOOD BY AUTOMATED COUNT: 18 % (ref 11.5–14.5)
GLUCOSE SERPL-MCNC: 94 MG/DL (ref 74–99)
HCT VFR BLD AUTO: 24 % (ref 41–52)
HCT VFR BLD AUTO: 26.6 % (ref 41–52)
HCT VFR BLD AUTO: 28.5 % (ref 41–52)
HGB BLD-MCNC: 7 G/DL (ref 13.5–17.5)
HGB BLD-MCNC: 8.3 G/DL (ref 13.5–17.5)
HGB BLD-MCNC: 9.1 G/DL (ref 13.5–17.5)
IMM GRANULOCYTES # BLD AUTO: 0.05 X10*3/UL (ref 0–0.5)
IMM GRANULOCYTES NFR BLD AUTO: 0.6 % (ref 0–0.9)
LYMPHOCYTES # BLD AUTO: 0.87 X10*3/UL (ref 0.8–3)
LYMPHOCYTES NFR BLD AUTO: 10.3 %
MAGNESIUM SERPL-MCNC: 2.03 MG/DL (ref 1.6–2.4)
MCH RBC QN AUTO: 28.4 PG (ref 26–34)
MCH RBC QN AUTO: 28.8 PG (ref 26–34)
MCHC RBC AUTO-ENTMCNC: 29.2 G/DL (ref 32–36)
MCHC RBC AUTO-ENTMCNC: 31.9 G/DL (ref 32–36)
MCV RBC AUTO: 89 FL (ref 80–100)
MCV RBC AUTO: 99 FL (ref 80–100)
MONOCYTES # BLD AUTO: 0.94 X10*3/UL (ref 0.05–0.8)
MONOCYTES NFR BLD AUTO: 11.1 %
NEUTROPHILS # BLD AUTO: 6.46 X10*3/UL (ref 1.6–5.5)
NEUTROPHILS NFR BLD AUTO: 76.3 %
NRBC BLD-RTO: 0 /100 WBCS (ref 0–0)
NRBC BLD-RTO: 0 /100 WBCS (ref 0–0)
PLATELET # BLD AUTO: 108 X10*3/UL (ref 150–450)
PLATELET # BLD AUTO: 92 X10*3/UL (ref 150–450)
POTASSIUM SERPL-SCNC: 4.2 MMOL/L (ref 3.5–5.3)
RBC # BLD AUTO: 2.43 X10*6/UL (ref 4.5–5.9)
RBC # BLD AUTO: 3.2 X10*6/UL (ref 4.5–5.9)
SODIUM SERPL-SCNC: 133 MMOL/L (ref 136–145)
WBC # BLD AUTO: 13.9 X10*3/UL (ref 4.4–11.3)
WBC # BLD AUTO: 8.5 X10*3/UL (ref 4.4–11.3)

## 2025-03-05 PROCEDURE — 52240 CYSTOSCOPY AND TREATMENT: CPT | Performed by: UROLOGY

## 2025-03-05 PROCEDURE — 88307 TISSUE EXAM BY PATHOLOGIST: CPT | Performed by: PATHOLOGY

## 2025-03-05 PROCEDURE — 2500000004 HC RX 250 GENERAL PHARMACY W/ HCPCS (ALT 636 FOR OP/ED): Performed by: INTERNAL MEDICINE

## 2025-03-05 PROCEDURE — A52240 PR CYSTOURETHROSCOPY,FULGUR >5 CM LESN: Performed by: ANESTHESIOLOGIST ASSISTANT

## 2025-03-05 PROCEDURE — 85027 COMPLETE CBC AUTOMATED: CPT | Performed by: INTERNAL MEDICINE

## 2025-03-05 PROCEDURE — 0T5B8ZZ DESTRUCTION OF BLADDER, VIA NATURAL OR ARTIFICIAL OPENING ENDOSCOPIC: ICD-10-PCS | Performed by: UROLOGY

## 2025-03-05 PROCEDURE — 2500000005 HC RX 250 GENERAL PHARMACY W/O HCPCS: Performed by: ANESTHESIOLOGIST ASSISTANT

## 2025-03-05 PROCEDURE — 7100000001 HC RECOVERY ROOM TIME - INITIAL BASE CHARGE: Performed by: UROLOGY

## 2025-03-05 PROCEDURE — 99100 ANES PT EXTEME AGE<1 YR&>70: CPT | Performed by: ANESTHESIOLOGY

## 2025-03-05 PROCEDURE — 83735 ASSAY OF MAGNESIUM: CPT | Performed by: INTERNAL MEDICINE

## 2025-03-05 PROCEDURE — 3700000002 HC GENERAL ANESTHESIA TIME - EACH INCREMENTAL 1 MINUTE: Performed by: UROLOGY

## 2025-03-05 PROCEDURE — 2720000007 HC OR 272 NO HCPCS: Performed by: UROLOGY

## 2025-03-05 PROCEDURE — 7100000002 HC RECOVERY ROOM TIME - EACH INCREMENTAL 1 MINUTE: Performed by: UROLOGY

## 2025-03-05 PROCEDURE — 2500000004 HC RX 250 GENERAL PHARMACY W/ HCPCS (ALT 636 FOR OP/ED): Performed by: ANESTHESIOLOGIST ASSISTANT

## 2025-03-05 PROCEDURE — 3600000003 HC OR TIME - INITIAL BASE CHARGE - PROCEDURE LEVEL THREE: Performed by: UROLOGY

## 2025-03-05 PROCEDURE — 85014 HEMATOCRIT: CPT | Performed by: INTERNAL MEDICINE

## 2025-03-05 PROCEDURE — 36415 COLL VENOUS BLD VENIPUNCTURE: CPT | Performed by: INTERNAL MEDICINE

## 2025-03-05 PROCEDURE — 52001 CYSTO W/IRRG&EVAC MLT CLOTS: CPT | Performed by: UROLOGY

## 2025-03-05 PROCEDURE — 88307 TISSUE EXAM BY PATHOLOGIST: CPT | Mod: TC,STJLAB | Performed by: UROLOGY

## 2025-03-05 PROCEDURE — 0TCB8ZZ EXTIRPATION OF MATTER FROM BLADDER, VIA NATURAL OR ARTIFICIAL OPENING ENDOSCOPIC: ICD-10-PCS | Performed by: UROLOGY

## 2025-03-05 PROCEDURE — 80048 BASIC METABOLIC PNL TOTAL CA: CPT | Performed by: INTERNAL MEDICINE

## 2025-03-05 PROCEDURE — 2500000005 HC RX 250 GENERAL PHARMACY W/O HCPCS: Performed by: INTERNAL MEDICINE

## 2025-03-05 PROCEDURE — 36430 TRANSFUSION BLD/BLD COMPNT: CPT

## 2025-03-05 PROCEDURE — 2500000005 HC RX 250 GENERAL PHARMACY W/O HCPCS: Performed by: ANESTHESIOLOGY

## 2025-03-05 PROCEDURE — 2500000001 HC RX 250 WO HCPCS SELF ADMINISTERED DRUGS (ALT 637 FOR MEDICARE OP): Performed by: INTERNAL MEDICINE

## 2025-03-05 PROCEDURE — 3700000001 HC GENERAL ANESTHESIA TIME - INITIAL BASE CHARGE: Performed by: UROLOGY

## 2025-03-05 PROCEDURE — 2060000001 HC INTERMEDIATE ICU ROOM DAILY

## 2025-03-05 PROCEDURE — P9016 RBC LEUKOCYTES REDUCED: HCPCS

## 2025-03-05 PROCEDURE — A52240 PR CYSTOURETHROSCOPY,FULGUR >5 CM LESN: Performed by: ANESTHESIOLOGY

## 2025-03-05 PROCEDURE — 85025 COMPLETE CBC W/AUTO DIFF WBC: CPT | Performed by: INTERNAL MEDICINE

## 2025-03-05 PROCEDURE — 3600000008 HC OR TIME - EACH INCREMENTAL 1 MINUTE - PROCEDURE LEVEL THREE: Performed by: UROLOGY

## 2025-03-05 PROCEDURE — 0TBB8ZZ EXCISION OF BLADDER, VIA NATURAL OR ARTIFICIAL OPENING ENDOSCOPIC: ICD-10-PCS | Performed by: UROLOGY

## 2025-03-05 PROCEDURE — 99233 SBSQ HOSP IP/OBS HIGH 50: CPT | Performed by: INTERNAL MEDICINE

## 2025-03-05 RX ORDER — HALOPERIDOL LACTATE 5 MG/ML
2 INJECTION, SOLUTION INTRAMUSCULAR ONCE
Status: COMPLETED | OUTPATIENT
Start: 2025-03-05 | End: 2025-03-05

## 2025-03-05 RX ORDER — LABETALOL HYDROCHLORIDE 5 MG/ML
5 INJECTION, SOLUTION INTRAVENOUS ONCE AS NEEDED
Status: DISCONTINUED | OUTPATIENT
Start: 2025-03-05 | End: 2025-03-05 | Stop reason: HOSPADM

## 2025-03-05 RX ORDER — ACETAMINOPHEN 325 MG/1
975 TABLET ORAL ONCE
Status: DISCONTINUED | OUTPATIENT
Start: 2025-03-05 | End: 2025-03-05 | Stop reason: HOSPADM

## 2025-03-05 RX ORDER — ESMOLOL HYDROCHLORIDE 10 MG/ML
INJECTION INTRAVENOUS AS NEEDED
Status: DISCONTINUED | OUTPATIENT
Start: 2025-03-05 | End: 2025-03-05

## 2025-03-05 RX ORDER — LIDOCAINE HYDROCHLORIDE 20 MG/ML
INJECTION, SOLUTION EPIDURAL; INFILTRATION; INTRACAUDAL; PERINEURAL AS NEEDED
Status: DISCONTINUED | OUTPATIENT
Start: 2025-03-05 | End: 2025-03-05

## 2025-03-05 RX ORDER — SODIUM CHLORIDE, SODIUM LACTATE, POTASSIUM CHLORIDE, CALCIUM CHLORIDE 600; 310; 30; 20 MG/100ML; MG/100ML; MG/100ML; MG/100ML
100 INJECTION, SOLUTION INTRAVENOUS CONTINUOUS
Status: DISCONTINUED | OUTPATIENT
Start: 2025-03-05 | End: 2025-03-05 | Stop reason: HOSPADM

## 2025-03-05 RX ORDER — LIDOCAINE HYDROCHLORIDE 10 MG/ML
0.1 INJECTION, SOLUTION INFILTRATION; PERINEURAL ONCE
Status: DISCONTINUED | OUTPATIENT
Start: 2025-03-05 | End: 2025-03-05 | Stop reason: HOSPADM

## 2025-03-05 RX ORDER — OXYCODONE AND ACETAMINOPHEN 5; 325 MG/1; MG/1
1 TABLET ORAL EVERY 4 HOURS PRN
Status: DISCONTINUED | OUTPATIENT
Start: 2025-03-05 | End: 2025-03-05 | Stop reason: HOSPADM

## 2025-03-05 RX ORDER — ONDANSETRON HYDROCHLORIDE 2 MG/ML
4 INJECTION, SOLUTION INTRAVENOUS ONCE AS NEEDED
Status: DISCONTINUED | OUTPATIENT
Start: 2025-03-05 | End: 2025-03-05 | Stop reason: HOSPADM

## 2025-03-05 RX ORDER — MIDODRINE HYDROCHLORIDE 10 MG/1
10 TABLET ORAL ONCE
Status: COMPLETED | OUTPATIENT
Start: 2025-03-05 | End: 2025-03-05

## 2025-03-05 RX ORDER — PROPOFOL 10 MG/ML
INJECTION, EMULSION INTRAVENOUS CONTINUOUS PRN
Status: DISCONTINUED | OUTPATIENT
Start: 2025-03-05 | End: 2025-03-05

## 2025-03-05 RX ORDER — METOPROLOL TARTRATE 1 MG/ML
INJECTION, SOLUTION INTRAVENOUS AS NEEDED
Status: DISCONTINUED | OUTPATIENT
Start: 2025-03-05 | End: 2025-03-05

## 2025-03-05 RX ORDER — SODIUM CHLORIDE, SODIUM LACTATE, POTASSIUM CHLORIDE, CALCIUM CHLORIDE 600; 310; 30; 20 MG/100ML; MG/100ML; MG/100ML; MG/100ML
INJECTION, SOLUTION INTRAVENOUS CONTINUOUS PRN
Status: DISCONTINUED | OUTPATIENT
Start: 2025-03-05 | End: 2025-03-05

## 2025-03-05 RX ORDER — ROCURONIUM BROMIDE 50 MG/5 ML
SYRINGE (ML) INTRAVENOUS AS NEEDED
Status: DISCONTINUED | OUTPATIENT
Start: 2025-03-05 | End: 2025-03-05

## 2025-03-05 RX ORDER — FENTANYL CITRATE 50 UG/ML
INJECTION, SOLUTION INTRAMUSCULAR; INTRAVENOUS CONTINUOUS PRN
Status: DISCONTINUED | OUTPATIENT
Start: 2025-03-05 | End: 2025-03-05

## 2025-03-05 RX ORDER — HYDRALAZINE HYDROCHLORIDE 20 MG/ML
5 INJECTION INTRAMUSCULAR; INTRAVENOUS EVERY 30 MIN PRN
Status: DISCONTINUED | OUTPATIENT
Start: 2025-03-05 | End: 2025-03-05 | Stop reason: HOSPADM

## 2025-03-05 RX ORDER — DIPHENHYDRAMINE HYDROCHLORIDE 50 MG/ML
12.5 INJECTION, SOLUTION INTRAMUSCULAR; INTRAVENOUS ONCE AS NEEDED
Status: DISCONTINUED | OUTPATIENT
Start: 2025-03-05 | End: 2025-03-05 | Stop reason: HOSPADM

## 2025-03-05 RX ORDER — ONDANSETRON HYDROCHLORIDE 2 MG/ML
INJECTION, SOLUTION INTRAVENOUS AS NEEDED
Status: DISCONTINUED | OUTPATIENT
Start: 2025-03-05 | End: 2025-03-05

## 2025-03-05 RX ORDER — PHENYLEPHRINE 10 MG/250 ML(40 MCG/ML)IN 0.9 % SOD.CHLORIDE INTRAVENOUS
CONTINUOUS PRN
Status: DISCONTINUED | OUTPATIENT
Start: 2025-03-05 | End: 2025-03-05

## 2025-03-05 RX ORDER — HYDROMORPHONE HYDROCHLORIDE 0.2 MG/ML
0.2 INJECTION INTRAMUSCULAR; INTRAVENOUS; SUBCUTANEOUS EVERY 5 MIN PRN
Status: DISCONTINUED | OUTPATIENT
Start: 2025-03-05 | End: 2025-03-05 | Stop reason: HOSPADM

## 2025-03-05 RX ORDER — PHENYLEPHRINE HYDROCHLORIDE 10 MG/ML
INJECTION INTRAVENOUS AS NEEDED
Status: DISCONTINUED | OUTPATIENT
Start: 2025-03-05 | End: 2025-03-05

## 2025-03-05 RX ORDER — ALBUTEROL SULFATE 0.83 MG/ML
2.5 SOLUTION RESPIRATORY (INHALATION) ONCE AS NEEDED
Status: DISCONTINUED | OUTPATIENT
Start: 2025-03-05 | End: 2025-03-05 | Stop reason: HOSPADM

## 2025-03-05 RX ORDER — OXYCODONE HYDROCHLORIDE 5 MG/1
5 TABLET ORAL EVERY 4 HOURS PRN
Status: DISCONTINUED | OUTPATIENT
Start: 2025-03-05 | End: 2025-03-05 | Stop reason: HOSPADM

## 2025-03-05 RX ORDER — MIDODRINE HYDROCHLORIDE 5 MG/1
5 TABLET ORAL ONCE
Status: COMPLETED | OUTPATIENT
Start: 2025-03-05 | End: 2025-03-05

## 2025-03-05 RX ADMIN — MIDODRINE HYDROCHLORIDE 10 MG: 10 TABLET ORAL at 13:49

## 2025-03-05 RX ADMIN — ESMOLOL HYDROCHLORIDE 40 MG: 10 INJECTION, SOLUTION INTRAVENOUS at 14:53

## 2025-03-05 RX ADMIN — PROPOFOL 50 MG: 10 INJECTION, EMULSION INTRAVENOUS at 14:46

## 2025-03-05 RX ADMIN — Medication 4 L/MIN: at 17:00

## 2025-03-05 RX ADMIN — ESMOLOL HYDROCHLORIDE 20 MG: 10 INJECTION, SOLUTION INTRAVENOUS at 15:31

## 2025-03-05 RX ADMIN — FENTANYL CITRATE 25 MCG: 50 INJECTION, SOLUTION INTRAMUSCULAR; INTRAVENOUS at 14:44

## 2025-03-05 RX ADMIN — ESMOLOL HYDROCHLORIDE 10 MG: 10 INJECTION, SOLUTION INTRAVENOUS at 15:34

## 2025-03-05 RX ADMIN — ALPRAZOLAM 0.5 MG: 0.5 TABLET ORAL at 05:08

## 2025-03-05 RX ADMIN — ESMOLOL HYDROCHLORIDE 30 MG: 10 INJECTION, SOLUTION INTRAVENOUS at 15:28

## 2025-03-05 RX ADMIN — HALOPERIDOL LACTATE 2 MG: 5 INJECTION, SOLUTION INTRAMUSCULAR at 18:56

## 2025-03-05 RX ADMIN — HYOSCYAMINE SULFATE 0.12 MG: 0.12 TABLET SUBLINGUAL at 05:08

## 2025-03-05 RX ADMIN — PROPOFOL 50 MG: 10 INJECTION, EMULSION INTRAVENOUS at 14:44

## 2025-03-05 RX ADMIN — Medication 0.5 MCG/KG/MIN: at 14:44

## 2025-03-05 RX ADMIN — Medication 20 MG: at 15:33

## 2025-03-05 RX ADMIN — ONDANSETRON 4 MG: 2 INJECTION INTRAMUSCULAR; INTRAVENOUS at 15:53

## 2025-03-05 RX ADMIN — MIDODRINE HYDROCHLORIDE 5 MG: 5 TABLET ORAL at 07:41

## 2025-03-05 RX ADMIN — SODIUM CHLORIDE 500 ML: 9 INJECTION, SOLUTION INTRAVENOUS at 07:39

## 2025-03-05 RX ADMIN — SODIUM CHLORIDE, POTASSIUM CHLORIDE, SODIUM LACTATE AND CALCIUM CHLORIDE: 600; 310; 30; 20 INJECTION, SOLUTION INTRAVENOUS at 14:10

## 2025-03-05 RX ADMIN — LIDOCAINE HYDROCHLORIDE 60 MG: 20 INJECTION, SOLUTION EPIDURAL; INFILTRATION; INTRACAUDAL; PERINEURAL at 14:44

## 2025-03-05 RX ADMIN — MIDODRINE HYDROCHLORIDE 10 MG: 10 TABLET ORAL at 20:57

## 2025-03-05 RX ADMIN — METOPROLOL TARTRATE 5 MG: 5 INJECTION INTRAVENOUS at 15:34

## 2025-03-05 RX ADMIN — SUGAMMADEX 200 MG: 100 INJECTION, SOLUTION INTRAVENOUS at 16:16

## 2025-03-05 RX ADMIN — Medication 8 L/MIN: at 16:30

## 2025-03-05 RX ADMIN — LEVETIRACETAM 500 MG: 500 TABLET, FILM COATED ORAL at 09:49

## 2025-03-05 RX ADMIN — PHENYLEPHRINE HYDROCHLORIDE 100 MCG: 10 INJECTION INTRAVENOUS at 15:49

## 2025-03-05 RX ADMIN — Medication 10 MG: at 15:25

## 2025-03-05 RX ADMIN — Medication 30 MG: at 14:46

## 2025-03-05 RX ADMIN — PHENYLEPHRINE HYDROCHLORIDE 200 MCG: 10 INJECTION INTRAVENOUS at 15:09

## 2025-03-05 SDOH — HEALTH STABILITY: MENTAL HEALTH: CURRENT SMOKER: 0

## 2025-03-05 ASSESSMENT — COGNITIVE AND FUNCTIONAL STATUS - GENERAL
WALKING IN HOSPITAL ROOM: A LITTLE
STANDING UP FROM CHAIR USING ARMS: A LITTLE
TOILETING: A LITTLE
MOBILITY SCORE: 21
DRESSING REGULAR UPPER BODY CLOTHING: A LITTLE
CLIMB 3 TO 5 STEPS WITH RAILING: A LITTLE
DAILY ACTIVITIY SCORE: 19
HELP NEEDED FOR BATHING: A LITTLE
DRESSING REGULAR LOWER BODY CLOTHING: A LITTLE
PERSONAL GROOMING: A LITTLE

## 2025-03-05 ASSESSMENT — PAIN - FUNCTIONAL ASSESSMENT
PAIN_FUNCTIONAL_ASSESSMENT: 0-10

## 2025-03-05 ASSESSMENT — PAIN SCALES - GENERAL
PAINLEVEL_OUTOF10: 0 - NO PAIN
PAIN_LEVEL: 3
PAINLEVEL_OUTOF10: 0 - NO PAIN
PAINLEVEL_OUTOF10: 0 - NO PAIN

## 2025-03-05 NOTE — PROGRESS NOTES
Minh Harrington Jr. is a 85 y.o. male on day 4 of admission presenting with Hematuria.      Subjective   Blood pressure low today.  Talked about getting 2 units of blood was given midodrine along with moving to stepdown unit for closer monitoring.    Objective     Last Recorded Vitals  BP (!) 79/30 (BP Location: Left arm, Patient Position: Lying) Comment: Reported abnormal blood pressure to the RN Rose  Pulse 94   Temp 36.3 °C (97.3 °F) (Temporal)   Resp 16   Wt 76.5 kg (168 lb 10.4 oz)   SpO2 100%   Intake/Output last 3 Shifts:    Intake/Output Summary (Last 24 hours) at 3/5/2025 1013  Last data filed at 3/5/2025 0840  Gross per 24 hour   Intake 1430 ml   Output --   Net 1430 ml       Admission Weight  Weight: 80.7 kg (178 lb) (02/28/25 0946)    Daily Weight  02/28/25 : 76.5 kg (168 lb 10.4 oz)      Physical Exam:  General: Not in acute distress, alert.  Not on oxygen  HEENT: PERRLA, head intact and normocephalic  Neck: Normal to inspection  Lungs: Clear to auscultation, work of breathing within normal limit  Cardiac: Irregularly irregular  Abdomen: Soft nontender, positive bowel sounds  : Pandya catheter in place with bloody urine without any active clots  Skin: Intact  Hematology: No petechia or excessive ecchymosis  Musculoskeletal: Without significant trauma  Neurological: Alert awake oriented, no focal deficit, cranial nerves grossly intact  Psych: No suicidal ideation or homicidal ideation    Relevant Results  Scheduled medications  atorvastatin, 20 mg, oral, Daily  B complex-vitamin C, 1 tablet, oral, Daily  cefTRIAXone, 1 g, intravenous, q24h  cholecalciferol, 5,000 Units, oral, Daily  [Held by provider] empagliflozin, 10 mg, oral, Daily  finasteride, 5 mg, oral, Daily  hyoscyamine, 0.125 mg, oral, q6h BRISSA  levETIRAcetam, 500 mg, oral, Daily  [Held by provider] metoprolol succinate XL, 25 mg, oral, BID  mirtazapine, 15 mg, oral, Nightly  [Held by provider] sacubitriL-valsartan, 1 tablet, oral,  BID  [Held by provider] sertraline, 100 mg, oral, Daily  [Held by provider] spironolactone, 25 mg, oral, Daily      Continuous medications     PRN medications  PRN medications: acetaminophen **OR** acetaminophen **OR** acetaminophen, ALPRAZolam, benzocaine-menthol, guaiFENesin, ondansetron **OR** ondansetron, polyethylene glycol, prochlorperazine **OR** prochlorperazine **OR** prochlorperazine   Labs  Results from last 7 days   Lab Units 03/05/25  0607 03/04/25  0823 03/03/25  1659   WBC AUTO x10*3/uL 8.5 12.1* 11.7*   HEMOGLOBIN g/dL 7.0* 8.4* 9.5*   HEMATOCRIT % 24.0* 27.4* 30.4*   PLATELETS AUTO x10*3/uL 92* 109* 103*     Results from last 7 days   Lab Units 03/05/25  0607 03/04/25  0823 03/03/25  0531 03/01/25  0610 02/28/25  1238   SODIUM mmol/L 133* 136 136   < > 137   POTASSIUM mmol/L 4.2 4.2 4.2   < > 5.0   CHLORIDE mmol/L 106 106 105   < > 105   CO2 mmol/L 19* 22 24   < > 26   BUN mg/dL 30* 25* 19   < > 33*   CREATININE mg/dL 1.35* 1.44* 1.13   < > 1.51*   CALCIUM mg/dL 7.6* 8.2* 8.2*   < > 8.7   PROTEIN TOTAL g/dL  --   --   --   --  6.6   BILIRUBIN TOTAL mg/dL  --   --   --   --  0.7   ALK PHOS U/L  --   --   --   --  78   ALT U/L  --   --   --   --  12   AST U/L  --   --   --   --  20   GLUCOSE mg/dL 94 105* 98   < > 106*    < > = values in this interval not displayed.       CT abdomen pelvis w IV contrast    Result Date: 2/28/2025  Interpreted By:  Flaquita Redd, STUDY: CT ABDOMEN PELVIS W IV CONTRAST;  2/28/2025 2:41 pm   INDICATION: Signs/Symptoms:painless hematuria.     COMPARISON: CT urography 08/07/2024 CT abdomen pelvis 08/05/2024   ACCESSION NUMBER(S): [id number]   ORDERING CLINICIAN: FRANK SHEEHAN   TECHNIQUE: CT of the abdomen and pelvis was performed.  Standard contiguous axial images were obtained at 3 mm slice thickness through the abdomen and pelvis. Coronal and sagittal reconstructions at 3 mm slice thickness were performed.  75 ML of Omnipaque 350 was administered intravenously  without immediate complication.   FINDINGS: LOWER CHEST: Unchanged cardiomegaly without evidence of pericardial effusion. Severe coronary artery calcifications versus stent. Stable small right and trace left pleural effusions. Right right-greater-than-left lower lobe dependent ground-glass opacities. Similar smooth interlobular septal thickening is seen, right-greater-than-left, compatible with interstitial edema. The visualized distal esophagus appears unremarkable. Status post median sternotomy.   ABDOMEN:   LIVER: The liver is normal in size. Mottled appearance of the liver, suspicious for hepatic venous congestion in the setting of cardiomegaly. No suspicious liver lesion.   BILE DUCTS: No biliary duct dilatation.   GALLBLADDER: The gallbladder is nondistended and without evidence of radiopaque stones.   PANCREAS: The pancreas appears unremarkable without evidence of ductal dilatation or masses.   SPLEEN: The spleen is normal in size.   ADRENAL GLANDS: No adrenal nodularity or thickening.   KIDNEYS AND URETERS: The kidneys are normal in size. Stable hypoattenuating lesions in the bilateral kidneys, likely benign. Stable punctate nonobstructing calculus in the left kidney. No hydroureteronephrosis or right nephroureterolithiasis is identified.   PELVIS:   BLADDER: Interval worsening of masslike bladder wall thickening, most predominant posteriorly and along the right lateral wall.   REPRODUCTIVE ORGANS: Interval increase in prostatomegaly and heterogeneity of the prostate, measuring 52 mm in the transaxial dimension, previously 48 mm.   BOWEL: The stomach is unremarkable. The small and large bowel are normal in caliber and demonstrate no wall thickening. The appendix is not definitely visualized. There is however no pericecal stranding or fluid. Colonic diverticulosis without acute diverticulitis.   VESSELS: Severe atherosclerotic calcifications of the aortoiliac arteries. The IVC appears normal.    PERITONEUM/RETROPERITONEUM/LYMPH NODES: No ascites or fluid collection. No measurable peritoneal nodular thickening or deposits. Stable subcentimeter para-aortic and bilateral iliac chain, which are nonenlarged by size criteria, and bears watching on future follow-up evaluations, for example a 8 mm left common iliac lymph node on series 2, image 112 measured in the short axis.   ABDOMINAL WALL: The abdominal wall soft tissues appear normal.   BONES: No acute fracture. No suspicious osseous lesions. Discogenic degenerative changes in the lower thoracic and lumbar spine.       1. Interval worsening of masslike bladder wall thickening, most predominant posteriorly and along the right lateral wall. Findings are suspicious for underlying neoplasm, correlation with cystoscopy and tissue diagnosis is recommended. 2. Interval increase in size and heterogeneity of the prostate, which direct tumor invasion is not excluded. 3. Stable subcentimeter para-aortic and bilateral iliac chain. Though nonenlarged by size criteria, this area bears watching on future follow-up evaluations to evaluate for shanon metastasis. 4. Similar cardiomegaly with interstitial edema and small bilateral pleural effusions, right-greater-than-left. 5. Mottled appearance of the liver, suspicious for hepatic venous congestion in the setting of cardiomegaly. 6. Additional chronic and incidental findings as detailed above.     MACRO: None   Signed by: Flaquita Redd 2/28/2025 3:11 PM Dictation workstation:   [id number]                    Assessment/Plan   85-year-old male with past medical history of CAD status post CABG, atrial fibrillation on Eliquis, chronic systolic heart failure, CKD stage III, thrombocytopenia, BPH status post TURP in 2008 presented due to gross hematuria and was found to have worsening of bladder mass and require initiation of CBI and holding anticoagulation.  Assessment & Plan  Hematuria    Gross hematuria    Gross hematuria  secondary to bladder mass  CBI is running  N.p.o. for intervention  Will move patient to stepdown unit with significant hypotension from acute blood loss anemia  Status post 1 unit of packed RBC on 3/4/2025  2 more units ordered  Type and screen done  Will continue to hold anticoagulation  Continue with antibiotics  Cardiology consultation is noted as well  Hold anticoagulation and outpatient referral for Watchman device placement    Acute blood loss anemia  Acute blood loss anemia from hematuria from bladder mass  Type and screen done  Status post 1 unit of packed RBC  With significant drop in hemoglobin even after transfusion we will give 2 units of packed RBC with ongoing bleeding to optimize patient  Patient has significant hypertension    Hypotension  Likely secondary to acute blood loss anemia  Midodrine 5 mg given  Move patient to stepdown unit  500 cc bolus  2 units of packed RBC    Atrial fibrillation on Eliquis  Patient's anticoagulation will need to be held  Cardiology consultation is noted  To consider Watchman device as outpatient as outpatient and cardiology will refer patient  Patient follows up with Dr. Valle    HFrEF  Giving IV fluids given that patient is hypotensive and has acute blood loss anemia  Hold off on all antihypertensive medication    CKD stage III  Creatinine stable  Continue to monitor    Hyperlipidemia  Continue atorvastatin    Anxiety disorder  Continue with Xanax as needed    DVT prophylaxis  SCDs       Plan discussed with patient, wife and primary nurse at bedside    High Level of MDM based on above issue and discussing plan    This note is created using voice recognition software. All efforts are made to minimize errors, if there are errors there due to transcription.    Ruiz Lechuga  Hospitalist

## 2025-03-05 NOTE — PROGRESS NOTES
Physical Therapy                 Therapy Communication Note    Patient Name: Minh Harrington Jr.  MRN: [id number]  Department: [facility] ICU  Room: 2131/2131-A  Today's Date: 3/5/2025     Discipline: Physical Therapy    PT Missed Visit: Yes     Missed Time: Attempt    Comment:  PT orders received, chart reviewed.  Pt transferred to ICU this AM 2/2 hypotension.  Will re-attempt PT eval as pt is appropriate.

## 2025-03-05 NOTE — ANESTHESIA PROCEDURE NOTES
Airway  Date/Time: 3/5/2025 2:50 PM  Urgency: elective    Airway not difficult    Staffing  Performed: MAIKEL   Authorized by: Rod Brooks MD    Performed by: MAIKEL Kim  Patient location during procedure: OR    Indications and Patient Condition  Indications for airway management: anesthesia  Spontaneous Ventilation: absent  Sedation level: deep  Preoxygenated: yes  Patient position: sniffing  MILS maintained throughout  Mask difficulty assessment: 1 - vent by mask    Final Airway Details  Final airway type: endotracheal airway      Successful airway: ETT  Cuffed: yes   Successful intubation technique: direct laryngoscopy  Endotracheal tube insertion site: oral  Blade: Mandy  Blade size: #4  ETT size (mm): 7.0  Cormack-Lehane Classification: grade I - full view of glottis  Placement verified by: chest auscultation and capnometry   Cuff volume (mL): 6  Measured from: lips  ETT to lips (cm): 22  Number of attempts at approach: 1    Additional Comments  Lips and teeth in pre anesthetic conditions after laryngoscopy

## 2025-03-05 NOTE — ANESTHESIA PREPROCEDURE EVALUATION
Patient: Minh Harrington Jr.    Procedure Information       Date/Time: 03/05/25 4929    Procedures:       CYSTOSCOPY, WITH THROMBUS REMOVAL      transurethral resection of bladder tumor    Location: [facility] OR 02 / [facility]    Surgeons: Anthony Washington MD            Relevant Problems   Cardiac   (+) Aneurysm of ascending aorta without rupture (CMS-HCC)   (+) Atrial fibrillation with controlled ventricular response (Multi)   (+) CAD (coronary artery disease)   (+) HLD (hyperlipidemia)   (+) HTN (hypertension)   (+) History of coronary artery bypass graft   (+) Mitral valve insufficiency   (+) Nonrheumatic tricuspid valve regurgitation      Pulmonary   (+) WINNIE (obstructive sleep apnea)   (+) Pulmonary hypertension (Multi)      Neuro   (+) Anxiety   (+) Carotid artery plaque   (+) Moderate episode of recurrent major depressive disorder   (+) Partial idiopathic epilepsy with seizures of localized onset, not intractable, without status epilepticus (Multi)      /Renal   (+) BPH (benign prostatic hyperplasia)      Hematology   (+) Thrombocytopenia (CMS-HCC)      ID   (+) Herpes zoster      Skin   (+) Skin cancer       Clinical information reviewed:   Tobacco  Allergies  Meds   Med Hx  Surg Hx   Fam Hx  Soc Hx        NPO Detail:  No data recorded     Physical Exam    Airway  Mallampati: III  TM distance: >3 FB     Cardiovascular   Rhythm: irregular  Rate: normal     Dental    Pulmonary   Breath sounds clear to auscultation  (+) decreased breath sounds     Abdominal            Anesthesia Plan    History of general anesthesia?: yes  History of complications of general anesthesia?: no    ASA 4     general     The patient is not a current smoker.  Patient was previously instructed to abstain from smoking on day of procedure.  Patient did not smoke on day of procedure.  Education provided regarding risk of obstructive sleep apnea.  intravenous induction   Postoperative administration of opioids is  intended.  Anesthetic plan and risks discussed with patient.  Use of blood products discussed with patient who consented to blood products.    Plan discussed with CRNA and CAA.

## 2025-03-05 NOTE — PROGRESS NOTES
Occupational Therapy                 Therapy Communication Note    Patient Name: Minh Harrington Jr.  MRN: [id number]  Department: [facility] OR  Room: [facility] OR  Today's Date: 3/5/2025     Discipline: Occupational Therapy    Comment: Received orders for OT eval 2/28. OT has attempted OT eval several times, but had been on hold with medical issues. Pt was transferred to ICU this am secondary to hypotension, and is not appropriate for OT eval at this time. Pt also with plans for cystoscopy. Will hold OT eval and complete as appropriate.

## 2025-03-05 NOTE — NURSING NOTE
1010 Received pt in transfer from 3rd floor. Hypotensive, 3way bladder irrigation running, 2units PRBCs ready to transfuse. Pt and wife at bedside agreeable to POC.    1055 Dr Lechuga at bedside, PRBCs transfusing, hypotensive.     1400 Pandya cath manually irrigated for moderate amount large clots    1429 Pt to OR via bed, family accompanied.    1800 Pt back from OR, confused. Family at bedside.    1845 Pt confused, combative with staff. Pulling at leads and wires, removed SCDs and gown multiple times.

## 2025-03-05 NOTE — OP NOTE
CYSTOSCOPY, WITH THROMBUS REMOVAL, transurethral resection of bladder tumor large Operative Note     Date: 3/5/2025  OR Location: [facility] OR    Name: Minh Harrington Jr., : 1940, Age: 85 y.o., MRN: [id number], Sex: male    Diagnosis  Pre-op Diagnosis      * Gross hematuria [R31.0] Post-op Diagnosis     * Gross hematuria [R31.0]     Procedures  CYSTOSCOPY, WITH THROMBUS REMOVAL  21131 - MA CYSTO W/IRRIG & EVAC MULTPLE OBSTRUCTING CLOTS    transurethral resection of bladder tumor large  65571 - MA CYSTOURETHROSCOPY W/DEST &/RMVL TUMOR LARGE      Surgeons      * Anthony Washington - Primary    Resident/Fellow/Other Assistant:  Surgeons and Role:  * No surgeons found with a matching role *    Staff:   Circulator: Krystal  Circulator: Princess  Circulator: Maggy Cotton Person: Neda  Circulator: Denise Velez Scrub: Celestina    Anesthesia Staff: Anesthesiologist: Rod Brooks MD  C-AA: MAIKEL Blackwood; MAIKEL Kim    Procedure Summary  Anesthesia: General, Monitor Anesthesia Care  ASA: IV  Estimated Blood Loss: 20mL  Intra-op Medications:   Administrations occurring from 1422 to 1547 on 25:   Medication Name Total Dose   ALPRAZolam (Xanax) tablet 0.5 mg Cannot be calculated   atorvastatin (Lipitor) tablet 20 mg Cannot be calculated   benzocaine-menthol (Cepastat Sore Throat) lozenge 1 lozenge Cannot be calculated   cefTRIAXone (Rocephin) 1 g in sodium chloride 0.9% IV 50 mL Cannot be calculated   cholecalciferol (Vitamin D-3) tablet 5,000 Units Cannot be calculated   esmolol (Brevibloc) 10 mcg/mL  IV bolus 100 mg   fentaNYL (Sublimaze) injection 50 mcg/mL 25 mcg   finasteride (Proscar) tablet 5 mg Cannot be calculated   guaiFENesin (Mucinex) 12 hr tablet 600 mg Cannot be calculated   hyoscyamine (Anaspaz) disintegrating tablet 0.125 mg Cannot be calculated   levETIRAcetam (Keppra) tablet 500 mg Cannot be calculated   lidocaine PF (Xylocaine-MPF) local injection 2 % 60 mg   metoprolol  tartrate (Lopressor) injection 5 mg   mirtazapine (Remeron) tablet 15 mg Cannot be calculated   phenylephrine (Liam-Synephrine) 10 mg/250 mL NS (40 mcg/mL) infusion 1.63 mg   phenylephrine (Liam-Synephrine) injection 200 mcg   polyethylene glycol (Glycolax, Miralax) packet 17 g Cannot be calculated   propofol (Diprivan) injection 10 mg/mL 100 mg   rocuronium (Zemuron) 50 mg/5 mL prefilled syringe 60 mg              Anesthesia Record               Intraprocedure I/O Totals          Intake    Phenylephrine Drip 0.00 mL    The total shown is the total volume documented since Anesthesia Start was filed.    Total Intake 0 mL          Specimen:   ID Type Source Tests Collected by Time   1 : BLADDER TUMOR Tissue BLADDER TRANSURETHRAL RESECTION SURGICAL PATHOLOGY EXAM Anthony Washington MD 3/5/2025 4925                 Drains and/or Catheters:   Urethral Catheter Latex 22 Fr. (Active)       Tourniquet Times:         Implants:     Findings:   Roughly 6 mm tumor emanating from the dome of the bladder and right lateral wall of the bladder  -Papillary, nodular  -Suspicious for deep invasion  -Unable to determine whether the resection was complete, visibility was somewhat limited and the bladder been chronically over distended  -Ureteral orifices.    Indications: Minh Harrington Jr. is an 85 y.o. male who is having surgery for Gross hematuria [R31.0].     The patient was seen in the preoperative area. The risks, benefits, complications, treatment options, non-operative alternatives, expected recovery and outcomes were discussed with the patient. The possibilities of reaction to medication, pulmonary aspiration, injury to surrounding structures, bleeding, recurrent infection, the need for additional procedures, failure to diagnose a condition, and creating a complication requiring transfusion or operation were discussed with the patient. The patient concurred with the proposed plan, giving informed consent.  The site of surgery  was properly noted/marked if necessary per policy. The patient has been actively warmed in preoperative area. Preoperative antibiotics have been ordered and given within 1 hours of incision. Venous thrombosis prophylaxis have been ordered including bilateral sequential compression devices    Procedure Details:     Patient consented to procedure in preoperative area. Risks and benefits discussed. Allergies were reviewed and preoperative antibiotics were administered. Patient was brought to the operating room and placed in supine position on the operating room table. A timeout was performed. All were in agreement. Patient underwent general anesthesia without complication. They were repositioned in dorsal lithotomy and prepped and draped in the usual sterile fashion.     Urethral sounds were used to dilate the urethral meatus to 28Fr diameter.    A resectoscope was used to perform cystourethroscopy. Urethra was normal.  His prostate had some adenomatous regrowth.  There was a massive amount of clot burden in the bladder. This was evacuated using an Ellik.  Roughly 500 cc of clot was aspirated from the bladder.  Once this was cleared, a large papillary tumor with nodular components could be seen along the right lateral wall and dome of the bladder. The  thin electrocautery loop was used to resect the bladder tumor systematically. The tumor appeared to be completely resected and electrocautery was used to cauterize the base of the tumors. The bladder was drained and the tumor chips were completely emptied. We reintroduced the resectoscope and carefully checked for hemostasis after emptying the bladder. There was no active bleeding and hemostasis was excellent. All instruments were removed. A 22 Fr Pandya catheter was placed and bladder was drained. Patient was awoken from anesthesia and transferred to PACU in stable condition.       Complications:  None; patient tolerated the procedure well.    Disposition: PACU -  hemodynamically stable.  Condition: stable                 Additional Details: continue CBI, will reassess urine output during morning rounds     Attending Attestation: I was present and scrubbed for the entire procedure.    Anthony Washington  Phone Number: [phone]

## 2025-03-06 LAB
ANION GAP SERPL CALC-SCNC: 10 MMOL/L (ref 10–20)
APPEARANCE UR: CLEAR
BASOPHILS # BLD AUTO: 0.02 X10*3/UL (ref 0–0.1)
BASOPHILS NFR BLD AUTO: 0.2 %
BILIRUB UR STRIP.AUTO-MCNC: NEGATIVE MG/DL
BLOOD EXPIRATION DATE: NORMAL
BUN SERPL-MCNC: 23 MG/DL (ref 6–23)
CALCIUM SERPL-MCNC: 7.7 MG/DL (ref 8.6–10.3)
CHLORIDE SERPL-SCNC: 108 MMOL/L (ref 98–107)
CO2 SERPL-SCNC: 22 MMOL/L (ref 21–32)
COLOR UR: COLORLESS
CREAT SERPL-MCNC: 1.14 MG/DL (ref 0.5–1.3)
DISPENSE STATUS: NORMAL
EGFRCR SERPLBLD CKD-EPI 2021: 62 ML/MIN/1.73M*2
EOSINOPHIL # BLD AUTO: 0.16 X10*3/UL (ref 0–0.4)
EOSINOPHIL NFR BLD AUTO: 1.6 %
ERYTHROCYTE [DISTWIDTH] IN BLOOD BY AUTOMATED COUNT: 17.6 % (ref 11.5–14.5)
ERYTHROCYTE [DISTWIDTH] IN BLOOD BY AUTOMATED COUNT: 17.6 % (ref 11.5–14.5)
GLUCOSE SERPL-MCNC: 81 MG/DL (ref 74–99)
GLUCOSE UR STRIP.AUTO-MCNC: NORMAL MG/DL
HCT VFR BLD AUTO: 24.6 % (ref 41–52)
HCT VFR BLD AUTO: 27.5 % (ref 41–52)
HCT VFR BLD AUTO: 31.1 % (ref 41–52)
HGB BLD-MCNC: 10 G/DL (ref 13.5–17.5)
HGB BLD-MCNC: 7.8 G/DL (ref 13.5–17.5)
HGB BLD-MCNC: 8.6 G/DL (ref 13.5–17.5)
HOLD SPECIMEN: NORMAL
IMM GRANULOCYTES # BLD AUTO: 0.08 X10*3/UL (ref 0–0.5)
IMM GRANULOCYTES NFR BLD AUTO: 0.8 % (ref 0–0.9)
KETONES UR STRIP.AUTO-MCNC: NEGATIVE MG/DL
LEUKOCYTE ESTERASE UR QL STRIP.AUTO: ABNORMAL
LYMPHOCYTES # BLD AUTO: 0.54 X10*3/UL (ref 0.8–3)
LYMPHOCYTES NFR BLD AUTO: 5.3 %
MAGNESIUM SERPL-MCNC: 1.92 MG/DL (ref 1.6–2.4)
MCH RBC QN AUTO: 27.6 PG (ref 26–34)
MCH RBC QN AUTO: 28.3 PG (ref 26–34)
MCHC RBC AUTO-ENTMCNC: 31.3 G/DL (ref 32–36)
MCHC RBC AUTO-ENTMCNC: 32.2 G/DL (ref 32–36)
MCV RBC AUTO: 88 FL (ref 80–100)
MCV RBC AUTO: 88 FL (ref 80–100)
MONOCYTES # BLD AUTO: 0.98 X10*3/UL (ref 0.05–0.8)
MONOCYTES NFR BLD AUTO: 9.6 %
NEUTROPHILS # BLD AUTO: 8.43 X10*3/UL (ref 1.6–5.5)
NEUTROPHILS NFR BLD AUTO: 82.5 %
NITRITE UR QL STRIP.AUTO: NEGATIVE
NRBC BLD-RTO: 0 /100 WBCS (ref 0–0)
NRBC BLD-RTO: 0 /100 WBCS (ref 0–0)
PH UR STRIP.AUTO: 5 [PH]
PLATELET # BLD AUTO: 109 X10*3/UL (ref 150–450)
PLATELET # BLD AUTO: 94 X10*3/UL (ref 150–450)
POTASSIUM SERPL-SCNC: 4.2 MMOL/L (ref 3.5–5.3)
PRODUCT BLOOD TYPE: 5100
PRODUCT CODE: NORMAL
PROT UR STRIP.AUTO-MCNC: NEGATIVE MG/DL
RBC # BLD AUTO: 3.12 X10*6/UL (ref 4.5–5.9)
RBC # BLD AUTO: 3.53 X10*6/UL (ref 4.5–5.9)
RBC # UR STRIP.AUTO: ABNORMAL MG/DL
RBC #/AREA URNS AUTO: >20 /HPF
SODIUM SERPL-SCNC: 136 MMOL/L (ref 136–145)
SP GR UR STRIP.AUTO: 1
UNIT ABO: NORMAL
UNIT NUMBER: NORMAL
UNIT RH: NORMAL
UNIT VOLUME: 350
UROBILINOGEN UR STRIP.AUTO-MCNC: NORMAL MG/DL
WBC # BLD AUTO: 10.2 X10*3/UL (ref 4.4–11.3)
WBC # BLD AUTO: 12.3 X10*3/UL (ref 4.4–11.3)
WBC #/AREA URNS AUTO: ABNORMAL /HPF
XM INTEP: NORMAL

## 2025-03-06 PROCEDURE — 2500000004 HC RX 250 GENERAL PHARMACY W/ HCPCS (ALT 636 FOR OP/ED): Performed by: INTERNAL MEDICINE

## 2025-03-06 PROCEDURE — 2500000001 HC RX 250 WO HCPCS SELF ADMINISTERED DRUGS (ALT 637 FOR MEDICARE OP): Performed by: INTERNAL MEDICINE

## 2025-03-06 PROCEDURE — 2500000001 HC RX 250 WO HCPCS SELF ADMINISTERED DRUGS (ALT 637 FOR MEDICARE OP): Performed by: STUDENT IN AN ORGANIZED HEALTH CARE EDUCATION/TRAINING PROGRAM

## 2025-03-06 PROCEDURE — 97161 PT EVAL LOW COMPLEX 20 MIN: CPT | Mod: GP | Performed by: PHYSICAL THERAPIST

## 2025-03-06 PROCEDURE — 80048 BASIC METABOLIC PNL TOTAL CA: CPT | Performed by: STUDENT IN AN ORGANIZED HEALTH CARE EDUCATION/TRAINING PROGRAM

## 2025-03-06 PROCEDURE — 85025 COMPLETE CBC W/AUTO DIFF WBC: CPT | Performed by: STUDENT IN AN ORGANIZED HEALTH CARE EDUCATION/TRAINING PROGRAM

## 2025-03-06 PROCEDURE — P9016 RBC LEUKOCYTES REDUCED: HCPCS

## 2025-03-06 PROCEDURE — 2500000004 HC RX 250 GENERAL PHARMACY W/ HCPCS (ALT 636 FOR OP/ED): Performed by: STUDENT IN AN ORGANIZED HEALTH CARE EDUCATION/TRAINING PROGRAM

## 2025-03-06 PROCEDURE — 99233 SBSQ HOSP IP/OBS HIGH 50: CPT | Performed by: INTERNAL MEDICINE

## 2025-03-06 PROCEDURE — 83735 ASSAY OF MAGNESIUM: CPT | Performed by: STUDENT IN AN ORGANIZED HEALTH CARE EDUCATION/TRAINING PROGRAM

## 2025-03-06 PROCEDURE — 36415 COLL VENOUS BLD VENIPUNCTURE: CPT | Performed by: INTERNAL MEDICINE

## 2025-03-06 PROCEDURE — 2500000005 HC RX 250 GENERAL PHARMACY W/O HCPCS: Performed by: STUDENT IN AN ORGANIZED HEALTH CARE EDUCATION/TRAINING PROGRAM

## 2025-03-06 PROCEDURE — 85027 COMPLETE CBC AUTOMATED: CPT | Performed by: INTERNAL MEDICINE

## 2025-03-06 PROCEDURE — 85018 HEMOGLOBIN: CPT | Performed by: INTERNAL MEDICINE

## 2025-03-06 PROCEDURE — 97116 GAIT TRAINING THERAPY: CPT | Mod: GP | Performed by: PHYSICAL THERAPIST

## 2025-03-06 PROCEDURE — 36430 TRANSFUSION BLD/BLD COMPNT: CPT

## 2025-03-06 PROCEDURE — 99231 SBSQ HOSP IP/OBS SF/LOW 25: CPT | Performed by: PHYSICIAN ASSISTANT

## 2025-03-06 PROCEDURE — 1200000002 HC GENERAL ROOM WITH TELEMETRY DAILY

## 2025-03-06 PROCEDURE — 87086 URINE CULTURE/COLONY COUNT: CPT | Mod: STJLAB | Performed by: INTERNAL MEDICINE

## 2025-03-06 PROCEDURE — 81001 URINALYSIS AUTO W/SCOPE: CPT | Performed by: INTERNAL MEDICINE

## 2025-03-06 PROCEDURE — 36415 COLL VENOUS BLD VENIPUNCTURE: CPT | Performed by: STUDENT IN AN ORGANIZED HEALTH CARE EDUCATION/TRAINING PROGRAM

## 2025-03-06 RX ORDER — FUROSEMIDE 10 MG/ML
20 INJECTION INTRAMUSCULAR; INTRAVENOUS ONCE
Status: COMPLETED | OUTPATIENT
Start: 2025-03-06 | End: 2025-03-06

## 2025-03-06 RX ORDER — DIGOXIN 0.25 MG/ML
500 INJECTION INTRAMUSCULAR; INTRAVENOUS ONCE
Status: COMPLETED | OUTPATIENT
Start: 2025-03-06 | End: 2025-03-06

## 2025-03-06 RX ADMIN — MIRTAZAPINE 15 MG: 15 TABLET, FILM COATED ORAL at 20:15

## 2025-03-06 RX ADMIN — DIGOXIN 500 MCG: 0.25 INJECTION INTRAMUSCULAR; INTRAVENOUS at 10:04

## 2025-03-06 RX ADMIN — CHOLECALCIFEROL TAB 125 MCG (5000 UNIT) 5000 UNITS: 125 TAB at 08:50

## 2025-03-06 RX ADMIN — HYOSCYAMINE SULFATE 0.12 MG: 0.12 TABLET SUBLINGUAL at 07:01

## 2025-03-06 RX ADMIN — FINASTERIDE 5 MG: 5 TABLET, FILM COATED ORAL at 08:50

## 2025-03-06 RX ADMIN — Medication 1 TABLET: at 08:50

## 2025-03-06 RX ADMIN — HYOSCYAMINE SULFATE 0.12 MG: 0.12 TABLET SUBLINGUAL at 02:08

## 2025-03-06 RX ADMIN — CEFTRIAXONE 1 G: 1 INJECTION, POWDER, FOR SOLUTION INTRAMUSCULAR; INTRAVENOUS at 20:14

## 2025-03-06 RX ADMIN — ALPRAZOLAM 0.5 MG: 0.5 TABLET ORAL at 20:22

## 2025-03-06 RX ADMIN — LEVETIRACETAM 500 MG: 500 TABLET, FILM COATED ORAL at 08:50

## 2025-03-06 RX ADMIN — ATORVASTATIN CALCIUM 20 MG: 20 TABLET, FILM COATED ORAL at 20:15

## 2025-03-06 RX ADMIN — FUROSEMIDE 20 MG: 10 INJECTION, SOLUTION INTRAMUSCULAR; INTRAVENOUS at 05:04

## 2025-03-06 RX ADMIN — HYOSCYAMINE SULFATE 0.12 MG: 0.12 TABLET SUBLINGUAL at 12:17

## 2025-03-06 ASSESSMENT — COGNITIVE AND FUNCTIONAL STATUS - GENERAL
DRESSING REGULAR LOWER BODY CLOTHING: A LITTLE
WALKING IN HOSPITAL ROOM: A LITTLE
CLIMB 3 TO 5 STEPS WITH RAILING: A LITTLE
PERSONAL GROOMING: A LITTLE
TURNING FROM BACK TO SIDE WHILE IN FLAT BAD: A LITTLE
TOILETING: A LITTLE
DRESSING REGULAR UPPER BODY CLOTHING: A LITTLE
MOVING TO AND FROM BED TO CHAIR: A LITTLE
MOVING FROM LYING ON BACK TO SITTING ON SIDE OF FLAT BED WITH BEDRAILS: A LITTLE
TURNING FROM BACK TO SIDE WHILE IN FLAT BAD: A LITTLE
STANDING UP FROM CHAIR USING ARMS: A LITTLE
DAILY ACTIVITIY SCORE: 19
MOVING TO AND FROM BED TO CHAIR: A LITTLE
WALKING IN HOSPITAL ROOM: A LITTLE
CLIMB 3 TO 5 STEPS WITH RAILING: A LITTLE
STANDING UP FROM CHAIR USING ARMS: A LITTLE
HELP NEEDED FOR BATHING: A LITTLE
MOBILITY SCORE: 18
MOBILITY SCORE: 19

## 2025-03-06 ASSESSMENT — PAIN - FUNCTIONAL ASSESSMENT
PAIN_FUNCTIONAL_ASSESSMENT: 0-10

## 2025-03-06 ASSESSMENT — PAIN SCALES - GENERAL
PAINLEVEL_OUTOF10: 0 - NO PAIN
PAINLEVEL_OUTOF10: 0 - NO PAIN
PAINLEVEL_OUTOF10: 4
PAINLEVEL_OUTOF10: 0 - NO PAIN

## 2025-03-06 ASSESSMENT — ACTIVITIES OF DAILY LIVING (ADL): ADL_ASSISTANCE: INDEPENDENT

## 2025-03-06 NOTE — PROGRESS NOTES
Physical Therapy    Physical Therapy Evaluation    Patient Name: Minh Harrington Jr.  MRN: [id number]  Today's Date: 3/6/2025   Time Calculation  Start Time: 1430  Stop Time: 1458  Time Calculation (min): 28 min  2131/2131-A    Assessment/Plan   PT Assessment  PT Assessment Results: Decreased strength, Decreased endurance, Impaired balance, Decreased mobility  Rehab Prognosis: Good  Evaluation/Treatment Tolerance: Patient tolerated treatment well  Medical Staff Made Aware: Yes  End of Session Communication: Bedside nurse  Assessment Comment: Pt is a 85 y.o. male admitted for Gross hematuria [R31.0]  Hematuria [R31.9] on 2/28/2025. Pt below functional level and will benefit from skilled therapy during stay to improve overall functional mobility, strength, ROM, endurance and safety awareness. Upon discharge pt will benefit from low intensity therapy for continued improvement in functional mobility. Therapy will continue to follow and reassess each session.      End of Session Patient Position: Up in chair  IP OR SWING BED PT PLAN  Inpatient or Swing Bed: Inpatient  PT Plan  Treatment/Interventions: Bed mobility, Transfer training, Gait training, Stair training, Balance training, Strengthening, Therapeutic exercise, Therapeutic activity, Endurance training, Home exercise program  PT Plan: Ongoing PT  PT Frequency: 4 times per week  PT Discharge Recommendations: Low intensity level of continued care  Equipment Recommended upon Discharge: Wheeled walker  PT Recommended Transfer Status: Contact guard  PT - OK to Discharge: Yes    Subjective     Current Problem:  1. Gross hematuria  Case Request Operating Room: CYSTOSCOPY, WITH THROMBUS REMOVAL, transurethral resection of bladder tumor    Case Request Operating Room: CYSTOSCOPY, WITH THROMBUS REMOVAL, transurethral resection of bladder tumor    Surgical Pathology Exam    Surgical Pathology Exam        Patient Active Problem List   Diagnosis    Anxiety    BPH (benign  prostatic hyperplasia)    CAD (coronary artery disease)    Carotid artery plaque    Herpes zoster    Post herpetic neuralgia    HLD (hyperlipidemia)    HTN (hypertension)    Hyperglycemia    Insomnia    Male erectile disorder of organic origin    Skin cancer    Thrombocytopenia (CMS-HCC)    Vitamin D deficiency    Moderate episode of recurrent major depressive disorder    Aneurysm of ascending aorta without rupture (CMS-HCC)    V tach (Multi)    Mitral valve insufficiency    Atrial fibrillation with controlled ventricular response (Multi)    History of coronary artery bypass graft    Acute combined systolic and diastolic heart failure    BMI 26.0-26.9,adult    Former smoker    Stage 3a chronic kidney disease (Multi)    Acute on chronic systolic (congestive) heart failure    Hematuria, unspecified type    Hemorrhagic disorder due to extrinsic circulating anticoagulants (Multi)    WINNIE (obstructive sleep apnea)    Central sleep apnea in conditions classified elsewhere(327.27)    Cardiomyopathy, ischemic    Partial idiopathic epilepsy with seizures of localized onset, not intractable, without status epilepticus (Multi)    Nonrheumatic tricuspid valve regurgitation    Pulmonary hypertension (Multi)    Hematuria    Gross hematuria       General Visit Information:  General  Reason for Referral: impiared mobility  Referred By: Dr. Lechuga  Prior to Session Communication: Bedside nurse  Patient Position Received: Bed, 3 rail up  Preferred Learning Style: kinesthetic, verbal  General Comment: Pt agreeable to therapy    Home Living:  Home Living  Type of Home: House  Lives With: Spouse  Home Adaptive Equipment: Walker rolling or standard, Cane  Home Layout: One level  Home Access: Stairs to enter with rails  Entrance Stairs-Number of Steps: 1  Bathroom Shower/Tub: Walk-in shower    Prior Level of Function:  Prior Function Per Pt/Caregiver Report  Level of [place]: Independent with ADLs and functional transfers, Independent  with homemaking with ambulation  ADL Assistance: Independent    Precautions:  Precautions  Medical Precautions: Fall precautions    Vital Signs:     Objective     Pain:  Pain Assessment  Pain Assessment: 0-10  0-10 (Numeric) Pain Score: 4  Pain Type:  (pain in right IV site)    Cognition:  Cognition  Overall Cognitive Status: Within Functional Limits  Orientation Level: Disoriented to situation    General Assessments:      Activity Tolerance  Endurance: Tolerates 30 min exercise with multiple rests                 Static Sitting Balance  Static Sitting-Comment/Number of Minutes: good  Dynamic Sitting Balance  Dynamic Sitting-Comments: good  Static Standing Balance  Static Standing-Comment/Number of Minutes: fair+  Dynamic Standing Balance  Dynamic Standing-Comments: fair+    Functional Assessments:     Bed Mobility  Bed Mobility: Yes  Bed Mobility 1  Bed Mobility 1: Supine to sitting  Level of Assistance 1: Minimum assistance  Transfers  Transfer: Yes  Transfer 1  Transfer From 1: Sit to  Transfer to 1: Stand  Technique 1: Sit to stand  Transfer Device 1: Walker, Gait belt  Transfer Level of Assistance 1: Contact guard  Ambulation/Gait Training  Ambulation/Gait Training Performed: Yes  Ambulation/Gait Training 1  Surface 1: Level tile  Device 1: Rolling walker  Gait Support Devices: Gait belt  Assistance 1: Minimum assistance  Quality of Gait 1: Decreased step length  Comments/Distance (ft) 1: 250          Extremity/Trunk Assessments:        RLE   RLE : Exceptions to WFL  Strength RLE  RLE Overall Strength: Within Functional Limits - able to perform ADL tasks with strength  LLE   LLE : Exceptions to WFL  Strength LLE  LLE Overall Strength: Within Functional Limits - able to perform ADL tasks with strength    Outcome Measures:     [facility] Basic Mobility  Turning from your back to your side while in a flat bed without using bedrails: A little  Moving from lying on your back to sitting on the side of a flat bed without  using bedrails: A little  Moving to and from bed to chair (including a wheelchair): A little  Standing up from a chair using your arms (e.g. wheelchair or bedside chair): A little  To walk in hospital room: A little  Climbing 3-5 steps with railing: A little  Basic Mobility - Total Score: 18       Goals:  Encounter Problems       Encounter Problems (Active)       PT Problem       Pt will demonstrate mod I for all bed mobility         Start:  03/06/25    Expected End:  03/20/25            Pt will demonstrate mod I for all transfers with WW        Start:  03/06/25    Expected End:  03/20/25            Pt will ambulate 300 ft with WW and mod I.        Start:  03/06/25    Expected End:  03/20/25            Pt will demonstrate good dynamic standing balance while performing a functional task.         Start:  03/06/25    Expected End:  03/20/25               Pain - Adult            Education Documentation  Precautions, taught by Serena Ruelas PT at 3/6/2025  6:57 PM.  Learner: Patient  Readiness: Acceptance  Method: Explanation  Response: Verbalizes Understanding, Needs Reinforcement    Body Mechanics, taught by Serena Ruelas PT at 3/6/2025  6:57 PM.  Learner: Patient  Readiness: Acceptance  Method: Explanation  Response: Verbalizes Understanding, Needs Reinforcement    Home Exercise Program, taught by Serena Ruelas PT at 3/6/2025  6:57 PM.  Learner: Patient  Readiness: Acceptance  Method: Explanation  Response: Verbalizes Understanding, Needs Reinforcement    Mobility Training, taught by Serena Ruelas PT at 3/6/2025  6:57 PM.  Learner: Patient  Readiness: Acceptance  Method: Explanation  Response: Verbalizes Understanding, Needs Reinforcement    Education Comments  No comments found.

## 2025-03-06 NOTE — ANESTHESIA POSTPROCEDURE EVALUATION
Patient: Minh Harrington Jr.    Procedure Summary       Date: 03/05/25 Room / Location: [facility] OR 04 / [facility] OR    Anesthesia Start: 1429 Anesthesia Stop: 1636    Procedures:       CYSTOSCOPY, WITH THROMBUS REMOVAL      transurethral resection of bladder tumor large Diagnosis:       Gross hematuria      (Gross hematuria [R31.0])    Surgeons: Anthony Washington MD Responsible Provider: Rod Brooks MD    Anesthesia Type: general, MAC ASA Status: 4            Anesthesia Type: general, MAC    Vitals Value Taken Time   /50 03/05/25 1730   Temp 36.1 °C (97 °F) 03/05/25 1715   Pulse 80 03/05/25 1730   Resp 24 03/05/25 1730   SpO2 90 % 03/05/25 1730       Anesthesia Post Evaluation    Patient location during evaluation: PACU  Patient participation: complete - patient participated  Level of consciousness: sleepy but conscious, responsive to verbal stimuli, responsive to light touch, responsive to physical stimuli and sedated  Pain score: 3  Pain management: satisfactory to patient  Airway patency: patent  Two or more strategies used to mitigate risk of obstructive sleep apnea  Cardiovascular status: acceptable, hemodynamically stable, stable and blood pressure returned to baseline  Respiratory status: acceptable, unassisted, spontaneous ventilation, nonlabored ventilation and face mask  Hydration status: acceptable  Postoperative Nausea and Vomiting: none        No notable events documented.

## 2025-03-06 NOTE — PROGRESS NOTES
"Minh Harrington Jr. is a 85 y.o. male on day 5 of admission presenting with Hematuria.    Subjective   POD 1 cystoscope , evacuation of large (>500ml) clot from bladder and tumor excision. Op reports good hemostasis at conclusion of procedure, still he was placed on CBIUpon my exam, his CBI is clamped and he has~800 ml of concentrated jason colred urine in bag, no blood or clots seen He is sitting on side of bed eating lunch with his wife. He is having normal flatus and denies any pain. Nursing unsure who clamped CBI, or when? I instrcted to empty bag, leave CBI off, and will monitor fresh output. OK to restart if any new bleeding. Labs reviewed, stable       Objective     Physical Exam  AVSS  Alert, pleasant, sitting on side of bed eating lunch  Lungs clear bilaIrreg rhythm, rate controlled  Abdomen is soft and non-tender  3-way irrigating barbosa in place, irrigation is off, concentrated jason urine in bag  (See my HPI note)    Last Recorded Vitals  Blood pressure 96/64, pulse (!) 114, temperature 37.3 °C (99.1 °F), temperature source Temporal, resp. rate (!) 41, height 1.778 m (5' 10\"), weight 76.5 kg (168 lb 10.4 oz), SpO2 96%.  Intake/Output last 3 Shifts:  I/O last 3 completed shifts:  In: 1708.8 (22.3 mL/kg) [P.O.:100; I.V.:400 (5.2 mL/kg); Blood:708.8; IV Piggyback:500]  Out: 800 (10.5 mL/kg) [Urine:800 (0.3 mL/kg/hr)]  Weight: 76.5 kg     Relevant Results      Scheduled medications  atorvastatin, 20 mg, oral, Daily  B complex-vitamin C, 1 tablet, oral, Daily  cefTRIAXone, 1 g, intravenous, q24h  cholecalciferol, 5,000 Units, oral, Daily  [Held by provider] empagliflozin, 10 mg, oral, Daily  finasteride, 5 mg, oral, Daily  hyoscyamine, 0.125 mg, oral, q6h BRISSA  levETIRAcetam, 500 mg, oral, Daily  [Held by provider] metoprolol succinate XL, 25 mg, oral, BID  [Held by provider] mirtazapine, 15 mg, oral, Nightly  [Held by provider] sacubitriL-valsartan, 1 tablet, oral, BID  [Held by provider] sertraline, 100 " mg, oral, Daily  [Held by provider] spironolactone, 25 mg, oral, Daily      Continuous medications     PRN medications  PRN medications: acetaminophen **OR** acetaminophen **OR** acetaminophen, [Held by provider] ALPRAZolam, benzocaine-menthol, guaiFENesin, ondansetron **OR** ondansetron, polyethylene glycol, prochlorperazine **OR** prochlorperazine **OR** prochlorperazine  Results for orders placed or performed during the hospital encounter of 02/28/25 (from the past 24 hours)   CBC   Result Value Ref Range    WBC 13.9 (H) 4.4 - 11.3 x10*3/uL    nRBC 0.0 0.0 - 0.0 /100 WBCs    RBC 3.20 (L) 4.50 - 5.90 x10*6/uL    Hemoglobin 9.1 (L) 13.5 - 17.5 g/dL    Hematocrit 28.5 (L) 41.0 - 52.0 %    MCV 89 80 - 100 fL    MCH 28.4 26.0 - 34.0 pg    MCHC 31.9 (L) 32.0 - 36.0 g/dL    RDW 17.6 (H) 11.5 - 14.5 %    Platelets 108 (L) 150 - 450 x10*3/uL   Hemoglobin and Hematocrit, Blood   Result Value Ref Range    Hemoglobin 8.3 (L) 13.5 - 17.5 g/dL    Hematocrit 26.6 (L) 41.0 - 52.0 %   Hemoglobin and hematocrit, blood   Result Value Ref Range    Hemoglobin 7.8 (L) 13.5 - 17.5 g/dL    Hematocrit 24.6 (L) 41.0 - 52.0 %   Basic Metabolic Panel   Result Value Ref Range    Glucose 81 74 - 99 mg/dL    Sodium 136 136 - 145 mmol/L    Potassium 4.2 3.5 - 5.3 mmol/L    Chloride 108 (H) 98 - 107 mmol/L    Bicarbonate 22 21 - 32 mmol/L    Anion Gap 10 10 - 20 mmol/L    Urea Nitrogen 23 6 - 23 mg/dL    Creatinine 1.14 0.50 - 1.30 mg/dL    eGFR 62 >60 mL/min/1.73m*2    Calcium 7.7 (L) 8.6 - 10.3 mg/dL   CBC and Auto Differential   Result Value Ref Range    WBC 10.2 4.4 - 11.3 x10*3/uL    nRBC 0.0 0.0 - 0.0 /100 WBCs    RBC 3.12 (L) 4.50 - 5.90 x10*6/uL    Hemoglobin 8.6 (L) 13.5 - 17.5 g/dL    Hematocrit 27.5 (L) 41.0 - 52.0 %    MCV 88 80 - 100 fL    MCH 27.6 26.0 - 34.0 pg    MCHC 31.3 (L) 32.0 - 36.0 g/dL    RDW 17.6 (H) 11.5 - 14.5 %    Platelets 94 (L) 150 - 450 x10*3/uL    Neutrophils % 82.5 40.0 - 80.0 %    Immature Granulocytes %,  Automated 0.8 0.0 - 0.9 %    Lymphocytes % 5.3 13.0 - 44.0 %    Monocytes % 9.6 2.0 - 10.0 %    Eosinophils % 1.6 0.0 - 6.0 %    Basophils % 0.2 0.0 - 2.0 %    Neutrophils Absolute 8.43 (H) 1.60 - 5.50 x10*3/uL    Immature Granulocytes Absolute, Automated 0.08 0.00 - 0.50 x10*3/uL    Lymphocytes Absolute 0.54 (L) 0.80 - 3.00 x10*3/uL    Monocytes Absolute 0.98 (H) 0.05 - 0.80 x10*3/uL    Eosinophils Absolute 0.16 0.00 - 0.40 x10*3/uL    Basophils Absolute 0.02 0.00 - 0.10 x10*3/uL   Magnesium   Result Value Ref Range    Magnesium 1.92 1.60 - 2.40 mg/dL   Prepare RBC: 1 Units   Result Value Ref Range    PRODUCT CODE Z8812O88     Unit Number [id number]     Unit ABO O     Unit RH POS     XM INTEP COMP     Dispense Status IS     Blood Expiration Date 3/22/2025 11:59:00 PM EDT     PRODUCT BLOOD TYPE 5100     UNIT VOLUME 335    Urinalysis with Reflex Culture and Microscopic   Result Value Ref Range    Color, Urine Colorless (N) Light-Yellow, Yellow, Dark-Yellow    Appearance, Urine Clear Clear    Specific Gravity, Urine 1.005 1.005 - 1.035    pH, Urine 5.0 5.0, 5.5, 6.0, 6.5, 7.0, 7.5, 8.0    Protein, Urine NEGATIVE NEGATIVE, 10 (TRACE), 20 (TRACE) mg/dL    Glucose, Urine Normal Normal mg/dL    Blood, Urine OVER (3+) (A) NEGATIVE mg/dL    Ketones, Urine NEGATIVE NEGATIVE mg/dL    Bilirubin, Urine NEGATIVE NEGATIVE mg/dL    Urobilinogen, Urine Normal Normal mg/dL    Nitrite, Urine NEGATIVE NEGATIVE    Leukocyte Esterase, Urine 25 Cynthia/uL (A) NEGATIVE   Microscopic Only, Urine   Result Value Ref Range    WBC, Urine 6-10 (A) 1-5, NONE /HPF    RBC, Urine >20 (A) NONE, 1-2, 3-5 /HPF   CBC   Result Value Ref Range    WBC 12.3 (H) 4.4 - 11.3 x10*3/uL    nRBC 0.0 0.0 - 0.0 /100 WBCs    RBC 3.53 (L) 4.50 - 5.90 x10*6/uL    Hemoglobin 10.0 (L) 13.5 - 17.5 g/dL    Hematocrit 31.1 (L) 41.0 - 52.0 %    MCV 88 80 - 100 fL    MCH 28.3 26.0 - 34.0 pg    MCHC 32.2 32.0 - 36.0 g/dL    RDW 17.6 (H) 11.5 - 14.5 %    Platelets 109 (L) 150  - 450 x10*3/uL     *Note: Due to a large number of results and/or encounters for the requested time period, some results have not been displayed. A complete set of results can be found in Results Review.          This patient currently has cardiac telemetry ordered; if you would like to modify or discontinue the telemetry order, click here to go to the orders activity to modify/discontinue the order.    This patient has a urinary catheter   Reason for the urinary catheter remaining today? perioperative use for selected surgical procedures               Assessment/Plan   Assessment & Plan  Hematuria    Gross hematuria  Continue to monitor for recurrent bleeding  CBI currently off               Ministerio Mercer PA-C

## 2025-03-06 NOTE — PROGRESS NOTES
Met with patient, his wife, and son at the bedside with patient permission. Discussed home care for therapy. He and family will think about it. They would like [facility] if they decide to go with home care upon dc .    Tara Lawrence RN

## 2025-03-06 NOTE — PROGRESS NOTES
Minh Harrington Jr. is a 85 y.o. male on day 5 of admission presenting with Hematuria.      Subjective   Blood pressure slightly better.  No crackles on lung exam.  Getting another unit of blood.  CBI was stopped but then started having hematuria.  Received digoxin IV    Objective     Last Recorded Vitals  /63   Pulse 106   Temp 37.3 °C (99.1 °F) (Temporal)   Resp 26   Wt 76.5 kg (168 lb 10.4 oz)   SpO2 98%   Intake/Output last 3 Shifts:    Intake/Output Summary (Last 24 hours) at 3/6/2025 1126  Last data filed at 3/6/2025 0626  Gross per 24 hour   Intake 1208.76 ml   Output 800 ml   Net 408.76 ml       Admission Weight  Weight: 80.7 kg (178 lb) (02/28/25 0946)    Daily Weight  02/28/25 : 76.5 kg (168 lb 10.4 oz)      Physical Exam:  General: Not in acute distress, alert.  Not on oxygen  HEENT: PERRLA, head intact and normocephalic  Neck: Normal to inspection  Lungs: Lungs are clear to auscultation without rales or wheezing  Cardiac: Irregularly irregular ranging from 90s to 130 Abdomen: Soft nontender, positive bowel sounds  : Pandya with CBI which was initially clear but then became bloody with clots  Skin: Intact  Hematology: No petechia or excessive ecchymosis  Musculoskeletal: Without significant trauma  Neurological: Alert awake oriented, no focal deficit, cranial nerves grossly intact  Psych: No suicidal ideation or homicidal ideation    Relevant Results  Scheduled medications  atorvastatin, 20 mg, oral, Daily  B complex-vitamin C, 1 tablet, oral, Daily  cefTRIAXone, 1 g, intravenous, q24h  cholecalciferol, 5,000 Units, oral, Daily  [Held by provider] empagliflozin, 10 mg, oral, Daily  finasteride, 5 mg, oral, Daily  hyoscyamine, 0.125 mg, oral, q6h BRISSA  levETIRAcetam, 500 mg, oral, Daily  [Held by provider] metoprolol succinate XL, 25 mg, oral, BID  [Held by provider] mirtazapine, 15 mg, oral, Nightly  [Held by provider] sacubitriL-valsartan, 1 tablet, oral, BID  [Held by provider]  sertraline, 100 mg, oral, Daily  [Held by provider] spironolactone, 25 mg, oral, Daily      Continuous medications     PRN medications  PRN medications: acetaminophen **OR** acetaminophen **OR** acetaminophen, [Held by provider] ALPRAZolam, benzocaine-menthol, guaiFENesin, ondansetron **OR** ondansetron, polyethylene glycol, prochlorperazine **OR** prochlorperazine **OR** prochlorperazine   Labs  Results from last 7 days   Lab Units 03/06/25  0608 03/06/25  0042 03/05/25  2056 03/05/25  1828 03/05/25  0607   WBC AUTO x10*3/uL 10.2  --   --  13.9* 8.5   HEMOGLOBIN g/dL 8.6* 7.8* 8.3* 9.1* 7.0*   HEMATOCRIT % 27.5* 24.6* 26.6* 28.5* 24.0*   PLATELETS AUTO x10*3/uL 94*  --   --  108* 92*     Results from last 7 days   Lab Units 03/06/25  0608 03/05/25  0607 03/04/25  0823 03/01/25  0610 02/28/25  1238   SODIUM mmol/L 136 133* 136   < > 137   POTASSIUM mmol/L 4.2 4.2 4.2   < > 5.0   CHLORIDE mmol/L 108* 106 106   < > 105   CO2 mmol/L 22 19* 22   < > 26   BUN mg/dL 23 30* 25*   < > 33*   CREATININE mg/dL 1.14 1.35* 1.44*   < > 1.51*   CALCIUM mg/dL 7.7* 7.6* 8.2*   < > 8.7   PROTEIN TOTAL g/dL  --   --   --   --  6.6   BILIRUBIN TOTAL mg/dL  --   --   --   --  0.7   ALK PHOS U/L  --   --   --   --  78   ALT U/L  --   --   --   --  12   AST U/L  --   --   --   --  20   GLUCOSE mg/dL 81 94 105*   < > 106*    < > = values in this interval not displayed.       CT abdomen pelvis w IV contrast    Result Date: 2/28/2025  Interpreted By:  Flaquita Redd, STUDY: CT ABDOMEN PELVIS W IV CONTRAST;  2/28/2025 2:41 pm   INDICATION: Signs/Symptoms:painless hematuria.     COMPARISON: CT urography 08/07/2024 CT abdomen pelvis 08/05/2024   ACCESSION NUMBER(S): [id number]   ORDERING CLINICIAN: FRANK SHEEHAN   TECHNIQUE: CT of the abdomen and pelvis was performed.  Standard contiguous axial images were obtained at 3 mm slice thickness through the abdomen and pelvis. Coronal and sagittal reconstructions at 3 mm slice thickness were  performed.  75 ML of Omnipaque 350 was administered intravenously without immediate complication.   FINDINGS: LOWER CHEST: Unchanged cardiomegaly without evidence of pericardial effusion. Severe coronary artery calcifications versus stent. Stable small right and trace left pleural effusions. Right right-greater-than-left lower lobe dependent ground-glass opacities. Similar smooth interlobular septal thickening is seen, right-greater-than-left, compatible with interstitial edema. The visualized distal esophagus appears unremarkable. Status post median sternotomy.   ABDOMEN:   LIVER: The liver is normal in size. Mottled appearance of the liver, suspicious for hepatic venous congestion in the setting of cardiomegaly. No suspicious liver lesion.   BILE DUCTS: No biliary duct dilatation.   GALLBLADDER: The gallbladder is nondistended and without evidence of radiopaque stones.   PANCREAS: The pancreas appears unremarkable without evidence of ductal dilatation or masses.   SPLEEN: The spleen is normal in size.   ADRENAL GLANDS: No adrenal nodularity or thickening.   KIDNEYS AND URETERS: The kidneys are normal in size. Stable hypoattenuating lesions in the bilateral kidneys, likely benign. Stable punctate nonobstructing calculus in the left kidney. No hydroureteronephrosis or right nephroureterolithiasis is identified.   PELVIS:   BLADDER: Interval worsening of masslike bladder wall thickening, most predominant posteriorly and along the right lateral wall.   REPRODUCTIVE ORGANS: Interval increase in prostatomegaly and heterogeneity of the prostate, measuring 52 mm in the transaxial dimension, previously 48 mm.   BOWEL: The stomach is unremarkable. The small and large bowel are normal in caliber and demonstrate no wall thickening. The appendix is not definitely visualized. There is however no pericecal stranding or fluid. Colonic diverticulosis without acute diverticulitis.   VESSELS: Severe atherosclerotic calcifications of  the aortoiliac arteries. The IVC appears normal.   PERITONEUM/RETROPERITONEUM/LYMPH NODES: No ascites or fluid collection. No measurable peritoneal nodular thickening or deposits. Stable subcentimeter para-aortic and bilateral iliac chain, which are nonenlarged by size criteria, and bears watching on future follow-up evaluations, for example a 8 mm left common iliac lymph node on series 2, image 112 measured in the short axis.   ABDOMINAL WALL: The abdominal wall soft tissues appear normal.   BONES: No acute fracture. No suspicious osseous lesions. Discogenic degenerative changes in the lower thoracic and lumbar spine.       1. Interval worsening of masslike bladder wall thickening, most predominant posteriorly and along the right lateral wall. Findings are suspicious for underlying neoplasm, correlation with cystoscopy and tissue diagnosis is recommended. 2. Interval increase in size and heterogeneity of the prostate, which direct tumor invasion is not excluded. 3. Stable subcentimeter para-aortic and bilateral iliac chain. Though nonenlarged by size criteria, this area bears watching on future follow-up evaluations to evaluate for shanon metastasis. 4. Similar cardiomegaly with interstitial edema and small bilateral pleural effusions, right-greater-than-left. 5. Mottled appearance of the liver, suspicious for hepatic venous congestion in the setting of cardiomegaly. 6. Additional chronic and incidental findings as detailed above.     MACRO: None   Signed by: Flaquita Redd 2/28/2025 3:11 PM Dictation workstation:   [id number]                    Assessment/Plan   85-year-old male with past medical history of CAD status post CABG, atrial fibrillation on Eliquis, chronic systolic heart failure, CKD stage III, thrombocytopenia, BPH status post TURP in 2008 presented due to gross hematuria and was found to have worsening of bladder mass and require initiation of CBI and holding anticoagulation.  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 Washington on 3/5/2025  Receiving 15 need of packed RBC  No need for Lasix currently but will reevaluate after blood transfusion is completed in May needed  Watch for other signs and symptoms of systemic illness with hypotension  Type and screen was performed  Will continue to hold anticoagulation  Continue with antibiotics with ceftriaxone  UA unremarkable   Cardiology consultation is noted as well  Maintain CBI and work with urology until urine is clear  Hold anticoagulation and outpatient referral for Watchman device placement    Acute blood loss anemia  Acute blood loss anemia from hematuria from bladder mass  Type and screen done  status post 5 units of packed RBC  We will maintain patient in stepdown unit  Patient has significant hypertension    Hypotension  Likely secondary to acute blood loss anemia  Transfuse as needed  No signs and symptoms of systemic illness  Urine analysis performed  Will continue with antibiotics  If blood pressure drops and patient becomes symptomatic, may need to be upgraded to ICU    Atrial fibrillation on Eliquis  Patient's anticoagulation will need to be held  Cardiology consultation is noted  To consider Watchman device as outpatient as outpatient and cardiology will refer patient  Patient follows up with Dr. Valle    HFrEF  Does not appear to be decompensated  Having A-fib with RVR  Will monitor    CKD stage III  Creatinine stable  Continue to monitor    Hyperlipidemia  Continue atorvastatin    Anxiety disorder  Continue with Xanax as needed    DVT prophylaxis  SCDs      Disposition: Not yet medically ready or stable for discharge.  Will order PT OT to start working with patient     Plan discussed with patient, wife and primary nurse at bedside    High Level of MDM based on above issue and discussing plan    This note is created using voice  recognition software. All efforts are made to minimize errors, if there are errors there due to transcription.    Ruiz Bowling

## 2025-03-06 NOTE — PROGRESS NOTES
Cardiology Progress Note           Rounding Cardiologist:  Dr. Kala Kelly  Primary Cardiologist: Dr. Heriberto Valle    Date:  3/6/2025  Patient:  Minh Harrington Jr.  YOB: 1940  MRN:  [id number]   Admit Date:  2/28/2025      SUBJECTIVE    Minh Harrington Jr. was seen and examined today at bedside.     Patient seen in ICU. Transferred here post-op for anemia and hypotension. Patient feels better. Has had his 4th unit of PRBC today. Has been mildly hypotensive, asymptomatic, since his admission and all of his traditional CHF medications have been held for this. No chest pain or angina, no orthpnea or PND. No dizziness or lightheadedness.     Had cystoscopy yesterday - see Op report. Hematuria dramatically improved today. Remains off oral anticoagulation.     Review of Systems   No new complaints.   VITALS     Vitals:    03/06/25 1500 03/06/25 1600 03/06/25 1700 03/06/25 1800   BP:       BP Location:       Patient Position:       Pulse: (!) 114 104 94 90   Resp: (!) 41 (!) 33 24 (!) 29   Temp:  37 °C (98.6 °F)     TempSrc:       SpO2:  93% 96% 97%   Weight:       Height:           Intake/Output Summary (Last 24 hours) at 3/6/2025 1849  Last data filed at 3/6/2025 0626  Gross per 24 hour   Intake 450 ml   Output --   Net 450 ml       Vitals:    02/28/25 1714   Weight: 76.5 kg (168 lb 10.4 oz)       CURRENT MEDICATIONS    atorvastatin, 20 mg, oral, Daily  B complex-vitamin C, 1 tablet, oral, Daily  cefTRIAXone, 1 g, intravenous, q24h  cholecalciferol, 5,000 Units, oral, Daily  [Held by provider] empagliflozin, 10 mg, oral, Daily  finasteride, 5 mg, oral, Daily  levETIRAcetam, 500 mg, oral, Daily  [Held by provider] metoprolol succinate XL, 25 mg, oral, BID  mirtazapine, 15 mg, oral, Nightly  [Held by provider] sacubitriL-valsartan, 1 tablet, oral, BID  sertraline, 100 mg, oral, Daily  [Held by provider] spironolactone, 25 mg, oral, Daily         Current Outpatient Medications    Medication Instructions    ALPRAZolam (XANAX) 0.5 mg, oral, 3 times daily PRN    atorvastatin (LIPITOR) 20 mg, oral, Daily, as directed    b complex vitamins capsule 1 capsule, Daily    cholecalciferol (Vitamin D-3) 125 MCG (5000 UT) capsule 1 tablet, Daily    dutasteride (AVODART) 0.5 mg, oral, Daily    Eliquis 5 mg, oral, 2 times daily    furosemide (Lasix) 20 mg tablet Take one daily ,if weight gain of 3 lbs in one day or 5 lbs in one week take another tablet that day until weight is back to normal    Jardiance 10 mg, oral, Daily    levETIRAcetam (Keppra) 500 mg tablet TAKE 1/2 (ONE-HALF) OF A TABLET BY MOUTH DAILY    metoprolol succinate XL (TOPROL-XL) 25 mg, oral, 2 times daily    mirtazapine (REMERON) 15 mg, oral, Nightly    sacubitriL-valsartan (Entresto)  mg tablet 1 tablet, oral, 2 times daily    sertraline (ZOLOFT) 100 mg, oral, Daily    spironolactone (ALDACTONE) 25 mg, oral, Daily        PHYSICAL EXAMINATION   GENERAL:  Well developed, well nourished, in no acute distress.  CHEST:  Symmetric and nontender.  NECK:  no JVD, no bruit.  LUNGS:  Clear to auscultation bilaterally, normal respiratory effort. Diminished at bases.   HEART:  Irregularly irregular with normal S1 and S2, no appreciable S3, trace edema.   ABDOMEN: Soft, NT, ND without palpable organomegaly, normoactive bowel sounds.   EXTREMITIES:  Warm with good color, no clubbing or cyanosis.  There is tr edema noted.  PERIPHERAL VASCULAR:  Pulses present and equally palpable;  NEURO/PSYCH:  Alert and oriented times three with approppriate behavior and responses.  INTEGUMENT: No rashes, erythema at right wrist site - prior IV - more blood /bruise than infectious appearance.     LAB DATA     CBC:   Results from last 7 days   Lab Units 03/06/25  1453   WBC AUTO x10*3/uL 12.3*   RBC AUTO x10*6/uL 3.53*   HEMOGLOBIN g/dL 10.0*   HEMATOCRIT % 31.1*   PLATELETS AUTO x10*3/uL 109*        CMP:    Results from last 7 days   Lab Units 03/06/25  0608  "  SODIUM mmol/L 136   POTASSIUM mmol/L 4.2   CHLORIDE mmol/L 108*   CO2 mmol/L 22   BUN mg/dL 23   CREATININE mg/dL 1.14   GLUCOSE mg/dL 81   CALCIUM mg/dL 7.7*       Cardiac Enzymes:    Lab Results   Component Value Date    TROPHS 32 (H) 04/15/2024    TROPHS 32 (H) 04/15/2024       Magnesium:    Lab Results   Component Value Date    MG 1.92 03/06/2025       Lipid Profile:  No results found for: \"CHLPL\", \"TRIG\", \"HDL\", \"LDLCALC\", \"LDLDIRECT\"    TSH:  No results found for: \"TSH\"    BNP:   Lab Results   Component Value Date     (H) 03/04/2025        PT/INR:  No results found for: \"PROTIME\", \"INR\"    HgBA1c:    Lab Results   Component Value Date    HGBA1C 5.2 10/11/2023       CBC:  Lab Results   Component Value Date    WBC 12.3 (H) 03/06/2025    WBC 10.2 03/06/2025    WBC 13.9 (H) 03/05/2025    RBC 3.53 (L) 03/06/2025    RBC 3.12 (L) 03/06/2025    RBC 3.20 (L) 03/05/2025    HGB 10.0 (L) 03/06/2025    HGB 8.6 (L) 03/06/2025    HGB 7.8 (L) 03/06/2025    HCT 31.1 (L) 03/06/2025    HCT 27.5 (L) 03/06/2025    HCT 24.6 (L) 03/06/2025    MCV 88 03/06/2025    MCV 88 03/06/2025    MCV 89 03/05/2025    MCH 28.3 03/06/2025    MCH 27.6 03/06/2025    MCH 28.4 03/05/2025    MCHC 32.2 03/06/2025    MCHC 31.3 (L) 03/06/2025    MCHC 31.9 (L) 03/05/2025    RDW 17.6 (H) 03/06/2025    RDW 17.6 (H) 03/06/2025    RDW 17.6 (H) 03/05/2025     (L) 03/06/2025    PLT 94 (L) 03/06/2025     (L) 03/05/2025       BMP:  Lab Results   Component Value Date     03/06/2025     (L) 03/05/2025     03/04/2025    K 4.2 03/06/2025    K 4.2 03/05/2025    K 4.2 03/04/2025     (H) 03/06/2025     03/05/2025     03/04/2025    CO2 22 03/06/2025    CO2 19 (L) 03/05/2025    CO2 22 03/04/2025    BUN 23 03/06/2025    BUN 30 (H) 03/05/2025    BUN 25 (H) 03/04/2025    CREATININE 1.14 03/06/2025    CREATININE 1.35 (H) 03/05/2025    CREATININE 1.44 (H) 03/04/2025       Hepatic Function Panel:  No results found for: " "\"ALKPHOS\", \"ALT\", \"AST\", \"PROT\", \"BILITOT\", \"BILIDIR\"      DIAGNOSTIC TESTING RESULTS     ECG 12 Lead    Result Date: 3/4/2025  Atrial fibrillation Possible Inferior infarct , age undetermined ST & T wave abnormality, consider lateral ischemia or digitalis effect Abnormal ECG Confirmed by Curt Shine ([id number]) on 3/4/2025 7:06:56 PM    CT abdomen pelvis w IV contrast    Result Date: 2/28/2025  Interpreted By:  Flaquita Redd, STUDY: CT ABDOMEN PELVIS W IV CONTRAST;  2/28/2025 2:41 pm   INDICATION: Signs/Symptoms:painless hematuria.     COMPARISON: CT urography 08/07/2024 CT abdomen pelvis 08/05/2024   ACCESSION NUMBER(S): [id number]   ORDERING CLINICIAN: FRANK SHEEHAN   TECHNIQUE: CT of the abdomen and pelvis was performed.  Standard contiguous axial images were obtained at 3 mm slice thickness through the abdomen and pelvis. Coronal and sagittal reconstructions at 3 mm slice thickness were performed.  75 ML of Omnipaque 350 was administered intravenously without immediate complication.   FINDINGS: LOWER CHEST: Unchanged cardiomegaly without evidence of pericardial effusion. Severe coronary artery calcifications versus stent. Stable small right and trace left pleural effusions. Right right-greater-than-left lower lobe dependent ground-glass opacities. Similar smooth interlobular septal thickening is seen, right-greater-than-left, compatible with interstitial edema. The visualized distal esophagus appears unremarkable. Status post median sternotomy.   ABDOMEN:   LIVER: The liver is normal in size. Mottled appearance of the liver, suspicious for hepatic venous congestion in the setting of cardiomegaly. No suspicious liver lesion.   BILE DUCTS: No biliary duct dilatation.   GALLBLADDER: The gallbladder is nondistended and without evidence of radiopaque stones.   PANCREAS: The pancreas appears unremarkable without evidence of ductal dilatation or masses.   SPLEEN: The spleen is normal in size.   ADRENAL GLANDS: No " adrenal nodularity or thickening.   KIDNEYS AND URETERS: The kidneys are normal in size. Stable hypoattenuating lesions in the bilateral kidneys, likely benign. Stable punctate nonobstructing calculus in the left kidney. No hydroureteronephrosis or right nephroureterolithiasis is identified.   PELVIS:   BLADDER: Interval worsening of masslike bladder wall thickening, most predominant posteriorly and along the right lateral wall.   REPRODUCTIVE ORGANS: Interval increase in prostatomegaly and heterogeneity of the prostate, measuring 52 mm in the transaxial dimension, previously 48 mm.   BOWEL: The stomach is unremarkable. The small and large bowel are normal in caliber and demonstrate no wall thickening. The appendix is not definitely visualized. There is however no pericecal stranding or fluid. Colonic diverticulosis without acute diverticulitis.   VESSELS: Severe atherosclerotic calcifications of the aortoiliac arteries. The IVC appears normal.   PERITONEUM/RETROPERITONEUM/LYMPH NODES: No ascites or fluid collection. No measurable peritoneal nodular thickening or deposits. Stable subcentimeter para-aortic and bilateral iliac chain, which are nonenlarged by size criteria, and bears watching on future follow-up evaluations, for example a 8 mm left common iliac lymph node on series 2, image 112 measured in the short axis.   ABDOMINAL WALL: The abdominal wall soft tissues appear normal.   BONES: No acute fracture. No suspicious osseous lesions. Discogenic degenerative changes in the lower thoracic and lumbar spine.       1. Interval worsening of masslike bladder wall thickening, most predominant posteriorly and along the right lateral wall. Findings are suspicious for underlying neoplasm, correlation with cystoscopy and tissue diagnosis is recommended. 2. Interval increase in size and heterogeneity of the prostate, which direct tumor invasion is not excluded. 3. Stable subcentimeter para-aortic and bilateral iliac  chain. Though nonenlarged by size criteria, this area bears watching on future follow-up evaluations to evaluate for shanon metastasis. 4. Similar cardiomegaly with interstitial edema and small bilateral pleural effusions, right-greater-than-left. 5. Mottled appearance of the liver, suspicious for hepatic venous congestion in the setting of cardiomegaly. 6. Additional chronic and incidental findings as detailed above.     MACRO: None   Signed by: Flaquita Redd 2/28/2025 3:11 PM Dictation workstation:   [id number]         RADIOLOGY     CT abdomen pelvis w IV contrast   Final Result   1. Interval worsening of masslike bladder wall thickening, most   predominant posteriorly and along the right lateral wall. Findings   are suspicious for underlying neoplasm, correlation with cystoscopy   and tissue diagnosis is recommended.   2. Interval increase in size and heterogeneity of the prostate, which   direct tumor invasion is not excluded.   3. Stable subcentimeter para-aortic and bilateral iliac chain. Though   nonenlarged by size criteria, this area bears watching on future   follow-up evaluations to evaluate for shanon metastasis.   4. Similar cardiomegaly with interstitial edema and small bilateral   pleural effusions, right-greater-than-left.   5. Mottled appearance of the liver, suspicious for hepatic venous   congestion in the setting of cardiomegaly.   6. Additional chronic and incidental findings as detailed above.             MACRO:   None        Signed by: Flaquita Redd 2/28/2025 3:11 PM   Dictation workstation:   [id number]            ASSESSMENT     Problem List Items Addressed This Visit       Gross hematuria - Primary    Relevant Orders    Case Request Operating Room: CYSTOSCOPY, WITH THROMBUS REMOVAL, transurethral resection of bladder tumor (Completed)    Surgical Pathology Exam       Patient Active Problem List   Diagnosis    Anxiety    BPH (benign prostatic hyperplasia)    CAD (coronary artery disease)     Carotid artery plaque    Herpes zoster    Post herpetic neuralgia    HLD (hyperlipidemia)    HTN (hypertension)    Hyperglycemia    Insomnia    Male erectile disorder of organic origin    Skin cancer    Thrombocytopenia (CMS-HCC)    Vitamin D deficiency    Moderate episode of recurrent major depressive disorder    Aneurysm of ascending aorta without rupture (CMS-HCC)    V tach (Multi)    Mitral valve insufficiency    Atrial fibrillation with controlled ventricular response (Multi)    History of coronary artery bypass graft    Acute combined systolic and diastolic heart failure    BMI 26.0-26.9,adult    Former smoker    Stage 3a chronic kidney disease (Multi)    Acute on chronic systolic (congestive) heart failure    Hematuria, unspecified type    Hemorrhagic disorder due to extrinsic circulating anticoagulants (Multi)    WINNIE (obstructive sleep apnea)    Central sleep apnea in conditions classified elsewhere(327.27)    Cardiomyopathy, ischemic    Partial idiopathic epilepsy with seizures of localized onset, not intractable, 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 See  recommendations. Would keep off anticoagulation several more days to make sure does not recur.   Atrial fibrillation - rates less controlled off beta blockers. Would resume his metoprolol at earliest convenience. Would give for systolic > 95 mm Hg.   Hypotension - likely related to blood loss. All  his CHF regimen being held as appropriate. Would not hold beta blockers unless SBP < 95.   Chronic systolic heart failure - see above. Patient aware that his regimen will  need to be slowly added back as an outpatient as bp improves.   Chronic anticoagulation. Off eliquis - restart in 3-4 days. Will be seen and evaluated for a watchman device as outpatient.     OK to go to stepdown  or Tele from a cardiac standpoint. If CBC stable and tolerates his beta blockers can be DC home to follow-up with cardiolgy in an early post DC appointment.       Please do not hesitate to call with questions.  Electronically signed by Klaa Kelly MD, on 3/6/2025 at 6:49 PM

## 2025-03-06 NOTE — PROGRESS NOTES
Spiritual Care Visit  Spiritual Care Request    Reason for Visit:  Routine Visit: Introduction  Continue Visiting: Yes     Request Received From:       Focus of Care:  Visited With: Patient not available         Refer to :          Spiritual Care Assessment    Spiritual Assessment:                      Care Provided:  Intended Effects: Establish rapport and connectedness, Zoraida affirmation, Build relationship of care and support, Demonstrate caring and concern, Helping someone feel comforted, Meaning-making    Sense of Community and or Taoism Affiliation:  Scientologist         Addressed Needs/Concerns and/or Gale Through:  Taoism Encounters  Taoism Needs: Prayer  Sacramental Encounters  Communion: Patient wants communion  Communion Given Indicator: No  Sacrament of Sick-Anointing: Anointed, Patient requested anointing    Outcome:  Outcome of Spiritual Care Visit: Affirmation, [place], Comfort/healing presence, Peace/gratitude     Advance Directives:         Spiritual Care Annotation    Annotation:  His wife Justine and son Uriel were present.

## 2025-03-06 NOTE — NURSING NOTE
Cbi restarted at this time due to visualization of blood clots and javed hematuria. Dr. Lechuga notified.

## 2025-03-07 LAB
ANION GAP SERPL CALC-SCNC: 10 MMOL/L (ref 10–20)
BACTERIA UR CULT: NO GROWTH
BASOPHILS # BLD AUTO: 0.02 X10*3/UL (ref 0–0.1)
BASOPHILS NFR BLD AUTO: 0.2 %
BLOOD EXPIRATION DATE: NORMAL
BUN SERPL-MCNC: 21 MG/DL (ref 6–23)
CALCIUM SERPL-MCNC: 7.8 MG/DL (ref 8.6–10.3)
CHLORIDE SERPL-SCNC: 106 MMOL/L (ref 98–107)
CO2 SERPL-SCNC: 22 MMOL/L (ref 21–32)
CREAT SERPL-MCNC: 1.22 MG/DL (ref 0.5–1.3)
DISPENSE STATUS: NORMAL
EGFRCR SERPLBLD CKD-EPI 2021: 57 ML/MIN/1.73M*2
EOSINOPHIL # BLD AUTO: 0.17 X10*3/UL (ref 0–0.4)
EOSINOPHIL NFR BLD AUTO: 1.9 %
ERYTHROCYTE [DISTWIDTH] IN BLOOD BY AUTOMATED COUNT: 17.2 % (ref 11.5–14.5)
GLUCOSE SERPL-MCNC: 87 MG/DL (ref 74–99)
HCT VFR BLD AUTO: 29.4 % (ref 41–52)
HGB BLD-MCNC: 9.2 G/DL (ref 13.5–17.5)
IMM GRANULOCYTES # BLD AUTO: 0.08 X10*3/UL (ref 0–0.5)
IMM GRANULOCYTES NFR BLD AUTO: 0.9 % (ref 0–0.9)
LYMPHOCYTES # BLD AUTO: 0.81 X10*3/UL (ref 0.8–3)
LYMPHOCYTES NFR BLD AUTO: 9.1 %
MAGNESIUM SERPL-MCNC: 1.93 MG/DL (ref 1.6–2.4)
MCH RBC QN AUTO: 28 PG (ref 26–34)
MCHC RBC AUTO-ENTMCNC: 31.3 G/DL (ref 32–36)
MCV RBC AUTO: 89 FL (ref 80–100)
MONOCYTES # BLD AUTO: 1.2 X10*3/UL (ref 0.05–0.8)
MONOCYTES NFR BLD AUTO: 13.5 %
NEUTROPHILS # BLD AUTO: 6.6 X10*3/UL (ref 1.6–5.5)
NEUTROPHILS NFR BLD AUTO: 74.4 %
NRBC BLD-RTO: 0 /100 WBCS (ref 0–0)
PLATELET # BLD AUTO: 90 X10*3/UL (ref 150–450)
POTASSIUM SERPL-SCNC: 4.3 MMOL/L (ref 3.5–5.3)
PRODUCT BLOOD TYPE: 5100
PRODUCT CODE: NORMAL
RBC # BLD AUTO: 3.29 X10*6/UL (ref 4.5–5.9)
SODIUM SERPL-SCNC: 134 MMOL/L (ref 136–145)
UNIT ABO: NORMAL
UNIT NUMBER: NORMAL
UNIT RH: NORMAL
UNIT VOLUME: 335
UNIT VOLUME: 500
UNIT VOLUME: 500
WBC # BLD AUTO: 8.9 X10*3/UL (ref 4.4–11.3)
XM INTEP: NORMAL

## 2025-03-07 PROCEDURE — 80048 BASIC METABOLIC PNL TOTAL CA: CPT | Performed by: INTERNAL MEDICINE

## 2025-03-07 PROCEDURE — 83735 ASSAY OF MAGNESIUM: CPT | Performed by: INTERNAL MEDICINE

## 2025-03-07 PROCEDURE — 2500000002 HC RX 250 W HCPCS SELF ADMINISTERED DRUGS (ALT 637 FOR MEDICARE OP, ALT 636 FOR OP/ED): Performed by: INTERNAL MEDICINE

## 2025-03-07 PROCEDURE — 99231 SBSQ HOSP IP/OBS SF/LOW 25: CPT | Performed by: NURSE PRACTITIONER

## 2025-03-07 PROCEDURE — 99233 SBSQ HOSP IP/OBS HIGH 50: CPT | Performed by: INTERNAL MEDICINE

## 2025-03-07 PROCEDURE — 1200000002 HC GENERAL ROOM WITH TELEMETRY DAILY

## 2025-03-07 PROCEDURE — 36415 COLL VENOUS BLD VENIPUNCTURE: CPT | Performed by: INTERNAL MEDICINE

## 2025-03-07 PROCEDURE — 2500000004 HC RX 250 GENERAL PHARMACY W/ HCPCS (ALT 636 FOR OP/ED): Performed by: STUDENT IN AN ORGANIZED HEALTH CARE EDUCATION/TRAINING PROGRAM

## 2025-03-07 PROCEDURE — 2500000001 HC RX 250 WO HCPCS SELF ADMINISTERED DRUGS (ALT 637 FOR MEDICARE OP): Performed by: STUDENT IN AN ORGANIZED HEALTH CARE EDUCATION/TRAINING PROGRAM

## 2025-03-07 PROCEDURE — 85025 COMPLETE CBC W/AUTO DIFF WBC: CPT | Performed by: INTERNAL MEDICINE

## 2025-03-07 PROCEDURE — 2500000001 HC RX 250 WO HCPCS SELF ADMINISTERED DRUGS (ALT 637 FOR MEDICARE OP): Performed by: INTERNAL MEDICINE

## 2025-03-07 PROCEDURE — 97116 GAIT TRAINING THERAPY: CPT | Mod: GP,CQ

## 2025-03-07 RX ORDER — METOPROLOL SUCCINATE 25 MG/1
25 TABLET, EXTENDED RELEASE ORAL 2 TIMES DAILY
Status: DISCONTINUED | OUTPATIENT
Start: 2025-03-07 | End: 2025-03-10 | Stop reason: HOSPADM

## 2025-03-07 RX ADMIN — LEVETIRACETAM 500 MG: 500 TABLET, FILM COATED ORAL at 09:43

## 2025-03-07 RX ADMIN — SERTRALINE HYDROCHLORIDE 100 MG: 100 TABLET ORAL at 20:18

## 2025-03-07 RX ADMIN — CEFTRIAXONE 1 G: 1 INJECTION, POWDER, FOR SOLUTION INTRAMUSCULAR; INTRAVENOUS at 17:51

## 2025-03-07 RX ADMIN — MIRTAZAPINE 15 MG: 15 TABLET, FILM COATED ORAL at 20:19

## 2025-03-07 RX ADMIN — METOPROLOL SUCCINATE 25 MG: 25 TABLET, EXTENDED RELEASE ORAL at 09:44

## 2025-03-07 RX ADMIN — ALPRAZOLAM 0.5 MG: 0.5 TABLET ORAL at 20:18

## 2025-03-07 RX ADMIN — FINASTERIDE 5 MG: 5 TABLET, FILM COATED ORAL at 09:43

## 2025-03-07 RX ADMIN — Medication 1 TABLET: at 09:43

## 2025-03-07 RX ADMIN — ALPRAZOLAM 0.5 MG: 0.5 TABLET ORAL at 03:37

## 2025-03-07 RX ADMIN — ATORVASTATIN CALCIUM 20 MG: 20 TABLET, FILM COATED ORAL at 09:42

## 2025-03-07 RX ADMIN — CHOLECALCIFEROL TAB 125 MCG (5000 UNIT) 5000 UNITS: 125 TAB at 09:43

## 2025-03-07 ASSESSMENT — COGNITIVE AND FUNCTIONAL STATUS - GENERAL
TURNING FROM BACK TO SIDE WHILE IN FLAT BAD: A LITTLE
MOVING TO AND FROM BED TO CHAIR: A LITTLE
MOBILITY SCORE: 18
WALKING IN HOSPITAL ROOM: A LITTLE
STANDING UP FROM CHAIR USING ARMS: A LITTLE
CLIMB 3 TO 5 STEPS WITH RAILING: A LITTLE
MOVING FROM LYING ON BACK TO SITTING ON SIDE OF FLAT BED WITH BEDRAILS: A LITTLE

## 2025-03-07 ASSESSMENT — PAIN SCALES - GENERAL
PAINLEVEL_OUTOF10: 0 - NO PAIN

## 2025-03-07 ASSESSMENT — PAIN - FUNCTIONAL ASSESSMENT
PAIN_FUNCTIONAL_ASSESSMENT: 0-10

## 2025-03-07 NOTE — PROGRESS NOTES
Physical Therapy    Physical Therapy    Physical Therapy Treatment    Patient Name: Minh Harrington Jr.  MRN: [id number]  Today's Date: 3/7/2025  Time Calculation  Start Time: 1220  Stop Time: 1241  Time Calculation (min): 21 min     2131/2131-A    Assessment/Plan   PT Assessment  PT Assessment Results: Decreased strength, Decreased endurance, Impaired balance, Decreased mobility  End of Session Communication: Bedside nurse  Assessment Comment: Pt had no c/o dizziness or LOB noted throughout session. Pt requires minimal vcs for proper hand placement during transfers. pt was left in chair with fall safety activated and call light in reach.  End of Session Patient Position: Up in room, Alarm on  PT Plan  Inpatient/Swing Bed or Outpatient: Inpatient  PT Plan  Treatment/Interventions: Bed mobility, Transfer training, Gait training, Stair training, Balance training, Strengthening, Therapeutic exercise, Therapeutic activity, Endurance training, Home exercise program  PT Plan: Ongoing PT  PT Frequency: 4 times per week  PT Discharge Recommendations: Low intensity level of continued care  Equipment Recommended upon Discharge: Wheeled walker  PT Recommended Transfer Status: Contact guard  PT - OK to Discharge: Yes    Outcome Measures:  [facility] Basic Mobility  Turning from your back to your side while in a flat bed without using bedrails: A little  Moving from lying on your back to sitting on the side of a flat bed without using bedrails: A little  Moving to and from bed to chair (including a wheelchair): A little  Standing up from a chair using your arms (e.g. wheelchair or bedside chair): A little  To walk in hospital room: A little  Climbing 3-5 steps with railing: A little  Basic Mobility - Total Score: 18            03/07/25 1220   PT  Visit   PT Received On 03/07/25   Response to Previous Treatment Patient with no complaints from previous session.   General   Reason for Referral impiared mobility   Referred By Dr. Lechuga    Prior to Session Communication Bedside nurse   Patient Position Received Up in chair;Alarm on   Preferred Learning Style kinesthetic;verbal   General Comment Pt agreeable to therapy. Nurse cleared.   Precautions   Medical Precautions Fall precautions   Pain Assessment   Pain Assessment 0-10   0-10 (Numeric) Pain Score 0 - No pain   Therapeutic Exercise   Therapeutic Exercise Performed Yes   Therapeutic Exercise Activity 1 DF/PF x10   Therapeutic Exercise Activity 2 LAQ x10   Therapeutic Exercise Activity 3 hip flex x10   Ambulation/Gait Training   Ambulation/Gait Training Performed Yes   Ambulation/Gait Training 1   Surface 1 Level tile   Device 1 Rolling walker   Gait Support Devices Gait belt   Assistance 1 Contact guard   Quality of Gait 1 Decreased step length   Comments/Distance (ft) 1 250ft. no rest break required   Transfers   Transfer Yes   Transfer 1   Transfer From 1 Chair with arms to   Transfer to 1 Stand   Technique 1 Sit to stand;Stand to sit   Transfer Device 1 Walker;Gait belt   Transfer Level of Assistance 1 Contact guard   Trials/Comments 1 pt demonstrates proper hand placement when coming to stand. Minimal vcs to reach back with BUE when coming to sit.   Activity Tolerance   Endurance Tolerates 30 min exercise with multiple rests   PT Assessment   PT Assessment Results Decreased strength;Decreased endurance;Impaired balance;Decreased mobility   End of Session Communication Bedside nurse   Assessment Comment Pt had no c/o dizziness or LOB noted throughout session. Pt requires minimal vcs for proper hand placement during transfers. pt was left in chair with fall safety activated and call light in reach.   End of Session Patient Position Up in room;Alarm on   PT Plan   Inpatient/Swing Bed or Outpatient Inpatient   PT Plan   PT Plan Ongoing PT   PT Frequency 4 times per week   PT Discharge Recommendations Low intensity level of continued care   Equipment Recommended upon Discharge Wheeled walker                  EDUCATION:     Education Documentation  No documentation found.  Education Comments  No comments found.        GOALS:  Encounter Problems       Encounter Problems (Active)       PT Problem       Pt will demonstrate mod I for all bed mobility         Start:  03/06/25    Expected End:  03/20/25            Pt will demonstrate mod I for all transfers with WW        Start:  03/06/25    Expected End:  03/20/25            Pt will ambulate 300 ft with WW and mod I.        Start:  03/06/25    Expected End:  03/20/25            Pt will demonstrate good dynamic standing balance while performing a functional task.         Start:  03/06/25    Expected End:  03/20/25               Pain - Adult

## 2025-03-07 NOTE — PROGRESS NOTES
Minh Harrington Jr. is a 85 y.o. male on day 6 of admission presenting with Hematuria.      Subjective   Assuming care for this patient today; his blood pressures have been significantly improved, he has been staying in the intensive care unit for house convenience but is technically at a level of stepdown; his wife is at the bedside, he said he is starting to feel better today, he denied having any fevers or chills and was continued on the bladder irrigation.    Objective     Last Recorded Vitals  /59 (BP Location: Left arm, Patient Position: Sitting)   Pulse 88   Temp 36.5 °C (97.7 °F) (Temporal)   Resp (!) 28   Wt 79.4 kg (175 lb 0.7 oz)   SpO2 94%   Intake/Output last 3 Shifts:    Intake/Output Summary (Last 24 hours) at 3/7/2025 1523  Last data filed at 3/7/2025 0600  Gross per 24 hour   Intake --   Output 925 ml   Net -925 ml       Admission Weight  Weight: 80.7 kg (178 lb) (02/28/25 0946)    Daily Weight  03/07/25 : 79.4 kg (175 lb 0.7 oz)      Physical Exam:  General: Not in acute distress, alert.  Not on oxygen  HEENT: head intact and normocephalic  Neck: Normal to inspection  Lungs: Lungs are clear to auscultation without rales or wheezing  Cardiac: Irregularly irregular rates in 90s at time of examination today  Abdomen: Soft nontender, positive bowel sounds  : Pandya with CBI which was clear today  Skin: Intact  Hematology: No petechia or excessive ecchymosis  Musculoskeletal: Without significant trauma  Neurological: Alert awake oriented, no focal deficit, cranial nerves grossly intact  Psych: Pleasant mood with congruent affect    Relevant Results  Scheduled medications  atorvastatin, 20 mg, oral, Daily  B complex-vitamin C, 1 tablet, oral, Daily  cefTRIAXone, 1 g, intravenous, q24h  cholecalciferol, 5,000 Units, oral, Daily  [Held by provider] empagliflozin, 10 mg, oral, Daily  finasteride, 5 mg, oral, Daily  levETIRAcetam, 500 mg, oral, Daily  metoprolol succinate XL, 25 mg, oral,  BID  mirtazapine, 15 mg, oral, Nightly  [Held by provider] sacubitriL-valsartan, 1 tablet, oral, BID  sertraline, 100 mg, oral, Daily  [Held by provider] spironolactone, 25 mg, oral, Daily      Continuous medications     PRN medications  PRN medications: acetaminophen **OR** acetaminophen **OR** acetaminophen, ALPRAZolam, benzocaine-menthol, guaiFENesin, ondansetron **OR** ondansetron, polyethylene glycol   Labs  Results from last 7 days   Lab Units 03/07/25  0328 03/06/25  1453 03/06/25  0608   WBC AUTO x10*3/uL 8.9 12.3* 10.2   HEMOGLOBIN g/dL 9.2* 10.0* 8.6*   HEMATOCRIT % 29.4* 31.1* 27.5*   PLATELETS AUTO x10*3/uL 90* 109* 94*     Results from last 7 days   Lab Units 03/07/25  0328 03/06/25  0608 03/05/25  0607   SODIUM mmol/L 134* 136 133*   POTASSIUM mmol/L 4.3 4.2 4.2   CHLORIDE mmol/L 106 108* 106   CO2 mmol/L 22 22 19*   BUN mg/dL 21 23 30*   CREATININE mg/dL 1.22 1.14 1.35*   CALCIUM mg/dL 7.8* 7.7* 7.6*   GLUCOSE mg/dL 87 81 94       CT abdomen pelvis w IV contrast    Result Date: 2/28/2025  Interpreted By:  Flaquita Redd, STUDY: CT ABDOMEN PELVIS W IV CONTRAST;  2/28/2025 2:41 pm   INDICATION: Signs/Symptoms:painless hematuria.     COMPARISON: CT urography 08/07/2024 CT abdomen pelvis 08/05/2024   ACCESSION NUMBER(S): [id number]   ORDERING CLINICIAN: FRANK SHEEHAN   TECHNIQUE: CT of the abdomen and pelvis was performed.  Standard contiguous axial images were obtained at 3 mm slice thickness through the abdomen and pelvis. Coronal and sagittal reconstructions at 3 mm slice thickness were performed.  75 ML of Omnipaque 350 was administered intravenously without immediate complication.   FINDINGS: LOWER CHEST: Unchanged cardiomegaly without evidence of pericardial effusion. Severe coronary artery calcifications versus stent. Stable small right and trace left pleural effusions. Right right-greater-than-left lower lobe dependent ground-glass opacities. Similar smooth interlobular septal thickening is  seen, right-greater-than-left, compatible with interstitial edema. The visualized distal esophagus appears unremarkable. Status post median sternotomy.   ABDOMEN:   LIVER: The liver is normal in size. Mottled appearance of the liver, suspicious for hepatic venous congestion in the setting of cardiomegaly. No suspicious liver lesion.   BILE DUCTS: No biliary duct dilatation.   GALLBLADDER: The gallbladder is nondistended and without evidence of radiopaque stones.   PANCREAS: The pancreas appears unremarkable without evidence of ductal dilatation or masses.   SPLEEN: The spleen is normal in size.   ADRENAL GLANDS: No adrenal nodularity or thickening.   KIDNEYS AND URETERS: The kidneys are normal in size. Stable hypoattenuating lesions in the bilateral kidneys, likely benign. Stable punctate nonobstructing calculus in the left kidney. No hydroureteronephrosis or right nephroureterolithiasis is identified.   PELVIS:   BLADDER: Interval worsening of masslike bladder wall thickening, most predominant posteriorly and along the right lateral wall.   REPRODUCTIVE ORGANS: Interval increase in prostatomegaly and heterogeneity of the prostate, measuring 52 mm in the transaxial dimension, previously 48 mm.   BOWEL: The stomach is unremarkable. The small and large bowel are normal in caliber and demonstrate no wall thickening. The appendix is not definitely visualized. There is however no pericecal stranding or fluid. Colonic diverticulosis without acute diverticulitis.   VESSELS: Severe atherosclerotic calcifications of the aortoiliac arteries. The IVC appears normal.   PERITONEUM/RETROPERITONEUM/LYMPH NODES: No ascites or fluid collection. No measurable peritoneal nodular thickening or deposits. Stable subcentimeter para-aortic and bilateral iliac chain, which are nonenlarged by size criteria, and bears watching on future follow-up evaluations, for example a 8 mm left common iliac lymph node on series 2, image 112 measured in  the short axis.   ABDOMINAL WALL: The abdominal wall soft tissues appear normal.   BONES: No acute fracture. No suspicious osseous lesions. Discogenic degenerative changes in the lower thoracic and lumbar spine.       1. Interval worsening of masslike bladder wall thickening, most predominant posteriorly and along the right lateral wall. Findings are suspicious for underlying neoplasm, correlation with cystoscopy and tissue diagnosis is recommended. 2. Interval increase in size and heterogeneity of the prostate, which direct tumor invasion is not excluded. 3. Stable subcentimeter para-aortic and bilateral iliac chain. Though nonenlarged by size criteria, this area bears watching on future follow-up evaluations to evaluate for shanon metastasis. 4. Similar cardiomegaly with interstitial edema and small bilateral pleural effusions, right-greater-than-left. 5. Mottled appearance of the liver, suspicious for hepatic venous congestion in the setting of cardiomegaly. 6. Additional chronic and incidental findings as detailed above.     MACRO: None   Signed by: Flaquita Redd 2/28/2025 3:11 PM Dictation workstation:   [id number]                    Assessment/Plan   85-year-old male with past medical history of CAD status post CABG, atrial fibrillation on Eliquis, chronic systolic heart failure, CKD stage III, thrombocytopenia, BPH status post TURP in 2008 presented due to gross hematuria and was found to have worsening of bladder mass and require initiation of CBI and holding anticoagulation.  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 Washington on 3/5/2025  Receiving 5 units of pRBC, Hgb slightly lower today  Hypotension is improved, metoprolol may be resumed  Will continue to hold anticoagulation  Continue with antibiotics with ceftriaxone  UA unremarkable   Cardiology consultation is noted as well, appreciate the  recommendations to have the metoprolol resumed as mentioned above  Maintain CBI and work with urology until urine is clear  Hold anticoagulation and outpatient referral for Watchman device placement    Acute blood loss anemia  Acute blood loss anemia from hematuria from bladder mass  Type and screen done  status post 5 units of packed RBC  We will maintain patient in stepdown unit  Patient has significant hypertension    Hypotension  Improved today  Likely secondary to acute blood loss anemia  Transfuse as needed  No signs and symptoms of systemic illness  Urine analysis performed  Will continue with antibiotics    Atrial fibrillation on Eliquis  Patient's anticoagulation will need to be held  Cardiology consultation is noted  To consider Watchman device as outpatient as outpatient and cardiology will refer patient  Patient follows up with Dr. Valle    HFrEF  Does not appear to be decompensated  Having A-fib with RVR  Will monitor    CKD stage III  Creatinine stable  Continue to monitor    Hyperlipidemia  Continue atorvastatin    Anxiety disorder  Continue with Xanax as needed    DVT prophylaxis  SCDs      Disposition: Not yet medically ready or stable for discharge.  Will order PT OT to start working with patient     Plan discussed with patient and wife at bedside today    High Level of MDM based on above issue and discussing plan    Gumaro Garvin MD  [facility]  Internal Medicine    This document was generated in whole or in part using the Dragon One medical voice recognition software and there may be some incorrect words/wording, spelling, or punctuation errors that were not corrected prior to finalization in the medical record.

## 2025-03-07 NOTE — PROGRESS NOTES
Minh Rickettslona . 85 y.o. male    Subjective  Patient seen and examined this morning.  No fever or chills.  3 way barbosa catheter with CBI that remains clamped, urine yellow.  No suprapubic pain/pressure.  Patient wife at bedside.  With no other complaints.       Objective  PHYSICAL EXAM:  Physical Exam  Vitals reviewed.   Constitutional:       General: He is awake.      Appearance: Normal appearance.   Cardiovascular:      Rate and Rhythm: Normal rate and regular rhythm.      Pulses: Normal pulses.      Heart sounds: Normal heart sounds.   Pulmonary:      Effort: Pulmonary effort is normal.      Breath sounds: Normal breath sounds and air entry.   Abdominal:      General: Abdomen is flat. There is no distension.      Palpations: Abdomen is soft.      Tenderness: There is no abdominal tenderness. There is no right CVA tenderness or left CVA tenderness.   Genitourinary:     Comments: 3 way barbosa catheter with CBI that remains clamped, urine yellow.   Musculoskeletal:         General: Normal range of motion.      Cervical back: Normal range of motion.   Skin:     General: Skin is warm and dry.   Neurological:      General: No focal deficit present.      Mental Status: He is alert and oriented to person, place, and time.   Psychiatric:         Behavior: Behavior is cooperative.         Vital signs in last 24 hours:  Vitals:    03/07/25 0900   BP:    Pulse: 78   Resp: 22   Temp:    SpO2:          Intake/Output this shift:    Intake/Output Summary (Last 24 hours) at 3/7/2025 0933  Last data filed at 3/7/2025 0600  Gross per 24 hour   Intake --   Output 925 ml   Net -925 ml        Allergies:  No Known Allergies     Medications:  Scheduled medications  atorvastatin, 20 mg, oral, Daily  B complex-vitamin C, 1 tablet, oral, Daily  cefTRIAXone, 1 g, intravenous, q24h  cholecalciferol, 5,000 Units, oral, Daily  [Held by provider] empagliflozin, 10 mg, oral, Daily  finasteride, 5 mg, oral, Daily  levETIRAcetam, 500 mg,  oral, Daily  [Held by provider] metoprolol succinate XL, 25 mg, oral, BID  metoprolol succinate XL, 25 mg, oral, BID  mirtazapine, 15 mg, oral, Nightly  [Held by provider] sacubitriL-valsartan, 1 tablet, oral, BID  sertraline, 100 mg, oral, Daily  [Held by provider] spironolactone, 25 mg, oral, Daily      Continuous medications     PRN medications  PRN medications: acetaminophen **OR** acetaminophen **OR** acetaminophen, ALPRAZolam, benzocaine-menthol, guaiFENesin, ondansetron **OR** ondansetron, polyethylene glycol       Labs:  Results for orders placed or performed during the hospital encounter of 02/28/25 (from the past 24 hours)   CBC   Result Value Ref Range    WBC 12.3 (H) 4.4 - 11.3 x10*3/uL    nRBC 0.0 0.0 - 0.0 /100 WBCs    RBC 3.53 (L) 4.50 - 5.90 x10*6/uL    Hemoglobin 10.0 (L) 13.5 - 17.5 g/dL    Hematocrit 31.1 (L) 41.0 - 52.0 %    MCV 88 80 - 100 fL    MCH 28.3 26.0 - 34.0 pg    MCHC 32.2 32.0 - 36.0 g/dL    RDW 17.6 (H) 11.5 - 14.5 %    Platelets 109 (L) 150 - 450 x10*3/uL   Basic Metabolic Panel   Result Value Ref Range    Glucose 87 74 - 99 mg/dL    Sodium 134 (L) 136 - 145 mmol/L    Potassium 4.3 3.5 - 5.3 mmol/L    Chloride 106 98 - 107 mmol/L    Bicarbonate 22 21 - 32 mmol/L    Anion Gap 10 10 - 20 mmol/L    Urea Nitrogen 21 6 - 23 mg/dL    Creatinine 1.22 0.50 - 1.30 mg/dL    eGFR 57 (L) >60 mL/min/1.73m*2    Calcium 7.8 (L) 8.6 - 10.3 mg/dL   CBC and Auto Differential   Result Value Ref Range    WBC 8.9 4.4 - 11.3 x10*3/uL    nRBC 0.0 0.0 - 0.0 /100 WBCs    RBC 3.29 (L) 4.50 - 5.90 x10*6/uL    Hemoglobin 9.2 (L) 13.5 - 17.5 g/dL    Hematocrit 29.4 (L) 41.0 - 52.0 %    MCV 89 80 - 100 fL    MCH 28.0 26.0 - 34.0 pg    MCHC 31.3 (L) 32.0 - 36.0 g/dL    RDW 17.2 (H) 11.5 - 14.5 %    Platelets 90 (L) 150 - 450 x10*3/uL    Neutrophils % 74.4 40.0 - 80.0 %    Immature Granulocytes %, Automated 0.9 0.0 - 0.9 %    Lymphocytes % 9.1 13.0 - 44.0 %    Monocytes % 13.5 2.0 - 10.0 %    Eosinophils % 1.9 0.0  - 6.0 %    Basophils % 0.2 0.0 - 2.0 %    Neutrophils Absolute 6.60 (H) 1.60 - 5.50 x10*3/uL    Immature Granulocytes Absolute, Automated 0.08 0.00 - 0.50 x10*3/uL    Lymphocytes Absolute 0.81 0.80 - 3.00 x10*3/uL    Monocytes Absolute 1.20 (H) 0.05 - 0.80 x10*3/uL    Eosinophils Absolute 0.17 0.00 - 0.40 x10*3/uL    Basophils Absolute 0.02 0.00 - 0.10 x10*3/uL   Magnesium   Result Value Ref Range    Magnesium 1.93 1.60 - 2.40 mg/dL        Imaging:  ECG 12 Lead    Result Date: 3/4/2025  Atrial fibrillation Possible Inferior infarct , age undetermined ST & T wave abnormality, consider lateral ischemia or digitalis effect Abnormal ECG Confirmed by Curt Shine ([id number]) on 3/4/2025 7:06:56 PM    CT abdomen pelvis w IV contrast    Result Date: 2/28/2025  Interpreted By:  Flaquita Redd, STUDY: CT ABDOMEN PELVIS W IV CONTRAST;  2/28/2025 2:41 pm   INDICATION: Signs/Symptoms:painless hematuria.     COMPARISON: CT urography 08/07/2024 CT abdomen pelvis 08/05/2024   ACCESSION NUMBER(S): [id number]   ORDERING CLINICIAN: FRANK SHEEHAN   TECHNIQUE: CT of the abdomen and pelvis was performed.  Standard contiguous axial images were obtained at 3 mm slice thickness through the abdomen and pelvis. Coronal and sagittal reconstructions at 3 mm slice thickness were performed.  75 ML of Omnipaque 350 was administered intravenously without immediate complication.   FINDINGS: LOWER CHEST: Unchanged cardiomegaly without evidence of pericardial effusion. Severe coronary artery calcifications versus stent. Stable small right and trace left pleural effusions. Right right-greater-than-left lower lobe dependent ground-glass opacities. Similar smooth interlobular septal thickening is seen, right-greater-than-left, compatible with interstitial edema. The visualized distal esophagus appears unremarkable. Status post median sternotomy.   ABDOMEN:   LIVER: The liver is normal in size. Mottled appearance of the liver, suspicious for hepatic  venous congestion in the setting of cardiomegaly. No suspicious liver lesion.   BILE DUCTS: No biliary duct dilatation.   GALLBLADDER: The gallbladder is nondistended and without evidence of radiopaque stones.   PANCREAS: The pancreas appears unremarkable without evidence of ductal dilatation or masses.   SPLEEN: The spleen is normal in size.   ADRENAL GLANDS: No adrenal nodularity or thickening.   KIDNEYS AND URETERS: The kidneys are normal in size. Stable hypoattenuating lesions in the bilateral kidneys, likely benign. Stable punctate nonobstructing calculus in the left kidney. No hydroureteronephrosis or right nephroureterolithiasis is identified.   PELVIS:   BLADDER: Interval worsening of masslike bladder wall thickening, most predominant posteriorly and along the right lateral wall.   REPRODUCTIVE ORGANS: Interval increase in prostatomegaly and heterogeneity of the prostate, measuring 52 mm in the transaxial dimension, previously 48 mm.   BOWEL: The stomach is unremarkable. The small and large bowel are normal in caliber and demonstrate no wall thickening. The appendix is not definitely visualized. There is however no pericecal stranding or fluid. Colonic diverticulosis without acute diverticulitis.   VESSELS: Severe atherosclerotic calcifications of the aortoiliac arteries. The IVC appears normal.   PERITONEUM/RETROPERITONEUM/LYMPH NODES: No ascites or fluid collection. No measurable peritoneal nodular thickening or deposits. Stable subcentimeter para-aortic and bilateral iliac chain, which are nonenlarged by size criteria, and bears watching on future follow-up evaluations, for example a 8 mm left common iliac lymph node on series 2, image 112 measured in the short axis.   ABDOMINAL WALL: The abdominal wall soft tissues appear normal.   BONES: No acute fracture. No suspicious osseous lesions. Discogenic degenerative changes in the lower thoracic and lumbar spine.       1. Interval worsening of masslike  bladder wall thickening, most predominant posteriorly and along the right lateral wall. Findings are suspicious for underlying neoplasm, correlation with cystoscopy and tissue diagnosis is recommended. 2. Interval increase in size and heterogeneity of the prostate, which direct tumor invasion is not excluded. 3. Stable subcentimeter para-aortic and bilateral iliac chain. Though nonenlarged by size criteria, this area bears watching on future follow-up evaluations to evaluate for shanon metastasis. 4. Similar cardiomegaly with interstitial edema and small bilateral pleural effusions, right-greater-than-left. 5. Mottled appearance of the liver, suspicious for hepatic venous congestion in the setting of cardiomegaly. 6. Additional chronic and incidental findings as detailed above.     MACRO: None   Signed by: Flaquita Redd 2/28/2025 3:11 PM Dictation workstation:   [id number]           Plan  Hematuria     - Maintain barbosa catheter for now.  Possible removal not until Monday  - Discontinue CBI  - Continue to hold anticoagulation (Eliquis)  - H&H 9.2/29.4 - has received a total of 4 pRBC during this admission    Plan of care discussed with Dr. Felix Dalton, APRN-CNP    I spent 20 minutes in the professional and overall care of this patient.

## 2025-03-08 LAB
ANION GAP SERPL CALC-SCNC: 11 MMOL/L (ref 10–20)
BUN SERPL-MCNC: 19 MG/DL (ref 6–23)
CALCIUM SERPL-MCNC: 8.2 MG/DL (ref 8.6–10.3)
CHLORIDE SERPL-SCNC: 106 MMOL/L (ref 98–107)
CO2 SERPL-SCNC: 23 MMOL/L (ref 21–32)
CREAT SERPL-MCNC: 1.06 MG/DL (ref 0.5–1.3)
EGFRCR SERPLBLD CKD-EPI 2021: 68 ML/MIN/1.73M*2
ERYTHROCYTE [DISTWIDTH] IN BLOOD BY AUTOMATED COUNT: 16.9 % (ref 11.5–14.5)
GLUCOSE SERPL-MCNC: 87 MG/DL (ref 74–99)
HCT VFR BLD AUTO: 30.3 % (ref 41–52)
HGB BLD-MCNC: 9.4 G/DL (ref 13.5–17.5)
MAGNESIUM SERPL-MCNC: 1.8 MG/DL (ref 1.6–2.4)
MCH RBC QN AUTO: 27.5 PG (ref 26–34)
MCHC RBC AUTO-ENTMCNC: 31 G/DL (ref 32–36)
MCV RBC AUTO: 89 FL (ref 80–100)
NRBC BLD-RTO: 0 /100 WBCS (ref 0–0)
PLATELET # BLD AUTO: 98 X10*3/UL (ref 150–450)
POTASSIUM SERPL-SCNC: 4.2 MMOL/L (ref 3.5–5.3)
RBC # BLD AUTO: 3.42 X10*6/UL (ref 4.5–5.9)
SODIUM SERPL-SCNC: 136 MMOL/L (ref 136–145)
WBC # BLD AUTO: 9.4 X10*3/UL (ref 4.4–11.3)

## 2025-03-08 PROCEDURE — 85027 COMPLETE CBC AUTOMATED: CPT | Performed by: INTERNAL MEDICINE

## 2025-03-08 PROCEDURE — 2500000004 HC RX 250 GENERAL PHARMACY W/ HCPCS (ALT 636 FOR OP/ED): Performed by: INTERNAL MEDICINE

## 2025-03-08 PROCEDURE — 2500000001 HC RX 250 WO HCPCS SELF ADMINISTERED DRUGS (ALT 637 FOR MEDICARE OP): Performed by: INTERNAL MEDICINE

## 2025-03-08 PROCEDURE — 2500000001 HC RX 250 WO HCPCS SELF ADMINISTERED DRUGS (ALT 637 FOR MEDICARE OP): Performed by: STUDENT IN AN ORGANIZED HEALTH CARE EDUCATION/TRAINING PROGRAM

## 2025-03-08 PROCEDURE — 83735 ASSAY OF MAGNESIUM: CPT | Performed by: INTERNAL MEDICINE

## 2025-03-08 PROCEDURE — 99233 SBSQ HOSP IP/OBS HIGH 50: CPT | Performed by: INTERNAL MEDICINE

## 2025-03-08 PROCEDURE — 36415 COLL VENOUS BLD VENIPUNCTURE: CPT | Performed by: INTERNAL MEDICINE

## 2025-03-08 PROCEDURE — 2500000002 HC RX 250 W HCPCS SELF ADMINISTERED DRUGS (ALT 637 FOR MEDICARE OP, ALT 636 FOR OP/ED): Performed by: INTERNAL MEDICINE

## 2025-03-08 PROCEDURE — 80048 BASIC METABOLIC PNL TOTAL CA: CPT | Performed by: INTERNAL MEDICINE

## 2025-03-08 PROCEDURE — 1200000002 HC GENERAL ROOM WITH TELEMETRY DAILY

## 2025-03-08 PROCEDURE — 99231 SBSQ HOSP IP/OBS SF/LOW 25: CPT | Performed by: PHYSICIAN ASSISTANT

## 2025-03-08 RX ORDER — CEFTRIAXONE 1 G/50ML
1 INJECTION, SOLUTION INTRAVENOUS EVERY 24 HOURS
Status: DISCONTINUED | OUTPATIENT
Start: 2025-03-08 | End: 2025-03-10 | Stop reason: HOSPADM

## 2025-03-08 RX ORDER — SERTRALINE HYDROCHLORIDE 100 MG/1
100 TABLET, FILM COATED ORAL DAILY
Status: DISCONTINUED | OUTPATIENT
Start: 2025-03-08 | End: 2025-03-10 | Stop reason: HOSPADM

## 2025-03-08 RX ADMIN — LEVETIRACETAM 500 MG: 500 TABLET, FILM COATED ORAL at 09:33

## 2025-03-08 RX ADMIN — FINASTERIDE 5 MG: 5 TABLET, FILM COATED ORAL at 09:33

## 2025-03-08 RX ADMIN — MIRTAZAPINE 15 MG: 15 TABLET, FILM COATED ORAL at 20:45

## 2025-03-08 RX ADMIN — CHOLECALCIFEROL TAB 125 MCG (5000 UNIT) 5000 UNITS: 125 TAB at 09:33

## 2025-03-08 RX ADMIN — Medication 1 TABLET: at 09:33

## 2025-03-08 RX ADMIN — CEFTRIAXONE SODIUM 1 G: 1 INJECTION, SOLUTION INTRAVENOUS at 18:22

## 2025-03-08 RX ADMIN — ATORVASTATIN CALCIUM 20 MG: 20 TABLET, FILM COATED ORAL at 09:33

## 2025-03-08 RX ADMIN — SERTRALINE HYDROCHLORIDE 100 MG: 100 TABLET ORAL at 20:04

## 2025-03-08 RX ADMIN — METOPROLOL SUCCINATE 25 MG: 25 TABLET, EXTENDED RELEASE ORAL at 09:33

## 2025-03-08 RX ADMIN — ALPRAZOLAM 0.5 MG: 0.5 TABLET ORAL at 20:45

## 2025-03-08 ASSESSMENT — COGNITIVE AND FUNCTIONAL STATUS - GENERAL
MOVING TO AND FROM BED TO CHAIR: A LITTLE
HELP NEEDED FOR BATHING: A LITTLE
TURNING FROM BACK TO SIDE WHILE IN FLAT BAD: A LITTLE
DRESSING REGULAR LOWER BODY CLOTHING: A LITTLE
WALKING IN HOSPITAL ROOM: A LITTLE
TOILETING: A LITTLE
STANDING UP FROM CHAIR USING ARMS: A LITTLE
DAILY ACTIVITIY SCORE: 20
MOBILITY SCORE: 19
CLIMB 3 TO 5 STEPS WITH RAILING: A LITTLE
DRESSING REGULAR UPPER BODY CLOTHING: A LITTLE

## 2025-03-08 ASSESSMENT — PAIN SCALES - GENERAL
PAINLEVEL_OUTOF10: 0 - NO PAIN
PAINLEVEL_OUTOF10: 0 - NO PAIN

## 2025-03-08 NOTE — PROGRESS NOTES
Minh Harrington Jr. is a 85 y.o. male on day 7 of admission presenting with Hematuria.    Subjective   No acute events overnight, Bps still improved overall, but softer this afternoon, he is feeling better overall; pending having Pandya catheter removed, likely Monday; was able to transfer to [facility], doing well, he denied having any fevers or chills, bladder irrigation stopped since yesterday, holding anticoagulation.    Objective     Last Recorded Vitals  BP (!) 93/46   Pulse 80   Temp 37 °C (98.6 °F)   Resp 19   Wt 79.4 kg (175 lb 0.7 oz)   SpO2 96%   Intake/Output last 3 Shifts:    Intake/Output Summary (Last 24 hours) at 3/8/2025 1429  Last data filed at 3/8/2025 0600  Gross per 24 hour   Intake 100 ml   Output 2450 ml   Net -2350 ml     Admission Weight  Weight: 80.7 kg (178 lb) (02/28/25 0946)    Daily Weight  03/07/25 : 79.4 kg (175 lb 0.7 oz)    Physical Exam:  General: Not in acute distress, alert.  Not on oxygen  HEENT: head intact and normocephalic  Neck: Normal to inspection  Lungs: Lungs are clear to auscultation without rales or wheezing  Cardiac: Irregularly irregular rates in 90s at time of examination today  Abdomen: Soft nontender, positive bowel sounds  : Pandya in place, CBI has been stopped  Skin: Intact  Hematology: No petechia or excessive ecchymosis  Musculoskeletal: Without significant trauma  Neurological: Alert awake oriented, no focal deficit, cranial nerves grossly intact  Psych: Pleasant mood with congruent affect    Relevant Results  Scheduled medications  atorvastatin, 20 mg, oral, Daily  B complex-vitamin C, 1 tablet, oral, Daily  cefTRIAXone, 1 g, intravenous, q24h  cholecalciferol, 5,000 Units, oral, Daily  [Held by provider] empagliflozin, 10 mg, oral, Daily  finasteride, 5 mg, oral, Daily  levETIRAcetam, 500 mg, oral, Daily  metoprolol succinate XL, 25 mg, oral, BID  mirtazapine, 15 mg, oral, Nightly  [Held by provider] sacubitriL-valsartan, 1 tablet, oral, BID  sertraline,  100 mg, oral, Daily  [Held by provider] spironolactone, 25 mg, oral, Daily      Continuous medications     PRN medications  PRN medications: acetaminophen **OR** acetaminophen **OR** acetaminophen, ALPRAZolam, benzocaine-menthol, guaiFENesin, ondansetron **OR** ondansetron, polyethylene glycol   Labs  Results from last 7 days   Lab Units 03/08/25  0538 03/07/25  0328 03/06/25  1453   WBC AUTO x10*3/uL 9.4 8.9 12.3*   HEMOGLOBIN g/dL 9.4* 9.2* 10.0*   HEMATOCRIT % 30.3* 29.4* 31.1*   PLATELETS AUTO x10*3/uL 98* 90* 109*     Results from last 7 days   Lab Units 03/08/25  0538 03/07/25  0328 03/06/25  0608   SODIUM mmol/L 136 134* 136   POTASSIUM mmol/L 4.2 4.3 4.2   CHLORIDE mmol/L 106 106 108*   CO2 mmol/L 23 22 22   BUN mg/dL 19 21 23   CREATININE mg/dL 1.06 1.22 1.14   CALCIUM mg/dL 8.2* 7.8* 7.7*   GLUCOSE mg/dL 87 87 81       CT abdomen pelvis w IV contrast    Result Date: 2/28/2025  Interpreted By:  Flaquita Redd, STUDY: CT ABDOMEN PELVIS W IV CONTRAST;  2/28/2025 2:41 pm   INDICATION: Signs/Symptoms:painless hematuria.     COMPARISON: CT urography 08/07/2024 CT abdomen pelvis 08/05/2024   ACCESSION NUMBER(S): [id number]   ORDERING CLINICIAN: FRANK SHEEHAN   TECHNIQUE: CT of the abdomen and pelvis was performed.  Standard contiguous axial images were obtained at 3 mm slice thickness through the abdomen and pelvis. Coronal and sagittal reconstructions at 3 mm slice thickness were performed.  75 ML of Omnipaque 350 was administered intravenously without immediate complication.   FINDINGS: LOWER CHEST: Unchanged cardiomegaly without evidence of pericardial effusion. Severe coronary artery calcifications versus stent. Stable small right and trace left pleural effusions. Right right-greater-than-left lower lobe dependent ground-glass opacities. Similar smooth interlobular septal thickening is seen, right-greater-than-left, compatible with interstitial edema. The visualized distal esophagus appears unremarkable.  Status post median sternotomy.   ABDOMEN:   LIVER: The liver is normal in size. Mottled appearance of the liver, suspicious for hepatic venous congestion in the setting of cardiomegaly. No suspicious liver lesion.   BILE DUCTS: No biliary duct dilatation.   GALLBLADDER: The gallbladder is nondistended and without evidence of radiopaque stones.   PANCREAS: The pancreas appears unremarkable without evidence of ductal dilatation or masses.   SPLEEN: The spleen is normal in size.   ADRENAL GLANDS: No adrenal nodularity or thickening.   KIDNEYS AND URETERS: The kidneys are normal in size. Stable hypoattenuating lesions in the bilateral kidneys, likely benign. Stable punctate nonobstructing calculus in the left kidney. No hydroureteronephrosis or right nephroureterolithiasis is identified.   PELVIS:   BLADDER: Interval worsening of masslike bladder wall thickening, most predominant posteriorly and along the right lateral wall.   REPRODUCTIVE ORGANS: Interval increase in prostatomegaly and heterogeneity of the prostate, measuring 52 mm in the transaxial dimension, previously 48 mm.   BOWEL: The stomach is unremarkable. The small and large bowel are normal in caliber and demonstrate no wall thickening. The appendix is not definitely visualized. There is however no pericecal stranding or fluid. Colonic diverticulosis without acute diverticulitis.   VESSELS: Severe atherosclerotic calcifications of the aortoiliac arteries. The IVC appears normal.   PERITONEUM/RETROPERITONEUM/LYMPH NODES: No ascites or fluid collection. No measurable peritoneal nodular thickening or deposits. Stable subcentimeter para-aortic and bilateral iliac chain, which are nonenlarged by size criteria, and bears watching on future follow-up evaluations, for example a 8 mm left common iliac lymph node on series 2, image 112 measured in the short axis.   ABDOMINAL WALL: The abdominal wall soft tissues appear normal.   BONES: No acute fracture. No suspicious  osseous lesions. Discogenic degenerative changes in the lower thoracic and lumbar spine.       1. Interval worsening of masslike bladder wall thickening, most predominant posteriorly and along the right lateral wall. Findings are suspicious for underlying neoplasm, correlation with cystoscopy and tissue diagnosis is recommended. 2. Interval increase in size and heterogeneity of the prostate, which direct tumor invasion is not excluded. 3. Stable subcentimeter para-aortic and bilateral iliac chain. Though nonenlarged by size criteria, this area bears watching on future follow-up evaluations to evaluate for shanon metastasis. 4. Similar cardiomegaly with interstitial edema and small bilateral pleural effusions, right-greater-than-left. 5. Mottled appearance of the liver, suspicious for hepatic venous congestion in the setting of cardiomegaly. 6. Additional chronic and incidental findings as detailed above.     MACRO: None   Signed by: Flaquita Redd 2/28/2025 3:11 PM Dictation workstation:   [id number]                    Assessment/Plan   85-year-old male with past medical history of CAD status post CABG, atrial fibrillation on Eliquis, chronic systolic heart failure, CKD stage III, thrombocytopenia, BPH status post TURP in 2008 presented due to gross hematuria and was found to have worsening of bladder mass and required initiation of CBI and holding anticoagulation, CBI has stopped, Pandya remains in place.  Assessment & Plan    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  Status post procedure and tumor resection by Dr. Washington on 3/5/2025  Received a total of 4 units of pRBC, Hgb stable today  Hypotension is improved, overall despite metoprolol being resumed  Will continue to hold anticoagulation  Continue with antibiotics with ceftriaxone, UA unremarkable   Cardiology consultation is noted as well, appreciate the recommendations to have the metoprolol resumed as mentioned above  Hold anticoagulation and outpatient  referral for Watchman device placement    Acute blood loss anemia  Acute blood loss anemia from hematuria from bladder mass  status post 4 units of packed RBC  Stable for RMF unit today    Hypotension  Improved today overall again  Likely secondary to acute blood loss anemia  No signs and symptoms of systemic illness    Atrial fibrillation on Eliquis  Patient's anticoagulation held  Cardiology consultation is noted  To consider Watchman device as outpatient as outpatient and cardiology will refer patient  Patient follows up with Dr. Valle    HFrEF  Does not appear to be decompensated    CKD stage III  Creatinine stable  Continue to monitor    Hyperlipidemia  Continue atorvastatin    Anxiety disorder  Continue with Xanax as needed    DVT prophylaxis  SCDs    Plan discussed with patient and wife at bedside today    High Level of MDM based on above issue and discussing plan    Gumaro Garvin MD  [facility]  Internal Medicine    This document was generated in whole or in part using the Dragon One medical voice recognition software and there may be some incorrect words/wording, spelling, or punctuation errors that were not corrected prior to finalization in the medical record.

## 2025-03-08 NOTE — PROGRESS NOTES
"Minh Harrington Jr. is a 85 y.o. male on day 7 of admission presenting with Hematuria.    Subjective          Objective   Patient seen this morning, AVSS, irrigation remains clamped, yellow urine in bag Per patient, plan is to do a void trial on Monday.   Physical Exam  AVSS  Sitting up in chair, NAD    Last Recorded Vitals  Blood pressure 104/56, pulse 84, temperature 36.9 °C (98.4 °F), resp. rate 20, height 1.778 m (5' 10\"), weight 79.4 kg (175 lb 0.7 oz), SpO2 98%.  Intake/Output last 3 Shifts:  I/O last 3 completed shifts:  In: 100 (1.3 mL/kg) [IV Piggyback:100]  Out: 3375 (42.5 mL/kg) [Urine:3375 (1.2 mL/kg/hr)]  Weight: 79.4 kg     Relevant Results                 This patient has a urinary catheter   Reason for the urinary catheter remaining today? urinary retention/bladder outlet obstruction, acute or chronic               Assessment/Plan   Assessment & Plan  Hematuria    Gross hematuria  Continue to monitor for recurrent bleeding  CBI currently off           I spent  minutes in the professional and overall care of this patient.      Ministerio Mercer PA-C      "

## 2025-03-08 NOTE — CARE PLAN
The patient's goals for the shift include      The clinical goals for the shift include will remain hemodynamically stable, free of falls/injury throughout shift      Problem: Skin  Goal: Prevent/manage excess moisture  3/8/2025 1836 by Aleida Wayne RN  Outcome: Progressing  3/8/2025 1836 by Aleida Wayne RN  Outcome: Progressing  Goal: Prevent/minimize sheer/friction injuries  3/8/2025 1836 by Aleida Wayne RN  Outcome: Progressing  3/8/2025 1836 by Aleida Wayne RN  Outcome: Progressing  Goal: Promote/optimize nutrition  3/8/2025 1836 by Aleida Wayne RN  Outcome: Progressing  3/8/2025 1836 by Aleida Wayne RN  Outcome: Progressing  Goal: Decreased wound size/increased tissue granulation at next dressing change  3/8/2025 1836 by Aleida Wayne RN  Outcome: Progressing  3/8/2025 1836 by Aleida Wayne RN  Outcome: Progressing  Goal: Participates in plan/prevention/treatment measures  3/8/2025 1836 by Aleida Wayne RN  Outcome: Progressing  3/8/2025 1836 by Aleida Wayne RN  Outcome: Progressing  Goal: Promote skin healing  3/8/2025 1836 by Aleida Wayne RN  Outcome: Progressing  3/8/2025 1836 by Aleida Wayne RN  Outcome: Progressing     Problem: Pain - Adult  Goal: Verbalizes/displays adequate comfort level or baseline comfort level  3/8/2025 1836 by Aleida Wayne RN  Outcome: Progressing  3/8/2025 1836 by Aleida Wayne RN  Outcome: Progressing     Problem: Safety - Adult  Goal: Free from fall injury  3/8/2025 1836 by Aleida Wayne RN  Outcome: Progressing  3/8/2025 1836 by Aleida Wayne RN  Outcome: Progressing     Problem: Discharge Planning  Goal: Discharge to home or other facility with appropriate resources  3/8/2025 1836 by Aleida Wayne RN  Outcome: Progressing  3/8/2025 1836 by Aleida Wayne RN  Outcome: Progressing     Problem: Chronic Conditions and Co-morbidities  Goal: Patient's chronic conditions and co-morbidity symptoms are monitored and maintained or improved  3/8/2025 1836 by  Aleida Wayne RN  Outcome: Progressing  3/8/2025 1836 by Aleida Wayne RN  Outcome: Progressing     Problem: Nutrition  Goal: Nutrient intake appropriate for maintaining nutritional needs  3/8/2025 1836 by Aleida Wayne RN  Outcome: Progressing  3/8/2025 1836 by Aleida Wayne RN  Outcome: Progressing     Problem: Fall/Injury  Goal: Not fall by end of shift  3/8/2025 1836 by Aleida Wayne RN  Outcome: Progressing  3/8/2025 1836 by Aleida Wayne RN  Outcome: Progressing  Goal: Be free from injury by end of the shift  3/8/2025 1836 by Aleida Wayne RN  Outcome: Progressing  3/8/2025 1836 by Aleida Wayne RN  Outcome: Progressing  Goal: Verbalize understanding of personal risk factors for fall in the hospital  3/8/2025 1836 by Aledia Wayne RN  Outcome: Progressing  3/8/2025 1836 by Aleida Wayne RN  Outcome: Progressing  Goal: Verbalize understanding of risk factor reduction measures to prevent injury from fall in the home  3/8/2025 1836 by Aleida Wayne RN  Outcome: Progressing  3/8/2025 1836 by Aleida Wayne RN  Outcome: Progressing  Goal: Use assistive devices by end of the shift  3/8/2025 1836 by Aleida Wayne RN  Outcome: Progressing  3/8/2025 1836 by Aleida Wayne RN  Outcome: Progressing  Goal: Pace activities to prevent fatigue by end of the shift  3/8/2025 1836 by Aleida Wayne RN  Outcome: Progressing  3/8/2025 1836 by Aleida Wayne RN  Outcome: Progressing

## 2025-03-08 NOTE — CARE PLAN
The patient's goals for the shift include      The clinical goals for the shift include Pt will remain HDS this shift    Goals progressing, safety maintained. Pandya cath to continuous drain, clear yellow urine.

## 2025-03-09 VITALS
RESPIRATION RATE: 20 BRPM | TEMPERATURE: 97.2 F | HEIGHT: 70 IN | DIASTOLIC BLOOD PRESSURE: 67 MMHG | SYSTOLIC BLOOD PRESSURE: 111 MMHG | OXYGEN SATURATION: 96 % | WEIGHT: 175.04 LBS | BODY MASS INDEX: 25.06 KG/M2 | HEART RATE: 80 BPM

## 2025-03-09 LAB
ANION GAP SERPL CALC-SCNC: 9 MMOL/L (ref 10–20)
BUN SERPL-MCNC: 16 MG/DL (ref 6–23)
CALCIUM SERPL-MCNC: 8.4 MG/DL (ref 8.6–10.3)
CHLORIDE SERPL-SCNC: 104 MMOL/L (ref 98–107)
CO2 SERPL-SCNC: 29 MMOL/L (ref 21–32)
CREAT SERPL-MCNC: 0.94 MG/DL (ref 0.5–1.3)
EGFRCR SERPLBLD CKD-EPI 2021: 78 ML/MIN/1.73M*2
ERYTHROCYTE [DISTWIDTH] IN BLOOD BY AUTOMATED COUNT: 17 % (ref 11.5–14.5)
GLUCOSE SERPL-MCNC: 78 MG/DL (ref 74–99)
HCT VFR BLD AUTO: 30.7 % (ref 41–52)
HGB BLD-MCNC: 9.7 G/DL (ref 13.5–17.5)
MAGNESIUM SERPL-MCNC: 1.82 MG/DL (ref 1.6–2.4)
MCH RBC QN AUTO: 28.3 PG (ref 26–34)
MCHC RBC AUTO-ENTMCNC: 31.6 G/DL (ref 32–36)
MCV RBC AUTO: 90 FL (ref 80–100)
NRBC BLD-RTO: 0 /100 WBCS (ref 0–0)
PLATELET # BLD AUTO: 115 X10*3/UL (ref 150–450)
POTASSIUM SERPL-SCNC: 3.9 MMOL/L (ref 3.5–5.3)
RBC # BLD AUTO: 3.43 X10*6/UL (ref 4.5–5.9)
SODIUM SERPL-SCNC: 138 MMOL/L (ref 136–145)
WBC # BLD AUTO: 7.4 X10*3/UL (ref 4.4–11.3)

## 2025-03-09 PROCEDURE — 1200000002 HC GENERAL ROOM WITH TELEMETRY DAILY

## 2025-03-09 PROCEDURE — 36415 COLL VENOUS BLD VENIPUNCTURE: CPT | Performed by: INTERNAL MEDICINE

## 2025-03-09 PROCEDURE — 85027 COMPLETE CBC AUTOMATED: CPT | Performed by: INTERNAL MEDICINE

## 2025-03-09 PROCEDURE — 2500000002 HC RX 250 W HCPCS SELF ADMINISTERED DRUGS (ALT 637 FOR MEDICARE OP, ALT 636 FOR OP/ED): Performed by: INTERNAL MEDICINE

## 2025-03-09 PROCEDURE — 2500000001 HC RX 250 WO HCPCS SELF ADMINISTERED DRUGS (ALT 637 FOR MEDICARE OP): Performed by: INTERNAL MEDICINE

## 2025-03-09 PROCEDURE — 2500000001 HC RX 250 WO HCPCS SELF ADMINISTERED DRUGS (ALT 637 FOR MEDICARE OP)

## 2025-03-09 PROCEDURE — 83735 ASSAY OF MAGNESIUM: CPT | Performed by: INTERNAL MEDICINE

## 2025-03-09 PROCEDURE — 2500000001 HC RX 250 WO HCPCS SELF ADMINISTERED DRUGS (ALT 637 FOR MEDICARE OP): Performed by: STUDENT IN AN ORGANIZED HEALTH CARE EDUCATION/TRAINING PROGRAM

## 2025-03-09 PROCEDURE — 99232 SBSQ HOSP IP/OBS MODERATE 35: CPT | Performed by: INTERNAL MEDICINE

## 2025-03-09 PROCEDURE — 80048 BASIC METABOLIC PNL TOTAL CA: CPT | Performed by: INTERNAL MEDICINE

## 2025-03-09 PROCEDURE — 2500000004 HC RX 250 GENERAL PHARMACY W/ HCPCS (ALT 636 FOR OP/ED): Performed by: INTERNAL MEDICINE

## 2025-03-09 RX ORDER — FAMOTIDINE 20 MG/1
40 TABLET, FILM COATED ORAL ONCE
Status: COMPLETED | OUTPATIENT
Start: 2025-03-09 | End: 2025-03-09

## 2025-03-09 RX ADMIN — Medication 1 TABLET: at 09:48

## 2025-03-09 RX ADMIN — FINASTERIDE 5 MG: 5 TABLET, FILM COATED ORAL at 09:48

## 2025-03-09 RX ADMIN — ALPRAZOLAM 0.5 MG: 0.5 TABLET ORAL at 04:24

## 2025-03-09 RX ADMIN — LEVETIRACETAM 500 MG: 500 TABLET, FILM COATED ORAL at 09:48

## 2025-03-09 RX ADMIN — METOPROLOL SUCCINATE 25 MG: 25 TABLET, EXTENDED RELEASE ORAL at 21:07

## 2025-03-09 RX ADMIN — SERTRALINE HYDROCHLORIDE 100 MG: 100 TABLET ORAL at 21:00

## 2025-03-09 RX ADMIN — ATORVASTATIN CALCIUM 20 MG: 20 TABLET, FILM COATED ORAL at 09:48

## 2025-03-09 RX ADMIN — CHOLECALCIFEROL TAB 125 MCG (5000 UNIT) 5000 UNITS: 125 TAB at 09:49

## 2025-03-09 RX ADMIN — METOPROLOL SUCCINATE 25 MG: 25 TABLET, EXTENDED RELEASE ORAL at 09:49

## 2025-03-09 RX ADMIN — ALPRAZOLAM 0.5 MG: 0.5 TABLET ORAL at 22:40

## 2025-03-09 RX ADMIN — MIRTAZAPINE 15 MG: 15 TABLET, FILM COATED ORAL at 21:07

## 2025-03-09 RX ADMIN — FAMOTIDINE 40 MG: 20 TABLET, FILM COATED ORAL at 21:58

## 2025-03-09 RX ADMIN — CEFTRIAXONE SODIUM 1 G: 1 INJECTION, SOLUTION INTRAVENOUS at 18:34

## 2025-03-09 ASSESSMENT — COGNITIVE AND FUNCTIONAL STATUS - GENERAL
DAILY ACTIVITIY SCORE: 20
STANDING UP FROM CHAIR USING ARMS: A LITTLE
WALKING IN HOSPITAL ROOM: A LITTLE
WALKING IN HOSPITAL ROOM: A LITTLE
DRESSING REGULAR LOWER BODY CLOTHING: A LITTLE
CLIMB 3 TO 5 STEPS WITH RAILING: A LITTLE
MOBILITY SCORE: 19
STANDING UP FROM CHAIR USING ARMS: A LITTLE
DAILY ACTIVITIY SCORE: 20
TURNING FROM BACK TO SIDE WHILE IN FLAT BAD: A LITTLE
TURNING FROM BACK TO SIDE WHILE IN FLAT BAD: A LITTLE
TOILETING: A LITTLE
DRESSING REGULAR LOWER BODY CLOTHING: A LITTLE
HELP NEEDED FOR BATHING: A LITTLE
MOBILITY SCORE: 19
MOVING TO AND FROM BED TO CHAIR: A LITTLE
DRESSING REGULAR UPPER BODY CLOTHING: A LITTLE
MOVING TO AND FROM BED TO CHAIR: A LITTLE
TOILETING: A LITTLE
DRESSING REGULAR UPPER BODY CLOTHING: A LITTLE
CLIMB 3 TO 5 STEPS WITH RAILING: A LITTLE
HELP NEEDED FOR BATHING: A LITTLE

## 2025-03-09 ASSESSMENT — PAIN SCALES - GENERAL
PAINLEVEL_OUTOF10: 0 - NO PAIN
PAINLEVEL_OUTOF10: 0 - NO PAIN

## 2025-03-09 NOTE — PROGRESS NOTES
Minh Harrington Jr. is a 85 y.o. male on day 8 of admission presenting with Hematuria.    Subjective   No acute events overnight, blood pressure improved overall, feeling better overall again; still  has the Pandya catheter in place, there are no signs of bleeding, he has remained off of his Eliquis; no fevers or chills, has been off of bladder irrigation since 2 days prior.    Objective     Last Recorded Vitals  BP (!) 92/44   Pulse 85   Temp 37 °C (98.6 °F)   Resp 18   Wt 79.4 kg (175 lb 0.7 oz)   SpO2 96%   Intake/Output last 3 Shifts:    Intake/Output Summary (Last 24 hours) at 3/9/2025 1318  Last data filed at 3/9/2025 0600  Gross per 24 hour   Intake 480 ml   Output 2700 ml   Net -2220 ml     Admission Weight  Weight: 80.7 kg (178 lb) (02/28/25 0946)    Daily Weight  03/07/25 : 79.4 kg (175 lb 0.7 oz)    Physical Exam:  General: Not in acute distress, alert.  Not on oxygen  HEENT: head intact and normocephalic  Neck: Normal to inspection  Lungs: Lungs are clear to auscultation without rales or wheezing  Cardiac: Irregularly irregular rates in 90s at time of examination today  Abdomen: Soft nontender, positive bowel sounds  : Pandya in place, CBI has been stopped  Skin: Intact  Hematology: No petechia or excessive ecchymosis  Musculoskeletal: Without significant trauma  Neurological: Alert awake oriented, no focal deficit, cranial nerves grossly intact  Psych: Pleasant mood with congruent affect    Relevant Results  Scheduled medications  atorvastatin, 20 mg, oral, Daily  B complex-vitamin C, 1 tablet, oral, Daily  cefTRIAXone, 1 g, intravenous, q24h  cholecalciferol, 5,000 Units, oral, Daily  [Held by provider] empagliflozin, 10 mg, oral, Daily  finasteride, 5 mg, oral, Daily  levETIRAcetam, 500 mg, oral, Daily  metoprolol succinate XL, 25 mg, oral, BID  mirtazapine, 15 mg, oral, Nightly  [Held by provider] sacubitriL-valsartan, 1 tablet, oral, BID  sertraline, 100 mg, oral, Daily  [Held by provider]  spironolactone, 25 mg, oral, Daily      Continuous medications     PRN medications  PRN medications: acetaminophen **OR** acetaminophen **OR** acetaminophen, ALPRAZolam, benzocaine-menthol, guaiFENesin, ondansetron **OR** ondansetron, polyethylene glycol   Labs  Results from last 7 days   Lab Units 03/09/25  0648 03/08/25  0538 03/07/25  0328   WBC AUTO x10*3/uL 7.4 9.4 8.9   HEMOGLOBIN g/dL 9.7* 9.4* 9.2*   HEMATOCRIT % 30.7* 30.3* 29.4*   PLATELETS AUTO x10*3/uL 115* 98* 90*     Results from last 7 days   Lab Units 03/09/25  0648 03/08/25  0538 03/07/25  0328   SODIUM mmol/L 138 136 134*   POTASSIUM mmol/L 3.9 4.2 4.3   CHLORIDE mmol/L 104 106 106   CO2 mmol/L 29 23 22   BUN mg/dL 16 19 21   CREATININE mg/dL 0.94 1.06 1.22   CALCIUM mg/dL 8.4* 8.2* 7.8*   GLUCOSE mg/dL 78 87 87       CT abdomen pelvis w IV contrast    Result Date: 2/28/2025  Interpreted By:  Flaquita Redd, STUDY: CT ABDOMEN PELVIS W IV CONTRAST;  2/28/2025 2:41 pm   INDICATION: Signs/Symptoms:painless hematuria.     COMPARISON: CT urography 08/07/2024 CT abdomen pelvis 08/05/2024   ACCESSION NUMBER(S): [id number]   ORDERING CLINICIAN: FRANK SHEEHAN   TECHNIQUE: CT of the abdomen and pelvis was performed.  Standard contiguous axial images were obtained at 3 mm slice thickness through the abdomen and pelvis. Coronal and sagittal reconstructions at 3 mm slice thickness were performed.  75 ML of Omnipaque 350 was administered intravenously without immediate complication.   FINDINGS: LOWER CHEST: Unchanged cardiomegaly without evidence of pericardial effusion. Severe coronary artery calcifications versus stent. Stable small right and trace left pleural effusions. Right right-greater-than-left lower lobe dependent ground-glass opacities. Similar smooth interlobular septal thickening is seen, right-greater-than-left, compatible with interstitial edema. The visualized distal esophagus appears unremarkable. Status post median sternotomy.   ABDOMEN:    LIVER: The liver is normal in size. Mottled appearance of the liver, suspicious for hepatic venous congestion in the setting of cardiomegaly. No suspicious liver lesion.   BILE DUCTS: No biliary duct dilatation.   GALLBLADDER: The gallbladder is nondistended and without evidence of radiopaque stones.   PANCREAS: The pancreas appears unremarkable without evidence of ductal dilatation or masses.   SPLEEN: The spleen is normal in size.   ADRENAL GLANDS: No adrenal nodularity or thickening.   KIDNEYS AND URETERS: The kidneys are normal in size. Stable hypoattenuating lesions in the bilateral kidneys, likely benign. Stable punctate nonobstructing calculus in the left kidney. No hydroureteronephrosis or right nephroureterolithiasis is identified.   PELVIS:   BLADDER: Interval worsening of masslike bladder wall thickening, most predominant posteriorly and along the right lateral wall.   REPRODUCTIVE ORGANS: Interval increase in prostatomegaly and heterogeneity of the prostate, measuring 52 mm in the transaxial dimension, previously 48 mm.   BOWEL: The stomach is unremarkable. The small and large bowel are normal in caliber and demonstrate no wall thickening. The appendix is not definitely visualized. There is however no pericecal stranding or fluid. Colonic diverticulosis without acute diverticulitis.   VESSELS: Severe atherosclerotic calcifications of the aortoiliac arteries. The IVC appears normal.   PERITONEUM/RETROPERITONEUM/LYMPH NODES: No ascites or fluid collection. No measurable peritoneal nodular thickening or deposits. Stable subcentimeter para-aortic and bilateral iliac chain, which are nonenlarged by size criteria, and bears watching on future follow-up evaluations, for example a 8 mm left common iliac lymph node on series 2, image 112 measured in the short axis.   ABDOMINAL WALL: The abdominal wall soft tissues appear normal.   BONES: No acute fracture. No suspicious osseous lesions. Discogenic degenerative  changes in the lower thoracic and lumbar spine.       1. Interval worsening of masslike bladder wall thickening, most predominant posteriorly and along the right lateral wall. Findings are suspicious for underlying neoplasm, correlation with cystoscopy and tissue diagnosis is recommended. 2. Interval increase in size and heterogeneity of the prostate, which direct tumor invasion is not excluded. 3. Stable subcentimeter para-aortic and bilateral iliac chain. Though nonenlarged by size criteria, this area bears watching on future follow-up evaluations to evaluate for shanon metastasis. 4. Similar cardiomegaly with interstitial edema and small bilateral pleural effusions, right-greater-than-left. 5. Mottled appearance of the liver, suspicious for hepatic venous congestion in the setting of cardiomegaly. 6. Additional chronic and incidental findings as detailed above.     MACRO: None   Signed by: Flaquita Redd 2/28/2025 3:11 PM Dictation workstation:   [id number]                    Assessment/Plan   85-year-old male with past medical history of CAD status post CABG, atrial fibrillation on Eliquis, chronic systolic heart failure, CKD stage III, thrombocytopenia, BPH status post TURP in 2008 presented due to gross hematuria and was found to have worsening of bladder mass and required initiation of CBI and holding anticoagulation, CBI has stopped, Pandya remains in place.  Assessment & Plan    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  Status post procedure and tumor resection by Dr. Washington on 3/5/2025  Received a total of 4 units of pRBC, Hgb stable again today  Hypotension is improved /stable, overall despite metoprolol being resumed  Will continue to hold anticoagulation  Continue with antibiotics with ceftriaxone, UA unremarkable   Cardiology consultation is noted as well, appreciate the recommendations to have the metoprolol resumed as mentioned above, still holding Entresto, Jardiance and spironolactone  Hold  anticoagulation and outpatient referral for Watchman device placement    Acute blood loss anemia  Acute blood loss anemia from hematuria from bladder mass  status post 4 units of packed RBC  Stable for RMF unit today    Hypotension  Improved today overall again, still holding the above-noted medications including Jardiance, Entresto and spironolactone  Likely secondary to acute blood loss anemia  No signs and symptoms of systemic illness    Atrial fibrillation on Eliquis  Patient's anticoagulation held  Cardiology consultation is noted  To consider Watchman device as outpatient as outpatient and cardiology will refer patient  Patient follows up with Dr. Valle    HFrEF  Does not appear to be decompensated    CKD stage III  Creatinine stable  Continue to monitor    Hyperlipidemia  Continue atorvastatin    Anxiety disorder  Continue with Xanax as needed    DVT prophylaxis  SCDs    Plan discussed with patient and wife at bedside today    High Level of MDM based on above issue and discussing plan    Gumaro Garvin MD  [facility]  Internal Medicine    This document was generated in whole or in part using the Dragon One medical voice recognition software and there may be some incorrect words/wording, spelling, or punctuation errors that were not corrected prior to finalization in the medical record.

## 2025-03-09 NOTE — CARE PLAN
The patient's goals for the shift include      The clinical goals for the shift include will remain hemodynamically stable, free of falls throughout shift      Problem: Skin  Goal: Prevent/manage excess moisture  Outcome: Progressing  Goal: Prevent/minimize sheer/friction injuries  Outcome: Progressing  Goal: Promote/optimize nutrition  Outcome: Progressing  Goal: Decreased wound size/increased tissue granulation at next dressing change  Outcome: Progressing  Goal: Participates in plan/prevention/treatment measures  Outcome: Progressing  Goal: Promote skin healing  Outcome: Progressing     Problem: Pain - Adult  Goal: Verbalizes/displays adequate comfort level or baseline comfort level  Outcome: Progressing     Problem: Safety - Adult  Goal: Free from fall injury  Outcome: Progressing     Problem: Discharge Planning  Goal: Discharge to home or other facility with appropriate resources  Outcome: Progressing     Problem: Chronic Conditions and Co-morbidities  Goal: Patient's chronic conditions and co-morbidity symptoms are monitored and maintained or improved  Outcome: Progressing     Problem: Nutrition  Goal: Nutrient intake appropriate for maintaining nutritional needs  Outcome: Progressing     Problem: Fall/Injury  Goal: Not fall by end of shift  Outcome: Progressing  Goal: Be free from injury by end of the shift  Outcome: Progressing  Goal: Verbalize understanding of personal risk factors for fall in the hospital  Outcome: Progressing  Goal: Verbalize understanding of risk factor reduction measures to prevent injury from fall in the home  Outcome: Progressing  Goal: Use assistive devices by end of the shift  Outcome: Progressing  Goal: Pace activities to prevent fatigue by end of the shift  Outcome: Progressing

## 2025-03-09 NOTE — CARE PLAN
The patient's goals for the shift include      The clinical goals for the shift include pt to remain HDS    Over the shift, the patient did make progress toward his goals

## 2025-03-10 VITALS
TEMPERATURE: 97.2 F | WEIGHT: 175.04 LBS | HEIGHT: 70 IN | RESPIRATION RATE: 16 BRPM | BODY MASS INDEX: 25.06 KG/M2 | HEART RATE: 77 BPM | DIASTOLIC BLOOD PRESSURE: 58 MMHG | OXYGEN SATURATION: 100 % | SYSTOLIC BLOOD PRESSURE: 121 MMHG

## 2025-03-10 LAB
ANION GAP SERPL CALC-SCNC: 9 MMOL/L (ref 10–20)
BUN SERPL-MCNC: 18 MG/DL (ref 6–23)
CALCIUM SERPL-MCNC: 8.6 MG/DL (ref 8.6–10.3)
CHLORIDE SERPL-SCNC: 102 MMOL/L (ref 98–107)
CO2 SERPL-SCNC: 30 MMOL/L (ref 21–32)
CREAT SERPL-MCNC: 1.04 MG/DL (ref 0.5–1.3)
EGFRCR SERPLBLD CKD-EPI 2021: 69 ML/MIN/1.73M*2
ERYTHROCYTE [DISTWIDTH] IN BLOOD BY AUTOMATED COUNT: 16.7 % (ref 11.5–14.5)
GLUCOSE SERPL-MCNC: 87 MG/DL (ref 74–99)
HCT VFR BLD AUTO: 34.2 % (ref 41–52)
HGB BLD-MCNC: 10.6 G/DL (ref 13.5–17.5)
MAGNESIUM SERPL-MCNC: 1.89 MG/DL (ref 1.6–2.4)
MCH RBC QN AUTO: 28 PG (ref 26–34)
MCHC RBC AUTO-ENTMCNC: 31 G/DL (ref 32–36)
MCV RBC AUTO: 90 FL (ref 80–100)
NRBC BLD-RTO: 0 /100 WBCS (ref 0–0)
PLATELET # BLD AUTO: 138 X10*3/UL (ref 150–450)
POTASSIUM SERPL-SCNC: 4.4 MMOL/L (ref 3.5–5.3)
RBC # BLD AUTO: 3.79 X10*6/UL (ref 4.5–5.9)
SODIUM SERPL-SCNC: 137 MMOL/L (ref 136–145)
WBC # BLD AUTO: 6.9 X10*3/UL (ref 4.4–11.3)

## 2025-03-10 PROCEDURE — 83735 ASSAY OF MAGNESIUM: CPT | Performed by: INTERNAL MEDICINE

## 2025-03-10 PROCEDURE — 2500000001 HC RX 250 WO HCPCS SELF ADMINISTERED DRUGS (ALT 637 FOR MEDICARE OP): Performed by: STUDENT IN AN ORGANIZED HEALTH CARE EDUCATION/TRAINING PROGRAM

## 2025-03-10 PROCEDURE — 2500000001 HC RX 250 WO HCPCS SELF ADMINISTERED DRUGS (ALT 637 FOR MEDICARE OP): Performed by: INTERNAL MEDICINE

## 2025-03-10 PROCEDURE — 36415 COLL VENOUS BLD VENIPUNCTURE: CPT | Performed by: INTERNAL MEDICINE

## 2025-03-10 PROCEDURE — 99231 SBSQ HOSP IP/OBS SF/LOW 25: CPT | Performed by: NURSE PRACTITIONER

## 2025-03-10 PROCEDURE — 85027 COMPLETE CBC AUTOMATED: CPT | Performed by: INTERNAL MEDICINE

## 2025-03-10 PROCEDURE — 2500000005 HC RX 250 GENERAL PHARMACY W/O HCPCS: Performed by: INTERNAL MEDICINE

## 2025-03-10 PROCEDURE — 99239 HOSP IP/OBS DSCHRG MGMT >30: CPT | Performed by: INTERNAL MEDICINE

## 2025-03-10 PROCEDURE — 80048 BASIC METABOLIC PNL TOTAL CA: CPT | Performed by: INTERNAL MEDICINE

## 2025-03-10 RX ADMIN — METOPROLOL SUCCINATE 25 MG: 25 TABLET, EXTENDED RELEASE ORAL at 09:39

## 2025-03-10 RX ADMIN — ATORVASTATIN CALCIUM 20 MG: 20 TABLET, FILM COATED ORAL at 09:40

## 2025-03-10 RX ADMIN — LEVETIRACETAM 500 MG: 500 TABLET, FILM COATED ORAL at 09:40

## 2025-03-10 RX ADMIN — CHOLECALCIFEROL TAB 125 MCG (5000 UNIT) 5000 UNITS: 125 TAB at 09:40

## 2025-03-10 RX ADMIN — SODIUM PHOSPHATE, DIBASIC AND SODIUM PHOSPHATE, MONOBASIC 1 ENEMA: 7; 19 ENEMA RECTAL at 15:31

## 2025-03-10 RX ADMIN — Medication 1 TABLET: at 09:40

## 2025-03-10 RX ADMIN — FINASTERIDE 5 MG: 5 TABLET, FILM COATED ORAL at 09:40

## 2025-03-10 ASSESSMENT — PAIN SCALES - GENERAL
PAINLEVEL_OUTOF10: 0 - NO PAIN
PAINLEVEL_OUTOF10: 0 - NO PAIN

## 2025-03-10 NOTE — DISCHARGE SUMMARY
Discharge Diagnosis  Hematuria    Issues Requiring Follow-Up  It was a pleasure to meet you in the hospital  You were diagnosed as having a bladder mass  You were taken by the urology service, Dr. Washington, for cystoscopy to illuminate the large clot and a large mass was found that was resected  The pathology on this mass is still pending  You will need to follow-up with Dr. Hoffman in the office, there is a scheduled appointment in several weeks, please call to confirm the time  Because of the low blood pressure caused by losing blood, having an infection related to losing blood, as well as being more sedentary while you are in the hospital, your blood pressure medications as well as some of your heart medications were held  You have been able to be restarted on your home metoprolol medication  We are still holding your medications called Entresto as well as spironolactone  You and your wife mentioned that you are familiar with checking your blood pressure at home and have a validated blood pressure machine  Please continue to hold the Entresto until your systolic blood pressure, or the top blood pressure number, is above 120 mmHg  Please continue to hold the spironolactone medication until you follow-up with your Cardiologist, Dr. Valle  You should continue holding your Eliquis for an additional 7 days, please do not resume taking it prior to 3/17/2025, although you may also opt to continue holding this until you see your outpatient cardiologist as well; we did discuss an atrial appendage closure device, brand name of this an example is called the Watchman device, and you may ultimately want to trial being back on your Eliquis for a time to make sure you can tolerate it as getting the atrial appendage device implanted would require you to be back on an anticoagulation for some time while the device heals in place    Test Results Pending At Discharge  Pending Labs       Order Current Status    Surgical  Pathology Exam In process          Hospital Course  This is a very pleasant 85-year-old male with past medical history of CAD status post CABG, atrial fibrillation on Eliquis, chronic systolic heart failure, CKD stage III, thrombocytopenia, BPH status post TURP in 2008 presented due to gross hematuria and was found to have worsening of bladder mass and required initiation of CBI and holding anticoagulation, CBI stopped, Pandya removed; patient had reintroduction of his home metoprolol, he is blood pressure was uptrending, although still in the 90 to 110 mmHg systolic, and his home Entresto as well as spironolactone were still on hold; he was provided with the above-mentioned instructions about resuming the Entresto based on the parameters noted above.  He was also told not to resume his Eliquis until at earliest 3/17/2025; he will follow-up with his primary care physician, his cardiologist as well as his primary care physician as instructed above.    Greater than 30 minutes was spent facilitating this patients discharge from the hospital which included examining the patient, reconciling medications, and making arrangements for future care.    Gumaro Garvin MD  [facility] [place]  Internal Medicine    This document was generated in whole or in part using the Dragon One medical voice recognition software and there may be some incorrect words/wording, spelling, or punctuation errors that were not corrected prior to finalization in the medical record.    Pertinent Physical Exam At Time of Discharge  Physical Exam  General: Not in acute distress, alert.  Not on oxygen  HEENT: head intact and normocephalic  Neck: Normal to inspection  Lungs: Lungs are clear to auscultation without rales or wheezing  Cardiac: Irregularly irregular rates in 90s at time of examination today  Abdomen: Soft nontender, positive bowel sounds  : Pandya has been removed  Skin: Intact  Hematology: No petechia or excessive  ecchymosis  Musculoskeletal: Without significant trauma  Neurological: Alert awake oriented, no focal deficit, cranial nerves grossly intact  Psych: Pleasant mood with congruent affect    Home Medications     Medication List      ASK your doctor about these medications     ALPRAZolam 0.5 mg tablet; Commonly known as: Xanax; Take 1 tablet (0.5   mg) by mouth 3 times a day as needed for anxiety.   atorvastatin 20 mg tablet; Commonly known as: Lipitor; TAKE 1 TABLET BY   MOUTH ONCE DAILY AS DIRECTED   b complex vitamins capsule   cholecalciferol 125 mcg (5000 UT) capsule; Commonly known as: Vitamin   D-3   dutasteride 0.5 mg capsule; Commonly known as: Avodart; TAKE 1 CAPSULE   BY MOUTH ONCE DAILY   Eliquis 5 mg tablet; Generic drug: apixaban; TAKE 1 TABLET BY MOUTH   TWICE DAILY   furosemide 20 mg tablet; Commonly known as: Lasix; Take one daily ,if   weight gain of 3 lbs in one day or 5 lbs in one week take another tablet   that day until weight is back to normal   Jardiance 10 mg; Generic drug: empagliflozin; TAKE 1 TABLET BY MOUTH   ONCE DAILY   levETIRAcetam 500 mg tablet; Commonly known as: Keppra; TAKE 1/2   (ONE-HALF) OF A TABLET BY MOUTH DAILY   metoprolol succinate XL 25 mg 24 hr tablet; Commonly known as:   Toprol-XL; TAKE 1 TABLET BY MOUTH TWICE DAILY   mirtazapine 15 mg tablet; Commonly known as: Remeron; TAKE 1 TABLET BY   MOUTH AT BEDTIME   sacubitriL-valsartan  mg tablet; Commonly known as: Entresto; Take   1 tablet by mouth 2 times a day.   sertraline 100 mg tablet; Commonly known as: Zoloft; Take 1 tablet (100   mg) by mouth once daily.   spironolactone 25 mg tablet; Commonly known as: Aldactone; Take 1 tablet   (25 mg) by mouth once daily.       Outpatient Follow-Up  Future Appointments   Date Time Provider Department Center   3/31/2025  2:50 PM Anthony Washington MD [id number] [place]   5/29/2025  3:00 PM Heriberto Valle MD [id number] [place]   6/4/2025  2:30 PM Isabel Ritter DO  [id number] [place]   1/8/2026  1:00 PM Nuha Oneill, APRN-CNP [id number] [place]       Gumaro Garvin MD

## 2025-03-10 NOTE — NURSING NOTE
Discharged home at this time. Iv and telemetry removed. Plan is to follow up with pcp, cardiology and urology as outpatient

## 2025-03-10 NOTE — CARE PLAN
The patient's goals for the shift include      The clinical goals for the shift include REMAIN HEMODYNAMICALLY STABLE AND URINE REMAINS FREE OF BLOOD.    Problem: Skin  Goal: Prevent/manage excess moisture  Outcome: Progressing  Goal: Prevent/minimize sheer/friction injuries  Outcome: Progressing  Goal: Promote/optimize nutrition  Outcome: Progressing  Goal: Participates in plan/prevention/treatment measures  Outcome: Progressing     Problem: Pain - Adult  Goal: Verbalizes/displays adequate comfort level or baseline comfort level  Outcome: Progressing     Problem: Safety - Adult  Goal: Free from fall injury  Outcome: Progressing     Problem: Nutrition  Goal: Nutrient intake appropriate for maintaining nutritional needs  Outcome: Progressing     Problem: Fall/Injury  Goal: Not fall by end of shift  Outcome: Progressing  Goal: Be free from injury by end of the shift  Outcome: Progressing  Goal: Verbalize understanding of personal risk factors for fall in the hospital  Outcome: Progressing  Goal: Verbalize understanding of risk factor reduction measures to prevent injury from fall in the home  Outcome: Progressing  Goal: Use assistive devices by end of the shift  Outcome: Progressing

## 2025-03-10 NOTE — CARE PLAN
The patient's goals for the shift include be able to urinate post barbosa removal    The clinical goals for the shift include pt will remain afebrile through end of shift

## 2025-03-10 NOTE — DISCHARGE INSTRUCTIONS
It was a pleasure to meet you in the hospital  You were diagnosed as having a bladder mass  You were taken by the urology service, Dr. Washington, for cystoscopy to illuminate the large clot and a large mass was found that was resected  The pathology on this mass is still pending  You will need to follow-up with Dr. Hoffman in the office, there is a scheduled appointment in several weeks, please call to confirm the time  Because of the low blood pressure caused by losing blood, having an infection related to losing blood, as well as being more sedentary while you are in the hospital, your blood pressure medications as well as some of your heart medications were held  You have been able to be restarted on your home metoprolol medication  We are still holding your medications called Entresto as well as spironolactone  You and your wife mentioned that you are familiar with checking your blood pressure at home and have a validated blood pressure machine  Please continue to hold the Entresto until your systolic blood pressure, or the top blood pressure number, is above 120 mmHg  Please continue to hold the spironolactone medication until you follow-up with your Cardiologist, Dr. Valle  You should continue holding your Eliquis for an additional 7 days, please do not resume taking it prior to 3/17/2025, although you may also opt to continue holding this until you see your outpatient cardiologist as well; we did discuss an atrial appendage closure device, brand name of this an example is called the Watchman device, and you may ultimately want to trial being back on your Eliquis for a time to make sure you can tolerate it as getting the atrial appendage device implanted would require you to be back on an anticoagulation for some time while the device heals in place

## 2025-03-10 NOTE — PROGRESS NOTES
Minh ORR Lishachelocuca . 85 y.o. male    Subjective  Patient seen and examined this morning.  Barbosa catheter draining yellow urine.  Denies any suprapubic pain.  CBI has remains off since last Thursday.  With no new complaints.       Objective  PHYSICAL EXAM:  Physical Exam  Vitals reviewed.   Constitutional:       General: He is awake.      Appearance: Normal appearance.   Cardiovascular:      Rate and Rhythm: Normal rate and regular rhythm.      Pulses: Normal pulses.      Heart sounds: Normal heart sounds.   Pulmonary:      Effort: Pulmonary effort is normal.      Breath sounds: Normal breath sounds and air entry.   Abdominal:      General: Abdomen is flat. There is no distension.      Palpations: Abdomen is soft.      Tenderness: There is no abdominal tenderness. There is no right CVA tenderness or left CVA tenderness.   Genitourinary:     Comments: 3 way barbosa catheter draining yellow urine   Musculoskeletal:         General: Normal range of motion.      Cervical back: Normal range of motion.   Skin:     General: Skin is warm and dry.   Neurological:      General: No focal deficit present.      Mental Status: He is alert and oriented to person, place, and time.   Psychiatric:         Behavior: Behavior is cooperative.         Vital signs in last 24 hours:  Vitals:    03/10/25 0400   BP: 111/53   Pulse: 81   Resp: 18   Temp: 35.6 °C (96.1 °F)   SpO2: 99%         Intake/Output this shift:    Intake/Output Summary (Last 24 hours) at 3/10/2025 0946  Last data filed at 3/10/2025 0530  Gross per 24 hour   Intake 350 ml   Output 2875 ml   Net -2525 ml        Allergies:  No Known Allergies     Medications:  Scheduled medications  atorvastatin, 20 mg, oral, Daily  B complex-vitamin C, 1 tablet, oral, Daily  cefTRIAXone, 1 g, intravenous, q24h  cholecalciferol, 5,000 Units, oral, Daily  [Held by provider] empagliflozin, 10 mg, oral, Daily  finasteride, 5 mg, oral, Daily  levETIRAcetam, 500 mg, oral, Daily  metoprolol  succinate XL, 25 mg, oral, BID  mirtazapine, 15 mg, oral, Nightly  [Held by provider] sacubitriL-valsartan, 1 tablet, oral, BID  sertraline, 100 mg, oral, Daily  [Held by provider] spironolactone, 25 mg, oral, Daily      Continuous medications     PRN medications  PRN medications: acetaminophen **OR** acetaminophen **OR** acetaminophen, ALPRAZolam, benzocaine-menthol, guaiFENesin, ondansetron **OR** ondansetron, polyethylene glycol       Labs:  Results for orders placed or performed during the hospital encounter of 02/28/25 (from the past 24 hours)   CBC   Result Value Ref Range    WBC 6.9 4.4 - 11.3 x10*3/uL    nRBC 0.0 0.0 - 0.0 /100 WBCs    RBC 3.79 (L) 4.50 - 5.90 x10*6/uL    Hemoglobin 10.6 (L) 13.5 - 17.5 g/dL    Hematocrit 34.2 (L) 41.0 - 52.0 %    MCV 90 80 - 100 fL    MCH 28.0 26.0 - 34.0 pg    MCHC 31.0 (L) 32.0 - 36.0 g/dL    RDW 16.7 (H) 11.5 - 14.5 %    Platelets 138 (L) 150 - 450 x10*3/uL   Magnesium   Result Value Ref Range    Magnesium 1.89 1.60 - 2.40 mg/dL   Basic Metabolic Panel   Result Value Ref Range    Glucose 87 74 - 99 mg/dL    Sodium 137 136 - 145 mmol/L    Potassium 4.4 3.5 - 5.3 mmol/L    Chloride 102 98 - 107 mmol/L    Bicarbonate 30 21 - 32 mmol/L    Anion Gap 9 (L) 10 - 20 mmol/L    Urea Nitrogen 18 6 - 23 mg/dL    Creatinine 1.04 0.50 - 1.30 mg/dL    eGFR 69 >60 mL/min/1.73m*2    Calcium 8.6 8.6 - 10.3 mg/dL        Imaging:  ECG 12 Lead    Result Date: 3/4/2025  Atrial fibrillation Possible Inferior infarct , age undetermined ST & T wave abnormality, consider lateral ischemia or digitalis effect Abnormal ECG Confirmed by Curt Shine ([id number]) on 3/4/2025 7:06:56 PM    CT abdomen pelvis w IV contrast    Result Date: 2/28/2025  Interpreted By:  Flaquita Redd, STUDY: CT ABDOMEN PELVIS W IV CONTRAST;  2/28/2025 2:41 pm   INDICATION: Signs/Symptoms:painless hematuria.     COMPARISON: CT urography 08/07/2024 CT abdomen pelvis 08/05/2024   ACCESSION NUMBER(S): [id number]   ORDERING  CLINICIAN: FRANK SHEEHAN   TECHNIQUE: CT of the abdomen and pelvis was performed.  Standard contiguous axial images were obtained at 3 mm slice thickness through the abdomen and pelvis. Coronal and sagittal reconstructions at 3 mm slice thickness were performed.  75 ML of Omnipaque 350 was administered intravenously without immediate complication.   FINDINGS: LOWER CHEST: Unchanged cardiomegaly without evidence of pericardial effusion. Severe coronary artery calcifications versus stent. Stable small right and trace left pleural effusions. Right right-greater-than-left lower lobe dependent ground-glass opacities. Similar smooth interlobular septal thickening is seen, right-greater-than-left, compatible with interstitial edema. The visualized distal esophagus appears unremarkable. Status post median sternotomy.   ABDOMEN:   LIVER: The liver is normal in size. Mottled appearance of the liver, suspicious for hepatic venous congestion in the setting of cardiomegaly. No suspicious liver lesion.   BILE DUCTS: No biliary duct dilatation.   GALLBLADDER: The gallbladder is nondistended and without evidence of radiopaque stones.   PANCREAS: The pancreas appears unremarkable without evidence of ductal dilatation or masses.   SPLEEN: The spleen is normal in size.   ADRENAL GLANDS: No adrenal nodularity or thickening.   KIDNEYS AND URETERS: The kidneys are normal in size. Stable hypoattenuating lesions in the bilateral kidneys, likely benign. Stable punctate nonobstructing calculus in the left kidney. No hydroureteronephrosis or right nephroureterolithiasis is identified.   PELVIS:   BLADDER: Interval worsening of masslike bladder wall thickening, most predominant posteriorly and along the right lateral wall.   REPRODUCTIVE ORGANS: Interval increase in prostatomegaly and heterogeneity of the prostate, measuring 52 mm in the transaxial dimension, previously 48 mm.   BOWEL: The stomach is unremarkable. The small and large bowel  are normal in caliber and demonstrate no wall thickening. The appendix is not definitely visualized. There is however no pericecal stranding or fluid. Colonic diverticulosis without acute diverticulitis.   VESSELS: Severe atherosclerotic calcifications of the aortoiliac arteries. The IVC appears normal.   PERITONEUM/RETROPERITONEUM/LYMPH NODES: No ascites or fluid collection. No measurable peritoneal nodular thickening or deposits. Stable subcentimeter para-aortic and bilateral iliac chain, which are nonenlarged by size criteria, and bears watching on future follow-up evaluations, for example a 8 mm left common iliac lymph node on series 2, image 112 measured in the short axis.   ABDOMINAL WALL: The abdominal wall soft tissues appear normal.   BONES: No acute fracture. No suspicious osseous lesions. Discogenic degenerative changes in the lower thoracic and lumbar spine.       1. Interval worsening of masslike bladder wall thickening, most predominant posteriorly and along the right lateral wall. Findings are suspicious for underlying neoplasm, correlation with cystoscopy and tissue diagnosis is recommended. 2. Interval increase in size and heterogeneity of the prostate, which direct tumor invasion is not excluded. 3. Stable subcentimeter para-aortic and bilateral iliac chain. Though nonenlarged by size criteria, this area bears watching on future follow-up evaluations to evaluate for shanon metastasis. 4. Similar cardiomegaly with interstitial edema and small bilateral pleural effusions, right-greater-than-left. 5. Mottled appearance of the liver, suspicious for hepatic venous congestion in the setting of cardiomegaly. 6. Additional chronic and incidental findings as detailed above.     MACRO: None   Signed by: Flaquita Redd 2/28/2025 3:11 PM Dictation workstation:   [id number]           Plan  Hematuria     POD#5 - S/p cystoscopy with thrombus removal and TURBT     - Remove barbosa catheter. Obtain PVR after 1st  void  - Continue to hold eliquis x1 more week.   - Follow up as outpatient as scheduled.       Plan of care discussed Dr. Washington and urology will sign off.  Please call with any concerns.     MERCEDES Lund-CNP    I spent 20 minutes in the professional and overall care of this patient.

## 2025-03-10 NOTE — NURSING NOTE
PT. SLEPT FAIRLY, WAS MEDICATED W/ PEPCID 40MG X I FOR C/O ACID REFLUX, ALSO WAS MEDICATED W. XANAX 0.5MG PO FOR ANXIETY.

## 2025-03-10 NOTE — DOCUMENTATION CLARIFICATION NOTE
"    PATIENT:               EAMON CHINO  ACCT #:                  [id number]  MRN:                       [id number]  :                       1940  ADMIT DATE:       2025 12:03 PM  DISCH DATE:  RESPONDING PROVIDER #:        [id number]          PROVIDER RESPONSE TEXT:    Hemorrhagic Shock    CDI QUERY TEXT:    Clarification    Instruction:    Based on your assessment of the patient and the clinical information, please provide the requested documentation by clicking on the appropriate radio button and enter any additional information if prompted.    Question: Is there a diagnosis associated with the clinical information    When answering this query, please exercise your independent professional judgment. The fact that a question is being asked, does not imply that any particular answer is desired or expected.    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    Clinical Indicators:  Hgb: 10.8 on admission , dropping to 7.0 on 3/5.    VS 3/5 2506-4724: HR 71-94, RR 16-20, BP 78/30, 79/30, 110/45, 87/37, 80/57, 84/42, 77/42.  Lowest MAP 51.    VS 3/6 2393-2873: HR , RR 16-26, BP 84//63.  Lowest MAP 63.    IM note 3/5 1126 AM states \"Hypotension\", \"Likely secondary to acute blood loss anemia\", \"status post 5 units of packed RBC\".    IM note 3/9 0118 PM states \"Hypotension\", \"Improved today overall..\", \"Stable for RMF unit today\".    Treatment: Lab monitoring of H/H.  PT/INR.  5u PRBCs.  Midodrine 10 mg PO x3 on 3/5.  Eliquis held.    Risk Factors: Elderly male s/p TURBT with ABLA, Hypotension.  Options provided:  -- Hemorrhagic Shock  -- Hypotension only without Shock  -- Other - I will add my own diagnosis  -- Refer to Clinical Documentation Reviewer    Query created by: Silva Alvarez on 3/10/2025 8:59 AM      Electronically signed by:  IRIS CHRIS MD 3/10/2025 2:22 PM          "

## 2025-03-11 ENCOUNTER — PATIENT OUTREACH (OUTPATIENT)
Dept: PRIMARY CARE | Facility: CLINIC | Age: 85
End: 2025-03-11
Payer: MEDICARE

## 2025-03-11 LAB
LABORATORY COMMENT REPORT: NORMAL
PATH REPORT.FINAL DX SPEC: NORMAL
PATH REPORT.GROSS SPEC: NORMAL
PATH REPORT.RELEVANT HX SPEC: NORMAL
PATH REPORT.TOTAL CANCER: NORMAL

## 2025-03-11 NOTE — PROGRESS NOTES
Discharge Facility: [facility]  Discharge Diagnosis: hematuria  Admission Date: 2/28/25  Discharge Date: 3/10/25    PCP Appointment Date: 3/18/25  Specialist Appointment Date: 3/20/25 Dr Burdick  3/31/25 urology  Hospital Encounter and Summary Linked: Yes  ED to Hosp-Admission (Discharged) with Gumaro Garvin MD; Marin Araya DO; Ruiz Lechuga MD (02/28/2025)   Two attempts were made to reach patient within two business days after discharge. Voicemail left with contact information for patient to call back with any non-emergent questions or concerns.

## 2025-03-13 DIAGNOSIS — G47.00 INSOMNIA, UNSPECIFIED TYPE: ICD-10-CM

## 2025-03-13 RX ORDER — MIRTAZAPINE 15 MG/1
15 TABLET, FILM COATED ORAL NIGHTLY
Qty: 90 TABLET | Refills: 3 | Status: SHIPPED | OUTPATIENT
Start: 2025-03-13

## 2025-03-18 ENCOUNTER — APPOINTMENT (OUTPATIENT)
Dept: PRIMARY CARE | Facility: CLINIC | Age: 85
End: 2025-03-18
Payer: MEDICARE

## 2025-03-18 VITALS
RESPIRATION RATE: 16 BRPM | WEIGHT: 170 LBS | SYSTOLIC BLOOD PRESSURE: 116 MMHG | BODY MASS INDEX: 24.34 KG/M2 | DIASTOLIC BLOOD PRESSURE: 68 MMHG | HEIGHT: 70 IN | HEART RATE: 60 BPM | OXYGEN SATURATION: 100 % | TEMPERATURE: 98.4 F

## 2025-03-18 DIAGNOSIS — R31.0 GROSS HEMATURIA: ICD-10-CM

## 2025-03-18 DIAGNOSIS — I48.91 ATRIAL FIBRILLATION WITH CONTROLLED VENTRICULAR RESPONSE (MULTI): ICD-10-CM

## 2025-03-18 DIAGNOSIS — Z87.891 FORMER SMOKER: ICD-10-CM

## 2025-03-18 DIAGNOSIS — C67.9: ICD-10-CM

## 2025-03-18 DIAGNOSIS — N30.01 ACUTE CYSTITIS WITH HEMATURIA: Primary | ICD-10-CM

## 2025-03-18 DIAGNOSIS — I10 PRIMARY HYPERTENSION: ICD-10-CM

## 2025-03-18 DIAGNOSIS — D64.9 ANEMIA, UNSPECIFIED TYPE: ICD-10-CM

## 2025-03-18 DIAGNOSIS — I50.41 ACUTE COMBINED SYSTOLIC AND DIASTOLIC HEART FAILURE: ICD-10-CM

## 2025-03-18 DIAGNOSIS — F41.9 ANXIETY: ICD-10-CM

## 2025-03-18 DIAGNOSIS — E86.1 HYPOTENSION DUE TO HYPOVOLEMIA: ICD-10-CM

## 2025-03-18 DIAGNOSIS — I95.89 HYPOTENSION DUE TO HYPOVOLEMIA: ICD-10-CM

## 2025-03-18 DIAGNOSIS — I25.5 CARDIOMYOPATHY, ISCHEMIC: ICD-10-CM

## 2025-03-18 DIAGNOSIS — N32.89 MASS OF BLADDER: ICD-10-CM

## 2025-03-18 DIAGNOSIS — I50.9 ACUTE CONGESTIVE HEART FAILURE, UNSPECIFIED HEART FAILURE TYPE: Primary | ICD-10-CM

## 2025-03-18 DIAGNOSIS — D68.32 HEMORRHAGIC DISORDER DUE TO EXTRINSIC CIRCULATING ANTICOAGULANTS (MULTI): ICD-10-CM

## 2025-03-18 DIAGNOSIS — D50.0 IRON DEFICIENCY ANEMIA DUE TO CHRONIC BLOOD LOSS: ICD-10-CM

## 2025-03-18 DIAGNOSIS — N18.31 STAGE 3A CHRONIC KIDNEY DISEASE (MULTI): ICD-10-CM

## 2025-03-18 PROBLEM — R31.9 HEMATURIA, UNSPECIFIED TYPE: Status: RESOLVED | Noted: 2024-08-05 | Resolved: 2025-03-18

## 2025-03-18 PROBLEM — R31.9 HEMATURIA: Status: RESOLVED | Noted: 2025-02-28 | Resolved: 2025-03-18

## 2025-03-18 PROBLEM — R73.9 HYPERGLYCEMIA: Status: RESOLVED | Noted: 2023-07-17 | Resolved: 2025-03-18

## 2025-03-18 LAB
POC APPEARANCE, URINE: ABNORMAL
POC BILIRUBIN, URINE: NEGATIVE
POC BLOOD, URINE: ABNORMAL
POC COLOR, URINE: ABNORMAL
POC GLUCOSE, URINE: ABNORMAL MG/DL
POC KETONES, URINE: NEGATIVE MG/DL
POC LEUKOCYTES, URINE: ABNORMAL
POC NITRITE,URINE: NEGATIVE
POC PH, URINE: 6 PH
POC PROTEIN, URINE: ABNORMAL MG/DL
POC SPECIFIC GRAVITY, URINE: 1.02
POC UROBILINOGEN, URINE: 0.2 EU/DL

## 2025-03-18 PROCEDURE — 1159F MED LIST DOCD IN RCRD: CPT | Performed by: INTERNAL MEDICINE

## 2025-03-18 PROCEDURE — 3078F DIAST BP <80 MM HG: CPT | Performed by: INTERNAL MEDICINE

## 2025-03-18 PROCEDURE — 3074F SYST BP LT 130 MM HG: CPT | Performed by: INTERNAL MEDICINE

## 2025-03-18 PROCEDURE — 99495 TRANSJ CARE MGMT MOD F2F 14D: CPT | Performed by: INTERNAL MEDICINE

## 2025-03-18 PROCEDURE — 1123F ACP DISCUSS/DSCN MKR DOCD: CPT | Performed by: INTERNAL MEDICINE

## 2025-03-18 PROCEDURE — 1157F ADVNC CARE PLAN IN RCRD: CPT | Performed by: INTERNAL MEDICINE

## 2025-03-18 PROCEDURE — 1160F RVW MEDS BY RX/DR IN RCRD: CPT | Performed by: INTERNAL MEDICINE

## 2025-03-18 PROCEDURE — 1111F DSCHRG MED/CURRENT MED MERGE: CPT | Performed by: INTERNAL MEDICINE

## 2025-03-18 PROCEDURE — 1036F TOBACCO NON-USER: CPT | Performed by: INTERNAL MEDICINE

## 2025-03-18 PROCEDURE — 81002 URINALYSIS NONAUTO W/O SCOPE: CPT | Performed by: INTERNAL MEDICINE

## 2025-03-18 RX ORDER — CEPHALEXIN 500 MG/1
500 CAPSULE ORAL 3 TIMES DAILY
Qty: 21 CAPSULE | Refills: 0 | Status: SHIPPED | OUTPATIENT
Start: 2025-03-18 | End: 2025-03-20 | Stop reason: WASHOUT

## 2025-03-18 RX ORDER — ALPRAZOLAM 0.5 MG/1
0.5 TABLET ORAL 3 TIMES DAILY PRN
Qty: 45 TABLET | Refills: 0 | Status: SHIPPED | OUTPATIENT
Start: 2025-03-18 | End: 2025-04-17

## 2025-03-18 ASSESSMENT — ENCOUNTER SYMPTOMS
FEVER: 0
SHORTNESS OF BREATH: 0
FATIGUE: 1
COUGH: 0

## 2025-03-18 NOTE — PROGRESS NOTES
"Subjective   Minh Harrington Jr. is a 85 y.o. male who presents for Hospital Follow-up.    HPI   Hospitalized [facility] 2/28/25 - 3/10/25  Dx: Hematuria  Labs, CT Abdomen/Pelvis, EKG  Med changes: Hold Entresto, spironolactone, Eliquis  Follow up: PCP, Urology, Cardiology Thurs.  Has taken x1 dose of Lasix since dc d/t ankle edema.  Reports some fatigue.    1 mile walk at gym, 15 min stationary bike yesterday and today.    ST x2 nights ago.  \"Like fire\". After dinner. Sx's have improved.  Denies abdominal pain.    Review of Systems   Constitutional:  Positive for fatigue. Negative for fever.   Respiratory:  Negative for cough and shortness of breath.    Cardiovascular:  Negative for chest pain and leg swelling.   All other systems reviewed and are negative.      Health Maintenance Due   Topic Date Due    Derm Melanoma Skin Check  Never done    Hepatitis A Vaccines (1 of 2 - Risk 2-dose series) Never done    CKD: Urine Protein Screening  Never done    DTaP/Tdap/Td Vaccines (1 - Tdap) Never done    Zoster Vaccines (1 of 2) Never done    Hepatitis B Vaccines (1 of 3 - Risk 3-dose series) Never done    RSV High Risk: (Elderly (60+) or Pregnant Population) (1 - 1-dose 75+ series) Never done    COVID-19 Vaccine (4 - 2024-25 season) 09/01/2024       Objective   /68   Pulse 60   Temp 36.9 °C (98.4 °F)   Resp 16   Ht 1.778 m (5' 10\")   Wt 77.1 kg (170 lb)   SpO2 100%   BMI 24.39 kg/m²     Physical Exam  Vitals and nursing note reviewed.   Constitutional:       Appearance: Normal appearance.   HENT:      Head: Normocephalic.   Eyes:      Conjunctiva/sclera: Conjunctivae normal.      Pupils: Pupils are equal, round, and reactive to light.   Cardiovascular:      Rate and Rhythm: Normal rate and regular rhythm.      Pulses: Normal pulses.      Heart sounds: Normal heart sounds.   Pulmonary:      Effort: Pulmonary effort is normal.      Breath sounds: Normal breath sounds.   Musculoskeletal:         General: No " swelling.      Cervical back: Neck supple.   Skin:     General: Skin is warm and dry.   Neurological:      General: No focal deficit present.      Mental Status: He is oriented to person, place, and time.         Assessment/Plan   Problem List Items Addressed This Visit       Anxiety    HTN (hypertension)    Atrial fibrillation with controlled ventricular response (Multi)    Acute combined systolic and diastolic heart failure    Former smoker    Stage 3a chronic kidney disease (Multi)    Hemorrhagic disorder due to extrinsic circulating anticoagulants (Multi)    Cardiomyopathy, ischemic    Gross hematuria     Other Visit Diagnoses       Acute cystitis with hematuria    -  Primary    Relevant Orders    Urinalysis with Reflex Microscopic    Urine Culture    POCT UA (nonautomated) manually resulted (Completed)    Iron deficiency anemia due to chronic blood loss        Hypotension due to hypovolemia        Mass of bladder        Primary bladder papillary carcinoma (Multi)        Anemia, unspecified type        Relevant Orders    Folate    Vitamin B12    Iron and TIBC    Ferritin    CBC and Auto Differential    Comprehensive Metabolic Panel        Reviewed all records  Recheck labs  Recheck urine  Cont meds  Fu with cardiology  Stable based on symptoms and exam.  Continue established treatment plan and follow up at least yearly.

## 2025-03-18 NOTE — DOCUMENTATION CLARIFICATION NOTE
"    PATIENT:               EAMON CHINO  ACCT #:                  [id number]  MRN:                       [id number]  :                       1940  ADMIT DATE:       2025 12:03 PM  DISCH DATE:        3/10/2025 5:00 PM  RESPONDING PROVIDER #:        [id number]          PROVIDER RESPONSE TEXT:    I concur with the final pathology report findings of Urothelial Carcinoma and they are clinically significant    CDI QUERY TEXT:    Clarification    Instruction:    Based on your assessment of the patient and the clinical information, please provide the requested documentation by clicking on the appropriate radio button and enter any additional information if prompted.    Question: Please document whether you concur or do not concur with the pathology report findings    When answering this query, please exercise your independent professional judgment. The fact that a question is being asked, does not imply that any particular answer is desired or expected.    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    Clinical Indicators and Pathology Findings:  Final pathology on 3/11 states \"Urinary bladder, tumor, transurethral resection:  -- Invasive papillary urothelial carcinoma, high-grade, with focal, superficial lamina propria invasion  -- Muscularis propria (detrusor muscle) is present and negative for carcinoma\"    Treatment: CT Abd/Pelvis.  Urology consult s/p TURBT.    Risk Factors: Gross hematuria w/Bladder Tumor.  Options provided:  -- I concur with the final pathology report findings of Urothelial Carcinoma and they are clinically significant  -- I do not concur with the final pathology report findings of Urothelial Carcinoma  -- Other - I will add my own diagnosis  -- Refer to Clinical Documentation Reviewer    Query created by: Silva Alvarez on 3/17/2025 8:20 AM      Electronically signed by:  YOHANNES SHANE MD 3/18/2025 12:38 " PM

## 2025-03-20 ENCOUNTER — APPOINTMENT (OUTPATIENT)
Dept: CARDIOLOGY | Facility: CLINIC | Age: 85
End: 2025-03-20
Payer: MEDICARE

## 2025-03-20 VITALS
HEART RATE: 70 BPM | BODY MASS INDEX: 24.55 KG/M2 | SYSTOLIC BLOOD PRESSURE: 116 MMHG | WEIGHT: 171.1 LBS | DIASTOLIC BLOOD PRESSURE: 74 MMHG

## 2025-03-20 DIAGNOSIS — I48.91 ATRIAL FIBRILLATION BY ELECTROCARDIOGRAM (MULTI): ICD-10-CM

## 2025-03-20 DIAGNOSIS — I36.1 NONRHEUMATIC TRICUSPID VALVE REGURGITATION: ICD-10-CM

## 2025-03-20 DIAGNOSIS — I34.0 MITRAL VALVE INSUFFICIENCY, UNSPECIFIED ETIOLOGY: ICD-10-CM

## 2025-03-20 DIAGNOSIS — E78.2 MIXED HYPERLIPIDEMIA: ICD-10-CM

## 2025-03-20 DIAGNOSIS — Z95.1 HISTORY OF CORONARY ARTERY BYPASS GRAFT: ICD-10-CM

## 2025-03-20 DIAGNOSIS — I25.5 CARDIOMYOPATHY, ISCHEMIC: ICD-10-CM

## 2025-03-20 DIAGNOSIS — Z87.891 FORMER SMOKER: ICD-10-CM

## 2025-03-20 DIAGNOSIS — I48.91 ATRIAL FIBRILLATION WITH CONTROLLED VENTRICULAR RESPONSE (MULTI): ICD-10-CM

## 2025-03-20 DIAGNOSIS — I50.41 ACUTE COMBINED SYSTOLIC AND DIASTOLIC HEART FAILURE: Primary | ICD-10-CM

## 2025-03-20 DIAGNOSIS — I25.10 CORONARY ARTERY DISEASE INVOLVING NATIVE CORONARY ARTERY OF NATIVE HEART WITHOUT ANGINA PECTORIS: ICD-10-CM

## 2025-03-20 DIAGNOSIS — G47.33 OSA (OBSTRUCTIVE SLEEP APNEA): ICD-10-CM

## 2025-03-20 DIAGNOSIS — I10 PRIMARY HYPERTENSION: ICD-10-CM

## 2025-03-20 LAB
APPEARANCE UR: CLEAR
BACTERIA #/AREA URNS HPF: ABNORMAL /HPF
BACTERIA UR CULT: NORMAL
BILIRUB UR QL STRIP: NEGATIVE
COLOR UR: YELLOW
GLUCOSE UR QL STRIP: ABNORMAL
HGB UR QL STRIP: ABNORMAL
HYALINE CASTS #/AREA URNS LPF: ABNORMAL /LPF
KETONES UR QL STRIP: NEGATIVE
LEUKOCYTE ESTERASE UR QL STRIP: ABNORMAL
NITRITE UR QL STRIP: NEGATIVE
PH UR STRIP: 5.5 [PH] (ref 5–8)
PROT UR QL STRIP: ABNORMAL
RBC #/AREA URNS HPF: ABNORMAL /HPF
SERVICE CMNT-IMP: ABNORMAL
SP GR UR STRIP: 1.02 (ref 1–1.03)
SQUAMOUS #/AREA URNS HPF: ABNORMAL /HPF
WBC #/AREA URNS HPF: ABNORMAL /HPF

## 2025-03-20 PROCEDURE — 3074F SYST BP LT 130 MM HG: CPT | Performed by: INTERNAL MEDICINE

## 2025-03-20 PROCEDURE — 1157F ADVNC CARE PLAN IN RCRD: CPT | Performed by: INTERNAL MEDICINE

## 2025-03-20 PROCEDURE — 1123F ACP DISCUSS/DSCN MKR DOCD: CPT | Performed by: INTERNAL MEDICINE

## 2025-03-20 PROCEDURE — 99214 OFFICE O/P EST MOD 30 MIN: CPT | Performed by: INTERNAL MEDICINE

## 2025-03-20 PROCEDURE — 3078F DIAST BP <80 MM HG: CPT | Performed by: INTERNAL MEDICINE

## 2025-03-20 PROCEDURE — 1036F TOBACCO NON-USER: CPT | Performed by: INTERNAL MEDICINE

## 2025-03-20 PROCEDURE — 1159F MED LIST DOCD IN RCRD: CPT | Performed by: INTERNAL MEDICINE

## 2025-03-20 PROCEDURE — 1111F DSCHRG MED/CURRENT MED MERGE: CPT | Performed by: INTERNAL MEDICINE

## 2025-03-20 NOTE — PROGRESS NOTES
CARDIOLOGY OFFICE VISIT      CHIEF COMPLAINT  No chief complaint on file.      HISTORY OF PRESENT ILLNESS  The patient states that he had his bladder surgery for his bladder carcinoma.  He states that he had a fair amount of bleeding and had to have 4 units of blood transfusion.  He has had some problems with hematuria off and on since his surgery.  I told him to hold his Eliquis for 2 weeks at least and I want to make sure he is off the Eliquis for at least 1 week before he restarts it.  He states his blood pressure was low after the surgery and they stopped his spironolactone and his Entresto.  I told him I would like to start him back on low-dose Entresto at this time.  He is having a little bit more swelling in his lower extremities but nothing severe.  He has only had to take his furosemide 1 time recently.  He denies dyspnea.  He denies chest discomfort.  I told him we will try to gradually get him back to the high-dose Entresto and spironolactone.  I did briefly talk to him about watchman but he wants to just get over this prostate problem before he even thinks about it.      Impression:  Coronary disease no angina  Remote CABG  Former smoker  Hyperlipidemia  Chronic systolic congestive heart failure  Permanent atrial fibrillation  Ischemic cardiomyopathy left ventricular ejection fraction 30 to 35% on echocardiogram   Overweight  History of aneurysm of ascending thoracic aorta  Obstructive Sleep Apnea with CPAP   Moderate Mitral Regurgitation  Moderate tricuspid regurgitation  Pulmonary hypertension        Please excuse any errors in grammar or translation related to this dictation.  Voice recognition software was utilized to prepare this document    Past Medical History  Past Medical History:   Diagnosis Date    Abnormal ECG     Arrhythmia     Atrial fibrillation (Multi)     CHF (congestive heart failure)     Coronary artery disease     COVID-19 02/16/2022    COVID-19    Epilepsy, unspecified, not  intractable, without status epilepticus 2020    Epilepsy    Hyperlipidemia     Hypertension     Ischemic cardiomyopathy     Myocardial infarction (Multi)     Unspecified convulsions (Multi) 2021    Seizures       Social History  Social History     Tobacco Use    Smoking status: Former     Current packs/day: 0.00     Average packs/day: 1 pack/day for 40.0 years (40.0 ttl pk-yrs)     Types: Cigarettes     Start date: 1957     Quit date:      Years since quittin.2    Smokeless tobacco: Never   Vaping Use    Vaping status: Never Used   Substance Use Topics    Alcohol use: Yes     Alcohol/week: 12.0 standard drinks of alcohol     Types: 12 Standard drinks or equivalent per week     Comment: daily    Drug use: Never       Family History     Family History   Problem Relation Name Age of Onset    Heart failure Mother Dar     Emphysema Father Saroj John.     Lung disease Father Saroj John.     Bipolar disorder Brother          Allergies:  No Known Allergies     Outpatient Medications:  Current Outpatient Medications   Medication Instructions    ALPRAZolam (XANAX) 0.5 mg, oral, 3 times daily PRN    apixaban (ELIQUIS) 5 mg, oral, 2 times daily    atorvastatin (LIPITOR) 20 mg, oral, Daily, as directed    b complex vitamins capsule 1 capsule, Daily    cephalexin (KEFLEX) 500 mg, oral, 3 times daily    cholecalciferol (Vitamin D-3) 125 MCG (5000 UT) capsule 1 tablet, Daily    dutasteride (AVODART) 0.5 mg, oral, Daily    furosemide (Lasix) 20 mg tablet Take one daily ,if weight gain of 3 lbs in one day or 5 lbs in one week take another tablet that day until weight is back to normal    Jardiance 10 mg, oral, Daily    levETIRAcetam (Keppra) 500 mg tablet TAKE 1/2 (ONE-HALF) OF A TABLET BY MOUTH DAILY    metoprolol succinate XL (TOPROL-XL) 25 mg, oral, 2 times daily    mirtazapine (REMERON) 15 mg, oral, Nightly    sertraline (ZOLOFT) 100 mg, oral, Daily          REVIEW OF SYSTEMS  Review of Systems   All  other systems reviewed and are negative.        VITALS  There were no vitals filed for this visit.    PHYSICAL EXAM  Constitutional:       Appearance: Healthy appearance. Not in distress.   Neck:      Vascular: No JVR. JVD normal.   Pulmonary:      Effort: Pulmonary effort is normal.      Breath sounds: Normal breath sounds. No wheezing. No rhonchi. No rales.   Chest:      Chest wall: Not tender to palpatation.   Cardiovascular:      PMI at left midclavicular line. Normal rate. Irregularly irregular rhythm. Normal S1. Normal S2.       Murmurs: There is a grade 3/6 systolic murmur at the apex.      No gallop.  No click. No rub.   Pulses:     Intact distal pulses.   Edema:     Pretibial: bilateral 1+ edema of the pretibial area.     Ankle: bilateral 1+ edema of the ankle.     Feet: bilateral 1+ edema of the feet.  Abdominal:      General: Bowel sounds are normal.      Palpations: Abdomen is soft.      Tenderness: There is no abdominal tenderness.   Musculoskeletal: Normal range of motion.         General: No tenderness. Skin:     General: Skin is warm and dry.   Neurological:      General: No focal deficit present.      Mental Status: Alert and oriented to person, place and time.           ASSESSMENT AND PLAN  Diagnoses and all orders for this visit:  Acute combined systolic and diastolic heart failure  Coronary artery disease involving native coronary artery of native heart without angina pectoris  History of coronary artery bypass graft  Mixed hyperlipidemia  Primary hypertension  Atrial fibrillation with controlled ventricular response (Multi)  Cardiomyopathy, ischemic  Nonrheumatic tricuspid valve regurgitation  Mitral valve insufficiency, unspecified etiology  WINNIE (obstructive sleep apnea)  Former smoker  BMI 24.0-24.9, adult  Atrial fibrillation by electrocardiogram (Multi)          Samira LEE LPN am scribing for, and in the presence of Dr. Heriberto Valle MD, [facility].    Dr. Heriberto LEE  MD Leonard, [facility], personally performed the services described in the documentation as scribed by Samira Turcios LPN in my presence, and confirm it is both accurate and complete.      Dr. Heriberto Martinez MD   Thank you for allowing me to participate in the care of this patient. Please do not hesitate to contact me with any further questions or concerns.

## 2025-03-20 NOTE — PATIENT INSTRUCTIONS
Continue same medications and treatments.   Patient educated on proper medication use.   Patient educated on risk factor modification.   Please bring any lab results from other providers / physicians to your next appointment.     Please bring all medicines, vitamins, and herbal supplements with you when you come to the office.     Prescriptions will not be filled unless you are compliant with your follow up appointments or have a follow up appointment scheduled as per instruction of your physician. Refills should be requested at the time of your visit.    HOLD ELIQUIS    START ENTRESTO 24/26 1 TABLET TWICE DAILY    FOLLOW UP IN THE 2ND WEEK OF APRIL 2025    Samira LEE LPN, am scribing for and in the presence of Dr. Heriberto Valle MD, [facility]

## 2025-03-21 ENCOUNTER — HOSPITAL ENCOUNTER (EMERGENCY)
Facility: HOSPITAL | Age: 85
Discharge: HOME | End: 2025-03-21
Attending: STUDENT IN AN ORGANIZED HEALTH CARE EDUCATION/TRAINING PROGRAM
Payer: MEDICARE

## 2025-03-21 VITALS
HEIGHT: 70 IN | RESPIRATION RATE: 16 BRPM | SYSTOLIC BLOOD PRESSURE: 131 MMHG | TEMPERATURE: 97.4 F | HEART RATE: 65 BPM | WEIGHT: 171 LBS | OXYGEN SATURATION: 98 % | DIASTOLIC BLOOD PRESSURE: 62 MMHG | BODY MASS INDEX: 24.48 KG/M2

## 2025-03-21 DIAGNOSIS — R31.9 HEMATURIA, UNSPECIFIED TYPE: Primary | ICD-10-CM

## 2025-03-21 DIAGNOSIS — N13.9 URINARY OBSTRUCTION: ICD-10-CM

## 2025-03-21 LAB
ALBUMIN SERPL BCP-MCNC: 3.6 G/DL (ref 3.4–5)
ALP SERPL-CCNC: 67 U/L (ref 33–136)
ALT SERPL W P-5'-P-CCNC: 14 U/L (ref 10–52)
ANION GAP SERPL CALC-SCNC: 12 MMOL/L (ref 10–20)
APPEARANCE UR: ABNORMAL
AST SERPL W P-5'-P-CCNC: 22 U/L (ref 9–39)
BASOPHILS # BLD AUTO: 0.05 X10*3/UL (ref 0–0.1)
BASOPHILS NFR BLD AUTO: 0.5 %
BILIRUB SERPL-MCNC: 0.5 MG/DL (ref 0–1.2)
BILIRUB UR STRIP.AUTO-MCNC: NEGATIVE MG/DL
BUN SERPL-MCNC: 26 MG/DL (ref 6–23)
CALCIUM SERPL-MCNC: 8.4 MG/DL (ref 8.6–10.3)
CHLORIDE SERPL-SCNC: 103 MMOL/L (ref 98–107)
CO2 SERPL-SCNC: 25 MMOL/L (ref 21–32)
COLOR UR: ABNORMAL
CREAT SERPL-MCNC: 1.15 MG/DL (ref 0.5–1.3)
EGFRCR SERPLBLD CKD-EPI 2021: 62 ML/MIN/1.73M*2
EOSINOPHIL # BLD AUTO: 0.16 X10*3/UL (ref 0–0.4)
EOSINOPHIL NFR BLD AUTO: 1.7 %
ERYTHROCYTE [DISTWIDTH] IN BLOOD BY AUTOMATED COUNT: 16 % (ref 11.5–14.5)
GLUCOSE SERPL-MCNC: 101 MG/DL (ref 74–99)
GLUCOSE UR STRIP.AUTO-MCNC: ABNORMAL MG/DL
HCT VFR BLD AUTO: 36.1 % (ref 41–52)
HGB BLD-MCNC: 10.8 G/DL (ref 13.5–17.5)
HOLD SPECIMEN: NORMAL
IMM GRANULOCYTES # BLD AUTO: 0.05 X10*3/UL (ref 0–0.5)
IMM GRANULOCYTES NFR BLD AUTO: 0.5 % (ref 0–0.9)
KETONES UR STRIP.AUTO-MCNC: NEGATIVE MG/DL
LEUKOCYTE ESTERASE UR QL STRIP.AUTO: ABNORMAL
LYMPHOCYTES # BLD AUTO: 0.75 X10*3/UL (ref 0.8–3)
LYMPHOCYTES NFR BLD AUTO: 8 %
MCH RBC QN AUTO: 27.6 PG (ref 26–34)
MCHC RBC AUTO-ENTMCNC: 29.9 G/DL (ref 32–36)
MCV RBC AUTO: 92 FL (ref 80–100)
MONOCYTES # BLD AUTO: 0.88 X10*3/UL (ref 0.05–0.8)
MONOCYTES NFR BLD AUTO: 9.4 %
NEUTROPHILS # BLD AUTO: 7.43 X10*3/UL (ref 1.6–5.5)
NEUTROPHILS NFR BLD AUTO: 79.9 %
NITRITE UR QL STRIP.AUTO: NEGATIVE
NRBC BLD-RTO: 0 /100 WBCS (ref 0–0)
PH UR STRIP.AUTO: 6.5 [PH]
PLATELET # BLD AUTO: 145 X10*3/UL (ref 150–450)
POTASSIUM SERPL-SCNC: 4.6 MMOL/L (ref 3.5–5.3)
PROT SERPL-MCNC: 6.4 G/DL (ref 6.4–8.2)
PROT UR STRIP.AUTO-MCNC: ABNORMAL MG/DL
RBC # BLD AUTO: 3.91 X10*6/UL (ref 4.5–5.9)
RBC # UR STRIP.AUTO: ABNORMAL MG/DL
RBC #/AREA URNS AUTO: >20 /HPF
SODIUM SERPL-SCNC: 135 MMOL/L (ref 136–145)
SP GR UR STRIP.AUTO: 1.02
UROBILINOGEN UR STRIP.AUTO-MCNC: NORMAL MG/DL
WBC # BLD AUTO: 9.3 X10*3/UL (ref 4.4–11.3)
WBC #/AREA URNS AUTO: ABNORMAL /HPF

## 2025-03-21 PROCEDURE — 87086 URINE CULTURE/COLONY COUNT: CPT | Mod: STJLAB | Performed by: PHYSICIAN ASSISTANT

## 2025-03-21 PROCEDURE — 85025 COMPLETE CBC W/AUTO DIFF WBC: CPT | Performed by: PHYSICIAN ASSISTANT

## 2025-03-21 PROCEDURE — 99284 EMERGENCY DEPT VISIT MOD MDM: CPT | Performed by: STUDENT IN AN ORGANIZED HEALTH CARE EDUCATION/TRAINING PROGRAM

## 2025-03-21 PROCEDURE — 99284 EMERGENCY DEPT VISIT MOD MDM: CPT | Performed by: PHYSICIAN ASSISTANT

## 2025-03-21 PROCEDURE — 81001 URINALYSIS AUTO W/SCOPE: CPT | Performed by: PHYSICIAN ASSISTANT

## 2025-03-21 PROCEDURE — 51702 INSERT TEMP BLADDER CATH: CPT

## 2025-03-21 PROCEDURE — 51798 US URINE CAPACITY MEASURE: CPT

## 2025-03-21 PROCEDURE — 80053 COMPREHEN METABOLIC PANEL: CPT | Performed by: STUDENT IN AN ORGANIZED HEALTH CARE EDUCATION/TRAINING PROGRAM

## 2025-03-21 PROCEDURE — 36415 COLL VENOUS BLD VENIPUNCTURE: CPT | Performed by: PHYSICIAN ASSISTANT

## 2025-03-21 ASSESSMENT — LIFESTYLE VARIABLES
TOTAL SCORE: 0
EVER FELT BAD OR GUILTY ABOUT YOUR DRINKING: NO
HAVE YOU EVER FELT YOU SHOULD CUT DOWN ON YOUR DRINKING: NO
HAVE PEOPLE ANNOYED YOU BY CRITICIZING YOUR DRINKING: NO
EVER HAD A DRINK FIRST THING IN THE MORNING TO STEADY YOUR NERVES TO GET RID OF A HANGOVER: NO

## 2025-03-21 ASSESSMENT — PAIN - FUNCTIONAL ASSESSMENT: PAIN_FUNCTIONAL_ASSESSMENT: 0-10

## 2025-03-21 ASSESSMENT — PAIN SCALES - GENERAL
PAINLEVEL_OUTOF10: 0 - NO PAIN
PAINLEVEL_OUTOF10: 0 - NO PAIN

## 2025-03-21 NOTE — ED PROVIDER NOTES
HPI   Chief Complaint   Patient presents with    Blood in Urine       HPI  Patient is a 85-year-old male with a past medical history of hyperlipidemia, CAD, A-fib on Eliquis, ischemic cardiomyopathy (EF 30 to 35%), obesity, aneurysm of ascending thoracic aorta, mitral regurgitation, tricuspid regurgitation, pulmonary hypertension, bladder carcinoma status post excision on 3/5/2025 that presents to the ED for evaluation of hematuria.  Patient has had intermittent hematuria since surgery.  Was prescribed Keflex on 3/19/2025 for UTI.  Patient reports today because he is concerned he is losing too much blood and has required blood transfusions in the past.  He is endorsing dysuria today.  He also reports leaking of urine and has to wear depends.  He has taken 2 days of Keflex so far.  Denies fever, chills, nausea, vomiting, shortness of breath, abdominal pain, flank pain.  Eliquis is currently held 1 week postprocedure and again by his cardiologist yesterday.      Patient History   Past Medical History:   Diagnosis Date    Abnormal ECG     Arrhythmia     Atrial fibrillation (Multi)     CHF (congestive heart failure)     Coronary artery disease     COVID-19 02/16/2022    COVID-19    Epilepsy, unspecified, not intractable, without status epilepticus 11/18/2020    Epilepsy    Hyperlipidemia     Hypertension     Ischemic cardiomyopathy     Myocardial infarction (Multi)     Unspecified convulsions (Multi) 02/24/2021    Seizures     Past Surgical History:   Procedure Laterality Date    ARTERIAL BYPASS SURGERY      CARDIAC CATHETERIZATION      CORONARY ANGIOPLASTY      OTHER SURGICAL HISTORY  02/12/2019    Angioplasty    OTHER SURGICAL HISTORY  02/12/2019    Appendectomy    OTHER SURGICAL HISTORY  02/12/2019    Bypass     Family History   Problem Relation Name Age of Onset    Heart failure Mother Nana1     Emphysema Father Saroj Sr.     Lung disease Father Saroj Sr.     Bipolar disorder Brother       Social History      Tobacco Use    Smoking status: Former     Current packs/day: 0.00     Average packs/day: 1 pack/day for 40.0 years (40.0 ttl pk-yrs)     Types: Cigarettes     Start date: 1957     Quit date:      Years since quittin.2    Smokeless tobacco: Never   Vaping Use    Vaping status: Never Used   Substance Use Topics    Alcohol use: Yes     Alcohol/week: 12.0 standard drinks of alcohol     Types: 12 Standard drinks or equivalent per week     Comment: daily    Drug use: Never       Physical Exam   ED Triage Vitals [25 1213]   Temperature Heart Rate Respirations BP   35.8 °C (96.4 °F) 75 16 117/58      Pulse Ox Temp Source Heart Rate Source Patient Position   96 % Temporal -- --      BP Location FiO2 (%)     -- --       Physical Exam  Vitals reviewed.   Constitutional:       General: He is not in acute distress.     Appearance: Normal appearance. He is not ill-appearing or toxic-appearing.   HENT:      Head: Normocephalic and atraumatic.      Nose: No congestion or rhinorrhea.   Eyes:      General:         Right eye: No discharge.         Left eye: No discharge.   Cardiovascular:      Rate and Rhythm: Normal rate and regular rhythm.      Pulses: Normal pulses.      Heart sounds: Normal heart sounds. No murmur heard.     No friction rub.   Pulmonary:      Effort: Pulmonary effort is normal. No respiratory distress.      Breath sounds: Normal breath sounds. No stridor. No wheezing or rhonchi.   Abdominal:      General: Abdomen is flat. There is no distension.      Tenderness: There is no abdominal tenderness. There is no right CVA tenderness, left CVA tenderness, guarding or rebound.   Musculoskeletal:      Cervical back: Neck supple.      Right lower leg: Edema present.      Left lower leg: Edema present.      Comments: Baseline edema   Skin:     General: Skin is warm and dry.      Capillary Refill: Capillary refill takes less than 2 seconds.      Coloration: Skin is not jaundiced or pale.    Neurological:      General: No focal deficit present.      Mental Status: He is alert and oriented to person, place, and time.   Psychiatric:         Mood and Affect: Mood normal.         Behavior: Behavior normal.           ED Course & MDM   ED Course as of 03/21/25 1607   Fri Mar 21, 2025   1251 On external chart review culture of urine on 3/18/2025 shows no growth [FN]   1331 HEMOGLOBIN(!): 10.8  On external chart review hemoglobin from 11 days ago 3/10/2025 was 10.6.  Not concern for worsening anemia at this time. [FN]   1331 Patient has a high PVR.  Concern for obstruction in the setting of hematuria/blood clots.    Given patient's recent bladder tumor resection will discuss with urology regarding Pandya placement for irrigation of the bladder [FN]   1557 Creatinine: 1.15  No TERRY   [FN]   1558 600 mL output via Pandya cath.  Urology at bedside [HK]      ED Course User Index  [FN] Mnina Aj MD  [HK] Gilson Ordoñez PA-C         Diagnoses as of 03/21/25 1607   Hematuria, unspecified type   Urinary obstruction                 No data recorded     Bowden Coma Scale Score: 15 (03/21/25 1214 : Angelina Aldrich RN)                           Medical Decision Making  Patient is a 85-year-old male with a past medical history of hyperlipidemia, CAD, A-fib on Eliquis, ischemic cardiomyopathy (EF 30 to 35%), obesity, aneurysm of ascending thoracic aorta, mitral regurgitation, tricuspid regurgitation, pulmonary hypertension, bladder carcinoma status post excision on 3/5/2025 that presents to the ED for evaluation of hematuria.  On exam patient is anxious appearing and in no acute distress.  Vital signs are stable.  Cardiopulmonary exam largely unremarkable.  Abdomen is soft, nondistended and without rebound or guarding.  Differential includes but is not limited to UTI, urinary obstruction, infection.  Patient staffed with ED attending physician.    CBC without leukocytosis or anemia.  Hemoglobin is at his baseline.   CMP largely unremarkable.  Urinalysis positive for RBC and 11-20 WBCs.  No nitrates.  He was advised to continue taking his Keflex until cultures return given he is high risk and now has a Pandya in place.  Postvoid residual was around 500 mL.  Pandya catheter was placed with 600 mL and sediment drained.  Brown in color. Urology saw patient at bedside and okayed discharge.  Patient is to be discharged with Pandya catheter in place for outpatient void trial.  Eliquis is still held.  Urgent urology referral although he does follow consistently with Dr. Washington.    Social determinants of health affecting care:  None     I independently reviewed all imaging and labs.    Patient was informed of the aforementioned findings.  Symptoms are felt to be due to urinary retention.    At this time, low suspicion for an acute process that would necessitate further emergent workup, urgent specialist consultation, or hospitalization.  Based on clinical workup and discussion with attending physician, the disposition of discharge is appropriate.  Advised patient to pursue close follow-up with PCP.  Patient was provided with appropriate information to assist in obtaining the proper follow-up.  Discussed strict return to ED protocols with patient, including low threshold to return for changing/worsening symptoms.  Patient demonstrated understanding and agreement with the plan of care, and all questions answered prior to discharge.  Patient stable at time of discharge.     --------------------------------------------------------------------------------------------------------------------------------------------------------------------------------------------------------------------------    This note was dictated using a speech recognition program.  While an attempt was made at proof reading to minimize errors, minor errors in transcription may be present call for questions.     Procedure  Procedures     Gilson Ordoñez PA-C  03/21/25  [id number]

## 2025-03-21 NOTE — ED TRIAGE NOTES
Patient had mass removed from bladder 3/5/25.  Went to PCP on Tuesday and was prescribed ATB for UTI.  Yesterday patient began urinating blood.  No other complaints.

## 2025-03-21 NOTE — DISCHARGE INSTRUCTIONS
You were seen in the emergency department for evaluation of blood in your urine.    Your ultrasound showed urinary retention.  A Pandya catheter was placed.  Attached are instructions on how to care for your Pandya catheter.  You are to follow-up with urology to have a voiding trial.    Please follow-up with your primary care provider.  If you do not have a primary care provider you can call [phone] to make an appointment.    Please do not hesitate to return to the ED if symptoms change or worsen.

## 2025-03-22 LAB — BACTERIA UR CULT: NORMAL

## 2025-03-24 ENCOUNTER — PATIENT OUTREACH (OUTPATIENT)
Dept: PRIMARY CARE | Facility: CLINIC | Age: 85
End: 2025-03-24
Payer: MEDICARE

## 2025-03-24 NOTE — PROGRESS NOTES
Call regarding appt. with PCP on 31/9/25 after hospitalization.  At time of outreach call the patient feels as if their condition has returned to baseline since last visit.  Reviewed the PCP appointment with the pt and addressed any questions or concerns.  Went to ER 3/21 due to hematuria. Now has catheter but functioning well and urine color is back to normal. Sees Dr Washington on 3/31.

## 2025-03-26 ENCOUNTER — CLINICAL SUPPORT (OUTPATIENT)
Dept: UROLOGY | Facility: CLINIC | Age: 85
End: 2025-03-26
Payer: MEDICARE

## 2025-03-26 VITALS — DIASTOLIC BLOOD PRESSURE: 74 MMHG | HEART RATE: 88 BPM | SYSTOLIC BLOOD PRESSURE: 130 MMHG

## 2025-03-26 DIAGNOSIS — R31.0 GROSS HEMATURIA: ICD-10-CM

## 2025-03-26 LAB
ALBUMIN SERPL-MCNC: 3.8 G/DL (ref 3.6–5.1)
ALP SERPL-CCNC: 68 U/L (ref 35–144)
ALT SERPL-CCNC: 10 U/L (ref 9–46)
ANION GAP SERPL CALCULATED.4IONS-SCNC: 6 MMOL/L (CALC) (ref 7–17)
AST SERPL-CCNC: 18 U/L (ref 10–35)
BASOPHILS # BLD AUTO: 26 CELLS/UL (ref 0–200)
BASOPHILS NFR BLD AUTO: 0.3 %
BILIRUB SERPL-MCNC: 0.7 MG/DL (ref 0.2–1.2)
BUN SERPL-MCNC: 23 MG/DL (ref 7–25)
CALCIUM SERPL-MCNC: 8.7 MG/DL (ref 8.6–10.3)
CHLORIDE SERPL-SCNC: 108 MMOL/L (ref 98–110)
CO2 SERPL-SCNC: 27 MMOL/L (ref 20–32)
CREAT SERPL-MCNC: 1.22 MG/DL (ref 0.7–1.22)
EGFRCR SERPLBLD CKD-EPI 2021: 57 ML/MIN/1.73M2
EOSINOPHIL # BLD AUTO: 141 CELLS/UL (ref 15–500)
EOSINOPHIL NFR BLD AUTO: 1.6 %
ERYTHROCYTE [DISTWIDTH] IN BLOOD BY AUTOMATED COUNT: 14.8 % (ref 11–15)
FERRITIN SERPL-MCNC: 22 NG/ML (ref 24–380)
FOLATE SERPL-MCNC: 6.4 NG/ML
GLUCOSE SERPL-MCNC: 131 MG/DL (ref 65–139)
HCT VFR BLD AUTO: 33.7 % (ref 38.5–50)
HGB BLD-MCNC: 10.3 G/DL (ref 13.2–17.1)
IRON SATN MFR SERPL: 8 % (CALC) (ref 20–48)
IRON SERPL-MCNC: 30 MCG/DL (ref 50–180)
LYMPHOCYTES # BLD AUTO: 906 CELLS/UL (ref 850–3900)
LYMPHOCYTES NFR BLD AUTO: 10.3 %
MCH RBC QN AUTO: 27.7 PG (ref 27–33)
MCHC RBC AUTO-ENTMCNC: 30.6 G/DL (ref 32–36)
MCV RBC AUTO: 90.6 FL (ref 80–100)
MONOCYTES # BLD AUTO: 810 CELLS/UL (ref 200–950)
MONOCYTES NFR BLD AUTO: 9.2 %
NEUTROPHILS # BLD AUTO: 6917 CELLS/UL (ref 1500–7800)
NEUTROPHILS NFR BLD AUTO: 78.6 %
PLATELET # BLD AUTO: 116 THOUSAND/UL (ref 140–400)
PMV BLD REES-ECKER: 13.6 FL (ref 7.5–12.5)
POTASSIUM SERPL-SCNC: 4.9 MMOL/L (ref 3.5–5.3)
PROT SERPL-MCNC: 6.5 G/DL (ref 6.1–8.1)
RBC # BLD AUTO: 3.72 MILLION/UL (ref 4.2–5.8)
SODIUM SERPL-SCNC: 141 MMOL/L (ref 135–146)
TIBC SERPL-MCNC: 375 MCG/DL (CALC) (ref 250–425)
VIT B12 SERPL-MCNC: 399 PG/ML (ref 200–1100)
WBC # BLD AUTO: 8.8 THOUSAND/UL (ref 3.8–10.8)

## 2025-03-26 PROCEDURE — 99211 OFF/OP EST MAY X REQ PHY/QHP: CPT | Performed by: NURSE PRACTITIONER

## 2025-03-31 ENCOUNTER — APPOINTMENT (OUTPATIENT)
Dept: UROLOGY | Facility: CLINIC | Age: 85
End: 2025-03-31
Payer: MEDICARE

## 2025-03-31 VITALS — HEART RATE: 118 BPM | DIASTOLIC BLOOD PRESSURE: 76 MMHG | SYSTOLIC BLOOD PRESSURE: 125 MMHG | TEMPERATURE: 96.7 F

## 2025-03-31 DIAGNOSIS — C67.1 MALIGNANT NEOPLASM OF DOME OF URINARY BLADDER (MULTI): Primary | ICD-10-CM

## 2025-03-31 DIAGNOSIS — R82.90 ABNORMAL URINALYSIS: ICD-10-CM

## 2025-03-31 PROCEDURE — 1157F ADVNC CARE PLAN IN RCRD: CPT | Performed by: UROLOGY

## 2025-03-31 PROCEDURE — 99214 OFFICE O/P EST MOD 30 MIN: CPT | Performed by: UROLOGY

## 2025-03-31 PROCEDURE — 1111F DSCHRG MED/CURRENT MED MERGE: CPT | Performed by: UROLOGY

## 2025-03-31 PROCEDURE — 3078F DIAST BP <80 MM HG: CPT | Performed by: UROLOGY

## 2025-03-31 PROCEDURE — 1160F RVW MEDS BY RX/DR IN RCRD: CPT | Performed by: UROLOGY

## 2025-03-31 PROCEDURE — 1123F ACP DISCUSS/DSCN MKR DOCD: CPT | Performed by: UROLOGY

## 2025-03-31 PROCEDURE — G2211 COMPLEX E/M VISIT ADD ON: HCPCS | Performed by: UROLOGY

## 2025-03-31 PROCEDURE — 1036F TOBACCO NON-USER: CPT | Performed by: UROLOGY

## 2025-03-31 PROCEDURE — 1159F MED LIST DOCD IN RCRD: CPT | Performed by: UROLOGY

## 2025-03-31 PROCEDURE — 3074F SYST BP LT 130 MM HG: CPT | Performed by: UROLOGY

## 2025-03-31 RX ORDER — ACETAMINOPHEN 325 MG/1
975 TABLET ORAL ONCE
OUTPATIENT
Start: 2025-03-31 | End: 2025-03-31

## 2025-03-31 RX ORDER — CEFAZOLIN SODIUM 2 G/100ML
2 INJECTION, SOLUTION INTRAVENOUS ONCE
OUTPATIENT
Start: 2025-03-31 | End: 2025-03-31

## 2025-03-31 NOTE — PROGRESS NOTES
"PAST UROLOGICAL HISTORY:  85-year-old man with a history of BPH who underwent TURP in 2008. He has a history of intermittent gross hematuria. Other relevant medical history includes atrial fibrillation on Eliquis, systolic heart failure with EF 30-35%, hyperlipidemia, prior MI, and CKD 3A. He was admitted on 02/28/2025 with gross hematuria and failed conservative management with continuous bladder irrigation.    CT abdomen and pelvis from 02/28/2025 showed no upper tract lesions and subcentimeter paraaortic and iliac lymph nodes, stable from August 2024.    I independently reviewed and interpreted the notes, images, and tests as noted herein.    HPI TODAY 03/31/2025:    Bladder Cancer:  - Patient underwent TURBT on 03/05/2025 for a 6 cm tumor emanating from the dome of his bladder and right lateral wall, papillary and nodular, suspicious for cancer with deep invasion.  - Visibility was limited during the procedure, and complete resection could not be confirmed.  - Pathology revealed invasive papillary urothelial carcinoma, high grade, with lamina propria invasion (T1 high grade). Muscle was present and negative for tumor.  - Patient reports feeling \"super\" since the procedure.  - Catheter has been removed without complications.    PAST MEDICAL HISTORY:  - Benign prostatic hyperplasia (BPH)  - Atrial fibrillation on Eliquis  - Systolic heart failure (EF 30-35%)  - Hyperlipidemia  - Prior myocardial infarction  - Chronic kidney disease stage 3A  - Sleep apnea (recently diagnosed)    SOCIAL:  Mr. Harrington lives in the [facility] near the [facility].    REVIEW OF SYSTEMS: A tailored review of systems was performed and all pertinent positives and negatives are listed in the HPI.     PHYSICAL EXAMINATION:  Gen: NAD  Pulm: No increased WOB on RA  Cards: WWP    ASSESSMENT/PLAN:    Bladder Cancer (T1 high grade) (ICD-10: C67.9)  - Assessment: T1 high grade bladder cancer, limited to the bladder with no evidence of " spread outside the bladder on imaging.  - Plan:    - Schedule repeat TURBT for further resection and staging    - Consider intravesical therapy (BCG or chemotherapy) after repeat TURBT    - Tentative date for repeat TURBT: 04/15/2025 or 05/07/2025  - Counseling: Discussed the nature of T1 bladder cancer, its potential for progression, and the need for aggressive management. Explained the staging system and the importance of repeat TURBT for complete resection and accurate staging. Risks, benefits, and alternatives were discussed including but not limited to bleeding, infection, bladder perforation, and the potential need for further treatments. Discussed the possibility of intravesical therapy after repeat TURBT, including the process of BCG instillation and its potential side effects such as increased urinary frequency, burning with urination, hematuria, flu-like symptoms, and the rare risk of severe infection (sepsis).    Anticoagulation Management  - Plan:     - Patient to restart Eliquis now    - Will need to hold anticoagulation for repeat TURBT procedure    Follow-up  - Schedule repeat TURBT, likely at either this location or [facility], based on patient preference and availability  - Patient to have short-term catheter placement post-procedure, likely for a couple of days  - Discuss intravesical therapy options after repeat TURBT results are available

## 2025-03-31 NOTE — PATIENT INSTRUCTIONS
**Assessment and Plan Summary for Mr. Harrington**    **Assessment:**  - You have been diagnosed with T1 high-grade bladder cancer. This means the cancer is limited to the bladder and has not spread outside of it.    **Plan:**  - We will schedule a repeat Transurethral Resection of Bladder Tumor (TURBT) to ensure all cancerous tissue is removed.  - After the surgery, you may need to have a catheter for a few days.  - Following recovery from the surgery, we will administer six weekly treatments of a medicine called BCG directly into your bladder. This treatment uses your body's immune system to help fight the cancer.  - We will monitor your bladder's response to the treatment and perform follow-up evaluations to ensure the cancer is being effectively managed.    Please let us know if you have any questions or concerns about the upcoming procedures and treatments.

## 2025-04-10 ENCOUNTER — APPOINTMENT (OUTPATIENT)
Dept: CARDIOLOGY | Facility: CLINIC | Age: 85
End: 2025-04-10
Payer: MEDICARE

## 2025-04-10 VITALS
HEIGHT: 70 IN | DIASTOLIC BLOOD PRESSURE: 70 MMHG | WEIGHT: 173.6 LBS | HEART RATE: 66 BPM | BODY MASS INDEX: 24.85 KG/M2 | SYSTOLIC BLOOD PRESSURE: 128 MMHG

## 2025-04-10 DIAGNOSIS — I71.21 ANEURYSM OF ASCENDING AORTA WITHOUT RUPTURE: ICD-10-CM

## 2025-04-10 DIAGNOSIS — I10 PRIMARY HYPERTENSION: ICD-10-CM

## 2025-04-10 DIAGNOSIS — E78.2 MIXED HYPERLIPIDEMIA: ICD-10-CM

## 2025-04-10 DIAGNOSIS — Z95.1 HISTORY OF CORONARY ARTERY BYPASS GRAFT: ICD-10-CM

## 2025-04-10 DIAGNOSIS — I25.5 CARDIOMYOPATHY, ISCHEMIC: ICD-10-CM

## 2025-04-10 DIAGNOSIS — I25.10 CORONARY ARTERY DISEASE INVOLVING NATIVE CORONARY ARTERY OF NATIVE HEART WITHOUT ANGINA PECTORIS: ICD-10-CM

## 2025-04-10 DIAGNOSIS — I48.91 ATRIAL FIBRILLATION WITH CONTROLLED VENTRICULAR RESPONSE (MULTI): ICD-10-CM

## 2025-04-10 DIAGNOSIS — G47.33 OSA (OBSTRUCTIVE SLEEP APNEA): ICD-10-CM

## 2025-04-10 DIAGNOSIS — I48.91 ATRIAL FIBRILLATION BY ELECTROCARDIOGRAM (MULTI): ICD-10-CM

## 2025-04-10 DIAGNOSIS — Z87.891 FORMER SMOKER: ICD-10-CM

## 2025-04-10 DIAGNOSIS — I50.41 ACUTE COMBINED SYSTOLIC AND DIASTOLIC HEART FAILURE: ICD-10-CM

## 2025-04-10 PROCEDURE — 3074F SYST BP LT 130 MM HG: CPT | Performed by: INTERNAL MEDICINE

## 2025-04-10 PROCEDURE — 3078F DIAST BP <80 MM HG: CPT | Performed by: INTERNAL MEDICINE

## 2025-04-10 PROCEDURE — 1036F TOBACCO NON-USER: CPT | Performed by: INTERNAL MEDICINE

## 2025-04-10 PROCEDURE — 1157F ADVNC CARE PLAN IN RCRD: CPT | Performed by: INTERNAL MEDICINE

## 2025-04-10 PROCEDURE — 99214 OFFICE O/P EST MOD 30 MIN: CPT | Performed by: INTERNAL MEDICINE

## 2025-04-10 PROCEDURE — 1123F ACP DISCUSS/DSCN MKR DOCD: CPT | Performed by: INTERNAL MEDICINE

## 2025-04-10 PROCEDURE — 1159F MED LIST DOCD IN RCRD: CPT | Performed by: INTERNAL MEDICINE

## 2025-04-10 ASSESSMENT — ENCOUNTER SYMPTOMS
NEUROLOGICAL NEGATIVE: 1
CONSTITUTIONAL NEGATIVE: 1
IRREGULAR HEARTBEAT: 1
RESPIRATORY NEGATIVE: 1

## 2025-04-10 NOTE — PROGRESS NOTES
CARDIOLOGY OFFICE VISIT      CHIEF COMPLAINT  Chief Complaint   Patient presents with    Follow-up     Pt is here today following up after 2 months, med change        HISTORY OF PRESENT ILLNESS     the patient states that since I saw him last he was in the hospital at [facility] for about 11 days.  He was found to have bladder cancer.  He had surgery for that.  He states that he needs to have 1 more surgery as he did not completely remove the entire cancer.  He is then going to undergo chemotherapy the end of May.  He did not have any cardiac problems with the surgery or in the postsurgical period.  He has not had any problems with bleeding.  He states that he feels stable from a cardiac standpoint.  He is not having any chest discomfort.  He denies dyspnea.  His weight has been stable.  He has a little bit of swelling of his lower extremities but nothing severe.  He is probably only taken the diuretic 3 times since he has been out of the hospital.  I would keep him off spironolactone because his potassium was borderline high in the past.  Once he gets done with his surgeries and has started chemotherapy I am going to try to increase his Entresto.  I will see him back in the middle the end of June for further evaluation.      Impression:  Coronary disease no angina  Remote CABG  Former smoker  Hyperlipidemia  Chronic systolic congestive heart failure  Permanent atrial fibrillation  Ischemic cardiomyopathy left ventricular ejection fraction 30 to 35% on echocardiogram   Overweight  History of aneurysm of ascending thoracic aorta  Obstructive Sleep Apnea with CPAP   Moderate Mitral Regurgitation  Moderate tricuspid regurgitation  Pulmonary hypertension        Please excuse any errors in grammar or translation related to this dictation.  Voice recognition software was utilized to prepare this document    Past Medical History  Past Medical History:   Diagnosis Date    Abnormal ECG     Arrhythmia     Atrial  fibrillation (Multi)     CHF (congestive heart failure)     Coronary artery disease     COVID-19 2022    COVID-19    Epilepsy, unspecified, not intractable, without status epilepticus 2020    Epilepsy    Hyperlipidemia     Hypertension     Ischemic cardiomyopathy     Myocardial infarction (Multi)     Unspecified convulsions (Multi) 2021    Seizures       Social History  Social History     Tobacco Use    Smoking status: Former     Current packs/day: 0.00     Average packs/day: 1 pack/day for 40.0 years (40.0 ttl pk-yrs)     Types: Cigarettes     Start date: 1957     Quit date:      Years since quittin.2    Smokeless tobacco: Never   Vaping Use    Vaping status: Never Used   Substance Use Topics    Alcohol use: Yes     Alcohol/week: 12.0 standard drinks of alcohol     Types: 12 Standard drinks or equivalent per week     Comment: daily    Drug use: Never       Family History     Family History   Problem Relation Name Age of Onset    Heart failure Mother Nandaly     Emphysema Father Saroj John.     Lung disease Father Saroj John.     Bipolar disorder Brother          Allergies:  No Known Allergies     Outpatient Medications:  Current Outpatient Medications   Medication Instructions    ALPRAZolam (XANAX) 0.5 mg, oral, 3 times daily PRN    apixaban (Eliquis) 5 mg tablet ON HOLD PER CDO 3/20/25    atorvastatin (LIPITOR) 20 mg, oral, Daily, as directed    b complex vitamins capsule 1 capsule, Daily    cholecalciferol (Vitamin D-3) 125 MCG (5000 UT) capsule 1 tablet, Daily    dutasteride (AVODART) 0.5 mg, oral, Daily    furosemide (Lasix) 20 mg tablet Take one daily ,if weight gain of 3 lbs in one day or 5 lbs in one week take another tablet that day until weight is back to normal    Jardiance 10 mg, oral, Daily    levETIRAcetam (Keppra) 500 mg tablet TAKE 1/2 (ONE-HALF) OF A TABLET BY MOUTH DAILY    metoprolol succinate XL (TOPROL-XL) 25 mg, oral, 2 times daily    mirtazapine (REMERON) 15 mg,  oral, Nightly    sacubitriL-valsartan (Entresto) 24-26 mg tablet 1 tablet, oral, 2 times daily    sertraline (ZOLOFT) 100 mg, oral, Daily          REVIEW OF SYSTEMS  Review of Systems   Constitutional: Negative.   Cardiovascular:  Positive for irregular heartbeat.   Respiratory: Negative.     Neurological: Negative.    All other systems reviewed and are negative.        VITALS  Vitals:    04/10/25 1505   BP: 128/70   Pulse: 66       PHYSICAL EXAM  Constitutional:       Appearance: Healthy appearance.   Eyes:      Pupils: Pupils are equal, round, and reactive to light.   Pulmonary:      Effort: Pulmonary effort is normal.      Breath sounds: Normal breath sounds.   Cardiovascular:      PMI at left midclavicular line. Normal rate. Irregularly irregular rhythm.      Murmurs:  3/6  murmur Holo-systolic at apex       No gallop.  No click. No rub.   Pulses:     Intact distal pulses.   Edema:     Comments: Trace to 1+ bilateral  pre-tiba  Musculoskeletal: Normal range of motion.      Cervical back: Normal range of motion. Skin:     General: Skin is warm and dry.   Neurological:      General: No focal deficit present.      Mental Status: Alert and oriented to person, place and time.           ASSESSMENT AND PLAN  Problem List Items Addressed This Visit       CAD (coronary artery disease)    HLD (hyperlipidemia)    HTN (hypertension)    Aneurysm of ascending aorta without rupture    Atrial fibrillation with controlled ventricular response (Multi)    History of coronary artery bypass graft    Acute combined systolic and diastolic heart failure    Former smoker    WINNIE (obstructive sleep apnea)    Cardiomyopathy, ischemic    BMI 24.0-24.9, adult       [unfilled]      I,Harpal Schaefer LPN   am scribing for, and in the presence of Dr. Heriberto Martinez MD   .    I, Dr. Heriberto Martinez MD   , personally performed the services described in the documentation as scribed by Harpal Schaefer LPN   in my presence,  and confirm it is both accurate and complete.      Dr. Heriberto Burdick MD  Thank you for allowing me to participate in the care of this patient. Please do not hesitate to contact me with any further questions or concerns.

## 2025-04-10 NOTE — PATIENT INSTRUCTIONS
Continue same medications and treatments.   Patient educated on proper medication use.   Patient educated on risk factor modification.   Please bring any lab results from other providers / physicians to your next appointment.     Please bring all medicines, vitamins, and herbal supplements with you when you come to the office.     Prescriptions will not be filled unless you are compliant with your follow up appointments or have a follow up appointment scheduled as per instruction of your physician. Refills should be requested at the time of your visit.  3rd week of [place] visit

## 2025-04-14 DIAGNOSIS — I50.43 CHF (CONGESTIVE HEART FAILURE), NYHA CLASS I, ACUTE ON CHRONIC, COMBINED: ICD-10-CM

## 2025-04-21 DIAGNOSIS — I50.41 ACUTE COMBINED SYSTOLIC AND DIASTOLIC HEART FAILURE: ICD-10-CM

## 2025-04-21 DIAGNOSIS — F41.9 ANXIETY: ICD-10-CM

## 2025-04-21 RX ORDER — ALPRAZOLAM 0.5 MG/1
0.5 TABLET ORAL 3 TIMES DAILY PRN
Qty: 45 TABLET | Refills: 0 | Status: SHIPPED | OUTPATIENT
Start: 2025-04-21 | End: 2025-05-21

## 2025-04-21 NOTE — TELEPHONE ENCOUNTER
Received request for prescription refills for patient.   Patient follows with Dr. Valle    Request is for entresto  Is patient currently on medication yes, per dictation 4/10/2025.    Last OV 4/10/2025  Next OV 6/19/2025    Pended for signing and sent to provider

## 2025-04-23 ENCOUNTER — LAB (OUTPATIENT)
Dept: LAB | Facility: HOSPITAL | Age: 85
End: 2025-04-23
Payer: MEDICARE

## 2025-04-23 ENCOUNTER — PRE-ADMISSION TESTING (OUTPATIENT)
Dept: PREADMISSION TESTING | Facility: HOSPITAL | Age: 85
End: 2025-04-23
Payer: MEDICARE

## 2025-04-23 VITALS
DIASTOLIC BLOOD PRESSURE: 57 MMHG | TEMPERATURE: 97.2 F | OXYGEN SATURATION: 99 % | RESPIRATION RATE: 16 BRPM | HEART RATE: 77 BPM | WEIGHT: 169.97 LBS | BODY MASS INDEX: 24.33 KG/M2 | SYSTOLIC BLOOD PRESSURE: 121 MMHG | HEIGHT: 70 IN

## 2025-04-23 DIAGNOSIS — Z01.818 PREOP EXAMINATION: Primary | ICD-10-CM

## 2025-04-23 DIAGNOSIS — C67.1 MALIGNANT NEOPLASM OF DOME OF URINARY BLADDER (MULTI): ICD-10-CM

## 2025-04-23 DIAGNOSIS — R82.90 UNSPECIFIED ABNORMAL FINDINGS IN URINE: ICD-10-CM

## 2025-04-23 DIAGNOSIS — C67.1 MALIGNANT NEOPLASM OF DOME OF BLADDER (MULTI): Primary | ICD-10-CM

## 2025-04-23 LAB
ANION GAP SERPL CALC-SCNC: 14 MMOL/L (ref 10–20)
BUN SERPL-MCNC: 33 MG/DL (ref 6–23)
CALCIUM SERPL-MCNC: 9.2 MG/DL (ref 8.6–10.3)
CHLORIDE SERPL-SCNC: 105 MMOL/L (ref 98–107)
CO2 SERPL-SCNC: 23 MMOL/L (ref 21–32)
CREAT SERPL-MCNC: 1.67 MG/DL (ref 0.5–1.3)
EGFRCR SERPLBLD CKD-EPI 2021: 39 ML/MIN/1.73M*2
ERYTHROCYTE [DISTWIDTH] IN BLOOD BY AUTOMATED COUNT: 16.2 % (ref 11.5–14.5)
GLUCOSE SERPL-MCNC: 81 MG/DL (ref 74–99)
HCT VFR BLD AUTO: 36.7 % (ref 41–52)
HGB BLD-MCNC: 10.4 G/DL (ref 13.5–17.5)
MCH RBC QN AUTO: 25.1 PG (ref 26–34)
MCHC RBC AUTO-ENTMCNC: 28.3 G/DL (ref 32–36)
MCV RBC AUTO: 89 FL (ref 80–100)
NRBC BLD-RTO: 0 /100 WBCS (ref 0–0)
PLATELET # BLD AUTO: 142 X10*3/UL (ref 150–450)
POTASSIUM SERPL-SCNC: 5 MMOL/L (ref 3.5–5.3)
RBC # BLD AUTO: 4.14 X10*6/UL (ref 4.5–5.9)
SODIUM SERPL-SCNC: 137 MMOL/L (ref 136–145)
WBC # BLD AUTO: 6.4 X10*3/UL (ref 4.4–11.3)

## 2025-04-23 PROCEDURE — 80048 BASIC METABOLIC PNL TOTAL CA: CPT

## 2025-04-23 PROCEDURE — 85027 COMPLETE CBC AUTOMATED: CPT

## 2025-04-23 PROCEDURE — 87086 URINE CULTURE/COLONY COUNT: CPT | Mod: STJLAB | Performed by: UROLOGY

## 2025-04-23 PROCEDURE — 99202 OFFICE O/P NEW SF 15 MIN: CPT | Performed by: NURSE PRACTITIONER

## 2025-04-23 ASSESSMENT — DUKE ACTIVITY SCORE INDEX (DASI)
CAN YOU WALK A BLOCK OR TWO ON LEVEL GROUND: YES
CAN YOU PARTICIPATE IN MODERATE RECREATIONAL ACTIVITIES LIKE GOLF, BOWLING, DANCING, DOUBLES TENNIS OR THROWING A BASEBALL OR FOOTBALL: NO
CAN YOU DO MODERATE WORK AROUND THE HOUSE LIKE VACUUMING, SWEEPING FLOORS OR CARRYING GROCERIES: YES
CAN YOU DO LIGHT WORK AROUND THE HOUSE LIKE DUSTING OR WASHING DISHES: NO
CAN YOU RUN A SHORT DISTANCE: YES
CAN YOU CLIMB A FLIGHT OF STAIRS OR WALK UP A HILL: YES
CAN YOU HAVE SEXUAL RELATIONS: NO
CAN YOU DO HEAVY WORK AROUND THE HOUSE LIKE SCRUBBING FLOORS OR LIFTING AND MOVING HEAVY FURNITURE: NO
CAN YOU WALK INDOORS, SUCH AS AROUND YOUR HOUSE: YES
TOTAL_SCORE: 24.25
DASI METS SCORE: 5.7
CAN YOU DO YARD WORK LIKE RAKING LEAVES, WEEDING OR PUSHING A MOWER: NO
CAN YOU TAKE CARE OF YOURSELF (EAT, DRESS, BATHE, OR USE TOILET): YES
CAN YOU PARTICIPATE IN STRENOUS SPORTS LIKE SWIMMING, SINGLES TENNIS, FOOTBALL, BASKETBALL, OR SKIING: NO

## 2025-04-23 ASSESSMENT — PAIN - FUNCTIONAL ASSESSMENT: PAIN_FUNCTIONAL_ASSESSMENT: 0-10

## 2025-04-23 ASSESSMENT — PAIN SCALES - GENERAL: PAINLEVEL_OUTOF10: 0 - NO PAIN

## 2025-04-23 ASSESSMENT — ACTIVITIES OF DAILY LIVING (ADL): ADL_SCORE: 0

## 2025-04-23 ASSESSMENT — LIFESTYLE VARIABLES: SMOKING_STATUS: NONSMOKER

## 2025-04-23 NOTE — PREPROCEDURE INSTRUCTIONS
Medication List            Accurate as of April 23, 2025 11:05 AM. Always use your most recent med list.                ALPRAZolam 0.5 mg tablet  Commonly known as: Xanax  Take 1 tablet (0.5 mg) by mouth 3 times a day as needed for anxiety.  Medication Adjustments for Surgery: Take/Use as prescribed     apixaban 5 mg tablet  Commonly known as: Eliquis  ON HOLD PER CDO 3/20/25  Additional Medication Adjustments for Surgery: Other (Comment)  Notes to patient: Coordinate with prescribing provider for further instructions on this medications prior to surgery.     atorvastatin 20 mg tablet  Commonly known as: Lipitor  TAKE 1 TABLET BY MOUTH ONCE DAILY AS DIRECTED  Medication Adjustments for Surgery: Take/Use as prescribed     b complex vitamins capsule  Additional Medication Adjustments for Surgery: Take last dose 7 days before surgery  Notes to patient: STOP all NSAIDS (Ibuprofen, Motrin, Aleve), vitamins, herbals, supplements, and all over the counter medications for 7 days prior to surgery    Tylenol (Acetaminophen) is okay to take up until the morning of surgery for pain or headache as needed      cholecalciferol 125 mcg (5,000 units) capsule  Commonly known as: Vitamin D-3  Additional Medication Adjustments for Surgery: Take last dose 7 days before surgery     dutasteride 0.5 mg capsule  Commonly known as: Avodart  TAKE 1 CAPSULE BY MOUTH ONCE DAILY  Medication Adjustments for Surgery: Take/Use as prescribed     empagliflozin 10 mg tablet  Commonly known as: Jardiance  Take 1 tablet (10 mg) by mouth once daily.  Medication Adjustments for Surgery: Take last dose 3 days before surgery     furosemide 20 mg tablet  Commonly known as: Lasix  Take one daily ,if weight gain of 3 lbs in one day or 5 lbs in one week take another tablet that day until weight is back to normal  Medication Adjustments for Surgery: Take/Use as prescribed     levETIRAcetam 500 mg tablet  Commonly known as: Keppra  TAKE 1/2 (ONE-HALF) OF A  TABLET BY MOUTH DAILY  Medication Adjustments for Surgery: Take/Use as prescribed     metoprolol succinate XL 25 mg 24 hr tablet  Commonly known as: Toprol-XL  TAKE 1 TABLET BY MOUTH TWICE DAILY  Medication Adjustments for Surgery: Take/Use as prescribed     mirtazapine 15 mg tablet  Commonly known as: Remeron  TAKE 1 TABLET BY MOUTH AT BEDTIME  Medication Adjustments for Surgery: Take/Use as prescribed     sacubitriL-valsartan 24-26 mg tablet  Commonly known as: Entresto  Take 1 tablet by mouth 2 times a day.  Additional Medication Adjustments for Surgery: Other (Comment)  Notes to patient: HOLD any evening dose the night before the day of surgery  HOLD the day of surgery     sertraline 100 mg tablet  Commonly known as: Zoloft  Take 1 tablet (100 mg) by mouth once daily.  Medication Adjustments for Surgery: Take/Use as prescribed                          /Home Preoperative Antibacterial Shower with Chlorhexidine gluconate (CHG)     What is a home preoperative antibacterial shower?  This shower is a way of cleaning the skin with a germ killing solution before surgery. The solution contains chlorhexidine gluconate, commonly known as CHG. CHG is a skin cleanser with germ killing ability. Let your doctor know if you are allergic to chlorhexidine.    Why do I need to take a preoperative antibacterial shower?  Skin is not sterile. It is best to try to make your skin as free of germs as possible before surgery. Proper cleansing with a germ killing soap before surgery can lower the number of germs on your skin. This helps to reduce the risk of infection at the surgical site. Following the instructions listed below will help you prepare your skin for surgery.    How do I use the solution?  Begin using your CHG soap the night before and again the morning of your procedure.   Do not shave the day before or day of surgery.  Remove all jewelry until after surgery. Take off rings and take out all body-piercing jewelry.  Wash  your face and hair with normal soap and shampoo before you use the CHG soap.  Apply the CHG solution to a clean wet washcloth. Move away from the water to avoid premature rinsing of the CHG soap as you are applying. Firmly lather your entire body from the neck down. Do not use CHG on your face, eyes, ears, or genitals.   Pay special attention to the area where your incisions will be located.  Do not scrub your skin too hard.  It is important to allow the CHG soap to sit on your skin for 3-5 minutes.  Rinse the solution off your body completely. Do not wash with your normal soap after the CHG soap solution.  Pat yourself dry with a clean, soft towel.  Do not apply powders, lotions or deodorants as these might block how the CHG soap works.   Dress in clean clothing.  Be sure to sleep with clean, freshly laundered sheets.  Be aware that CHG can cause stains on fabric. Rinse your washcloth and other linens that have contact with CHG completely. Use only non-chlorine detergents to launder the items used.      /    PRE-OPERATIVE INSTRUCTIONS    You will receive notification one business day prior to your procedure to confirm your arrival time. It is important that you answer your phone and/or check your messages during this time. If you do not hear from the surgery center by 5 pm. the day before your procedure, please call [phone].     Please enter the building through the Outpatient entrance and take the elevator off the lobby to the 2nd floor then check in at the Outpatient Surgery desk on the 2nd floor.    INSTRUCTIONS:  Talk to your surgeon for instructions if you should stop your aspirin, blood thinner, or diabetes medicines.  DO NOT take any multivitamins or over the counter supplements for 7-10 days before surgery.  If not being admitted, you must have an adult immediately available to drive you home after surgery. We also highly recommend you have someone stay with you for the entire day and night of your  surgery.  For children having surgery, a parent or legal guardian must accompany them to the surgery center. If this is not possible, please call [phone] to make additional arrangements.  For adults who are unable to consent or make medical decisions for themselves, a legal guardian or Power of  must accompany them to the surgery center. If this is not possible, please call [phone] to make additional arrangements.  Wear comfortable, loose fitting clothing.  All jewelry and piercings must be removed. If you are unable to remove an item or have a dermal piercing, please be sure to tell the nurse when you arrive for surgery.  Nail polish and make-up must be removed.  Avoid smoking or consuming alcohol for 24 hours before surgery.  To help prevent infection, please take a shower/bath and wash your hair the night before and/or morning of surgery (or follow other specific bathing instructions provided).    Preoperative Fasting Guidelines    Why must I stop eating and drinking near surgery time?  With sedation, food or liquid in your stomach can enter your lungs causing serious complications  Increases nausea and vomiting    When do I need to stop eating and drinking before my surgery?  Do not eat any solid food after midnight the night before your surgery/procedure unless otherwise instructed by your surgeon.   If you take a GLP-1 medication (ex: Trulicity, Ozempic, Mounjaro, Zepbound, Bydyreon, Tanzeun, Saxenda, Victoza, Adlyxin, Rybelus) please follow other specific instructions provided regarding preoperative          fasting.  You may have up to 13.5 ounces of clear liquid until TWO hours before your instructed arrival time to the hospital.  This includes water, black tea/coffee, (no milk or cream) apple juice, and electrolyte drinks (Gatorade).   You may chew gum until TWO hours before your surgery/procedure         If applicable, notify your surgeons office immediately of any new skin changes that  occur to the surgical limb.      If you have any questions or concerns, please call Pre-Admission Testing at [phone].

## 2025-04-23 NOTE — PREPROCEDURE INSTRUCTIONS
Thank you for visiting Preadmission Testing at [facility]. If you have any changes to your health condition, please call the SURGEON's office to alert them and give them details of your symptoms.        Preoperative Brain Exercises    What are brain exercises?  A brain exercise is any activity that engages your thinking (cognitive) skills.    What types of activities are considered brain exercises?  Jigsaw puzzles, crossword puzzles, word jumble, memory games, word search, and many more.  Many can be found free online or on your phone via a mobile zander.    Why should I do brain exercises before my surgery?  More recent research has shown brain exercise before surgery can lower the risk of postoperative delirium (confusion) which can be especially important for older adults.  Patients who did brain exercises for 5 to 10 hours the days before surgery, cut their risk of postoperative delirium in half up to 1 week after surgery.      Preoperative Deep Breathing Exercises    Why it is important to do deep breathing exercises before my surgery?  Deep breathing exercises strengthen your breathing muscles.  This helps you to recover after your surgery and decreases the chance of breathing complications.    How are the deep breathing exercises done?  Sit straight with your back supported.  Breathe in deeply and slowly through your nose. Your lower rib cage should expand and your abdomen may move forward.  Hold that breath for 3 to 5 seconds.  Breathe out through pursed lips, slowly and completely.  Rest and repeat 10 times every hour while awake.  Rest longer if you become dizzy or lightheaded.      Patient and Family Education   Ways You Can Help Prevent Blood Clots     This handout explains some simple things you can do to help prevent blood clots.      Blood clots are blockages that can form in the body's veins. When a blood clot forms in your deep veins, it may be called a deep vein thrombosis, or DVT for short. Blood clots can  happen in any part of the body where blood flows, but they are most common in the arms and legs. If a piece of a blood clot breaks free and travels to the lungs, it is called a pulmonary embolus (PE). A PE can be a very serious problem.      Being in the hospital or having surgery can raise your chances of getting a blood clot because you may not be well enough to move around as much as you normally do.      Ways you can help prevent blood clots in the hospital         Wearing SCDs. SCDs stands for Sequential Compression Devices.   SCDs are special sleeves that wrap around your legs  They attach to a pump that fills them with air to gently squeeze your legs every few minutes.   This helps return the blood in your legs to your heart.   SCDs should only be taken off when walking or bathing.   SCDs may not be comfortable, but they can help save your life.               Wearing compression stockings - if your doctor orders them. These special snug fitting stockings gently squeeze your legs to help blood flow.       Walking. Walking helps move the blood in your legs.   If your doctor says it is ok, try walking the halls at least   5 times a day. Ask us to help you get up, so you don't fall.      Taking any blood thinning medicines your doctor orders.          ©[facility]; 3/23        Ways you can help prevent blood clots at home       Wearing compression stockings - if your doctor orders them. ? Walking - to help move the blood in your legs.       Taking any blood thinning medicines your doctor orders.      Signs of a blood clot or PE      Tell your doctor or nurse know right away if you have of the problems listed below.    If you are at home, seek medical care right away. Call 911 for chest pain or problems breathing.          Signs of a blood clot (DVT) - such as pain,  swelling, redness or warmth in your arm or leg      Signs of a pulmonary embolism (PE) - such as chest     pain or feeling short of breath

## 2025-04-24 ENCOUNTER — PATIENT OUTREACH (OUTPATIENT)
Dept: PRIMARY CARE | Facility: CLINIC | Age: 85
End: 2025-04-24
Payer: MEDICARE

## 2025-04-24 LAB — BACTERIA UR CULT: NO GROWTH

## 2025-04-30 DIAGNOSIS — N17.9 ACUTE KIDNEY INJURY: ICD-10-CM

## 2025-04-30 NOTE — PROGRESS NOTES
Left message that Dr. Washington would like a renal ultrasound completed and gave radiology scheduling lines number.

## 2025-05-01 ENCOUNTER — HOSPITAL ENCOUNTER (OUTPATIENT)
Dept: RADIOLOGY | Facility: CLINIC | Age: 85
Discharge: HOME | End: 2025-05-01
Payer: MEDICARE

## 2025-05-01 DIAGNOSIS — N17.9 ACUTE KIDNEY INJURY: ICD-10-CM

## 2025-05-01 PROCEDURE — 76770 US EXAM ABDO BACK WALL COMP: CPT

## 2025-05-06 ENCOUNTER — ANESTHESIA EVENT (OUTPATIENT)
Dept: OPERATING ROOM | Facility: HOSPITAL | Age: 85
End: 2025-05-06
Payer: MEDICARE

## 2025-05-07 ENCOUNTER — HOSPITAL ENCOUNTER (OUTPATIENT)
Facility: HOSPITAL | Age: 85
Setting detail: OUTPATIENT SURGERY
Discharge: HOME | End: 2025-05-07
Attending: UROLOGY | Admitting: UROLOGY
Payer: MEDICARE

## 2025-05-07 ENCOUNTER — PHARMACY VISIT (OUTPATIENT)
Dept: PHARMACY | Facility: CLINIC | Age: 85
End: 2025-05-07
Payer: MEDICARE

## 2025-05-07 ENCOUNTER — ANESTHESIA (OUTPATIENT)
Dept: OPERATING ROOM | Facility: HOSPITAL | Age: 85
End: 2025-05-07
Payer: MEDICARE

## 2025-05-07 VITALS
OXYGEN SATURATION: 96 % | HEIGHT: 70 IN | BODY MASS INDEX: 24.34 KG/M2 | WEIGHT: 170 LBS | DIASTOLIC BLOOD PRESSURE: 56 MMHG | TEMPERATURE: 96.8 F | SYSTOLIC BLOOD PRESSURE: 112 MMHG | RESPIRATION RATE: 18 BRPM | HEART RATE: 77 BPM

## 2025-05-07 DIAGNOSIS — C67.1 MALIGNANT NEOPLASM OF DOME OF URINARY BLADDER (MULTI): ICD-10-CM

## 2025-05-07 PROCEDURE — 3600000008 HC OR TIME - EACH INCREMENTAL 1 MINUTE - PROCEDURE LEVEL THREE: Performed by: UROLOGY

## 2025-05-07 PROCEDURE — 2500000005 HC RX 250 GENERAL PHARMACY W/O HCPCS: Performed by: NURSE ANESTHETIST, CERTIFIED REGISTERED

## 2025-05-07 PROCEDURE — 99100 ANES PT EXTEME AGE<1 YR&>70: CPT | Performed by: ANESTHESIOLOGY

## 2025-05-07 PROCEDURE — 2500000004 HC RX 250 GENERAL PHARMACY W/ HCPCS (ALT 636 FOR OP/ED): Performed by: UROLOGY

## 2025-05-07 PROCEDURE — 2500000004 HC RX 250 GENERAL PHARMACY W/ HCPCS (ALT 636 FOR OP/ED): Mod: JZ | Performed by: NURSE ANESTHETIST, CERTIFIED REGISTERED

## 2025-05-07 PROCEDURE — 3700000001 HC GENERAL ANESTHESIA TIME - INITIAL BASE CHARGE: Performed by: UROLOGY

## 2025-05-07 PROCEDURE — A52235 PR CYSTOURETHROSCOPY,FULGUR 2-5 CM LESN: Performed by: NURSE ANESTHETIST, CERTIFIED REGISTERED

## 2025-05-07 PROCEDURE — 7100000009 HC PHASE TWO TIME - INITIAL BASE CHARGE: Performed by: UROLOGY

## 2025-05-07 PROCEDURE — 7100000001 HC RECOVERY ROOM TIME - INITIAL BASE CHARGE: Performed by: UROLOGY

## 2025-05-07 PROCEDURE — RXMED WILLOW AMBULATORY MEDICATION CHARGE

## 2025-05-07 PROCEDURE — A52235 PR CYSTOURETHROSCOPY,FULGUR 2-5 CM LESN: Performed by: ANESTHESIOLOGY

## 2025-05-07 PROCEDURE — 2720000007 HC OR 272 NO HCPCS: Performed by: UROLOGY

## 2025-05-07 PROCEDURE — 2500000005 HC RX 250 GENERAL PHARMACY W/O HCPCS: Performed by: ANESTHESIOLOGY

## 2025-05-07 PROCEDURE — 7100000010 HC PHASE TWO TIME - EACH INCREMENTAL 1 MINUTE: Performed by: UROLOGY

## 2025-05-07 PROCEDURE — 52235 CYSTOSCOPY AND TREATMENT: CPT | Performed by: UROLOGY

## 2025-05-07 PROCEDURE — 2500000001 HC RX 250 WO HCPCS SELF ADMINISTERED DRUGS (ALT 637 FOR MEDICARE OP): Performed by: UROLOGY

## 2025-05-07 PROCEDURE — 88307 TISSUE EXAM BY PATHOLOGIST: CPT | Mod: TC,STJLAB | Performed by: UROLOGY

## 2025-05-07 PROCEDURE — 3700000002 HC GENERAL ANESTHESIA TIME - EACH INCREMENTAL 1 MINUTE: Performed by: UROLOGY

## 2025-05-07 PROCEDURE — 3600000003 HC OR TIME - INITIAL BASE CHARGE - PROCEDURE LEVEL THREE: Performed by: UROLOGY

## 2025-05-07 PROCEDURE — 88307 TISSUE EXAM BY PATHOLOGIST: CPT | Performed by: STUDENT IN AN ORGANIZED HEALTH CARE EDUCATION/TRAINING PROGRAM

## 2025-05-07 PROCEDURE — 7100000002 HC RECOVERY ROOM TIME - EACH INCREMENTAL 1 MINUTE: Performed by: UROLOGY

## 2025-05-07 RX ORDER — DIPHENHYDRAMINE HYDROCHLORIDE 50 MG/ML
12.5 INJECTION, SOLUTION INTRAMUSCULAR; INTRAVENOUS ONCE AS NEEDED
Status: DISCONTINUED | OUTPATIENT
Start: 2025-05-07 | End: 2025-05-07 | Stop reason: HOSPADM

## 2025-05-07 RX ORDER — METOCLOPRAMIDE HYDROCHLORIDE 5 MG/ML
10 INJECTION INTRAMUSCULAR; INTRAVENOUS ONCE AS NEEDED
Status: DISCONTINUED | OUTPATIENT
Start: 2025-05-07 | End: 2025-05-07 | Stop reason: HOSPADM

## 2025-05-07 RX ORDER — ONDANSETRON HYDROCHLORIDE 2 MG/ML
4 INJECTION, SOLUTION INTRAVENOUS ONCE AS NEEDED
Status: DISCONTINUED | OUTPATIENT
Start: 2025-05-07 | End: 2025-05-07 | Stop reason: HOSPADM

## 2025-05-07 RX ORDER — ACETAMINOPHEN 325 MG/1
975 TABLET ORAL ONCE
Status: DISCONTINUED | OUTPATIENT
Start: 2025-05-07 | End: 2025-05-07 | Stop reason: HOSPADM

## 2025-05-07 RX ORDER — PROPOFOL 10 MG/ML
INJECTION, EMULSION INTRAVENOUS AS NEEDED
Status: DISCONTINUED | OUTPATIENT
Start: 2025-05-07 | End: 2025-05-07

## 2025-05-07 RX ORDER — LIDOCAINE HYDROCHLORIDE 10 MG/ML
0.1 INJECTION, SOLUTION INFILTRATION; PERINEURAL ONCE
Status: DISCONTINUED | OUTPATIENT
Start: 2025-05-07 | End: 2025-05-07 | Stop reason: HOSPADM

## 2025-05-07 RX ORDER — LABETALOL HYDROCHLORIDE 5 MG/ML
5 INJECTION, SOLUTION INTRAVENOUS ONCE AS NEEDED
Status: DISCONTINUED | OUTPATIENT
Start: 2025-05-07 | End: 2025-05-07 | Stop reason: HOSPADM

## 2025-05-07 RX ORDER — FAMOTIDINE 10 MG/ML
20 INJECTION, SOLUTION INTRAVENOUS ONCE
Status: DISCONTINUED | OUTPATIENT
Start: 2025-05-07 | End: 2025-05-07 | Stop reason: HOSPADM

## 2025-05-07 RX ORDER — OXYCODONE HYDROCHLORIDE 5 MG/1
5 TABLET ORAL EVERY 4 HOURS PRN
Status: DISCONTINUED | OUTPATIENT
Start: 2025-05-07 | End: 2025-05-07 | Stop reason: HOSPADM

## 2025-05-07 RX ORDER — PHENYLEPHRINE 10 MG/250 ML(40 MCG/ML)IN 0.9 % SOD.CHLORIDE INTRAVENOUS
CONTINUOUS PRN
Status: DISCONTINUED | OUTPATIENT
Start: 2025-05-07 | End: 2025-05-07

## 2025-05-07 RX ORDER — ROCURONIUM BROMIDE 50 MG/5 ML
SYRINGE (ML) INTRAVENOUS AS NEEDED
Status: DISCONTINUED | OUTPATIENT
Start: 2025-05-07 | End: 2025-05-07

## 2025-05-07 RX ORDER — OXYCODONE AND ACETAMINOPHEN 5; 325 MG/1; MG/1
1 TABLET ORAL EVERY 4 HOURS PRN
Status: DISCONTINUED | OUTPATIENT
Start: 2025-05-07 | End: 2025-05-07 | Stop reason: HOSPADM

## 2025-05-07 RX ORDER — HYDRALAZINE HYDROCHLORIDE 20 MG/ML
5 INJECTION INTRAMUSCULAR; INTRAVENOUS EVERY 30 MIN PRN
Status: DISCONTINUED | OUTPATIENT
Start: 2025-05-07 | End: 2025-05-07 | Stop reason: HOSPADM

## 2025-05-07 RX ORDER — SODIUM CHLORIDE, SODIUM LACTATE, POTASSIUM CHLORIDE, CALCIUM CHLORIDE 600; 310; 30; 20 MG/100ML; MG/100ML; MG/100ML; MG/100ML
INJECTION, SOLUTION INTRAVENOUS CONTINUOUS PRN
Status: DISCONTINUED | OUTPATIENT
Start: 2025-05-07 | End: 2025-05-07

## 2025-05-07 RX ORDER — METOPROLOL TARTRATE 1 MG/ML
INJECTION, SOLUTION INTRAVENOUS AS NEEDED
Status: DISCONTINUED | OUTPATIENT
Start: 2025-05-07 | End: 2025-05-07

## 2025-05-07 RX ORDER — ACETAMINOPHEN 325 MG/1
975 TABLET ORAL ONCE
Status: COMPLETED | OUTPATIENT
Start: 2025-05-07 | End: 2025-05-07

## 2025-05-07 RX ORDER — DEXMEDETOMIDINE HYDROCHLORIDE 100 UG/ML
INJECTION, SOLUTION INTRAVENOUS AS NEEDED
Status: DISCONTINUED | OUTPATIENT
Start: 2025-05-07 | End: 2025-05-07

## 2025-05-07 RX ORDER — HYDROMORPHONE HYDROCHLORIDE 0.2 MG/ML
0.2 INJECTION INTRAMUSCULAR; INTRAVENOUS; SUBCUTANEOUS EVERY 5 MIN PRN
Status: DISCONTINUED | OUTPATIENT
Start: 2025-05-07 | End: 2025-05-07 | Stop reason: HOSPADM

## 2025-05-07 RX ORDER — CEFAZOLIN SODIUM 2 G/100ML
2 INJECTION, SOLUTION INTRAVENOUS ONCE
Status: COMPLETED | OUTPATIENT
Start: 2025-05-07 | End: 2025-05-07

## 2025-05-07 RX ORDER — CEPHALEXIN 500 MG/1
500 CAPSULE ORAL 3 TIMES DAILY
Qty: 9 CAPSULE | Refills: 0 | Status: SHIPPED | OUTPATIENT
Start: 2025-05-07 | End: 2025-05-10

## 2025-05-07 RX ORDER — SODIUM CHLORIDE, SODIUM LACTATE, POTASSIUM CHLORIDE, CALCIUM CHLORIDE 600; 310; 30; 20 MG/100ML; MG/100ML; MG/100ML; MG/100ML
100 INJECTION, SOLUTION INTRAVENOUS CONTINUOUS
Status: DISCONTINUED | OUTPATIENT
Start: 2025-05-07 | End: 2025-05-07 | Stop reason: HOSPADM

## 2025-05-07 RX ORDER — ONDANSETRON HYDROCHLORIDE 2 MG/ML
INJECTION, SOLUTION INTRAVENOUS AS NEEDED
Status: DISCONTINUED | OUTPATIENT
Start: 2025-05-07 | End: 2025-05-07

## 2025-05-07 RX ORDER — ALBUTEROL SULFATE 0.83 MG/ML
2.5 SOLUTION RESPIRATORY (INHALATION) ONCE AS NEEDED
Status: DISCONTINUED | OUTPATIENT
Start: 2025-05-07 | End: 2025-05-07 | Stop reason: HOSPADM

## 2025-05-07 RX ORDER — FENTANYL CITRATE 50 UG/ML
INJECTION, SOLUTION INTRAMUSCULAR; INTRAVENOUS AS NEEDED
Status: DISCONTINUED | OUTPATIENT
Start: 2025-05-07 | End: 2025-05-07

## 2025-05-07 RX ORDER — LIDOCAINE HYDROCHLORIDE 20 MG/ML
INJECTION, SOLUTION EPIDURAL; INFILTRATION; INTRACAUDAL; PERINEURAL AS NEEDED
Status: DISCONTINUED | OUTPATIENT
Start: 2025-05-07 | End: 2025-05-07

## 2025-05-07 RX ORDER — FUROSEMIDE 10 MG/ML
INJECTION INTRAMUSCULAR; INTRAVENOUS AS NEEDED
Status: DISCONTINUED | OUTPATIENT
Start: 2025-05-07 | End: 2025-05-07

## 2025-05-07 RX ADMIN — ACETAMINOPHEN 975 MG: 325 TABLET ORAL at 06:17

## 2025-05-07 RX ADMIN — FUROSEMIDE 20 MG: 10 INJECTION, SOLUTION INTRAMUSCULAR; INTRAVENOUS at 08:20

## 2025-05-07 RX ADMIN — PROPOFOL 30 MG: 10 INJECTION, EMULSION INTRAVENOUS at 07:45

## 2025-05-07 RX ADMIN — PROPOFOL 10 MG: 10 INJECTION, EMULSION INTRAVENOUS at 07:40

## 2025-05-07 RX ADMIN — SUGAMMADEX 200 MG: 100 INJECTION, SOLUTION INTRAVENOUS at 08:26

## 2025-05-07 RX ADMIN — Medication 45 MG: at 07:41

## 2025-05-07 RX ADMIN — Medication 0.3 MCG/KG/MIN: at 07:52

## 2025-05-07 RX ADMIN — FENTANYL CITRATE 25 MCG: 50 INJECTION, SOLUTION INTRAMUSCULAR; INTRAVENOUS at 08:01

## 2025-05-07 RX ADMIN — SODIUM CHLORIDE, SODIUM LACTATE, POTASSIUM CHLORIDE, AND CALCIUM CHLORIDE: .6; .31; .03; .02 INJECTION, SOLUTION INTRAVENOUS at 07:30

## 2025-05-07 RX ADMIN — DEXAMETHASONE SODIUM PHOSPHATE 2 MG: 4 INJECTION, SOLUTION INTRAMUSCULAR; INTRAVENOUS at 08:30

## 2025-05-07 RX ADMIN — Medication 2 L/MIN: at 08:50

## 2025-05-07 RX ADMIN — ONDANSETRON 4 MG: 2 INJECTION INTRAMUSCULAR; INTRAVENOUS at 08:02

## 2025-05-07 RX ADMIN — DEXAMETHASONE SODIUM PHOSPHATE 4 MG: 4 INJECTION, SOLUTION INTRAMUSCULAR; INTRAVENOUS at 08:02

## 2025-05-07 RX ADMIN — DEXMEDETOMIDINE HYDROCHLORIDE 4 MCG: 100 INJECTION, SOLUTION, CONCENTRATE INTRAVENOUS at 08:05

## 2025-05-07 RX ADMIN — LIDOCAINE HYDROCHLORIDE 90 MG: 20 INJECTION, SOLUTION EPIDURAL; INFILTRATION; INTRACAUDAL; PERINEURAL at 07:39

## 2025-05-07 RX ADMIN — PROPOFOL 60 MG: 10 INJECTION, EMULSION INTRAVENOUS at 07:39

## 2025-05-07 RX ADMIN — FENTANYL CITRATE 25 MCG: 50 INJECTION, SOLUTION INTRAMUSCULAR; INTRAVENOUS at 07:39

## 2025-05-07 RX ADMIN — CEFAZOLIN SODIUM 2 G: 2 INJECTION, SOLUTION INTRAVENOUS at 07:50

## 2025-05-07 RX ADMIN — METOPROLOL TARTRATE 2 MG: 5 INJECTION, SOLUTION INTRAVENOUS at 07:50

## 2025-05-07 SDOH — HEALTH STABILITY: MENTAL HEALTH: CURRENT SMOKER: 0

## 2025-05-07 ASSESSMENT — PAIN - FUNCTIONAL ASSESSMENT
PAIN_FUNCTIONAL_ASSESSMENT: 0-10

## 2025-05-07 ASSESSMENT — PAIN SCALES - GENERAL
PAINLEVEL_OUTOF10: 0 - NO PAIN
PAIN_LEVEL: 0
PAINLEVEL_OUTOF10: 0 - NO PAIN

## 2025-05-07 NOTE — ANESTHESIA PREPROCEDURE EVALUATION
Patient: Minh Harrington Jr.    Procedure Information       Anesthesia Start Date/Time: 05/07/25 0731    Procedure: Transurethral resection of bladder tumor    Location: [facility] OR 01 / [facility]    Surgeons: Anthony Washington MD            Relevant Problems   Cardiac   (+) Aneurysm of ascending aorta without rupture   (+) Atrial fibrillation with controlled ventricular response (Multi)   (+) CAD (coronary artery disease)   (+) HLD (hyperlipidemia)   (+) HTN (hypertension)   (+) History of coronary artery bypass graft   (+) Mitral valve insufficiency   (+) Nonrheumatic tricuspid valve regurgitation      Pulmonary   (+) WINNIE (obstructive sleep apnea)   (+) Pulmonary hypertension (Multi)      Neuro   (+) Anxiety   (+) Carotid artery plaque   (+) Moderate episode of recurrent major depressive disorder   (+) Partial idiopathic epilepsy with seizures of localized onset, not intractable, without status epilepticus      /Renal   (+) BPH (benign prostatic hyperplasia)      Hematology   (+) Thrombocytopenia (CMS-HCC)      ID   (+) Herpes zoster      Skin   (+) Skin cancer       Clinical information reviewed:   Tobacco  Allergies  Meds  Problems  Med Hx  Surg Hx   Fam Hx  Soc   Hx        NPO Detail:  NPO/Void Status  Date of Last Liquid: 05/06/25  Time of Last Liquid: 2000  Date of Last Solid: 05/06/25  Time of Last Solid: 2000  Time of Last Void: 0400         Physical Exam    Airway  Mallampati: II  TM distance: >3 FB  Neck ROM: full  Mouth opening: 3 or more finger widths     Cardiovascular   Rhythm: regular  Rate: normal     Dental - normal exam     Pulmonary Breath sounds clear to auscultation     Abdominal            Anesthesia Plan    History of general anesthesia?: yes  History of complications of general anesthesia?: no    ASA 3     general     The patient is not a current smoker.    intravenous induction   Postoperative pain plan includes opioids.  Anesthetic plan and risks discussed with patient.    Plan  discussed with CRNA.

## 2025-05-07 NOTE — OP NOTE
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  Estimated Blood Loss: 3mL  Intra-op Medications:   Administrations occurring from 730 to 0905 on 25:   Medication Name Total Dose   oxygen (O2) therapy 30 L   ceFAZolin (Ancef) 2 g in dextrose (iso)  mL 2 g   dexAMETHasone (Decadron) 4 mg/mL IV Syringe 2 mL 6 mg   dexmedeTOMIDine (Precedex) 100 mcg/mL 2 mL single dose vial 4 mcg   fentaNYL (Sublimaze) injection 50 mcg/mL 50 mcg   furosemide (Lasix) injection 20 mg   LR infusion Cannot be calculated   lidocaine PF (Xylocaine-MPF) local injection 2 % 90 mg   metoprolol 5 mg/5 mL 2 mg   ondansetron (Zofran) 2 mg/mL injection 4 mg   phenylephrine (Liam-Synephrine) 10 mg/250 mL NS (40 mcg/mL) infusion 0.99 mg   propofol (Diprivan) injection 10 mg/mL 100 mg   rocuronium (Zemuron) 50 mg/5 mL prefilled syringe 45 mg   sugammadex (Bridion) 200 mg/2 mL injection 200 mg              Anesthesia Record               Intraprocedure I/O Totals          Intake    Phenylephrine Drip 24.64 mL    The total shown is the total volume documented since Anesthesia Start was filed.    LR infusion 250.00  mL    ceFAZolin (Ancef) 2 g in dextrose (iso)  mL 100.00 mL    Total Intake 374.64 mL          Specimen:   ID Type Source Tests Collected by Time   1 : BLADDER TUMOR Tissue BLADDER TRANSURETHRAL RESECTION SURGICAL PATHOLOGY EXAM Anthony Washington MD 5/7/2025 0803                 Drains and/or Catheters:   Urethral Catheter Non-latex;Straight-tip 20 Fr. (Active)   Site Assessment Clean;Skin intact 05/07/25 0912   Collection Container Standard drainage bag 05/07/25 0912   Securement Method Securing device (Describe) 05/07/25 0912   Reason for Continuing Urinary Catheterization surgical procedures: urological/gynecological, pelvic oncology, anal, prolonged surgical procedure 05/07/25 0912     Operative Indications: 85 year old with a history of bladder cancer, recently diagnosed with T1 high-grade bladder cancer and presents now for repeat TURBT. The patient was counseled regarding the risks, benefits, alternatives, equipment, and personnel involved and consented to proceed with surgical intervention.    Operative Findings:   - Area of the bladder involved includes right lateral wall and right anterior/dome  - He had some residual papillary tumor, roughly 3 cm worth of tumor, resected scar around the tumor as well for total resection area of roughly 4 to 5 cm  - Complete resection  - Muscle visualized at the base  - Negative bimanual exam  - He had some bleeding from his anterior bladder neck, cauterized    Operative Procedure:   The patient was correctly identified and the operative plan was confirmed with the patient and the operative team. A weight appropriate dose of prophylactic antibiotics was administered intravenously prior to the procedure and sequential compression devices were applied to the lower extremities and activated prior to induction of anesthesia. The patient was placed in supine position. General anesthesia was induced. The patient was repositioned in dorsal lithotomy position. All pressure  points were padded per protocol and the operative area was prepped and draped in the usual sterile fashion.    The 26F rigid resectoscope was inserted with the visual obturator and complete cystourethroscopy was performed revealing the above-mentioned findings. Using the loop electrocautery, a complete resection of all visible lesions was performed down to the deep layers of the muscularis propria.    Hemostasis of all resection sites was performed using loop electrocautery.     A Pandya catheter was then inserted and there was clear return of urine.     Counts were correct. Sign-out was performed with the surgical team confirming the above listed specimen. The patient tolerated the procedure well, emerged from anesthesia without incident, and was transferred to PACU in stable condition.     I performed the entire procedure.     MD Anthony Mott  Phone Number: [phone]

## 2025-05-07 NOTE — ANESTHESIA PROCEDURE NOTES
Airway  Date/Time: 5/7/2025 7:45 AM  Reason: elective    Airway not difficult    Staffing  Performed: NITIN   Authorized by: SAMI Ramos    Performed by: SAMI Ramos  Patient location during procedure: OR    Patient Condition  Indications for airway management: anesthesia  Patient position: sniffing  Sedation level: deep     Final Airway Details   Preoxygenated: yes  Final airway type: endotracheal airway  Successful airway: ETT  Cuffed: yes   Successful intubation technique: video laryngoscopy  Adjuncts used in placement: intubating stylet  Blade: Mandy  Blade size: #4  ETT size (mm): 7.5  Cormack-Lehane Classification: grade I - full view of glottis  Placement verified by: chest auscultation, capnometry and palpation of cuff   Cuff volume (mL): 10  Measured from: lips  ETT to lips (cm): 24  Number of attempts at approach: 1

## 2025-05-07 NOTE — ANESTHESIA POSTPROCEDURE EVALUATION
Patient: Minh Harrington Jr.    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    Anesthesia Post Evaluation    Patient location during evaluation: PACU  Patient participation: complete - patient participated  Level of consciousness: awake and sleepy but conscious  Pain score: 0  Pain management: adequate  Airway patency: patent  Two or more strategies used to mitigate risk of obstructive sleep apnea  Cardiovascular status: acceptable, blood pressure returned to baseline and hemodynamically stable  Respiratory status: acceptable, face mask, nonlabored ventilation, spontaneous ventilation and unassisted  Hydration status: balanced  Postoperative Nausea and Vomiting: none  Comments: Handoff to Mariely, PACU RN        There were no known notable events for this encounter.

## 2025-05-13 ENCOUNTER — APPOINTMENT (OUTPATIENT)
Dept: UROLOGY | Facility: CLINIC | Age: 85
End: 2025-05-13
Payer: MEDICARE

## 2025-05-13 VITALS — DIASTOLIC BLOOD PRESSURE: 72 MMHG | SYSTOLIC BLOOD PRESSURE: 119 MMHG | HEART RATE: 91 BPM

## 2025-05-13 DIAGNOSIS — C67.1 MALIGNANT NEOPLASM OF DOME OF URINARY BLADDER (MULTI): ICD-10-CM

## 2025-05-13 DIAGNOSIS — N40.1 BPH WITH OBSTRUCTION/LOWER URINARY TRACT SYMPTOMS: Primary | ICD-10-CM

## 2025-05-13 DIAGNOSIS — N13.8 BPH WITH OBSTRUCTION/LOWER URINARY TRACT SYMPTOMS: Primary | ICD-10-CM

## 2025-05-13 PROCEDURE — 99024 POSTOP FOLLOW-UP VISIT: CPT | Performed by: NURSE PRACTITIONER

## 2025-05-13 RX ORDER — TAMSULOSIN HYDROCHLORIDE 0.4 MG/1
0.4 CAPSULE ORAL NIGHTLY
Qty: 90 CAPSULE | Refills: 1 | Status: SHIPPED | OUTPATIENT
Start: 2025-05-13 | End: 2025-11-09

## 2025-05-15 ENCOUNTER — OFFICE VISIT (OUTPATIENT)
Dept: URGENT CARE | Age: 85
End: 2025-05-15
Payer: MEDICARE

## 2025-05-15 VITALS
BODY MASS INDEX: 24.34 KG/M2 | DIASTOLIC BLOOD PRESSURE: 63 MMHG | HEIGHT: 70 IN | RESPIRATION RATE: 18 BRPM | HEART RATE: 57 BPM | OXYGEN SATURATION: 98 % | SYSTOLIC BLOOD PRESSURE: 100 MMHG | WEIGHT: 170 LBS | TEMPERATURE: 97.5 F

## 2025-05-15 DIAGNOSIS — F41.9 ANXIETY: ICD-10-CM

## 2025-05-15 DIAGNOSIS — N30.01 ACUTE CYSTITIS WITH HEMATURIA: Primary | ICD-10-CM

## 2025-05-15 LAB
POC APPEARANCE, URINE: ABNORMAL
POC BILIRUBIN, URINE: NEGATIVE
POC BLOOD, URINE: ABNORMAL
POC COLOR, URINE: YELLOW
POC GLUCOSE, URINE: NEGATIVE MG/DL
POC KETONES, URINE: NEGATIVE MG/DL
POC LEUKOCYTES, URINE: ABNORMAL
POC NITRITE,URINE: NEGATIVE
POC PH, URINE: 6 PH
POC PROTEIN, URINE: ABNORMAL MG/DL
POC SPECIFIC GRAVITY, URINE: 1.01
POC UROBILINOGEN, URINE: 0.2 EU/DL

## 2025-05-15 RX ORDER — AMOXICILLIN AND CLAVULANATE POTASSIUM 875; 125 MG/1; MG/1
875 TABLET, FILM COATED ORAL 2 TIMES DAILY
Qty: 20 TABLET | Refills: 0 | Status: SHIPPED | OUTPATIENT
Start: 2025-05-15 | End: 2025-05-25

## 2025-05-15 RX ORDER — CEFTRIAXONE 1 G/1
1000 INJECTION, POWDER, FOR SOLUTION INTRAMUSCULAR; INTRAVENOUS ONCE
Status: COMPLETED | OUTPATIENT
Start: 2025-05-15 | End: 2025-05-15

## 2025-05-15 RX ORDER — ALPRAZOLAM 0.5 MG/1
0.5 TABLET ORAL 3 TIMES DAILY PRN
Qty: 45 TABLET | Refills: 0 | Status: SHIPPED | OUTPATIENT
Start: 2025-05-15 | End: 2025-06-14

## 2025-05-15 RX ORDER — AMOXICILLIN AND CLAVULANATE POTASSIUM 875; 125 MG/1; MG/1
875 TABLET, FILM COATED ORAL 2 TIMES DAILY
Qty: 20 TABLET | Refills: 0 | Status: SHIPPED | OUTPATIENT
Start: 2025-05-15 | End: 2025-05-15

## 2025-05-15 RX ADMIN — CEFTRIAXONE 1000 MG: 1 INJECTION, POWDER, FOR SOLUTION INTRAMUSCULAR; INTRAVENOUS at 20:18

## 2025-05-15 ASSESSMENT — PATIENT HEALTH QUESTIONNAIRE - PHQ9
SUM OF ALL RESPONSES TO PHQ9 QUESTIONS 1 & 2: 0
1. LITTLE INTEREST OR PLEASURE IN DOING THINGS: NOT AT ALL
2. FEELING DOWN, DEPRESSED OR HOPELESS: NOT AT ALL

## 2025-05-15 ASSESSMENT — PAIN SCALES - GENERAL: PAINLEVEL_OUTOF10: 0-NO PAIN

## 2025-05-15 ASSESSMENT — ENCOUNTER SYMPTOMS: DYSURIA: 1

## 2025-05-15 NOTE — PROGRESS NOTES
Subjective   Patient ID: Minh Harrington Jr. is a 85 y.o. male. They present today with a chief complaint of Difficulty Urinating (Urinary frequency /Started 2 days ago /Patient states he had a barbosa taken out Tuesday May 13th).    History of Present Illness  Patient is an 85-year-old male with a primary history of BPH, malignant neoplasm of the bladder, atrial fibrillation and anticoagulation with Eliquis who presents with a complaint of dysuria and urinary frequency after Barbosa catheter removal on Tuesday. He underwent a transurethral resection of a bladder tumor on May 7th. He denies fevers, chills, fatigue, malaise, hematuria. cloudy or foul-smelling urine, or flank pain. He informed me that he has specialist follow up this Tuesday.     Review of Systems   Constitutional:  Denies fever, chills, malaise, fatigue  Respiratory:  Denies shortness of breath, cough, wheezing, sputum production  Cardiovascular:  Denies chest pain, palpitations, lightheadedness, dizziness, syncope.    Gastrointestinal:  Denies abdominal pain, nausea, vomiting, diarrhea, constipation    Genitourinary:  See HPI   Musculoskeletal:  Denies lower extremity swelling, arthralgia, myalgia, back pain   Integumentary:  Denies rash, wounds, redness, bruising.    Neurologic:  Denies numbness, weakness, paresthesia    All other systems are negative             History provided by:  Patient and spouse   used: No    Difficulty Urinating  Presenting symptoms: dysuria        Past Medical History  Allergies as of 05/15/2025    (No Known Allergies)       Prescriptions Prior to Admission[1]     Medical History[2]    Surgical History[3]     reports that he quit smoking about 28 years ago. His smoking use included cigarettes. He started smoking about 67 years ago. He has a 40 pack-year smoking history. He has never been exposed to tobacco smoke. He has never used smokeless tobacco. He reports current alcohol use of about 12.0 standard  "drinks of alcohol per week. He reports that he does not use drugs.    Review of Systems  Review of Systems   Genitourinary:  Positive for dysuria.                                  Objective    Vitals:    05/15/25 1925   BP: 100/63   BP Location: Left arm   Patient Position: Sitting   Pulse: 57   Resp: 18   Temp: 36.4 °C (97.5 °F)   SpO2: 98%   Weight: 77.1 kg (170 lb)   Height: 1.778 m (5' 10\")     No LMP for male patient.    Physical Exam  Vitals and nursing note reviewed.   Constitutional:       General: He is not in acute distress.     Appearance: Normal appearance. He is normal weight. He is not ill-appearing, toxic-appearing or diaphoretic.   Cardiovascular:      Rate and Rhythm: Normal rate. Rhythm irregularly irregular.      Pulses:           Radial pulses are 2+ on the right side and 2+ on the left side.   Pulmonary:      Effort: Pulmonary effort is normal. No respiratory distress.      Breath sounds: No stridor. No wheezing, rhonchi or rales.   Abdominal:      General: Abdomen is flat. Bowel sounds are normal. There is no distension.      Palpations: Abdomen is soft. There is no mass.      Tenderness: There is no abdominal tenderness. There is no right CVA tenderness, left CVA tenderness, guarding or rebound.      Hernia: No hernia is present.   Musculoskeletal:      Right lower leg: No edema.      Left lower leg: No edema.   Skin:     General: Skin is warm and dry.      Coloration: Skin is not jaundiced or pale.      Findings: No bruising, erythema or lesion.   Neurological:      General: No focal deficit present.      Mental Status: He is alert and oriented to person, place, and time.         Procedures    Point of Care Test & Imaging Results from this visit  Results for orders placed or performed in visit on 05/15/25   POCT UA Automated manually resulted   Result Value Ref Range    POC Color, Urine Yellow Straw, Yellow, Light-Yellow    POC Appearance, Urine Cloudy (A) Clear    POC Glucose, Urine NEGATIVE " NEGATIVE mg/dl    POC Bilirubin, Urine NEGATIVE NEGATIVE    POC Ketones, Urine NEGATIVE NEGATIVE mg/dl    POC Specific Gravity, Urine 1.010 1.005 - 1.035    POC Blood, Urine LARGE (3+) (A) NEGATIVE    POC PH, Urine 6.0 No Reference Range Established PH    POC Protein, Urine 15 (1+) (A) NEGATIVE mg/dl    POC Urobilinogen, Urine 0.2 0.2, 1.0 EU/DL    Poc Nitrite, Urine NEGATIVE NEGATIVE    POC Leukocytes, Urine LARGE (3+) (A) NEGATIVE      Imaging  No results found.    Cardiology, Vascular, and Other Imaging  No other imaging results found for the past 2 days      Diagnostic study results (if any) were reviewed by VANIA Lau.    Assessment/Plan   Allergies, medications, history, and pertinent labs/EKGs/Imaging reviewed by VANIA Lau.     Medical Decision Making    On the exam there were no signs of systemic illness, sepsis, or pyelonephritis. Patient is alert and oriented and non-toxic appearing. He is febrile and his vital signs are stable. His UA returned with three plus leukocytes and blood and one plus protein. There were no nitrites in his urine. Abdomen is soft and non-tender without rebound or guarding and there is no CVA tenderness present. He is alert and oriented times 4. His heart rate is 57 and his pulse was noted to be irregular, which is consistent with his history of atrial fibrillation. He denied any chest pain, palpitations or shortness of breath. Guidelines recommend mono- treatment with a fluoroquinolone for the management of complicated OR catheter - associated urinary tract infections, however, he is not a good candidate given his age and history of atrial fibrillation. Recommendations for second line therapy include 1g of Rocephin with a course of oral antibiotics. 1g of Rocephin was administered IM and he was observed for a period of 15 minutes to ensure he did not have an allergic reaction. He was prescribed Augmentin twice daily for 10 days. He did temporarily have  to discontinue his Eliquis and states that he and his other providers discussed signs and symptoms of pulmonary embolism, DVT and stroke. He has resumed his Eliquis therapy as directed. He has specialist follow up next week and a urine culture is pending to determine the need for a change in his antibiotic therapy. I informed him that we would contact him or his wife to notify them of positive urine culture or any need to change an antibiotic. I also instructed him to go immediately to the emergency room for any systemic signs of illness like fever or shaking chills. He and his wife verbalized understanding and will follow up as recommended. On discharge there are no signs of acute distress and his gait is steady.     Orders and Diagnoses  Diagnoses and all orders for this visit:  Acute cystitis with hematuria  -     POCT UA Automated manually resulted  -     Urine Culture  -     cefTRIAXone (Rocephin) vial 1,000 mg  -     amoxicillin-clavulanate (Augmentin) 875-125 mg tablet; Take 1 tablet (875 mg) by mouth 2 times a day for 10 days.      Medical Admin Record  Administrations This Visit       cefTRIAXone (Rocephin) vial 1,000 mg       Admin Date  05/15/2025 Action  Given Dose  1,000 mg Route  intramuscular Documented By  Kassie Manley MA                    Patient disposition: Home    Electronically signed by MERCEDES Lau-CNP  8:48 PM           [1] (Not in a hospital admission)  [2]   Past Medical History:  Diagnosis Date    Abnormal ECG     Arrhythmia     Atrial fibrillation (Multi)     Bladder cancer (Multi)     BPH (benign prostatic hyperplasia)     CHF (congestive heart failure)     CKD (chronic kidney disease)     stage 3    Coronary artery disease     COVID-19 02/16/2022    COVID-19    Epilepsy, unspecified, not intractable, without status epilepticus 11/18/2020    Epilepsy    Hyperlipidemia     Hypertension     Ischemic cardiomyopathy     Myocardial infarction (Multi)     Unspecified convulsions  (Multi) 02/24/2021    Seizures   [3]   Past Surgical History:  Procedure Laterality Date    ARTERIAL BYPASS SURGERY      CARDIAC CATHETERIZATION      CORONARY ANGIOPLASTY      OTHER SURGICAL HISTORY  02/12/2019    Angioplasty    OTHER SURGICAL HISTORY  02/12/2019    Appendectomy    OTHER SURGICAL HISTORY  02/12/2019    Bypass

## 2025-05-15 NOTE — H&P
"History Of Present Illness  Delayed entry.  85-year-old male with bladder cancer here for repeat transurethral resection.    Past Medical History  Medical History[1]     Surgical History  Surgical History[2]     Social History  He reports that he quit smoking about 28 years ago. His smoking use included cigarettes. He started smoking about 67 years ago. He has a 40 pack-year smoking history. He has never used smokeless tobacco. He reports current alcohol use of about 12.0 standard drinks of alcohol per week. He reports that he does not use drugs.    Family History  Family History[3]     Allergies  Patient has no known allergies.    ROS: 12 system review was completed and is negative with the exception of those signs and symptoms noted in the history of present illness: A 12 system review was completed and is negative with the exception of those signs and symptoms noted in the history of present illness.     Exam:  General: in NAD, appears stated age  Head: normocephalic, atraumatic  Respiratory: normal effort, no use of accessory muscles  Cardiovascular: no edema noted  Skin: normal turgor, no rashes  Neurologic: grossly intact, oriented to person/place/time  Psychiatric: mode and affect appropriate    Per anesthesiology, clear to auscultation bilaterally with a regular rate and rhythm     Last Recorded Vitals  /56   Pulse 77   Temp 36 °C (96.8 °F) (Temporal)   Resp 18   Ht 1.778 m (5' 10\")   Wt 77.1 kg (170 lb)   SpO2 96%   BMI 24.39 kg/m²       No results found for: \"URINECULTURE\", \"CREATININE\"      ASSESSMENT/PLAN:  Okay to proceed with transurethral resection    Anthony Washington MD         [1]   Past Medical History:  Diagnosis Date    Abnormal ECG     Arrhythmia     Atrial fibrillation (Multi)     Bladder cancer (Multi)     BPH (benign prostatic hyperplasia)     CHF (congestive heart failure)     CKD (chronic kidney disease)     stage 3    Coronary artery disease     COVID-19 02/16/2022    COVID-19 "    Epilepsy, unspecified, not intractable, without status epilepticus 11/18/2020    Epilepsy    Hyperlipidemia     Hypertension     Ischemic cardiomyopathy     Myocardial infarction (Multi)     Unspecified convulsions (Multi) 02/24/2021    Seizures   [2]   Past Surgical History:  Procedure Laterality Date    ARTERIAL BYPASS SURGERY      CARDIAC CATHETERIZATION      CORONARY ANGIOPLASTY      OTHER SURGICAL HISTORY  02/12/2019    Angioplasty    OTHER SURGICAL HISTORY  02/12/2019    Appendectomy    OTHER SURGICAL HISTORY  02/12/2019    Bypass   [3]   Family History  Problem Relation Name Age of Onset    Heart failure Mother Nana1     Emphysema Father Saroj Sr.     Lung disease Father Saroj Sr.     Bipolar disorder Brother

## 2025-05-16 NOTE — PATIENT INSTRUCTIONS
Take full course of antibiotics even though he may be feeling better.Take medications as directed. Take with food, yogurt, or a probiotic. I recommend taking a probiotic while taking this medication, and for 7 days after you finish it.    A probiotic is a supplement you can buy over-the-counter that helps support the good bacteria in your body while taking antibiotics. Yogurt with live and active cultures are also a good source of probiotics. They may help to avoid the side effects of antibiotics, such as diarrhea or yeast infections. It is best to take a probiotic about 2 hours after your dose of antibiotic.    If you develop a rash, itching, hives, wheezing, or difficulty swallowing, seek medical care immediately.      Antibiotics should never be saved or taken without the direction of a medical provider.  Failure to take an entire course of an antibiotic means that the infection may not be adequately treated.  It can also increase the risk for developing bacteria that are resistant, which can make treating future infections more difficult.

## 2025-05-17 LAB — BACTERIA UR CULT: ABNORMAL

## 2025-05-20 ENCOUNTER — APPOINTMENT (OUTPATIENT)
Dept: UROLOGY | Facility: CLINIC | Age: 85
End: 2025-05-20
Payer: MEDICARE

## 2025-05-20 DIAGNOSIS — R33.9 RETENTION OF URINE: ICD-10-CM

## 2025-05-20 PROCEDURE — 99211 OFF/OP EST MAY X REQ PHY/QHP: CPT | Performed by: NURSE PRACTITIONER

## 2025-05-20 NOTE — PROGRESS NOTES
Pt presented for PVR check. Pt tolerated PVR check well. Discussed care of pt with Radha Rosado CNP and decided to not give pt a catheter in lieu of having pt wait to see Dr Washington at his follow up appointment on 5/29/25. Discussed this option with the pt. Pt stated agreement.

## 2025-05-27 DIAGNOSIS — I50.23 ACUTE ON CHRONIC SYSTOLIC (CONGESTIVE) HEART FAILURE: ICD-10-CM

## 2025-05-27 LAB
LABORATORY COMMENT REPORT: NORMAL
PATH REPORT.FINAL DX SPEC: NORMAL
PATH REPORT.GROSS SPEC: NORMAL
PATH REPORT.RELEVANT HX SPEC: NORMAL
PATH REPORT.TOTAL CANCER: NORMAL
RESIDENT REVIEW: NORMAL

## 2025-05-28 ENCOUNTER — PATIENT OUTREACH (OUTPATIENT)
Dept: PRIMARY CARE | Facility: CLINIC | Age: 85
End: 2025-05-28
Payer: MEDICARE

## 2025-05-29 ENCOUNTER — APPOINTMENT (OUTPATIENT)
Dept: LAB | Facility: HOSPITAL | Age: 85
End: 2025-05-29
Payer: MEDICARE

## 2025-05-29 ENCOUNTER — OFFICE VISIT (OUTPATIENT)
Dept: UROLOGY | Facility: CLINIC | Age: 85
End: 2025-05-29
Payer: MEDICARE

## 2025-05-29 ENCOUNTER — APPOINTMENT (OUTPATIENT)
Dept: CARDIOLOGY | Facility: CLINIC | Age: 85
End: 2025-05-29
Payer: MEDICARE

## 2025-05-29 VITALS
HEIGHT: 70 IN | DIASTOLIC BLOOD PRESSURE: 66 MMHG | SYSTOLIC BLOOD PRESSURE: 111 MMHG | BODY MASS INDEX: 25 KG/M2 | HEART RATE: 84 BPM | WEIGHT: 174.6 LBS | RESPIRATION RATE: 16 BRPM

## 2025-05-29 DIAGNOSIS — N40.1 BPH WITH OBSTRUCTION/LOWER URINARY TRACT SYMPTOMS: ICD-10-CM

## 2025-05-29 DIAGNOSIS — N13.8 BPH WITH OBSTRUCTION/LOWER URINARY TRACT SYMPTOMS: ICD-10-CM

## 2025-05-29 DIAGNOSIS — C67.1 MALIGNANT NEOPLASM OF DOME OF URINARY BLADDER (MULTI): Primary | ICD-10-CM

## 2025-05-29 LAB
CREAT SERPL-MCNC: 1.22 MG/DL (ref 0.5–1.3)
EGFRCR SERPLBLD CKD-EPI 2021: 57 ML/MIN/1.73M*2

## 2025-05-29 PROCEDURE — G2211 COMPLEX E/M VISIT ADD ON: HCPCS | Performed by: UROLOGY

## 2025-05-29 PROCEDURE — 99215 OFFICE O/P EST HI 40 MIN: CPT | Performed by: UROLOGY

## 2025-05-29 PROCEDURE — 1126F AMNT PAIN NOTED NONE PRSNT: CPT | Performed by: UROLOGY

## 2025-05-29 PROCEDURE — 1160F RVW MEDS BY RX/DR IN RCRD: CPT | Performed by: UROLOGY

## 2025-05-29 PROCEDURE — 82565 ASSAY OF CREATININE: CPT

## 2025-05-29 PROCEDURE — 1036F TOBACCO NON-USER: CPT | Performed by: UROLOGY

## 2025-05-29 PROCEDURE — 3078F DIAST BP <80 MM HG: CPT | Performed by: UROLOGY

## 2025-05-29 PROCEDURE — 3074F SYST BP LT 130 MM HG: CPT | Performed by: UROLOGY

## 2025-05-29 PROCEDURE — 1159F MED LIST DOCD IN RCRD: CPT | Performed by: UROLOGY

## 2025-05-29 RX ORDER — FAMOTIDINE 10 MG/ML
20 INJECTION, SOLUTION INTRAVENOUS ONCE AS NEEDED
OUTPATIENT
Start: 2025-07-30

## 2025-05-29 RX ORDER — FAMOTIDINE 10 MG/ML
20 INJECTION, SOLUTION INTRAVENOUS ONCE AS NEEDED
OUTPATIENT
Start: 2025-07-23

## 2025-05-29 RX ORDER — DIPHENHYDRAMINE HYDROCHLORIDE 50 MG/ML
50 INJECTION, SOLUTION INTRAMUSCULAR; INTRAVENOUS AS NEEDED
OUTPATIENT
Start: 2025-07-30

## 2025-05-29 RX ORDER — ALBUTEROL SULFATE 0.83 MG/ML
3 SOLUTION RESPIRATORY (INHALATION) AS NEEDED
OUTPATIENT
Start: 2025-07-02

## 2025-05-29 RX ORDER — ALBUTEROL SULFATE 0.83 MG/ML
3 SOLUTION RESPIRATORY (INHALATION) AS NEEDED
OUTPATIENT
Start: 2025-06-25

## 2025-05-29 RX ORDER — ALBUTEROL SULFATE 0.83 MG/ML
3 SOLUTION RESPIRATORY (INHALATION) AS NEEDED
OUTPATIENT
Start: 2025-07-23

## 2025-05-29 RX ORDER — FAMOTIDINE 10 MG/ML
20 INJECTION, SOLUTION INTRAVENOUS ONCE AS NEEDED
OUTPATIENT
Start: 2025-07-16

## 2025-05-29 RX ORDER — DIPHENHYDRAMINE HYDROCHLORIDE 50 MG/ML
50 INJECTION, SOLUTION INTRAMUSCULAR; INTRAVENOUS AS NEEDED
OUTPATIENT
Start: 2025-06-25

## 2025-05-29 RX ORDER — ALBUTEROL SULFATE 0.83 MG/ML
3 SOLUTION RESPIRATORY (INHALATION) AS NEEDED
OUTPATIENT
Start: 2025-07-09

## 2025-05-29 RX ORDER — EPINEPHRINE 0.3 MG/.3ML
0.3 INJECTION SUBCUTANEOUS EVERY 5 MIN PRN
OUTPATIENT
Start: 2025-07-16

## 2025-05-29 RX ORDER — FAMOTIDINE 10 MG/ML
20 INJECTION, SOLUTION INTRAVENOUS ONCE AS NEEDED
OUTPATIENT
Start: 2025-07-09

## 2025-05-29 RX ORDER — DIPHENHYDRAMINE HYDROCHLORIDE 50 MG/ML
50 INJECTION, SOLUTION INTRAMUSCULAR; INTRAVENOUS AS NEEDED
OUTPATIENT
Start: 2025-07-16

## 2025-05-29 RX ORDER — EPINEPHRINE 0.3 MG/.3ML
0.3 INJECTION SUBCUTANEOUS EVERY 5 MIN PRN
OUTPATIENT
Start: 2025-07-02

## 2025-05-29 RX ORDER — FAMOTIDINE 10 MG/ML
20 INJECTION, SOLUTION INTRAVENOUS ONCE AS NEEDED
OUTPATIENT
Start: 2025-06-25

## 2025-05-29 RX ORDER — FAMOTIDINE 10 MG/ML
20 INJECTION, SOLUTION INTRAVENOUS ONCE AS NEEDED
OUTPATIENT
Start: 2025-07-02

## 2025-05-29 RX ORDER — EPINEPHRINE 0.3 MG/.3ML
0.3 INJECTION SUBCUTANEOUS EVERY 5 MIN PRN
OUTPATIENT
Start: 2025-07-23

## 2025-05-29 RX ORDER — ALBUTEROL SULFATE 0.83 MG/ML
3 SOLUTION RESPIRATORY (INHALATION) AS NEEDED
OUTPATIENT
Start: 2025-07-30

## 2025-05-29 RX ORDER — ALBUTEROL SULFATE 0.83 MG/ML
3 SOLUTION RESPIRATORY (INHALATION) AS NEEDED
OUTPATIENT
Start: 2025-07-16

## 2025-05-29 RX ORDER — EPINEPHRINE 0.3 MG/.3ML
0.3 INJECTION SUBCUTANEOUS EVERY 5 MIN PRN
OUTPATIENT
Start: 2025-06-25

## 2025-05-29 RX ORDER — EPINEPHRINE 0.3 MG/.3ML
0.3 INJECTION SUBCUTANEOUS EVERY 5 MIN PRN
OUTPATIENT
Start: 2025-07-09

## 2025-05-29 RX ORDER — DIPHENHYDRAMINE HYDROCHLORIDE 50 MG/ML
50 INJECTION, SOLUTION INTRAMUSCULAR; INTRAVENOUS AS NEEDED
OUTPATIENT
Start: 2025-07-09

## 2025-05-29 RX ORDER — DIPHENHYDRAMINE HYDROCHLORIDE 50 MG/ML
50 INJECTION, SOLUTION INTRAMUSCULAR; INTRAVENOUS AS NEEDED
OUTPATIENT
Start: 2025-07-02

## 2025-05-29 RX ORDER — DIPHENHYDRAMINE HYDROCHLORIDE 50 MG/ML
50 INJECTION, SOLUTION INTRAMUSCULAR; INTRAVENOUS AS NEEDED
OUTPATIENT
Start: 2025-07-23

## 2025-05-29 RX ORDER — EPINEPHRINE 0.3 MG/.3ML
0.3 INJECTION SUBCUTANEOUS EVERY 5 MIN PRN
OUTPATIENT
Start: 2025-07-30

## 2025-05-29 ASSESSMENT — PAIN SCALES - GENERAL: PAINLEVEL_OUTOF10: 0-NO PAIN

## 2025-05-29 NOTE — PROGRESS NOTES
PAST UROLOGICAL HISTORY:  85-year-old man with a history of BPH who underwent TURP in 2008 with prolonged hospitalization due to bleeding. He has a history of intermittent gross hematuria. On 02/28/2025, he was admitted with gross hematuria that failed conservative management with continuous bladder irrigation. CT abdomen and pelvis from 02/28/2025 showed abnormal bladder changes concerning for bladder cancer with subcentimeter paraaortic and iliac lymph nodes not enlarged by criteria. On 03/05/2025, he underwent TURBT for a 6 cm tumor at the dome and right lateral wall, with pathology revealing T1 high grade urothelial carcinoma.    I independently reviewed and interpreted the notes, images, and tests as noted herein.    HPI TODAY 05/29/2025:    Bladder Cancer:  - Patient underwent repeat TURBT on 05/07/2025 which showed residual papillary tumor approximately 3 cm in size located at the right lateral wall, anterior, and bladder dome.  - Total resection size was 4-5 cm with complete resection achieved. Muscle was visualized at the base with negative bimanual exam.  - Surgical pathology returned with high grade Ta bladder cancer. Muscle was present and not involved, showing improvement from previous T1 high grade pathology on 03/05/2025.  - Renal ultrasound on 05/01/2025 due to acute kidney injury showed no hydroureteronephrosis.  - Patient developed UTI with Klebsiella pneumoniae after recent office visit on 05/15/2025, treated with amoxicillin which caused diarrhea and pruritus.    Post-TURP Urinary Incontinence:  - Mr. Harrington reports experiencing urinary leakage when standing up after sitting for prolonged periods.  - He maintains good urinary stream and reports significant nocturia, filling approximately two urinals nightly.  - Reports history of incomplete bladder emptying since TURP in 2008, which Dr. Bush attributed to possible bladder muscle stretching.    PSA History:  - Last PSA was 1.47, obtained five  years ago.  - Given age of 85 without obvious concern for prostate cancer, recommended against PSA testing at this time.    PAST MEDICAL HISTORY:  - Atrial fibrillation on Eliquis  - Systolic heart failure with EF 30-35%  - Hyperlipidemia  - Prior myocardial infarction  - Chronic kidney disease stage 3A  - Sleep apnea (recently diagnosed)  - History of perforated appendix with abscess formation behind bladder many years ago    SOCIAL:  [facility].    REVIEW OF SYSTEMS: A tailored review of systems was performed and all pertinent positives and negatives are listed in the HPI.    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  - Plan:    - Intravesical BCG therapy to begin approximately 6 weeks post-TURBT    - Schedule for 6 weekly treatments at [facility] [place]    - Cystoscopic surveillance in 3-4 months post-TURBT    - Order CT chest, abdomen, and pelvis to evaluate previously noted subcentimeter lymph nodes    - Will communicate results  - Counseling: Discussed BCG therapy process including catheter insertion, instillation, and retention for 1-2 hours minimum. Reviewed potential side effects including flu-like symptoms, urinary frequency, and dysuria. Risks, benefits, and alternatives were discussed including but not limited to infection requiring hospitalization and antibiotics (less than 2% risk). Noted that immune response may be less robust due to advanced age but remains the best first-line option.    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  - Plan:    - Observation at this time as focus is on bladder cancer treatment    - Will reassess after completion of BCG therapy  - Counseling: Discussed that incontinence may be related to multiple catheterizations and procedures rather than recent TURBT. Explained that no tissue was removed near the sphincter during recent procedure.

## 2025-05-30 ENCOUNTER — TELEPHONE (OUTPATIENT)
Dept: UROLOGY | Facility: CLINIC | Age: 85
End: 2025-05-30
Payer: MEDICARE

## 2025-05-30 LAB
ALBUMIN SERPL-MCNC: 4 G/DL (ref 3.6–5.1)
BNP SERPL-MCNC: 1287 PG/ML
BUN SERPL-MCNC: 27 MG/DL (ref 7–25)
BUN/CREAT SERPL: 24 (CALC) (ref 6–22)
CALCIUM SERPL-MCNC: 8.6 MG/DL (ref 8.6–10.3)
CHLORIDE SERPL-SCNC: 105 MMOL/L (ref 98–110)
CO2 SERPL-SCNC: 27 MMOL/L (ref 20–32)
CREAT SERPL-MCNC: 1.13 MG/DL (ref 0.7–1.22)
EGFRCR SERPLBLD CKD-EPI 2021: 63 ML/MIN/1.73M2
GLUCOSE SERPL-MCNC: 98 MG/DL (ref 65–99)
PHOSPHATE SERPL-MCNC: 4.2 MG/DL (ref 2.1–4.3)
POTASSIUM SERPL-SCNC: 5.3 MMOL/L (ref 3.5–5.3)
SODIUM SERPL-SCNC: 137 MMOL/L (ref 135–146)

## 2025-06-04 ENCOUNTER — APPOINTMENT (OUTPATIENT)
Dept: PRIMARY CARE | Facility: CLINIC | Age: 85
End: 2025-06-04
Payer: MEDICARE

## 2025-06-04 ENCOUNTER — HOSPITAL ENCOUNTER (OUTPATIENT)
Dept: RADIOLOGY | Facility: CLINIC | Age: 85
Discharge: HOME | End: 2025-06-04
Payer: MEDICARE

## 2025-06-04 DIAGNOSIS — C67.1 MALIGNANT NEOPLASM OF DOME OF URINARY BLADDER (MULTI): ICD-10-CM

## 2025-06-04 PROCEDURE — 74177 CT ABD & PELVIS W/CONTRAST: CPT

## 2025-06-04 PROCEDURE — 2550000001 HC RX 255 CONTRASTS: Performed by: UROLOGY

## 2025-06-04 RX ADMIN — IOHEXOL 75 ML: 350 INJECTION, SOLUTION INTRAVENOUS at 14:35

## 2025-06-05 ENCOUNTER — TELEPHONE (OUTPATIENT)
Dept: UROLOGY | Facility: CLINIC | Age: 85
End: 2025-06-05
Payer: MEDICARE

## 2025-06-06 ENCOUNTER — TELEPHONE (OUTPATIENT)
Dept: UROLOGY | Facility: CLINIC | Age: 85
End: 2025-06-06
Payer: MEDICARE

## 2025-06-06 ENCOUNTER — OFFICE VISIT (OUTPATIENT)
Dept: URGENT CARE | Age: 85
End: 2025-06-06
Payer: MEDICARE

## 2025-06-06 VITALS
OXYGEN SATURATION: 94 % | HEIGHT: 70 IN | BODY MASS INDEX: 24.91 KG/M2 | DIASTOLIC BLOOD PRESSURE: 57 MMHG | HEART RATE: 68 BPM | SYSTOLIC BLOOD PRESSURE: 113 MMHG | TEMPERATURE: 97.1 F | WEIGHT: 174 LBS | RESPIRATION RATE: 18 BRPM

## 2025-06-06 DIAGNOSIS — N30.01 ACUTE CYSTITIS WITH HEMATURIA: Primary | ICD-10-CM

## 2025-06-06 LAB
POC APPEARANCE, URINE: ABNORMAL
POC BILIRUBIN, URINE: NEGATIVE
POC BLOOD, URINE: ABNORMAL
POC COLOR, URINE: YELLOW
POC GLUCOSE, URINE: NEGATIVE MG/DL
POC KETONES, URINE: NEGATIVE MG/DL
POC LEUKOCYTES, URINE: ABNORMAL
POC NITRITE,URINE: NEGATIVE
POC PH, URINE: 7 PH
POC PROTEIN, URINE: NEGATIVE MG/DL
POC SPECIFIC GRAVITY, URINE: 1.01
POC UROBILINOGEN, URINE: 0.2 EU/DL

## 2025-06-06 RX ORDER — AMOXICILLIN AND CLAVULANATE POTASSIUM 875; 125 MG/1; MG/1
875 TABLET, FILM COATED ORAL 2 TIMES DAILY
Qty: 20 TABLET | Refills: 0 | Status: ON HOLD | OUTPATIENT
Start: 2025-06-06 | End: 2025-06-16

## 2025-06-06 ASSESSMENT — ENCOUNTER SYMPTOMS: DYSURIA: 1

## 2025-06-06 ASSESSMENT — PATIENT HEALTH QUESTIONNAIRE - PHQ9
1. LITTLE INTEREST OR PLEASURE IN DOING THINGS: NOT AT ALL
2. FEELING DOWN, DEPRESSED OR HOPELESS: NOT AT ALL
SUM OF ALL RESPONSES TO PHQ9 QUESTIONS 1 & 2: 0

## 2025-06-06 ASSESSMENT — PAIN SCALES - GENERAL: PAINLEVEL_OUTOF10: 0-NO PAIN

## 2025-06-06 NOTE — PATIENT INSTRUCTIONS
Diffuse, non-petechial, sandpaper-like rash on the trunk, arms, neck and face.   Please take antibiotic as prescribed.   Please call Urology after your visit.   If you experience any confusion, weakness, vomiting, or back pain, go to the emergency room.   Take full course of antibiotics even though he may be feeling better.Take medications as directed. Take with food, yogurt, or a probiotic. I recommend taking a probiotic while taking this medication, and for 7 days after you finish it.    A probiotic is a supplement you can buy over-the-counter that helps support the good bacteria in your body while taking antibiotics. Yogurt with live and active cultures are also a good source of probiotics. They may help to avoid the side effects of antibiotics, such as diarrhea or yeast infections. It is best to take a probiotic about 2 hours after your dose of antibiotic.    If you develop a rash, itching, hives, wheezing, or difficulty swallowing, seek medical care immediately.      Antibiotics should never be saved or taken without the direction of a medical provider.  Failure to take an entire course of an antibiotic means that the infection may not be adequately treated.  It can also increase the risk for developing bacteria that are resistant, which can make treating future infections more difficult.

## 2025-06-06 NOTE — PROGRESS NOTES
Subjective   Patient ID: Minh Harrington Jr. is a 85 y.o. male. They present today with a chief complaint of Difficulty Urinating (Ongoing 2-3 weeks ).    History of Present Illness  Subjective  Patient is a pleasant 85-year-old male with a history of malignant neoplasm of the bladder who presents with ongoing urinary urgency and burning with urination. I saw him on May 15th, and he had a urinary tract infection that returned with Klebsiella pneumoniae. At this time, I prescribed Augmentin, and he received a dose of IM ceftriaxone. He states he is unsure of his symptoms resolved; however, the sensitivity reported that the bacteria was sensitive to Augmentin. He is under the care of a urologist currently; however, he has not spoken with them regarding his ongoing symptoms. He denies fevers, chills, malaise, nausea, vomiting, confusion, back pain or abdominal pain.     Review of Systems  Constitutional: Negative for fever, chills, anorexia, weight loss, malaise    Respiratory: Negative for cough, hemoptysis, wheezing, shortness of breath   Cardiac: Negative for chest pain, dyspnea on exertion, orthopnea, palpitations, syncope   Gastrointestinal: Negative for nausea, vomiting, diarrhea, constipation, abdominal pain  Genitourinary: See HPI   Musculoskeletal: Negative for decreased ROM, pain, swelling, weakness   Neurological: Negative for dizziness, confusion, headache, seizures, syncope, paresthesia, numbness, weakness.    All other systems are negative        History provided by:  Patient and spouse   used: No    Difficulty Urinating  Presenting symptoms: dysuria        Past Medical History  Allergies as of 06/06/2025    (No Known Allergies)       Prescriptions Prior to Admission[1]     Medical History[2]    Surgical History[3]     reports that he quit smoking about 28 years ago. His smoking use included cigarettes. He started smoking about 68 years ago. He has a 40 pack-year smoking history.  "He has never been exposed to tobacco smoke. He has never used smokeless tobacco. He reports current alcohol use of about 12.0 standard drinks of alcohol per week. He reports that he does not use drugs.    Review of Systems  Review of Systems   Genitourinary:  Positive for dysuria.                                  Objective    Vitals:    06/06/25 1054   BP: 113/57   BP Location: Left arm   Patient Position: Sitting   Pulse: 68   Resp: 18   Temp: 36.2 °C (97.1 °F)   SpO2: 94%   Weight: 78.9 kg (174 lb)   Height: 1.778 m (5' 10\")     No LMP for male patient.    Physical Exam  Vitals and nursing note reviewed.   Constitutional:       General: He is not in acute distress.     Appearance: Normal appearance. He is normal weight. He is not ill-appearing, toxic-appearing or diaphoretic.   HENT:      Mouth/Throat:      Mouth: Mucous membranes are moist.   Cardiovascular:      Rate and Rhythm: Normal rate and regular rhythm.      Pulses: Normal pulses.      Heart sounds: No murmur heard.     No friction rub. No gallop.   Pulmonary:      Effort: Pulmonary effort is normal. No respiratory distress.      Breath sounds: Normal breath sounds. No stridor. No wheezing, rhonchi or rales.   Abdominal:      General: Abdomen is flat. There is no distension.      Palpations: Abdomen is soft. There is no mass.      Tenderness: There is no abdominal tenderness. There is no right CVA tenderness, left CVA tenderness, guarding or rebound.      Hernia: No hernia is present.   Skin:     General: Skin is warm and dry.      Coloration: Skin is not jaundiced or pale.      Findings: No bruising, erythema or rash.   Neurological:      General: No focal deficit present.      Mental Status: He is alert and oriented to person, place, and time.      Sensory: No sensory deficit.         Procedures    Point of Care Test & Imaging Results from this visit  Results for orders placed or performed in visit on 06/06/25   POCT UA Automated manually resulted "   Result Value Ref Range    POC Color, Urine Yellow Straw, Yellow, Light-Yellow    POC Appearance, Urine Cloudy (A) Clear    POC Glucose, Urine NEGATIVE NEGATIVE mg/dl    POC Bilirubin, Urine NEGATIVE NEGATIVE    POC Ketones, Urine NEGATIVE NEGATIVE mg/dl    POC Specific Gravity, Urine 1.010 1.005 - 1.035    POC Blood, Urine MODERATE (2+) (A) NEGATIVE    POC PH, Urine 7.0 No Reference Range Established PH    POC Protein, Urine NEGATIVE NEGATIVE mg/dl    POC Urobilinogen, Urine 0.2 0.2, 1.0 EU/DL    Poc Nitrite, Urine NEGATIVE NEGATIVE    POC Leukocytes, Urine LARGE (3+) (A) NEGATIVE      Imaging  CT chest abdomen pelvis w IV contrast  Result Date: 6/5/2025  Bladder cancer restaging scan. When compared to the prior examination dated 02/28/2025, there has been interval postsurgical changes of a TURBT and the previously noted right urinary bladder wall mass is no longer visualized. No definite evidence of new sites of metastatic disease. Suggestion of fluid overload which may be cardiogenic in etiology, correlation with echocardiogram is recommended. Additional stable chronic and incidental findings described above.   I personally reviewed the image(s) / study and I agree with the findings as stated by Elia Bunn MD. This study was interpreted at [facility], [place], [place].   MACRO: None   Signed by: Marshal Bermudez 6/5/2025 7:56 PM Dictation workstation:   [id number]      Cardiology, Vascular, and Other Imaging  No other imaging results found for the past 2 days      Diagnostic study results (if any) were reviewed by VANIA Lau.    Assessment/Plan   Allergies, medications, history, and pertinent labs/EKGs/Imaging reviewed by VANIA Lau.     Medical Decision Making    On exam, the patient's vital signs are stable, and he is not hypotensive, tachycardic, or febrile. There was no evidence of an acute abdomen, pyelonephritis, or sepsis. Abdomen was soft and non tender and  he had no CVA tenderness. His urine was cloudy, and the UA showed a moderate amount of blood and a large number of leukocytes. Labs dated 8 days ago showed improving kidney function from one month ago. A urine culture is pending, and I prescribed Augmentin and asked that he contact urology today. He verbalized that he understood and will make the appropriate follow up. All questions were answered prior to this charge.     Orders and Diagnoses  Diagnoses and all orders for this visit:  Acute cystitis with hematuria  -     POCT UA Automated manually resulted  -     amoxicillin-clavulanate (Augmentin) 875-125 mg tablet; Take 1 tablet (875 mg of amoxicillin) by mouth 2 times a day for 10 days.  -     Urine Culture      Medical Admin Record      Patient disposition: Home    Electronically signed by VANIA Lau  12:03 PM           [1] (Not in a hospital admission)  [2]   Past Medical History:  Diagnosis Date    Abnormal ECG     Arrhythmia     Atrial fibrillation (Multi)     Bladder cancer (Multi)     BPH (benign prostatic hyperplasia)     CHF (congestive heart failure)     CKD (chronic kidney disease)     stage 3    Coronary artery disease     COVID-19 02/16/2022    COVID-19    Epilepsy, unspecified, not intractable, without status epilepticus 11/18/2020    Epilepsy    Hyperlipidemia     Hypertension     Ischemic cardiomyopathy     Myocardial infarction (Multi)     Unspecified convulsions (Multi) 02/24/2021    Seizures   [3]   Past Surgical History:  Procedure Laterality Date    ARTERIAL BYPASS SURGERY      CARDIAC CATHETERIZATION      CORONARY ANGIOPLASTY      OTHER SURGICAL HISTORY  02/12/2019    Angioplasty    OTHER SURGICAL HISTORY  02/12/2019    Appendectomy    OTHER SURGICAL HISTORY  02/12/2019    Bypass

## 2025-06-06 NOTE — TELEPHONE ENCOUNTER
Patient called to report that he went to  urgent care today for urinary urgency and dysuria, UA showed blood and leukocytes. Urine culture also obtained. Patient was prescribed Augmentin and was told to call urologist to inform.

## 2025-06-07 ENCOUNTER — APPOINTMENT (OUTPATIENT)
Dept: RADIOLOGY | Facility: HOSPITAL | Age: 85
DRG: 871 | End: 2025-06-07
Payer: MEDICARE

## 2025-06-07 ENCOUNTER — APPOINTMENT (OUTPATIENT)
Dept: CARDIOLOGY | Facility: HOSPITAL | Age: 85
DRG: 871 | End: 2025-06-07
Payer: MEDICARE

## 2025-06-07 ENCOUNTER — HOSPITAL ENCOUNTER (INPATIENT)
Facility: HOSPITAL | Age: 85
End: 2025-06-07
Attending: STUDENT IN AN ORGANIZED HEALTH CARE EDUCATION/TRAINING PROGRAM | Admitting: INTERNAL MEDICINE
Payer: MEDICARE

## 2025-06-07 DIAGNOSIS — I48.20 CHRONIC ATRIAL FIBRILLATION (MULTI): ICD-10-CM

## 2025-06-07 DIAGNOSIS — I50.22 CHRONIC SYSTOLIC HEART FAILURE: ICD-10-CM

## 2025-06-07 DIAGNOSIS — I25.10 CORONARY ARTERY DISEASE INVOLVING NATIVE CORONARY ARTERY OF NATIVE HEART WITHOUT ANGINA PECTORIS: ICD-10-CM

## 2025-06-07 DIAGNOSIS — G47.33 OSA (OBSTRUCTIVE SLEEP APNEA): ICD-10-CM

## 2025-06-07 DIAGNOSIS — A41.9 SEPSIS WITHOUT ACUTE ORGAN DYSFUNCTION, DUE TO UNSPECIFIED ORGANISM (MULTI): ICD-10-CM

## 2025-06-07 DIAGNOSIS — I27.20 PULMONARY HYPERTENSION (MULTI): ICD-10-CM

## 2025-06-07 DIAGNOSIS — J18.9 PNEUMONIA OF BOTH LOWER LOBES DUE TO INFECTIOUS ORGANISM: ICD-10-CM

## 2025-06-07 DIAGNOSIS — N30.01 ACUTE CYSTITIS WITH HEMATURIA: ICD-10-CM

## 2025-06-07 DIAGNOSIS — I50.41 ACUTE COMBINED SYSTOLIC (CONGESTIVE) AND DIASTOLIC (CONGESTIVE) HEART FAILURE: ICD-10-CM

## 2025-06-07 DIAGNOSIS — J18.9 COMMUNITY ACQUIRED PNEUMONIA, UNSPECIFIED LATERALITY: ICD-10-CM

## 2025-06-07 DIAGNOSIS — N39.0 URINARY TRACT INFECTION WITHOUT HEMATURIA, SITE UNSPECIFIED: Primary | ICD-10-CM

## 2025-06-07 LAB
ALBUMIN SERPL BCP-MCNC: 4.1 G/DL (ref 3.4–5)
ALP SERPL-CCNC: 136 U/L (ref 33–136)
ALT SERPL W P-5'-P-CCNC: 28 U/L (ref 10–52)
ANION GAP SERPL CALC-SCNC: 16 MMOL/L (ref 10–20)
APPEARANCE UR: ABNORMAL
AST SERPL W P-5'-P-CCNC: 52 U/L (ref 9–39)
BASOPHILS # BLD AUTO: 0.03 X10*3/UL (ref 0–0.1)
BASOPHILS NFR BLD AUTO: 0.2 %
BILIRUB SERPL-MCNC: 1.2 MG/DL (ref 0–1.2)
BILIRUB UR STRIP.AUTO-MCNC: NEGATIVE MG/DL
BNP SERPL-MCNC: 1930 PG/ML (ref 0–99)
BUN SERPL-MCNC: 25 MG/DL (ref 6–23)
CALCIUM SERPL-MCNC: 9.2 MG/DL (ref 8.6–10.3)
CHLORIDE SERPL-SCNC: 98 MMOL/L (ref 98–107)
CO2 SERPL-SCNC: 23 MMOL/L (ref 21–32)
COLOR UR: YELLOW
CREAT SERPL-MCNC: 1.28 MG/DL (ref 0.5–1.3)
EGFRCR SERPLBLD CKD-EPI 2021: 54 ML/MIN/1.73M*2
EOSINOPHIL # BLD AUTO: 0.01 X10*3/UL (ref 0–0.4)
EOSINOPHIL NFR BLD AUTO: 0.1 %
ERYTHROCYTE [DISTWIDTH] IN BLOOD BY AUTOMATED COUNT: 19.9 % (ref 11.5–14.5)
FLUAV RNA RESP QL NAA+PROBE: NOT DETECTED
FLUBV RNA RESP QL NAA+PROBE: NOT DETECTED
GLUCOSE SERPL-MCNC: 117 MG/DL (ref 74–99)
GLUCOSE UR STRIP.AUTO-MCNC: ABNORMAL MG/DL
HCT VFR BLD AUTO: 36.7 % (ref 41–52)
HGB BLD-MCNC: 11.1 G/DL (ref 13.5–17.5)
IMM GRANULOCYTES # BLD AUTO: 0.12 X10*3/UL (ref 0–0.5)
IMM GRANULOCYTES NFR BLD AUTO: 0.8 % (ref 0–0.9)
KETONES UR STRIP.AUTO-MCNC: NEGATIVE MG/DL
LACTATE SERPL-SCNC: 1.7 MMOL/L (ref 0.4–2)
LEUKOCYTE ESTERASE UR QL STRIP.AUTO: ABNORMAL
LIPASE SERPL-CCNC: 8 U/L (ref 9–82)
LYMPHOCYTES # BLD AUTO: 0.39 X10*3/UL (ref 0.8–3)
LYMPHOCYTES NFR BLD AUTO: 2.5 %
MCH RBC QN AUTO: 24.8 PG (ref 26–34)
MCHC RBC AUTO-ENTMCNC: 30.2 G/DL (ref 32–36)
MCV RBC AUTO: 82 FL (ref 80–100)
MONOCYTES # BLD AUTO: 1.49 X10*3/UL (ref 0.05–0.8)
MONOCYTES NFR BLD AUTO: 9.4 %
MRSA DNA SPEC QL NAA+PROBE: NOT DETECTED
NEUTROPHILS # BLD AUTO: 13.86 X10*3/UL (ref 1.6–5.5)
NEUTROPHILS NFR BLD AUTO: 87 %
NITRITE UR QL STRIP.AUTO: NEGATIVE
NRBC BLD-RTO: 0 /100 WBCS (ref 0–0)
PH UR STRIP.AUTO: 5.5 [PH]
PLATELET # BLD AUTO: 77 X10*3/UL (ref 150–450)
POTASSIUM SERPL-SCNC: 5 MMOL/L (ref 3.5–5.3)
PROT SERPL-MCNC: 7.2 G/DL (ref 6.4–8.2)
PROT UR STRIP.AUTO-MCNC: ABNORMAL MG/DL
RBC # BLD AUTO: 4.47 X10*6/UL (ref 4.5–5.9)
RBC # UR STRIP.AUTO: ABNORMAL MG/DL
RBC #/AREA URNS AUTO: >20 /HPF
SARS-COV-2 RNA RESP QL NAA+PROBE: NOT DETECTED
SODIUM SERPL-SCNC: 132 MMOL/L (ref 136–145)
SP GR UR STRIP.AUTO: 1.01
UROBILINOGEN UR STRIP.AUTO-MCNC: NORMAL MG/DL
WBC # BLD AUTO: 15.9 X10*3/UL (ref 4.4–11.3)
WBC #/AREA URNS AUTO: >50 /HPF

## 2025-06-07 PROCEDURE — 51701 INSERT BLADDER CATHETER: CPT

## 2025-06-07 PROCEDURE — 81001 URINALYSIS AUTO W/SCOPE: CPT | Performed by: STUDENT IN AN ORGANIZED HEALTH CARE EDUCATION/TRAINING PROGRAM

## 2025-06-07 PROCEDURE — 36415 COLL VENOUS BLD VENIPUNCTURE: CPT | Performed by: STUDENT IN AN ORGANIZED HEALTH CARE EDUCATION/TRAINING PROGRAM

## 2025-06-07 PROCEDURE — 2500000001 HC RX 250 WO HCPCS SELF ADMINISTERED DRUGS (ALT 637 FOR MEDICARE OP): Performed by: INTERNAL MEDICINE

## 2025-06-07 PROCEDURE — 51798 US URINE CAPACITY MEASURE: CPT

## 2025-06-07 PROCEDURE — 96361 HYDRATE IV INFUSION ADD-ON: CPT | Mod: 59

## 2025-06-07 PROCEDURE — 2500000004 HC RX 250 GENERAL PHARMACY W/ HCPCS (ALT 636 FOR OP/ED): Performed by: STUDENT IN AN ORGANIZED HEALTH CARE EDUCATION/TRAINING PROGRAM

## 2025-06-07 PROCEDURE — 99223 1ST HOSP IP/OBS HIGH 75: CPT | Performed by: INTERNAL MEDICINE

## 2025-06-07 PROCEDURE — 87641 MR-STAPH DNA AMP PROBE: CPT | Performed by: STUDENT IN AN ORGANIZED HEALTH CARE EDUCATION/TRAINING PROGRAM

## 2025-06-07 PROCEDURE — 71275 CT ANGIOGRAPHY CHEST: CPT

## 2025-06-07 PROCEDURE — 87075 CULTR BACTERIA EXCEPT BLOOD: CPT | Mod: STJLAB | Performed by: STUDENT IN AN ORGANIZED HEALTH CARE EDUCATION/TRAINING PROGRAM

## 2025-06-07 PROCEDURE — 85025 COMPLETE CBC W/AUTO DIFF WBC: CPT | Performed by: STUDENT IN AN ORGANIZED HEALTH CARE EDUCATION/TRAINING PROGRAM

## 2025-06-07 PROCEDURE — 71045 X-RAY EXAM CHEST 1 VIEW: CPT | Performed by: RADIOLOGY

## 2025-06-07 PROCEDURE — 83690 ASSAY OF LIPASE: CPT | Performed by: STUDENT IN AN ORGANIZED HEALTH CARE EDUCATION/TRAINING PROGRAM

## 2025-06-07 PROCEDURE — 96365 THER/PROPH/DIAG IV INF INIT: CPT | Mod: 59

## 2025-06-07 PROCEDURE — 87040 BLOOD CULTURE FOR BACTERIA: CPT | Mod: STJLAB | Performed by: STUDENT IN AN ORGANIZED HEALTH CARE EDUCATION/TRAINING PROGRAM

## 2025-06-07 PROCEDURE — 74177 CT ABD & PELVIS W/CONTRAST: CPT | Performed by: RADIOLOGY

## 2025-06-07 PROCEDURE — 2500000001 HC RX 250 WO HCPCS SELF ADMINISTERED DRUGS (ALT 637 FOR MEDICARE OP)

## 2025-06-07 PROCEDURE — 2060000001 HC INTERMEDIATE ICU ROOM DAILY

## 2025-06-07 PROCEDURE — 74177 CT ABD & PELVIS W/CONTRAST: CPT

## 2025-06-07 PROCEDURE — 71045 X-RAY EXAM CHEST 1 VIEW: CPT

## 2025-06-07 PROCEDURE — 80053 COMPREHEN METABOLIC PANEL: CPT | Performed by: STUDENT IN AN ORGANIZED HEALTH CARE EDUCATION/TRAINING PROGRAM

## 2025-06-07 PROCEDURE — 2550000001 HC RX 255 CONTRASTS: Performed by: STUDENT IN AN ORGANIZED HEALTH CARE EDUCATION/TRAINING PROGRAM

## 2025-06-07 PROCEDURE — 71275 CT ANGIOGRAPHY CHEST: CPT | Performed by: RADIOLOGY

## 2025-06-07 PROCEDURE — 96375 TX/PRO/DX INJ NEW DRUG ADDON: CPT | Mod: 59

## 2025-06-07 PROCEDURE — 99291 CRITICAL CARE FIRST HOUR: CPT | Performed by: STUDENT IN AN ORGANIZED HEALTH CARE EDUCATION/TRAINING PROGRAM

## 2025-06-07 PROCEDURE — 96366 THER/PROPH/DIAG IV INF ADDON: CPT | Mod: 59

## 2025-06-07 PROCEDURE — 83605 ASSAY OF LACTIC ACID: CPT | Performed by: STUDENT IN AN ORGANIZED HEALTH CARE EDUCATION/TRAINING PROGRAM

## 2025-06-07 PROCEDURE — 83880 ASSAY OF NATRIURETIC PEPTIDE: CPT

## 2025-06-07 PROCEDURE — 93005 ELECTROCARDIOGRAM TRACING: CPT

## 2025-06-07 PROCEDURE — 87086 URINE CULTURE/COLONY COUNT: CPT | Mod: STJLAB | Performed by: STUDENT IN AN ORGANIZED HEALTH CARE EDUCATION/TRAINING PROGRAM

## 2025-06-07 PROCEDURE — 99285 EMERGENCY DEPT VISIT HI MDM: CPT | Mod: 25 | Performed by: STUDENT IN AN ORGANIZED HEALTH CARE EDUCATION/TRAINING PROGRAM

## 2025-06-07 PROCEDURE — 2500000005 HC RX 250 GENERAL PHARMACY W/O HCPCS: Performed by: STUDENT IN AN ORGANIZED HEALTH CARE EDUCATION/TRAINING PROGRAM

## 2025-06-07 PROCEDURE — 87636 SARSCOV2 & INF A&B AMP PRB: CPT | Performed by: STUDENT IN AN ORGANIZED HEALTH CARE EDUCATION/TRAINING PROGRAM

## 2025-06-07 RX ORDER — NOREPINEPHRINE BITARTRATE/D5W 8 MG/250ML
0-.2 PLASTIC BAG, INJECTION (ML) INTRAVENOUS CONTINUOUS
Status: DISCONTINUED | OUTPATIENT
Start: 2025-06-07 | End: 2025-06-07

## 2025-06-07 RX ORDER — ACETAMINOPHEN 650 MG/1
650 SUPPOSITORY RECTAL EVERY 4 HOURS PRN
Status: ACTIVE | OUTPATIENT
Start: 2025-06-07

## 2025-06-07 RX ORDER — LEVETIRACETAM 500 MG/1
250 TABLET ORAL DAILY
Status: DISPENSED | OUTPATIENT
Start: 2025-06-08

## 2025-06-07 RX ORDER — PANTOPRAZOLE SODIUM 40 MG/1
40 TABLET, DELAYED RELEASE ORAL
Status: ACTIVE | OUTPATIENT
Start: 2025-06-08

## 2025-06-07 RX ORDER — VANCOMYCIN HYDROCHLORIDE 1.25 G/250ML
1250 INJECTION, SOLUTION INTRAVITREAL ONCE
Status: COMPLETED | OUTPATIENT
Start: 2025-06-07 | End: 2025-06-07

## 2025-06-07 RX ORDER — TALC
3 POWDER (GRAM) TOPICAL NIGHTLY PRN
Status: ACTIVE | OUTPATIENT
Start: 2025-06-07

## 2025-06-07 RX ORDER — ACETAMINOPHEN 160 MG/5ML
650 SOLUTION ORAL EVERY 4 HOURS PRN
Status: ACTIVE | OUTPATIENT
Start: 2025-06-07

## 2025-06-07 RX ORDER — ATORVASTATIN CALCIUM 20 MG/1
20 TABLET, FILM COATED ORAL NIGHTLY
Status: DISPENSED | OUTPATIENT
Start: 2025-06-07

## 2025-06-07 RX ORDER — DOXYCYCLINE 100 MG/1
100 CAPSULE ORAL ONCE
Status: DISCONTINUED | OUTPATIENT
Start: 2025-06-07 | End: 2025-06-07

## 2025-06-07 RX ORDER — METOCLOPRAMIDE 10 MG/1
10 TABLET ORAL EVERY 6 HOURS PRN
Status: DISCONTINUED | OUTPATIENT
Start: 2025-06-07 | End: 2025-06-08

## 2025-06-07 RX ORDER — ACETAMINOPHEN 325 MG/1
650 TABLET ORAL EVERY 4 HOURS PRN
Status: ACTIVE | OUTPATIENT
Start: 2025-06-07

## 2025-06-07 RX ORDER — METOPROLOL SUCCINATE 25 MG/1
25 TABLET, EXTENDED RELEASE ORAL 2 TIMES DAILY
Status: ACTIVE | OUTPATIENT
Start: 2025-06-07

## 2025-06-07 RX ORDER — ACETAMINOPHEN 325 MG/1
975 TABLET ORAL ONCE
Status: COMPLETED | OUTPATIENT
Start: 2025-06-07 | End: 2025-06-07

## 2025-06-07 RX ORDER — ONDANSETRON HYDROCHLORIDE 2 MG/ML
4 INJECTION, SOLUTION INTRAVENOUS ONCE
Status: COMPLETED | OUTPATIENT
Start: 2025-06-07 | End: 2025-06-07

## 2025-06-07 RX ORDER — SERTRALINE HYDROCHLORIDE 100 MG/1
100 TABLET, FILM COATED ORAL DAILY
Status: ACTIVE | OUTPATIENT
Start: 2025-06-08

## 2025-06-07 RX ORDER — ONDANSETRON HYDROCHLORIDE 2 MG/ML
4 INJECTION, SOLUTION INTRAVENOUS EVERY 8 HOURS PRN
Status: ACTIVE | OUTPATIENT
Start: 2025-06-07

## 2025-06-07 RX ORDER — METOCLOPRAMIDE HYDROCHLORIDE 5 MG/ML
10 INJECTION INTRAMUSCULAR; INTRAVENOUS EVERY 6 HOURS PRN
Status: DISCONTINUED | OUTPATIENT
Start: 2025-06-07 | End: 2025-06-08

## 2025-06-07 RX ORDER — MIRTAZAPINE 15 MG/1
15 TABLET, FILM COATED ORAL NIGHTLY
Status: ACTIVE | OUTPATIENT
Start: 2025-06-07

## 2025-06-07 RX ORDER — NOREPINEPHRINE BITARTRATE/D5W 8 MG/250ML
PLASTIC BAG, INJECTION (ML) INTRAVENOUS
Status: DISPENSED
Start: 2025-06-07 | End: 2025-06-08

## 2025-06-07 RX ORDER — PANTOPRAZOLE SODIUM 40 MG/10ML
40 INJECTION, POWDER, LYOPHILIZED, FOR SOLUTION INTRAVENOUS
Status: DISCONTINUED | OUTPATIENT
Start: 2025-06-08 | End: 2025-06-08

## 2025-06-07 RX ORDER — WATER
250 LIQUID (ML) MISCELLANEOUS ONCE
Status: COMPLETED | OUTPATIENT
Start: 2025-06-07 | End: 2025-06-07

## 2025-06-07 RX ORDER — CEFTRIAXONE 2 G/50ML
2 INJECTION, SOLUTION INTRAVENOUS ONCE
Status: COMPLETED | OUTPATIENT
Start: 2025-06-07 | End: 2025-06-07

## 2025-06-07 RX ORDER — ONDANSETRON 4 MG/1
4 TABLET, ORALLY DISINTEGRATING ORAL EVERY 8 HOURS PRN
Status: ACTIVE | OUTPATIENT
Start: 2025-06-07

## 2025-06-07 RX ADMIN — ACETAMINOPHEN 975 MG: 325 TABLET ORAL at 15:16

## 2025-06-07 RX ADMIN — DOXYCYCLINE 100 MG: 100 INJECTION, POWDER, LYOPHILIZED, FOR SOLUTION INTRAVENOUS at 19:18

## 2025-06-07 RX ADMIN — IOHEXOL 60 ML: 350 INJECTION, SOLUTION INTRAVENOUS at 18:47

## 2025-06-07 RX ADMIN — Medication 250 ML: at 15:38

## 2025-06-07 RX ADMIN — SODIUM CHLORIDE, SODIUM LACTATE, POTASSIUM CHLORIDE, AND CALCIUM CHLORIDE 250 ML: .6; .31; .03; .02 INJECTION, SOLUTION INTRAVENOUS at 18:20

## 2025-06-07 RX ADMIN — APIXABAN 5 MG: 5 TABLET, FILM COATED ORAL at 23:32

## 2025-06-07 RX ADMIN — SODIUM CHLORIDE, SODIUM LACTATE, POTASSIUM CHLORIDE, AND CALCIUM CHLORIDE 500 ML: .6; .31; .03; .02 INJECTION, SOLUTION INTRAVENOUS at 15:16

## 2025-06-07 RX ADMIN — ONDANSETRON 4 MG: 2 INJECTION INTRAMUSCULAR; INTRAVENOUS at 15:16

## 2025-06-07 RX ADMIN — VANCOMYCIN HYDROCHLORIDE 1250 MG: 1.25 INJECTION, SOLUTION INTRAVITREAL at 20:27

## 2025-06-07 RX ADMIN — ATORVASTATIN CALCIUM 20 MG: 20 TABLET, FILM COATED ORAL at 23:32

## 2025-06-07 RX ADMIN — CEFTRIAXONE 2 G: 2 INJECTION, SOLUTION INTRAVENOUS at 18:24

## 2025-06-07 RX ADMIN — IOHEXOL 75 ML: 350 INJECTION, SOLUTION INTRAVENOUS at 16:35

## 2025-06-07 RX ADMIN — Medication 2 L/MIN: at 18:16

## 2025-06-07 SDOH — SOCIAL STABILITY: SOCIAL INSECURITY: DO YOU FEEL ANYONE HAS EXPLOITED OR TAKEN ADVANTAGE OF YOU FINANCIALLY OR OF YOUR PERSONAL PROPERTY?: NO

## 2025-06-07 SDOH — SOCIAL STABILITY: SOCIAL INSECURITY: HAS ANYONE EVER THREATENED TO HURT YOUR FAMILY OR YOUR PETS?: NO

## 2025-06-07 SDOH — SOCIAL STABILITY: SOCIAL INSECURITY: WITHIN THE LAST YEAR, HAVE YOU BEEN HUMILIATED OR EMOTIONALLY ABUSED IN OTHER WAYS BY YOUR PARTNER OR EX-PARTNER?: NO

## 2025-06-07 SDOH — SOCIAL STABILITY: SOCIAL INSECURITY: WITHIN THE LAST YEAR, HAVE YOU BEEN AFRAID OF YOUR PARTNER OR EX-PARTNER?: NO

## 2025-06-07 SDOH — ECONOMIC STABILITY: FOOD INSECURITY: WITHIN THE PAST 12 MONTHS, THE FOOD YOU BOUGHT JUST DIDN'T LAST AND YOU DIDN'T HAVE MONEY TO GET MORE.: NEVER TRUE

## 2025-06-07 SDOH — SOCIAL STABILITY: SOCIAL INSECURITY: DO YOU FEEL UNSAFE GOING BACK TO THE PLACE WHERE YOU ARE LIVING?: NO

## 2025-06-07 SDOH — ECONOMIC STABILITY: FOOD INSECURITY: WITHIN THE PAST 12 MONTHS, YOU WORRIED THAT YOUR FOOD WOULD RUN OUT BEFORE YOU GOT THE MONEY TO BUY MORE.: NEVER TRUE

## 2025-06-07 SDOH — SOCIAL STABILITY: SOCIAL INSECURITY: DOES ANYONE TRY TO KEEP YOU FROM HAVING/CONTACTING OTHER FRIENDS OR DOING THINGS OUTSIDE YOUR HOME?: NO

## 2025-06-07 SDOH — ECONOMIC STABILITY: INCOME INSECURITY: IN THE PAST 12 MONTHS HAS THE ELECTRIC, GAS, OIL, OR WATER COMPANY THREATENED TO SHUT OFF SERVICES IN YOUR HOME?: NO

## 2025-06-07 SDOH — SOCIAL STABILITY: SOCIAL INSECURITY: HAVE YOU HAD ANY THOUGHTS OF HARMING ANYONE ELSE?: NO

## 2025-06-07 SDOH — SOCIAL STABILITY: SOCIAL INSECURITY: ARE YOU OR HAVE YOU BEEN THREATENED OR ABUSED PHYSICALLY, EMOTIONALLY, OR SEXUALLY BY ANYONE?: NO

## 2025-06-07 SDOH — SOCIAL STABILITY: SOCIAL INSECURITY: HAVE YOU HAD THOUGHTS OF HARMING ANYONE ELSE?: NO

## 2025-06-07 SDOH — SOCIAL STABILITY: SOCIAL INSECURITY: ABUSE: ADULT

## 2025-06-07 SDOH — SOCIAL STABILITY: SOCIAL INSECURITY: ARE THERE ANY APPARENT SIGNS OF INJURIES/BEHAVIORS THAT COULD BE RELATED TO ABUSE/NEGLECT?: NO

## 2025-06-07 ASSESSMENT — LIFESTYLE VARIABLES
SUBSTANCE_ABUSE_PAST_12_MONTHS: NO
PRESCIPTION_ABUSE_PAST_12_MONTHS: NO
HAVE YOU EVER FELT YOU SHOULD CUT DOWN ON YOUR DRINKING: NO
EVER FELT BAD OR GUILTY ABOUT YOUR DRINKING: NO
AUDIT-C TOTAL SCORE: 4
AUDIT TOTAL SCORE: 4
HOW OFTEN DURING THE LAST YEAR HAVE YOU FAILED TO DO WHAT WAS NORMALLY EXPECTED FROM YOU BECAUSE OF DRINKING: NEVER
SKIP TO QUESTIONS 9-10: 1
HAS A RELATIVE, FRIEND, DOCTOR, OR ANOTHER HEALTH PROFESSIONAL EXPRESSED CONCERN ABOUT YOUR DRINKING OR SUGGESTED YOU CUT DOWN: NO
AUDIT-C TOTAL SCORE: 4
HOW MANY STANDARD DRINKS CONTAINING ALCOHOL DO YOU HAVE ON A TYPICAL DAY: 1 OR 2
EVER HAD A DRINK FIRST THING IN THE MORNING TO STEADY YOUR NERVES TO GET RID OF A HANGOVER: NO
HAVE PEOPLE ANNOYED YOU BY CRITICIZING YOUR DRINKING: NO
HOW OFTEN DURING THE LAST YEAR HAVE YOU FOUND THAT YOU WERE NOT ABLE TO STOP DRINKING ONCE YOU HAD STARTED: NEVER
HOW OFTEN DURING THE LAST YEAR HAVE YOU BEEN UNABLE TO REMEMBER WHAT HAPPENED THE NIGHT BEFORE BECAUSE YOU HAD BEEN DRINKING: NEVER
HOW OFTEN DURING THE LAST YEAR HAVE YOU HAD A FEELING OF GUILT OR REMORSE AFTER DRINKING: NEVER
HOW OFTEN DURING THE LAST YEAR HAVE YOU NEEDED AN ALCOHOLIC DRINK FIRST THING IN THE MORNING TO GET YOURSELF GOING AFTER A NIGHT OF HEAVY DRINKING: NEVER
TOTAL SCORE: 0
HAVE YOU OR SOMEONE ELSE BEEN INJURED AS A RESULT OF YOUR DRINKING: NO
HOW OFTEN DO YOU HAVE 6 OR MORE DRINKS ON ONE OCCASION: NEVER
AUDIT TOTAL SCORE: 0
HOW OFTEN DO YOU HAVE A DRINK CONTAINING ALCOHOL: 4 OR MORE TIMES A WEEK

## 2025-06-07 ASSESSMENT — COGNITIVE AND FUNCTIONAL STATUS - GENERAL
CLIMB 3 TO 5 STEPS WITH RAILING: A LITTLE
PATIENT BASELINE BEDBOUND: NO
DAILY ACTIVITIY SCORE: 24
WALKING IN HOSPITAL ROOM: A LITTLE
MOBILITY SCORE: 22

## 2025-06-07 ASSESSMENT — PAIN - FUNCTIONAL ASSESSMENT
PAIN_FUNCTIONAL_ASSESSMENT: 0-10

## 2025-06-07 ASSESSMENT — PAIN SCALES - GENERAL
PAINLEVEL_OUTOF10: 0 - NO PAIN
PAINLEVEL_OUTOF10: 6
PAINLEVEL_OUTOF10: 0 - NO PAIN

## 2025-06-07 ASSESSMENT — ACTIVITIES OF DAILY LIVING (ADL)
WALKS IN HOME: INDEPENDENT
HEARING - LEFT EAR: FUNCTIONAL
ADEQUATE_TO_COMPLETE_ADL: YES
TOILETING: INDEPENDENT
FEEDING YOURSELF: INDEPENDENT
GROOMING: INDEPENDENT
HEARING - RIGHT EAR: DIFFICULTY WITH NOISE
JUDGMENT_ADEQUATE_SAFELY_COMPLETE_DAILY_ACTIVITIES: YES
PATIENT'S MEMORY ADEQUATE TO SAFELY COMPLETE DAILY ACTIVITIES?: YES
LACK_OF_TRANSPORTATION: NO
ASSISTIVE_DEVICE: EYEGLASSES
BATHING: INDEPENDENT
DRESSING YOURSELF: INDEPENDENT

## 2025-06-07 ASSESSMENT — CURB65 SCORE
SBP LESS THAN 90 OR DBNP LESS THAN 60: YES
CURB65 SCORE: 3
RESPIRATORY RATE GREATER THAN OR EQUAL TO 30: NO
BUN IS GREATER THAN 19 MG/DL: YES
HAS CONFUSION: NO
AGE IS GREATER THAN OR EQUAL TO 65: YES

## 2025-06-07 ASSESSMENT — PAIN DESCRIPTION - LOCATION: LOCATION: ABDOMEN

## 2025-06-07 ASSESSMENT — PATIENT HEALTH QUESTIONNAIRE - PHQ9
SUM OF ALL RESPONSES TO PHQ9 QUESTIONS 1 & 2: 1
1. LITTLE INTEREST OR PLEASURE IN DOING THINGS: SEVERAL DAYS
2. FEELING DOWN, DEPRESSED OR HOPELESS: NOT AT ALL

## 2025-06-07 NOTE — ED PROVIDER NOTES
History of Present Illness     History provided by: Patient and Family Member  Limitations to History: None  External Records Reviewed with Brief Summary: Outpatient progress note from 5/7/2025 which showed that he had a recent TURBT procedure with Dr. Washington.  Additionally, urgent care note on 5/15/2025 which showed that the patient visited the urgent care with a complaint of frequency and urgency and was given 1 dose ceftriaxone IV as well as Augmentin that was later on changed to amoxicillin.    HPI:  Minh Harrington Jr. is a 85 y.o. male with PMHx of HLD, CAD, A-fib on Eliquis, HFrEF (LVEF 30 to 35%), pulmonary hypertension, ascending thoracic aortic aneurysm, mitral/tricuspid valve regurgitation, bladder cancer s/p TURP with repeat TURBT recently on 5/7/2025, presenting to the ED today with a major complaint of nausea, dry heaves, and diarrhea that started today.  He was recently diagnosed with UTI and was started on antibiotic in the form of Augmentin that was later switched to amoxicillin.  The patient started the antibiotic yesterday and this morning was his second dose after which he started experiencing the symptoms.  He denied having any other recent medication changes.  He also denied having any abdominal pain.  Furthermore, he denied having any recent sick contact however he does endorse having flulike symptoms over the last week and currently is recovering.  He had 2 episodes of diarrhea which he described as being orange in color with no blood in stools.  He also denied having any hematuria.  He denied having any fever, chills, chest pain, shortness of breath.  He does endorse having some lightheadedness.  Of note, he reported having breakfast in a restaurant yesterday after visiting the urgent care however he did not have any symptoms throughout the day.    Physical Exam   Triage vitals:  T 37.4 °C (99.3 °F)  HR 71  /71  RR 18  O2 96 % None (Room air)    Physical Exam  Constitutional:        General: He is awake. He is not in acute distress.     Appearance: Normal appearance. He is well-developed, well-groomed and normal weight. He is not ill-appearing.   HENT:      Head: Normocephalic and atraumatic.      Right Ear: External ear normal.      Left Ear: External ear normal.      Nose: Nose normal. No rhinorrhea.      Mouth/Throat:      Mouth: Mucous membranes are moist.      Pharynx: Oropharynx is clear. No oropharyngeal exudate or posterior oropharyngeal erythema.   Eyes:      General: No scleral icterus.        Right eye: No discharge.         Left eye: No discharge.      Extraocular Movements: Extraocular movements intact.      Conjunctiva/sclera: Conjunctivae normal.      Pupils: Pupils are equal, round, and reactive to light.   Cardiovascular:      Rate and Rhythm: Normal rate and regular rhythm.      Pulses: Normal pulses.      Heart sounds: Normal heart sounds. No murmur heard.  Pulmonary:      Effort: Pulmonary effort is normal. No respiratory distress.      Breath sounds: Normal breath sounds. No wheezing, rhonchi or rales.   Chest:      Chest wall: No tenderness.   Abdominal:      General: Abdomen is flat. Bowel sounds are normal. There is no distension.      Palpations: Abdomen is soft.      Tenderness: There is no abdominal tenderness. There is no guarding or rebound.   Musculoskeletal:         General: No swelling or tenderness. Normal range of motion.      Cervical back: Normal range of motion. No rigidity or tenderness.      Right lower le+ Edema present.      Left lower le+ Edema present.   Skin:     General: Skin is warm and dry.      Coloration: Skin is not jaundiced or pale.      Findings: No erythema or lesion.   Neurological:      General: No focal deficit present.      Mental Status: He is alert and oriented to person, place, and time. Mental status is at baseline.   Psychiatric:         Mood and Affect: Mood normal.         Behavior: Behavior normal. Behavior is  [place].          Medical Decision Making & ED Course   Medical Decision Makin y.o. male presenting with dry heaves, nausea, and diarrhea in the setting of active UTI with recent start on amoxicillin as well as breakfast ingestion nitroestrone yesterday.  He endorses having URI symptoms that has been ongoing for the last week and currently in recovery.  Physical exam was unremarkable for abdominal tenderness.  There was normal bowel sounds.  And he denied having any fever, chills, chest pain, shortness of breath, or abdominal pain.  Lab workup was ordered in the form of CBC, CMP, lipase, UA, flu A/B/COVID PCR swab.  Patient had leukocytosis with WBC of 15.9 with absolute neutrophil count of 13.86.  He also have a positive UA for UTI with leukocyte esterase of 500 as well as WBC of more than 50.  His lipase as well as flu A/B/COVID PCR came back negative.  BNP was ordered which came back elevated at the level of 1930 however patient is currently on Entresto which can falsely elevate the BNP.    While the patient is in the ED his automated blood pressure measurement was recorded as 249/171, 294/157 which is most likely erroneous measurement as the patient did not have any symptoms.  Manual blood pressure measurement was performed which showed continuous decline in blood pressure with measurement of 88/53 going down to 70/50 that did not improve after administration of 750 mL of IV fluid.  There was consideration for starting the patient on Levophed however his MAP has been maintained above 65  Given continuous decline in blood pressure in the setting of active infection (drain site infection) lactate was ordered which came back as 1.7    Discussion of obtaining CT scan of the abdomen was discussed with the patient and family at bedside including benefits and risks, however they elected not to do the CT imaging at this time.  However, given leukocytosis a CT scan was ordered for further evaluation of any  intra-abdominal pathology.  Additionally, CT angio of chest was ordered to rule out PE given the patient increased oxygen requirement.    Screening for intra-abdominal pathology was done with CT abdomen and pelvis with contrast which showed:  IMPRESSION:  Small bilateral pleural effusions. There are bibasilar infiltrates, greater on the left.  Cardiomegaly with scattered atherosclerotic arterial calcifications.  Fatty metamorphosis of the liver and hepatocellular disease.  Bilateral renal cortical thinning. There is a 5 cm cyst in the mid left kidney medially. A 2 mm nonobstructing calculus in the left kidney.  Mild bladder wall thickening and hypertrophy. Further workup with direct visualization is recommended. There is some wall irregularity at the base of the urinary bladder with some encroachment of the prostate. Correlate with PSA levels.  Sigmoid diverticulosis. There is mild wall prominence of the distal sigmoid colon and rectum.  Severe discogenic degenerative changes of the lumbar spine and osteoarthritic changes of both hips.    CT angio chest showed  IMPRESSION:  1.  No acute pulmonary embolism to the proximal segmental arterial level. Symmetric hypoenhancement of bilateral lower lobe distal segmental and subsegmental branches is favored to be artifactual due to bolus timing and poor opacification. If there is high clinical concern for PE repeat CT can be considered.  2. Main pulmonary artery is enlarged and measures 4.0 cm which can be seen with pulmonary arterial hypertension  3. Aneurysmal dilatation of the ascending thoracic aorta measuring up to 4.2 cm. Diffuse calcific atherosclerosis of the aorta and arch vessels.  4. Cardiomegaly. Mild interlobular septal thickening ground-glass opacities suggestive of developing interstitial edema/CHF.  5. Advanced centrilobular and paraseptal emphysema.  6. There small bilateral pleural effusions with adjacent airspace and consolidative opacities. This may  relate to compressive atelectasis versus developing airspace disease. Clinical correlation and continued short-term follow-up to resolution is advised.  7. There is reflux of contrast into the IVC and intrahepatic veins suggestive of right heart dysfunction.    Given the patient decreased SpO2 and new oxygen requirement in addition to CT finding showing suspicion for pulmonary edema patient may benefit from diuresis however given his low blood pressure we will hold off on giving any diuretics in the meantime until patient is stabilized.  Patient was started on IV antibiotic for management of UTI as well as suspected pneumonia in the form of ceftriaxone 2 g IV, cyclin 100 mg IV, and vancomycin.  MRSA swab was ordered for vancomycin de-escalation.  ICU was contacted for admission as the patient may require vasopressors given his persistent hypotension, however ICU refused the patient due to lack of ICU admission criteria and lack of active need for vasopressor at this time, and recommended admission to the CCU for further treatment.    Patient was discussed with the hospitalist medicine team who will be admitting the patient for IV antibiotic as well as further optimization of blood pressure and cardiac condition.    Patient was given Tylenol 975 mg for management of low-grade fever as well as  mL for dehydration and Zofran for management of nausea.  Plan of switching his antibiotic from amoxicillin to Bactrim was discussed with the patient which he was agreeable to.    ----    Differential diagnoses considered include but are not limited to: Antibiotic side effects, viral gastroenteritis, symptoms secondary to UTI, diverticulitis (less likely given lack of systemic symptoms as well as negative abdominal examination)     Social Determinants of Health which Significantly Impact Care: None identified     Chronic conditions affecting the patient's care: As documented above in MDM    The patient was discussed with  the following consultants/services: Dr. Browne    Care Considerations: As documented above in [facility]    ED Course:  ED Course as of 06/07/25 2055   Sat Jun 07, 2025   1431 Patient with mild suprapubic tenderness at this time.  Hemodynamically stable.  Endorsing low-grade fevers at home.  Will give Tylenol, IV fluid small bolus given HFrEF and Zofran.  Diagnosed with UTI which was explain patient symptomatology.  2 episodes of diarrhea today with nausea without any vomiting.  Could be related to GI upset from patient's Augmentin.  She decision-making, the patient, his wife, and son at bedside.  They are aware that I cannot fully evaluate for intra-abdominal processes without CT imaging.  They are comfortable not proceeding with this at this time as the symptomatology could very easily be explained by the known UTI/antibiotic side effects.  As long as basic laboratory studies not show any significant abnormalities such as significant leukocytosis or kidney dysfunction we will plan on foregoing imaging at this time at family request. [TL]   1601 Leukocytosis. This is only SIRS criteria. Not septic from my perspective at this time.  However will obtain CT imaging to further evaluate. [TL]   1657 Patient with highly fluctuating blood pressure recently on BP cuff.  Asymptomatic.  Does not seem reliable and asked nurse to obtain manual.  On manual blood pressure there is much more reassuring. [TL]   1657 BP: 134/78 [TL]   1804 UA showing signs of infection.  Will obtain a lactate, blood culture, give small IV fluid bolus. [TL]   1812 BP cuff adjusted by physician at this time and is reading low again.  Small IV fluid bolus to be given.  I am concerned for possible CHF exacerbation although patient not in any acute respiratory distress at this time.  250 cc IV fluid bolus to be given.  Patient placed on 2 L nasal cannula oxygenation.  CT angio the chest to be obtained. [TL]   1820 Holding on giving this patient 30 cc/kg ideal  body weight IV fluid bolus due to concern for active CHF exacerbation.  Patient does have hypoxic respiratory failure now placed on 2 L nasal cannula. [TL]   1821 Sepsis bedside huddle had with nurse.  Advised nurse goal MAP of 65 and to inform provider if blood pressure does not maintain above this and will start peripheral norepinephrine. [TL]   1821 Bladder scan 392.  No indication for emergent Pandya catheter although urinary retention will likely be contributing to his infection. [TL]   1858 Every 6 hour bladder scans ordered for further follow-up with patient retention of 390 cc.  Patient will eventually require diuresis for his pulmonary edema however at this time his bigger concern is his blood pressure.  He is saturating well on 2 L and no acute respiratory distress.  Will hold on diuresis this time given soft blood pressure and attempt to avoid needing to go on IV vasopressors [TL]   1947 EKG performed at 19: 41 and independently reviewed by provider: Reveals atrial fibrillation with rapid ventricular response with a rate of 107 bpm, normal axis, prolonged QTc at 501 ms, nonspecific ST and T wave changes,, no ectopy. No STEMI. [TL]   2004 Current MAP 70. No requiring pressors yet. Call to ICU to discuss admission to their service vs. [facility] under the medicine team. [TL]   2019 Spoke to Dr. Melton of ICU service. Declines patient at this time indicating that he believes the patient is appropriate for stepdown. [TL]      ED Course User Index  [TL] Seb Browne DO         Diagnoses as of 06/07/25 2055   Urinary tract infection without hematuria, site unspecified   Sepsis without acute organ dysfunction, due to unspecified organism (Multi)   Community acquired pneumonia, unspecified laterality     Disposition   As a result of their workup, the patient will require admission to the hospital.  The patient was informed of his diagnosis.  The patient was given the opportunity to ask questions and I answered them.  The patient agreed to be admitted to the hospital.    Procedures   Procedures    Patient seen and discussed with ED attending physician.    DERICK SEPULVEDA MD  Emergency Medicine     Derick Sepulveda MD  Resident  06/07/25 1073     being hypervolemic/fluid overloaded at this time.  This is why the patient not receive full IV fluid bolusing.  This can be performed in the next 24 to 48 hours once admitted.  Patient care with broad-spectrum antibiotics for potential intra-abdominal, urinary source of infection.  CT imaging obtained of the chest to rule out possible pulmonary embolism as well as further delineate any source of pulmonary infection such as pneumonia.  Case discussed with the intensivist who did not feel the patient met ICU criteria and recommend admission to the stepdown unit under the general medicine team.  Blood cultures, lactate obtained.    I was present for key and critical portions of the following procedures: Critical care  Time Spent in Critical Care of the patient: 35  Time spent in discussions with the patient and family: 30    DO Seb Larkin DO  06/09/25 0901

## 2025-06-08 ENCOUNTER — APPOINTMENT (OUTPATIENT)
Dept: CARDIOLOGY | Facility: HOSPITAL | Age: 85
End: 2025-06-08
Payer: MEDICARE

## 2025-06-08 PROBLEM — C67.9 BLADDER CANCER (MULTI): Status: ACTIVE | Noted: 2025-03-31

## 2025-06-08 PROBLEM — J18.9 PNEUMONIA OF BOTH LOWER LOBES DUE TO INFECTIOUS ORGANISM: Status: ACTIVE | Noted: 2025-06-08

## 2025-06-08 PROBLEM — I48.20 CHRONIC ATRIAL FIBRILLATION (MULTI): Status: ACTIVE | Noted: 2025-06-08

## 2025-06-08 PROBLEM — I50.22 CHRONIC SYSTOLIC HEART FAILURE: Status: ACTIVE | Noted: 2025-06-08

## 2025-06-08 LAB
ALBUMIN SERPL BCP-MCNC: 3.2 G/DL (ref 3.4–5)
ALP SERPL-CCNC: 92 U/L (ref 33–136)
ALT SERPL W P-5'-P-CCNC: 20 U/L (ref 10–52)
ANION GAP SERPL CALC-SCNC: 11 MMOL/L (ref 10–20)
AST SERPL W P-5'-P-CCNC: 34 U/L (ref 9–39)
BACTERIA BLD CULT: NORMAL
BACTERIA BLD CULT: NORMAL
BACTERIA UR CULT: NORMAL
BILIRUB SERPL-MCNC: 0.8 MG/DL (ref 0–1.2)
BODY SURFACE AREA: 2 M2
BUN SERPL-MCNC: 25 MG/DL (ref 6–23)
CALCIUM SERPL-MCNC: 8.2 MG/DL (ref 8.6–10.3)
CHLORIDE SERPL-SCNC: 101 MMOL/L (ref 98–107)
CO2 SERPL-SCNC: 24 MMOL/L (ref 21–32)
CREAT SERPL-MCNC: 1.22 MG/DL (ref 0.5–1.3)
EGFRCR SERPLBLD CKD-EPI 2021: 57 ML/MIN/1.73M*2
EJECTION FRACTION APICAL 4 CHAMBER: 32.5
EJECTION FRACTION: 33 %
ERYTHROCYTE [DISTWIDTH] IN BLOOD BY AUTOMATED COUNT: 19.7 % (ref 11.5–14.5)
GLUCOSE SERPL-MCNC: 92 MG/DL (ref 74–99)
HBV SURFACE AG SERPL QL IA: NONREACTIVE
HCT VFR BLD AUTO: 31 % (ref 41–52)
HGB BLD-MCNC: 9.1 G/DL (ref 13.5–17.5)
HOLD SPECIMEN: NORMAL
LEFT VENTRICLE INTERNAL DIMENSION DIASTOLE: 5.7 CM (ref 3.5–6)
LV EJECTION FRACTION BIPLANE: 33 %
MAGNESIUM SERPL-MCNC: 1.95 MG/DL (ref 1.6–2.4)
MCH RBC QN AUTO: 24.5 PG (ref 26–34)
MCHC RBC AUTO-ENTMCNC: 29.4 G/DL (ref 32–36)
MCV RBC AUTO: 84 FL (ref 80–100)
NRBC BLD-RTO: 0 /100 WBCS (ref 0–0)
PLATELET # BLD AUTO: 78 X10*3/UL (ref 150–450)
POTASSIUM SERPL-SCNC: 4.5 MMOL/L (ref 3.5–5.3)
PROT SERPL-MCNC: 5.9 G/DL (ref 6.4–8.2)
Q ONSET: 226 MS
QRS COUNT: 17 BEATS
QRS DURATION: 96 MS
QT INTERVAL: 376 MS
QTC CALCULATION(BAZETT): 501 MS
QTC FREDERICIA: 456 MS
R AXIS: 64 DEGREES
RBC # BLD AUTO: 3.71 X10*6/UL (ref 4.5–5.9)
RIGHT VENTRICLE PEAK SYSTOLIC PRESSURE: 44 MMHG
SODIUM SERPL-SCNC: 131 MMOL/L (ref 136–145)
T AXIS: 190 DEGREES
T OFFSET: 414 MS
VENTRICULAR RATE: 107 BPM
WBC # BLD AUTO: 10.9 X10*3/UL (ref 4.4–11.3)

## 2025-06-08 PROCEDURE — 36415 COLL VENOUS BLD VENIPUNCTURE: CPT | Performed by: INTERNAL MEDICINE

## 2025-06-08 PROCEDURE — 2500000004 HC RX 250 GENERAL PHARMACY W/ HCPCS (ALT 636 FOR OP/ED): Performed by: INTERNAL MEDICINE

## 2025-06-08 PROCEDURE — 87205 SMEAR GRAM STAIN: CPT | Mod: STJLAB | Performed by: INTERNAL MEDICINE

## 2025-06-08 PROCEDURE — 2500000002 HC RX 250 W HCPCS SELF ADMINISTERED DRUGS (ALT 637 FOR MEDICARE OP, ALT 636 FOR OP/ED): Performed by: INTERNAL MEDICINE

## 2025-06-08 PROCEDURE — 93325 DOPPLER ECHO COLOR FLOW MAPG: CPT

## 2025-06-08 PROCEDURE — 86706 HEP B SURFACE ANTIBODY: CPT | Mod: STJLAB | Performed by: INTERNAL MEDICINE

## 2025-06-08 PROCEDURE — 99222 1ST HOSP IP/OBS MODERATE 55: CPT | Performed by: INTERNAL MEDICINE

## 2025-06-08 PROCEDURE — 93308 TTE F-UP OR LMTD: CPT | Performed by: INTERNAL MEDICINE

## 2025-06-08 PROCEDURE — 2500000005 HC RX 250 GENERAL PHARMACY W/O HCPCS: Performed by: INTERNAL MEDICINE

## 2025-06-08 PROCEDURE — 99233 SBSQ HOSP IP/OBS HIGH 50: CPT | Performed by: INTERNAL MEDICINE

## 2025-06-08 PROCEDURE — 87340 HEPATITIS B SURFACE AG IA: CPT | Mod: STJLAB | Performed by: INTERNAL MEDICINE

## 2025-06-08 PROCEDURE — 85027 COMPLETE CBC AUTOMATED: CPT | Performed by: INTERNAL MEDICINE

## 2025-06-08 PROCEDURE — 93325 DOPPLER ECHO COLOR FLOW MAPG: CPT | Performed by: INTERNAL MEDICINE

## 2025-06-08 PROCEDURE — 83735 ASSAY OF MAGNESIUM: CPT | Performed by: INTERNAL MEDICINE

## 2025-06-08 PROCEDURE — 84075 ASSAY ALKALINE PHOSPHATASE: CPT | Performed by: INTERNAL MEDICINE

## 2025-06-08 PROCEDURE — 93321 DOPPLER ECHO F-UP/LMTD STD: CPT | Performed by: INTERNAL MEDICINE

## 2025-06-08 PROCEDURE — 2500000001 HC RX 250 WO HCPCS SELF ADMINISTERED DRUGS (ALT 637 FOR MEDICARE OP): Performed by: INTERNAL MEDICINE

## 2025-06-08 PROCEDURE — 94640 AIRWAY INHALATION TREATMENT: CPT

## 2025-06-08 PROCEDURE — 2060000001 HC INTERMEDIATE ICU ROOM DAILY

## 2025-06-08 PROCEDURE — 84145 PROCALCITONIN (PCT): CPT | Mod: STJLAB | Performed by: INTERNAL MEDICINE

## 2025-06-08 RX ORDER — IPRATROPIUM BROMIDE AND ALBUTEROL SULFATE 2.5; .5 MG/3ML; MG/3ML
3 SOLUTION RESPIRATORY (INHALATION) 3 TIMES DAILY
Status: ACTIVE | OUTPATIENT
Start: 2025-06-09

## 2025-06-08 RX ORDER — IPRATROPIUM BROMIDE AND ALBUTEROL SULFATE 2.5; .5 MG/3ML; MG/3ML
3 SOLUTION RESPIRATORY (INHALATION) EVERY 4 HOURS PRN
Status: ACTIVE | OUTPATIENT
Start: 2025-06-08

## 2025-06-08 RX ORDER — MIDODRINE HYDROCHLORIDE 10 MG/1
10 TABLET ORAL ONCE
Status: COMPLETED | OUTPATIENT
Start: 2025-06-08 | End: 2025-06-08

## 2025-06-08 RX ORDER — ALPRAZOLAM 0.25 MG/1
0.25 TABLET ORAL ONCE
Status: ACTIVE | OUTPATIENT
Start: 2025-06-08

## 2025-06-08 RX ORDER — SPIRONOLACTONE 25 MG/1
12.5 TABLET ORAL
Status: ACTIVE | OUTPATIENT
Start: 2025-06-08

## 2025-06-08 RX ORDER — GUAIFENESIN 600 MG/1
1200 TABLET, EXTENDED RELEASE ORAL 2 TIMES DAILY
Status: DISPENSED | OUTPATIENT
Start: 2025-06-08

## 2025-06-08 RX ORDER — IPRATROPIUM BROMIDE AND ALBUTEROL SULFATE 2.5; .5 MG/3ML; MG/3ML
3 SOLUTION RESPIRATORY (INHALATION)
Status: DISCONTINUED | OUTPATIENT
Start: 2025-06-08 | End: 2025-06-08

## 2025-06-08 RX ORDER — ALPRAZOLAM 0.5 MG/1
0.5 TABLET ORAL ONCE
Status: DISCONTINUED | OUTPATIENT
Start: 2025-06-08 | End: 2025-06-08

## 2025-06-08 RX ADMIN — IPRATROPIUM BROMIDE AND ALBUTEROL SULFATE 3 ML: 2.5; .5 SOLUTION RESPIRATORY (INHALATION) at 20:00

## 2025-06-08 RX ADMIN — PIPERACILLIN SODIUM AND TAZOBACTAM SODIUM 4.5 G: 4; .5 INJECTION, SOLUTION INTRAVENOUS at 17:00

## 2025-06-08 RX ADMIN — APIXABAN 5 MG: 5 TABLET, FILM COATED ORAL at 10:04

## 2025-06-08 RX ADMIN — SACUBITRIL AND VALSARTAN 1 TABLET: 24; 26 TABLET, FILM COATED ORAL at 20:25

## 2025-06-08 RX ADMIN — LEVETIRACETAM 250 MG: 500 TABLET, FILM COATED ORAL at 10:04

## 2025-06-08 RX ADMIN — IPRATROPIUM BROMIDE AND ALBUTEROL SULFATE 3 ML: 2.5; .5 SOLUTION RESPIRATORY (INHALATION) at 13:58

## 2025-06-08 RX ADMIN — IPRATROPIUM BROMIDE AND ALBUTEROL SULFATE 3 ML: 2.5; .5 SOLUTION RESPIRATORY (INHALATION) at 09:11

## 2025-06-08 RX ADMIN — APIXABAN 5 MG: 5 TABLET, FILM COATED ORAL at 20:25

## 2025-06-08 RX ADMIN — PIPERACILLIN SODIUM AND TAZOBACTAM SODIUM 4.5 G: 4; .5 INJECTION, SOLUTION INTRAVENOUS at 23:42

## 2025-06-08 RX ADMIN — Medication 1 L/MIN: at 20:00

## 2025-06-08 RX ADMIN — ATORVASTATIN CALCIUM 20 MG: 20 TABLET, FILM COATED ORAL at 20:25

## 2025-06-08 RX ADMIN — GUAIFENESIN 1200 MG: 600 TABLET ORAL at 10:03

## 2025-06-08 RX ADMIN — MIDODRINE HYDROCHLORIDE 10 MG: 10 TABLET ORAL at 04:30

## 2025-06-08 RX ADMIN — PANTOPRAZOLE SODIUM 40 MG: 40 INJECTION, POWDER, FOR SOLUTION INTRAVENOUS at 06:35

## 2025-06-08 RX ADMIN — PIPERACILLIN SODIUM AND TAZOBACTAM SODIUM 4.5 G: 4; .5 INJECTION, SOLUTION INTRAVENOUS at 10:04

## 2025-06-08 RX ADMIN — GUAIFENESIN 1200 MG: 600 TABLET ORAL at 20:25

## 2025-06-08 ASSESSMENT — PAIN SCALES - GENERAL
PAINLEVEL_OUTOF10: 0 - NO PAIN

## 2025-06-08 ASSESSMENT — PAIN - FUNCTIONAL ASSESSMENT
PAIN_FUNCTIONAL_ASSESSMENT: 0-10

## 2025-06-08 ASSESSMENT — ENCOUNTER SYMPTOMS
DIZZINESS: 0
ARTHRALGIAS: 1
FATIGUE: 1
VOMITING: 1
FEVER: 1
HEMATURIA: 0
PALPITATIONS: 0
DIARRHEA: 1
NAUSEA: 1
STRIDOR: 0
FREQUENCY: 1
ACTIVITY CHANGE: 1
WHEEZING: 0
DYSURIA: 1
BLOOD IN STOOL: 0
DECREASED CONCENTRATION: 1
WEAKNESS: 1
COUGH: 1
CHEST TIGHTNESS: 0
SHORTNESS OF BREATH: 1
CONFUSION: 1
LIGHT-HEADEDNESS: 0

## 2025-06-08 NOTE — ASSESSMENT & PLAN NOTE
BNP elevated at 1930.  CT scan noting reflux of contrast into the IVC and hepatic veins consistent with pulmonary hypertension/right-sided heart dysfunction.  Imaging studies note bilateral groundglass opacities consistent with developing pulmonary edema.  Patient was treated with 750 cc of IV fluid due to hypotension in the emergency department.  Refrain from additional IV fluids at this time if possible.  Should patient require significant BP support, may ultimately require transfer to the ICU for initiation of vasopressors.  Cardiology service to be consulted due to decompensated heart failure as noted by his elevated BNP and increased pulmonary edema.  Noted to follow with Dr. Valle.  Current medication regiment includes apixaban for chronic atrial fibrillation, metoprolol 25 mg twice daily, Entresto.  Previously on spironolactone but was discontinued due to hyperkalemia.  Echocardiogram to be obtained at this time.

## 2025-06-08 NOTE — PROGRESS NOTES
Minh Harrington Jr. is a 85 y.o. male on day 1 of admission presenting with Urinary tract infection without hematuria, site unspecified.      Subjective   Patient feeling better.  Less cough today than yesterday but still coughing.  Blood pressure is slightly better.  Sitting up heart rate went up to 120s.  Also noted to have wheezing audibly in the room       Objective     Last Recorded Vitals  /53 (BP Location: Right arm, Patient Position: Lying)   Pulse 82   Temp 35.6 °C (96.1 °F) (Temporal)   Resp 18   Wt 80.6 kg (177 lb 11.1 oz)   SpO2 98%   Intake/Output last 3 Shifts:    Intake/Output Summary (Last 24 hours) at 6/8/2025 1010  Last data filed at 6/8/2025 0400  Gross per 24 hour   Intake 370 ml   Output 1000 ml   Net -630 ml       Admission Weight  Weight: 78.9 kg (174 lb) (06/07/25 1340)    Daily Weight  06/08/25 : 80.6 kg (177 lb 11.1 oz)      Physical Exam:  General: Alert,, coughing intermittently on nasal cannula  HEENT: PERRLA, head intact and normocephalic  Neck: Normal to inspection  Lungs: Bilateral wheezing is noted.  Cardiac: Irregularly irregular  Abdomen: Soft nontender, positive bowel sounds  : Exam deferred  Skin: Intact  Hematology: No petechia or excessive ecchymosis  Musculoskeletal: Without significant trauma  Neurological: Alert awake oriented, no focal deficit, cranial nerves grossly intact  Psych: No suicidal ideation or homicidal ideation    Relevant Results  Scheduled medications  Scheduled Medications[1]  Continuous medications  Continuous Medications[2]  PRN medications  PRN Medications[3]   Labs  Results from last 7 days   Lab Units 06/08/25  0529 06/07/25  1500   WBC AUTO x10*3/uL 10.9 15.9*   HEMOGLOBIN g/dL 9.1* 11.1*   HEMATOCRIT % 31.0* 36.7*   PLATELETS AUTO x10*3/uL 78* 77*     Results from last 7 days   Lab Units 06/08/25  0529 06/07/25  1500   SODIUM mmol/L 131* 132*   POTASSIUM mmol/L 4.5 5.0   CHLORIDE mmol/L 101 98   CO2 mmol/L 24 23   BUN mg/dL 25* 25*    CREATININE mg/dL 1.22 1.28   CALCIUM mg/dL 8.2* 9.2   PROTEIN TOTAL g/dL 5.9* 7.2   BILIRUBIN TOTAL mg/dL 0.8 1.2   ALK PHOS U/L 92 136   ALT U/L 20 28   AST U/L 34 52*   GLUCOSE mg/dL 92 117*       Imaging  CT angio chest for pulmonary embolism  Result Date: 6/7/2025  1.  No acute pulmonary embolism to the proximal segmental arterial level. Symmetric hypoenhancement of bilateral lower lobe distal segmental and subsegmental branches is favored to be artifactual due to bolus timing and poor opacification. If there is high clinical concern for PE repeat CT can be considered. 2. Main pulmonary artery is enlarged and measures 4.0 cm which can be seen with pulmonary arterial hypertension 3. Aneurysmal dilatation of the ascending thoracic aorta measuring up to 4.2 cm. Diffuse calcific atherosclerosis of the aorta and arch vessels. 4. Cardiomegaly. Mild interlobular septal thickening ground-glass opacities suggestive of developing interstitial edema/CHF. 5. Advanced centrilobular and paraseptal emphysema. 6. There small bilateral pleural effusions with adjacent airspace and consolidative opacities. This may relate to compressive atelectasis versus developing airspace disease. Clinical correlation and continued short-term follow-up to resolution is advised. 7. There is reflux of contrast into the IVC and intrahepatic veins suggestive of right heart dysfunction. 8. Additional findings as described above.     MACRO: None   Signed by: Homero Grey 6/7/2025 7:47 PM Dictation workstation:   [id number]    CT abdomen pelvis w IV contrast  Result Date: 6/7/2025  Small bilateral pleural effusions. There are bibasilar infiltrates, greater on the left.   Cardiomegaly with scattered atherosclerotic arterial calcifications.   Fatty metamorphosis of the liver and hepatocellular disease. Correlate with clinical findings of cirrhosis.   Bilateral renal cortical thinning. There is a 5 cm cyst in the mid left kidney medially. A   2 mm  nonobstructing calculus in the left kidney.   Mild bladder wall thickening and hypertrophy. Further workup with direct visualization is recommended. There is some wall irregularity at the base of the urinary bladder with some encroachment of the prostate. Correlate with PSA levels.   Sigmoid diverticulosis. There is mild wall prominence of the distal sigmoid colon and rectum.   Severe discogenic degenerative changes of the lumbar spine and osteoarthritic changes of both hips.       MACRO: None   Signed by: Ministerio Dailey 6/7/2025 5:59 PM Dictation workstation:   [id number]    XR chest 1 view  Result Date: 6/7/2025  Mild cardiomegaly and central vascular congestion.   Small right pleural effusion and atelectasis. There is no airspace consolidation.   Faint ground-glass density can not be excluded in the lung bases. Continued surveillance is recommended as clinically warranted.   MACRO: None   Signed by: Ministerio Dailey 6/7/2025 5:51 PM Dictation workstation:   [id number]    CT chest abdomen pelvis w IV contrast  Result Date: 6/5/2025  Bladder cancer restaging scan. When compared to the prior examination dated 02/28/2025, there has been interval postsurgical changes of a TURBT and the previously noted right urinary bladder wall mass is no longer visualized. No definite evidence of new sites of metastatic disease. Suggestion of fluid overload which may be cardiogenic in etiology, correlation with echocardiogram is recommended. Additional stable chronic and incidental findings described above.   I personally reviewed the image(s) / study and I agree with the findings as stated by Elia Bunn MD. This study was interpreted at [facility], [place], [place].   MACRO: None   Signed by: Marshal Bermudez 6/5/2025 7:56 PM Dictation workstation:   [id number]      Cardiology, Vascular, and Other Imaging  No other imaging results found for the past 7 days                    Assessment/Plan   Minh ORR  Len Hardy is a 85 y.o. male on day 1 of admission presenting with Urinary tract infection without hematuria, site unspecified.  Assessment & Plan  Urinary tract infection without hematuria, site unspecified  Bladder cancer (Multi)  Urology is on consult  Patient states that he is following up with closely with Dr. Hoffman  Will continue with current antibiotics  Has had UTI in May that was treated and completed the treatment  Chronic systolic heart failure  Chronic atrial fibrillation (Multi)  Pulmonary hypertension (Multi)  Blood pressure on lower side  Does not appear to be in overt heart failure despite BNP being elevated  Does have some wheezing which could be from pneumonia rather than cardiac wheezing  Will add breathing treatment and see how patient does  Appreciate cardiology input  Holding GDMT given patient's blood pressure being Low  Received a dose of midodrine  Maintain patient in stepdown unit today with current blood pressure  Continue with Eliquis, atorvastatin  Pneumonia of both lower lobes due to infectious organism  Scheduled breathing treatment  Mucinex  I-S, flutter valve  Continue with Zosyn  Supplemental O2 and wean as tolerated  Patient with also acute hypoxemic respiratory failure present on admission       Plan discussed with patient at bedside along with primary nurse    High level of MDM based on above issue and discussing plan    This note is created using voice recognition software. All efforts are made to minimize errors, if there are errors there due to transcription.    Ruiz Lechuga  Hospitalist           [1] apixaban, 5 mg, oral, BID  atorvastatin, 20 mg, oral, Nightly  guaiFENesin, 1,200 mg, oral, BID  ipratropium-albuteroL, 3 mL, nebulization, q6h  levETIRAcetam, 250 mg, oral, Daily  [Held by provider] metoprolol succinate XL, 25 mg, oral, BID  [Held by provider] mirtazapine, 15 mg, oral, Nightly  pantoprazole, 40 mg, oral, Daily before breakfast  piperacillin-tazobactam,  4.5 g, intravenous, q8h  pneumoc 20-shi conj-dip cr(PF), 0.5 mL, intramuscular, During hospitalization  [Held by provider] sacubitriL-valsartan, 1 tablet, oral, BID  [Held by provider] sertraline, 100 mg, oral, Daily    [2]    [3] PRN medications: acetaminophen **OR** acetaminophen **OR** acetaminophen, acetaminophen **OR** acetaminophen **OR** acetaminophen, melatonin, ondansetron ODT **OR** ondansetron, oxygen

## 2025-06-08 NOTE — CARE PLAN
The patient's goals for the shift include Get better    The clinical goals for the shift include The patient will remain hemodynamically stable with no symptoms of sepsis, patient required dose of Midodrine for low Bps overnight, no further fluids administered due to concerns for CHF. Patient remained asymptomatic.    Problem: Pain - Adult  Goal: Verbalizes/displays adequate comfort level or baseline comfort level  Outcome: Progressing     Problem: Safety - Adult  Goal: Free from fall injury  Outcome: Progressing     Problem: Discharge Planning  Goal: Discharge to home or other facility with appropriate resources  Outcome: Progressing     Problem: Chronic Conditions and Co-morbidities  Goal: Patient's chronic conditions and co-morbidity symptoms are monitored and maintained or improved  Outcome: Progressing     Problem: Nutrition  Goal: Nutrient intake appropriate for maintaining nutritional needs  Outcome: Progressing     Problem: Heart Failure  Goal: Improved gas exchange this shift  Outcome: Progressing  Goal: Improved urinary output this shift  Outcome: Progressing  Goal: Reduction in peripheral edema within 24 hours  Outcome: Progressing  Goal: Report improvement of dyspnea/breathlessness this shift  Outcome: Progressing  Goal: Weight from fluid excess reduced over 2-3 days, then stabilize  Outcome: Progressing  Goal: Increase self care and/or family involvement in 24 hours  Outcome: Progressing

## 2025-06-08 NOTE — ASSESSMENT & PLAN NOTE
Urology is on consult  Patient states that he is following up with closely with Dr. Hoffman  Will continue with current antibiotics  Has had UTI in May that was treated and completed the treatment

## 2025-06-08 NOTE — ASSESSMENT & PLAN NOTE
Scheduled breathing treatment  Mucinex  I-S, flutter valve  Continue with Zosyn  Supplemental O2 and wean as tolerated  Patient with also acute hypoxemic respiratory failure present on admission

## 2025-06-08 NOTE — CONSULTS
Inpatient consult to Cardiology  Consult performed by: Kala Kelly MD  Consult ordered by: Schuyler Chiu DO  Reason for consult: heart failure / SOB          Cardiology Consult Note      Date:   6/8/2025  Patient name:  Minh Harrington Jr.  Date of admission:  6/7/2025  1:46 PM  MRN:   [id number]  YOB: 1940  Time of Consult:  11:59 AM    Consulting Cardiologist: Dr. Kala Kelly       Primary Cardiologist:  Dr. Heriberto Valle    Referring Provider: Dr. Ruiz Lechuga      Admission Diagnosis:     Urinary tract infection without hematuria, site unspecified    History of Present Illness:      Minh Harrington Jr. is a 85 y.o.  male patient who is being at the request of Dr. Lechuga for inpatient consultation of CHF. He was admitted on 6/7/2025.  Previous electronic health records have been reviewed in detail.      Patient seen by me when hospitalized in March of this year. Seen since by his outpatient cardiologist and stable - had his diuretic therapy at that time discontinued. Now comes with 2 week history of URI with heavy congestion and cough, and the development of mild progressive SOB, fever, nausea with occasional vomiting and diarrhea, and urinary symptoms.     BNP and troponin elevated on admission but no chest pain or angina. No palpitations, mild dizziness with standing. No orthopnea or PND, no notable edema. Family concerned as a home UTI test strip was positive and he was starting to act confused which has happened before with UTI. Patient feels better today but still with congestion and cough and mild SOB. Sputum is clear in color. No fever in past 24 hours.     Patient with history of CAD and remote CABG, chronic systolic heart failure EF 30-35%, HLD, moderate+ MR and TR, mild aortic stenosis, pulmonary hypertension. No recent cardiac cath or stress test. Has persistent atrial fibrillation since 6/2024.     Allergies:     Allergies[1]    Past Medical  History:     Medical History[2]    Past Surgical History:     Surgical History[3]    Family History:     Family History[4]    Social History:     Social History[5]    Home Medications:     Prior to Admission medications    Medication Sig Start Date End Date Taking? Authorizing Provider   ALPRAZolam (Xanax) 0.5 mg tablet Take 1 tablet (0.5 mg) by mouth 3 times a day as needed for anxiety. 5/15/25 6/14/25 Yes Isabel Ritter, DO   amoxicillin-clavulanate (Augmentin) 875-125 mg tablet Take 1 tablet (875 mg of amoxicillin) by mouth 2 times a day for 10 days. 6/6/25 6/16/25 Yes Bijan Unger, APRN-CNP   apixaban (Eliquis) 5 mg tablet Take 1 tablet (5 mg) by mouth 2 times a day.   Yes Historical Provider, MD   atorvastatin (Lipitor) 20 mg tablet TAKE 1 TABLET BY MOUTH ONCE DAILY AS DIRECTED 7/8/24  Yes Isabel Ritter DO   dutasteride (Avodart) 0.5 mg capsule TAKE 1 CAPSULE BY MOUTH ONCE DAILY 2/12/25  Yes Isabel Ritter DO   empagliflozin (Jardiance) 10 mg tablet Take 1 tablet (10 mg) by mouth once daily. 4/14/25  Yes Isabel Ritter DO   furosemide (Lasix) 20 mg tablet Take one daily ,if weight gain of 3 lbs in one day or 5 lbs in one week take another tablet that day until weight is back to normal 1/30/25  Yes Heriberto Valle MD   levETIRAcetam (Keppra) 500 mg tablet TAKE 1/2 (ONE-HALF) OF A TABLET BY MOUTH DAILY 10/23/23  Yes Isabel Ritter,    metoprolol succinate XL (Toprol-XL) 25 mg 24 hr tablet TAKE 1 TABLET BY MOUTH TWICE DAILY 2/24/25  Yes Liam Childs, DO   mirtazapine (Remeron) 15 mg tablet TAKE 1 TABLET BY MOUTH AT BEDTIME 3/13/25  Yes Isabel Ritter,    sacubitriL-valsartan (Entresto) 24-26 mg tablet Take 1 tablet by mouth 2 times a day. 4/21/25 4/21/26 Yes Heriberto Valle MD   sertraline (Zoloft) 100 mg tablet Take 1 tablet (100 mg) by mouth once daily. 9/16/24 9/16/25 Yes Isabel Ritter DO   tamsulosin (Flomax) 0.4 mg 24 hr capsule Take 1 capsule (0.4 mg) by  mouth once daily at bedtime. 5/13/25 11/9/25 Yes Geraldine Rosado, APRN-CNP   apixaban (Eliquis) 5 mg tablet ON HOLD PER CDO 3/20/25  Patient not taking: Reported on 6/7/2025 3/20/25   Heriberto Valle MD   b complex vitamins capsule Take 1 capsule by mouth once daily. After lunch  6/7/25  Historical Provider, MD   cholecalciferol (Vitamin D-3) 125 MCG (5000 UT) capsule Take 1 tablet by mouth once daily. After lunch 9/12/19 6/7/25  Historical Provider, MD       Current Medications:     Current Outpatient Medications   Medication Instructions    ALPRAZolam (XANAX) 0.5 mg, oral, 3 times daily PRN    amoxicillin-clavulanate (Augmentin) 875-125 mg tablet 875 mg of amoxicillin, oral, 2 times daily    apixaban (Eliquis) 5 mg tablet ON HOLD PER CDO 3/20/25    apixaban (ELIQUIS) 5 mg, 2 times daily    atorvastatin (LIPITOR) 20 mg, oral, Daily, as directed    dutasteride (AVODART) 0.5 mg, oral, Daily    empagliflozin (JARDIANCE) 10 mg, oral, Daily    furosemide (Lasix) 20 mg tablet Take one daily ,if weight gain of 3 lbs in one day or 5 lbs in one week take another tablet that day until weight is back to normal    levETIRAcetam (Keppra) 500 mg tablet TAKE 1/2 (ONE-HALF) OF A TABLET BY MOUTH DAILY    metoprolol succinate XL (TOPROL-XL) 25 mg, oral, 2 times daily    mirtazapine (REMERON) 15 mg, oral, Nightly    sacubitriL-valsartan (Entresto) 24-26 mg tablet 1 tablet, oral, 2 times daily    sertraline (ZOLOFT) 100 mg, oral, Daily    tamsulosin (FLOMAX) 0.4 mg, oral, Nightly        IV Medications:     Continuous Medications[6]    Review of Systems:      Review of Systems   Constitutional:  Positive for activity change, fatigue and fever.   HENT:  Positive for congestion.    Respiratory:  Positive for cough and shortness of breath. Negative for wheezing and stridor.    Cardiovascular:  Negative for chest pain, palpitations and leg swelling.   Gastrointestinal:  Positive for diarrhea, nausea and vomiting. Negative for  blood in stool.   Genitourinary:  Positive for dysuria and frequency. Negative for enuresis and hematuria.   Musculoskeletal:  Positive for arthralgias.   Neurological:  Positive for weakness. Negative for dizziness and light-headedness.   Psychiatric/Behavioral:  Positive for confusion and decreased concentration.    All other systems reviewed and are negative.      Vital Signs:     Vitals:    06/08/25 0750 06/08/25 0800 06/08/25 0950 06/08/25 1143   BP: 105/53  139/69 89/50   BP Location: Right arm      Patient Position: Lying      Pulse: 82 82 (!) 112 98   Resp: 18   16   Temp: 35.6 °C (96.1 °F)      TempSrc: Temporal      SpO2: 97% 98% 95% 97%   Weight:       Height:           Intake/Output Summary (Last 24 hours) at 6/8/2025 1159  Last data filed at 6/8/2025 1143  Gross per 24 hour   Intake 370 ml   Output 1200 ml   Net -830 ml     Vitals:    06/08/25 0354   Weight: 80.6 kg (177 lb 11.1 oz)       Physical Examination:     GENERAL APPEARANCE: Well developed, well nourished, in no acute distress.  HEENT: No gross abnormalities of conjunctiva, teeth, gums, oral mucosa  NECK: Supple, no JVD, no bruit. Thyroid not palpable. Carotid upstrokes normal.  CHEST: Symmetric and non-tender.  LUNGS: Clear to auscultation bilaterally; normal respiratory effort.  HEART: PMI nondisplaced, irregularly irregular with controlled rate, normal S1 and S2, no S3. EMMY RUSB, no diastolic murmur. Trace peripheral edema.   ABDOMEN: Soft, nontender, no palpable hepatosplenomegaly, no mases, no bruits. Abdominal aorta not noted to be enlarged.  MUSCULOSKELETAL: OA changes  EXTREMITIES: Warm with good color, no clubbing or cyanois. There is tr edema noted.  PERIPHERAL VASCULAR: Pulses present and equally palpable;   NEURO/PSHCY: Alert and oriented x3; appropriate behavior and responses and responses, no gross focal motor deficits.   INTEGUMENT: Skin warm and dry, without gross excoriationis or lesions.  LYMPH: No significant palpable  "lymphadenopathy      Lab:     CBC:   Lab Results   Component Value Date    WBC 10.9 06/08/2025    RBC 3.71 (L) 06/08/2025    HGB 9.1 (L) 06/08/2025    HCT 31.0 (L) 06/08/2025    PLT 78 (L) 06/08/2025        CMP:    Lab Results   Component Value Date     (L) 06/08/2025    K 4.5 06/08/2025     06/08/2025    CO2 24 06/08/2025    BUN 25 (H) 06/08/2025    CREATININE 1.22 06/08/2025    GLUCOSE 92 06/08/2025    CALCIUM 8.2 (L) 06/08/2025       Magnesium:    Lab Results   Component Value Date    MG 1.95 06/08/2025       Lipid Profile:    No results found for: \"CHLPL\", \"TRIG\", \"HDL\", \"LDLCALC\", \"LDLDIRECT\"    TSH:    No results found for: \"TSH\"    BNP:   Lab Results   Component Value Date    BNP 1,930 (H) 06/07/2025        PT/INR:    No results found for: \"PROTIME\", \"INR\"    HgBA1c:    Lab Results   Component Value Date    HGBA1C 5.2 10/11/2023       BMP:  Lab Results   Component Value Date     (L) 06/08/2025     (L) 06/07/2025    K 4.5 06/08/2025    K 5.0 06/07/2025     06/08/2025    CL 98 06/07/2025    CO2 24 06/08/2025    CO2 23 06/07/2025    BUN 25 (H) 06/08/2025    BUN 25 (H) 06/07/2025    CREATININE 1.22 06/08/2025    CREATININE 1.28 06/07/2025       CBC:  Lab Results   Component Value Date    WBC 10.9 06/08/2025    WBC 15.9 (H) 06/07/2025    RBC 3.71 (L) 06/08/2025    RBC 4.47 (L) 06/07/2025    HGB 9.1 (L) 06/08/2025    HGB 11.1 (L) 06/07/2025    HCT 31.0 (L) 06/08/2025    HCT 36.7 (L) 06/07/2025    MCV 84 06/08/2025    MCV 82 06/07/2025    MCH 24.5 (L) 06/08/2025    MCH 24.8 (L) 06/07/2025    MCHC 29.4 (L) 06/08/2025    MCHC 30.2 (L) 06/07/2025    RDW 19.7 (H) 06/08/2025    RDW 19.9 (H) 06/07/2025    PLT 78 (L) 06/08/2025    PLT 77 (L) 06/07/2025       Cardiac Enzymes:    Lab Results   Component Value Date    TROPHS 32 (H) 04/15/2024    TROPHS 32 (H) 04/15/2024       Hepatic Function Panel:    Lab Results   Component Value Date    ALKPHOS 92 06/08/2025    ALT 20 06/08/2025    AST 34 " 06/08/2025    PROT 5.9 (L) 06/08/2025    BILITOT 0.8 06/08/2025     Labs reviewed.     Diagnostic Studies:     CT angio chest for pulmonary embolism  Result Date: 6/7/2025  Interpreted By:  Homero Grey, STUDY: CT ANGIO CHEST FOR PULMONARY EMBOLISM;  6/7/2025 6:55 pm   INDICATION: Signs/Symptoms:tachycardia, hypoxia, hypotension.   COMPARISON: CT scan of the chest 06/04/2025.   ACCESSION NUMBER(S): [id number]   ORDERING CLINICIAN: SARAY GONGORA   TECHNIQUE: Helical data acquisition of the chest was obtained after intravenous administration of  60 mL of Omnipaque 350. Images were reformatted in axial, coronal, and sagittal planes. Axial and coronal MIPS were reconstructed and reviewed.   FINDINGS: HEART AND VESSELS: No acute pulmonary embolism to the  proximal segmental arterial level. Symmetric hypoenhancement of the lower lobe distal segmental and subsegmental branches is favored to be artifactual due to bolus timing and poor opacification.   Main pulmonary artery is enlarged and measures 4.0 cm which can be seen with pulmonary arterial hypertension   Aneurysmal dilatation of the ascending thoracic aorta measuring up to 4.2 cm. Diffuse calcific atherosclerosis of the aorta and arch vessels.   Severe coronary artery calcifications are seen. Postsurgical changes of prior CABG with midline sternotomy wires noted.The study is not optimized for evaluation of coronary arteries.   The cardiac chambers are enlarged. Aortic root and mitral annular calcifications noted.   No evidence of pericardial effusion.   MEDIASTINUM AND JACOB, LOWER NECK AND AXILLA: The visualized thyroid gland is within normal limits.   Nonenlarged mediastinal lymph nodes noted. No thoracic adenopathy.   Esophagus appears within normal limits as seen.   LUNGS AND AIRWAYS: The trachea and central airways are patent. No endobronchial lesion.   Advanced centrilobular and paraseptal emphysema. There small bilateral pleural effusions with adjacent  airspace and consolidative opacities. This may relate to compressive atelectasis versus developing airspace disease. Mild interlobular septal thickening and ground-glass opacities suggestive of developing interstitial edema. No pneumothorax.   UPPER ABDOMEN: There is reflux of contrast into the IVC and intrahepatic veins.   CHEST WALL AND OSSEOUS STRUCTURES: Mild bilateral shoulder osteoarthrosis. Multilevel degenerative changes are present.       1.  No acute pulmonary embolism to the proximal segmental arterial level. Symmetric hypoenhancement of bilateral lower lobe distal segmental and subsegmental branches is favored to be artifactual due to bolus timing and poor opacification. If there is high clinical concern for PE repeat CT can be considered. 2. Main pulmonary artery is enlarged and measures 4.0 cm which can be seen with pulmonary arterial hypertension 3. Aneurysmal dilatation of the ascending thoracic aorta measuring up to 4.2 cm. Diffuse calcific atherosclerosis of the aorta and arch vessels. 4. Cardiomegaly. Mild interlobular septal thickening ground-glass opacities suggestive of developing interstitial edema/CHF. 5. Advanced centrilobular and paraseptal emphysema. 6. There small bilateral pleural effusions with adjacent airspace and consolidative opacities. This may relate to compressive atelectasis versus developing airspace disease. Clinical correlation and continued short-term follow-up to resolution is advised. 7. There is reflux of contrast into the IVC and intrahepatic veins suggestive of right heart dysfunction. 8. Additional findings as described above.     MACRO: None   Signed by: Homero Grey 6/7/2025 7:47 PM Dictation workstation:   [id number]    CT abdomen pelvis w IV contrast  Result Date: 6/7/2025  Interpreted By:  Ministerio Dailey, STUDY: CT ABDOMEN PELVIS W IV CONTRAST; ;  6/7/2025 4:49 pm   INDICATION: Signs/Symptoms:lower abdominal pain, nausea, UTI, leukocytosis.   COMPARISON:  None.   ACCESSION NUMBER(S): [id number]   ORDERING CLINICIAN: SARAY GONGORA   TECHNIQUE: Contiguous axial CT sections are performed from the lung bases to the lesser trochanters following the uneventful administration of 75 cc of intravenous Omnipaque 350. Study is supplemented with coronal sagittal reformatted images.   FINDINGS: There are small bilateral pleural effusions with bibasilar infiltrates greater on the left. These findings are incompletely visualized.   The heart is mildly enlarged. There are atherosclerotic arterial calcifications of the coronary arteries.   There is mild diffuse heterogenous enhancement of the liver with hypoattenuation which could reflect fatty metamorphosis and hepatocellular disease. There is no space-occupying mass or surrounding fluid. The liver has a slightly lobular contour inferiorly at the right hepatic lobe.   The spleen is prominent. The spleen uniformly enhances. There is no space-occupying mass or surrounding fluid.   The gallbladder, pancreas, and adrenal glands are of normal CT appearance.   There is bilateral renal cortical thinning. There is a too small to characterize hypodensity in the mid left kidney laterally. There is a cyst in the medial mid left kidney measuring 5 cm in diameter. There is a 2 mm calculus in the lower pole of the left kidney anteriorly. There is no hydronephrosis bilaterally. There is no hydroureter or obstructing ureteral calculus. The urinary bladder is well distended though not opacified. There is some irregular bladder wall thickening at the dome of the urinary bladder and along the right lateral wall. There is wall prominence at the base of the urinary bladder likely related to encroachment of the prostate.   There are atherosclerotic arterial calcifications throughout the abdominal aorta. The aorta is of normal caliber. There is no periaortic mass or fluid collection. There is no bowel distension. There scattered diverticula in the colon.  There are no findings of diverticulitis. There may be mild wall thickening of the distal sigmoid colon and rectum.   There severe multilevel discogenic degenerative changes of the thoracolumbar spine with lumbar dextro convexity in lower thoracic levo convexity. There are severe osteoarthritic changes of both hips. The osseous structures are intact.       Small bilateral pleural effusions. There are bibasilar infiltrates, greater on the left.   Cardiomegaly with scattered atherosclerotic arterial calcifications.   Fatty metamorphosis of the liver and hepatocellular disease. Correlate with clinical findings of cirrhosis.   Bilateral renal cortical thinning. There is a 5 cm cyst in the mid left kidney medially. A   2 mm nonobstructing calculus in the left kidney.   Mild bladder wall thickening and hypertrophy. Further workup with direct visualization is recommended. There is some wall irregularity at the base of the urinary bladder with some encroachment of the prostate. Correlate with PSA levels.   Sigmoid diverticulosis. There is mild wall prominence of the distal sigmoid colon and rectum.   Severe discogenic degenerative changes of the lumbar spine and osteoarthritic changes of both hips.       MACRO: None   Signed by: Ministerio Dailey 6/7/2025 5:59 PM Dictation workstation:   [id number]    XR chest 1 view  Result Date: 6/7/2025  Interpreted By:  Ministerio Dailey, STUDY: XR CHEST 1 VIEW; ;  6/7/2025 5:30 pm   INDICATION: Signs/Symptoms:weakness, low grade fever, UTI, possible hypoxia.   COMPARISON: 04/15/2024   ACCESSION NUMBER(S): [id number]   ORDERING CLINICIAN: SARAY GONGORA   TECHNIQUE: A portable radiograph of the chest is performed.   FINDINGS: Postoperative changes are identified with midline sternotomy wires and vascular clips.   The heart is mildly enlarged. There is mild vascular and interstitial congestion. There is faint ground-glass density in both lungs more pronounced in the lung bases.  There is a small right pleural effusion. There is a small component of atelectasis in the right lower lobe. Small infiltrate can not be excluded. There is no airspace consolidation. There is no pneumothorax. The osseous structures are grossly intact.       Mild cardiomegaly and central vascular congestion.   Small right pleural effusion and atelectasis. There is no airspace consolidation.   Faint ground-glass density can not be excluded in the lung bases. Continued surveillance is recommended as clinically warranted.   MACRO: None   Signed by: Ministerio Dailey 6/7/2025 5:51 PM Dictation workstation:   [id number]    Imaging reviewed.     Radiology:     CT angio chest for pulmonary embolism   Final Result   1.  No acute pulmonary embolism to the proximal segmental arterial   level. Symmetric hypoenhancement of bilateral lower lobe distal   segmental and subsegmental branches is favored to be artifactual due   to bolus timing and poor opacification. If there is high clinical   concern for PE repeat CT can be considered.   2. Main pulmonary artery is enlarged and measures 4.0 cm which can be   seen with pulmonary arterial hypertension   3. Aneurysmal dilatation of the ascending thoracic aorta measuring up   to 4.2 cm. Diffuse calcific atherosclerosis of the aorta and arch   vessels.   4. Cardiomegaly. Mild interlobular septal thickening ground-glass   opacities suggestive of developing interstitial edema/CHF.   5. Advanced centrilobular and paraseptal emphysema.   6. There small bilateral pleural effusions with adjacent airspace and   consolidative opacities. This may relate to compressive atelectasis   versus developing airspace disease. Clinical correlation and   continued short-term follow-up to resolution is advised.   7. There is reflux of contrast into the IVC and intrahepatic veins   suggestive of right heart dysfunction.   8. Additional findings as described above.             MACRO:   None        Signed  by: Homero Grey 6/7/2025 7:47 PM   Dictation workstation:   [id number]      XR chest 1 view   Final Result   Mild cardiomegaly and central vascular congestion.        Small right pleural effusion and atelectasis. There is no airspace   consolidation.        Faint ground-glass density can not be excluded in the lung bases.   Continued surveillance is recommended as clinically warranted.        MACRO:   None        Signed by: Ministerio Dailey 6/7/2025 5:51 PM   Dictation workstation:   [id number]      CT abdomen pelvis w IV contrast   Final Result   Small bilateral pleural effusions. There are bibasilar infiltrates,   greater on the left.        Cardiomegaly with scattered atherosclerotic arterial calcifications.        Fatty metamorphosis of the liver and hepatocellular disease.   Correlate with clinical findings of cirrhosis.        Bilateral renal cortical thinning. There is a 5 cm cyst in the mid   left kidney medially. A        2 mm nonobstructing calculus in the left kidney.        Mild bladder wall thickening and hypertrophy. Further workup with   direct visualization is recommended. There is some wall irregularity   at the base of the urinary bladder with some encroachment of the   prostate. Correlate with PSA levels.        Sigmoid diverticulosis. There is mild wall prominence of the distal   sigmoid colon and rectum.        Severe discogenic degenerative changes of the lumbar spine and   osteoarthritic changes of both hips.                  MACRO:   None        Signed by: Ministerio Dailey 6/7/2025 5:59 PM   Dictation workstation:   [id number]      Transthoracic Echo Complete    (Results Pending)       Assessment:     Problem List Items Addressed This Visit       Pulmonary hypertension (Multi)    Relevant Orders    Transthoracic Echo Complete    * (Principal) Urinary tract infection without hematuria, site unspecified - Primary     Other Visit Diagnoses         Sepsis without acute organ  dysfunction, due to unspecified organism (Multi)          Community acquired pneumonia, unspecified laterality                Problem List[7]    Plan:     Mild acute on chronic systolic heart failure. Given no peripheral edema would cautiously gently diurese. Would restart patients spironolactone and would restart loop diuretic every other day. Continue other afterload redutioni.   Hx CAD and flat elevated troponin - not suggestive of ACS. Given no angina would follow clinically for now. More likely related to his CHF.   Persistent atrial fibrillation - rates acceptable.   UTI - per primary service.   Bladder cancer - treatment with BCG waiting until UTI resolved. His urologist has requested HBsAb and HBsAg which we can draw while here.   Pulmonary HTN - known - at his age no plans at this time to advanced. Likely secondary to CHF/MR.     Will check on patient tomorrow. Had recent echo in January. Does not really need repeat at this time.     Thank you for the opportunity to participate in the care of your patient.  Please do not hesitate to call if you have any questions.    Electronically signed by Kala Kelly MD, on 6/8/2025 at 11:59 AM       ADDENDUM: will restart metoprolol succinate and entresto with parameters to hold. Will start spironolactone. Due to recurrent UTI will hold jardiance at this time. Will have to defer to discussion between his cardiologist and urology about the safety of restarting with recurrent UTI.        [1] No Known Allergies  [2]   Past Medical History:  Diagnosis Date    Abnormal ECG     Arrhythmia     Atrial fibrillation (Multi)     Bladder cancer (Multi)     BPH (benign prostatic hyperplasia)     CHF (congestive heart failure)     CKD (chronic kidney disease)     stage 3    Coronary artery disease     COVID-19 02/16/2022    COVID-19    Epilepsy, unspecified, not intractable, without status epilepticus 11/18/2020    Epilepsy    Hyperlipidemia     Hypertension     Ischemic  cardiomyopathy     Myocardial infarction (Multi)     Unspecified convulsions (Multi) 2021    Seizures   [3]   Past Surgical History:  Procedure Laterality Date    ARTERIAL BYPASS SURGERY      CARDIAC CATHETERIZATION      CORONARY ANGIOPLASTY      OTHER SURGICAL HISTORY  2019    Angioplasty    OTHER SURGICAL HISTORY  2019    Appendectomy    OTHER SURGICAL HISTORY  2019    Bypass   [4]   Family History  Problem Relation Name Age of Onset    Heart failure Mother Nana1     Emphysema Father Saroj John.     Lung disease Father Saroj John.     Bipolar disorder Brother     [5]   Social History  Socioeconomic History    Marital status:    Tobacco Use    Smoking status: Former     Current packs/day: 0.00     Average packs/day: 1 pack/day for 40.0 years (40.0 ttl pk-yrs)     Types: Cigarettes     Start date: 1957     Quit date:      Years since quittin.4     Passive exposure: Never    Smokeless tobacco: Never   Vaping Use    Vaping status: Never Used   Substance and Sexual Activity    Alcohol use: Yes     Alcohol/week: 12.0 standard drinks of alcohol     Types: 12 Standard drinks or equivalent per week     Comment: daily    Drug use: Never    Sexual activity: Not Currently     Partners: Female     Birth control/protection: Abstinence     Social Drivers of Health     Financial Resource Strain: Low Risk  (2025)    Overall Financial Resource Strain (CARDIA)     Difficulty of Paying Living Expenses: Not hard at all   Food Insecurity: No Food Insecurity (2025)    Hunger Vital Sign     Worried About Running Out of Food in the Last Year: Never true     Ran Out of Food in the Last Year: Never true   Transportation Needs: No Transportation Needs (2025)    PRAPARE - Transportation     Lack of Transportation (Medical): No     Lack of Transportation (Non-Medical): No   Social Connections: Unknown (2024)    Social Connection and Isolation Panel [NHANES]     Marital Status:     Intimate Partner Violence: Not At Risk (6/7/2025)    Humiliation, Afraid, Rape, and Kick questionnaire     Fear of Current or Ex-Partner: No     Emotionally Abused: No     Physically Abused: No     Sexually Abused: No   Housing Stability: Low Risk  (6/7/2025)    Housing Stability Vital Sign     Unable to Pay for Housing in the Last Year: No     Number of Times Moved in the Last Year: 0     Homeless in the Last Year: No   [6]    [7]   Patient Active Problem List  Diagnosis    Anxiety    BPH (benign prostatic hyperplasia)    CAD (coronary artery disease)    Carotid artery plaque    Herpes zoster    Post herpetic neuralgia    HLD (hyperlipidemia)    HTN (hypertension)    Insomnia    Male erectile disorder of organic origin    Skin cancer    Thrombocytopenia    Vitamin D deficiency    Moderate episode of recurrent major depressive disorder    Aneurysm of ascending aorta without rupture    V tach (Multi)    Mitral valve insufficiency    Atrial fibrillation with controlled ventricular response (Multi)    History of coronary artery bypass graft    Acute combined systolic and diastolic heart failure    Former smoker    Stage 3a chronic kidney disease (Multi)    Acute on chronic systolic (congestive) heart failure    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

## 2025-06-08 NOTE — ASSESSMENT & PLAN NOTE
Blood pressure on lower side  Does not appear to be in overt heart failure despite BNP being elevated  Does have some wheezing which could be from pneumonia rather than cardiac wheezing  Will add breathing treatment and see how patient does  Appreciate cardiology input  Holding GDMT given patient's blood pressure being Low  Received a dose of midodrine  Maintain patient in stepdown unit today with current blood pressure  Continue with Eliquis, atorvastatin

## 2025-06-08 NOTE — ASSESSMENT & PLAN NOTE
UA noted to be grossly abnormal in the setting of recent intervention.  Patient is only completed a reported 2 days of his antibiotic regiment prior to presenting to this facility.  CT of the abdomen/pelvis shows findings consistent with cystitis however there is noted to be bladder wall irregularities specifically citing the base of the urinary bladder with encroachment upon the prostate.  Had a known masslike area of thickening posteriorly along the right lateral bladder wall.  On 2/28/2025, there was concern about interval increase in the size and heterogenicity of the prostate with direct tumor invasion not excluded.  Given the CT findings, urology has been consulted for recommendations and management.  Patient noted to follow with Dr. Hoffman.  Will be treated with IV Zosyn as part of parallel management for his respiratory infection.  Antibiotics ultimately to be modified based off of urine culture results including speciation and sensitivities if applicable.

## 2025-06-08 NOTE — NURSING NOTE
UPDATE 2435: Patient arrived from [facility] to room 2107. Patient Alert and Oriented X 4 on 3 liters NC.  Patient deneies pain, patient explained he had a TURP procedure and is unable to fully void all urine after he voids, Patient educated on IV ABXs running and monitoring of Bps, Patient call bell provided, bed in lowest and locked position. Patient educated to use call bell prior to getting out of bed.     UPDATE 2335: Bladder Scan completed >825 noted, Dr. Chiu notified. Straight catheter ordered for patient per Dr. Chiu. Straight cath completed with 1000 ml noted cloudy yellow urine.     UPDATE 0356: Patient BP low this am 74 systolic, recheck 84/51 (Map 62), Notified Dr. Chiu, Patient asymptomatic at this time. NO IV fluids at this time due to CHIF, Midodrine to be ordered, patient educated on Midodrine for low BP.    UPDATE 0550: BP improved to 104/65 (77) this am post Midodrine administration, will continue to monitor.

## 2025-06-08 NOTE — ASSESSMENT & PLAN NOTE
Bibasilar airspace opacities/infiltrates noted.  May represent compressive atelectasis however underlying infectious process cannot be excluded.  Will continue IV Zosyn as part of coverage for both UTI as well as pneumonia.  Anticipate de-escalation of antibiotics pending clinical course.  MRSA swab was negative so vancomycin has been discontinued as part of his pneumonia protocol.  No indication to continue vancomycin for treatment of his UTI.

## 2025-06-08 NOTE — NURSING NOTE
UPDATE 1954: Patient lying in bed on 1 liter O2, sating >95% no complaints of sob at this time, Patient requesting Xanex for bed time PRN takes 0.5 mg at home, notified Dr. Chiu, Patient BP 98/87, 25 mg Metoprolol extended release ordered for this evening. Awaiting on verification to give or hold.    UPDATE 2019: Dr. Chiu ordered to hold evening Metoprolol dose, Xanex 0.5 mg ordered. Patient stated he takes half of 0.5 xanax, notified Dr. Chiu to reorder.25 Xanax for patient this evening.     UPDATE 2105: Patient provided wash cloth, tooth paste and brush.     UPDATE 2137: Patient O2 was 1 liters, patient placed on RA, patient de sated down to 86% on RA, Placed back on 1 Liter NC sating >95%.

## 2025-06-08 NOTE — H&P
History Of Present Illness  Minh Harrington Jr. is a 85 y.o. male presenting with multiple complaints including nausea, diarrhea, dry heaving without emesis that started the day of admission.  Of note, patient recently underwent a TURBT procedure with urology and was diagnosed approximately 7 days later on 5/15/2025 with a UTI and was given a single dose of IV ceftriaxone and discharged on amoxicillin.  Patient reports having completed only 2 doses of the medication when he began to experience the symptoms.  Patient states his last bowel movement was shortly prior to arrival and described as diarrhea without blood or other atypical characteristics.  Denies any blood noted in his stool.  He does endorse urinary discomfort but denies hematuria.  No fever or chills.  Patient does endorse dyspnea.  Imaging studies obtained at the time of admission note small bilateral pleural effusions with adjacent airspace and consolidative opacities.  This may represent atelectasis versus developing airspace disease.  There is also noted to be reflux of contrast into the IVC and intrahepatic veins suggestive of right heart dysfunction.  BNP noted to be elevated >1800.  There is also noted to be mild bladder wall thickening and hypertrophy with wall irregularity at the base of the urinary bladder and encroachment of the prostate.  Patient also noted to be hypotensive upon arrival.  Was given small fluid boluses in the emergency department but did not require additional BP intervention.  Patient admitted for further management.     Past Medical History  Medical History[1]    Surgical History  Surgical History[2]     Social History  He reports that he quit smoking about 28 years ago. His smoking use included cigarettes. He started smoking about 68 years ago. He has a 40 pack-year smoking history. He has never been exposed to tobacco smoke. He has never used smokeless tobacco. He reports current alcohol use of about 12.0 standard  "drinks of alcohol per week. He reports that he does not use drugs.    Family History  Family History[3]     Allergies  Patient has no known allergies.    Review of Systems   Respiratory:  Positive for shortness of breath. Negative for chest tightness.    Gastrointestinal:  Positive for diarrhea. Negative for blood in stool.   Neurological:  Positive for weakness.        Physical Exam  Vitals reviewed.   Constitutional:       Appearance: Normal appearance.      Comments: Laying supine, appears uncomfortable, but able to converse and provide answers to questions regarding his current presentation   HENT:      Head: Normocephalic and atraumatic.      Nose: Nose normal.      Mouth/Throat:      Mouth: Mucous membranes are moist.   Eyes:      Extraocular Movements: Extraocular movements intact.      Conjunctiva/sclera: Conjunctivae normal.      Pupils: Pupils are equal, round, and reactive to light.   Cardiovascular:      Rate and Rhythm: Normal rate and regular rhythm.      Pulses: Normal pulses.      Heart sounds: Normal heart sounds.   Pulmonary:      Effort: Pulmonary effort is normal.      Breath sounds: Normal breath sounds.   Abdominal:      Palpations: Abdomen is soft.      Tenderness: There is abdominal tenderness.   Musculoskeletal:         General: Normal range of motion.      Cervical back: Normal range of motion and neck supple.   Skin:     General: Skin is warm and dry.   Neurological:      General: No focal deficit present.      Mental Status: He is alert. Mental status is at baseline.   Psychiatric:         Mood and Affect: Mood normal.         Behavior: Behavior normal.          Last Recorded Vitals  Blood pressure 102/64, pulse (!) 101, temperature 37.4 °C (99.3 °F), temperature source Temporal, resp. rate 20, height 1.778 m (5' 10\"), weight 78.9 kg (174 lb), SpO2 95%.    Relevant Results  Scheduled medications  Scheduled Medications[4]  Continuous medications  Continuous Medications[5]  PRN " medications  PRN Medications[6]  CT angio chest for pulmonary embolism  Result Date: 6/7/2025  Interpreted By:  Homero Grey, STUDY: CT ANGIO CHEST FOR PULMONARY EMBOLISM;  6/7/2025 6:55 pm   INDICATION: Signs/Symptoms:tachycardia, hypoxia, hypotension.   COMPARISON: CT scan of the chest 06/04/2025.   ACCESSION NUMBER(S): [id number]   ORDERING CLINICIAN: SARAY GONGORA   TECHNIQUE: Helical data acquisition of the chest was obtained after intravenous administration of  60 mL of Omnipaque 350. Images were reformatted in axial, coronal, and sagittal planes. Axial and coronal MIPS were reconstructed and reviewed.   FINDINGS: HEART AND VESSELS: No acute pulmonary embolism to the  proximal segmental arterial level. Symmetric hypoenhancement of the lower lobe distal segmental and subsegmental branches is favored to be artifactual due to bolus timing and poor opacification.   Main pulmonary artery is enlarged and measures 4.0 cm which can be seen with pulmonary arterial hypertension   Aneurysmal dilatation of the ascending thoracic aorta measuring up to 4.2 cm. Diffuse calcific atherosclerosis of the aorta and arch vessels.   Severe coronary artery calcifications are seen. Postsurgical changes of prior CABG with midline sternotomy wires noted.The study is not optimized for evaluation of coronary arteries.   The cardiac chambers are enlarged. Aortic root and mitral annular calcifications noted.   No evidence of pericardial effusion.   MEDIASTINUM AND JACOB, LOWER NECK AND AXILLA: The visualized thyroid gland is within normal limits.   Nonenlarged mediastinal lymph nodes noted. No thoracic adenopathy.   Esophagus appears within normal limits as seen.   LUNGS AND AIRWAYS: The trachea and central airways are patent. No endobronchial lesion.   Advanced centrilobular and paraseptal emphysema. There small bilateral pleural effusions with adjacent airspace and consolidative opacities. This may relate to compressive atelectasis  versus developing airspace disease. Mild interlobular septal thickening and ground-glass opacities suggestive of developing interstitial edema. No pneumothorax.   UPPER ABDOMEN: There is reflux of contrast into the IVC and intrahepatic veins.   CHEST WALL AND OSSEOUS STRUCTURES: Mild bilateral shoulder osteoarthrosis. Multilevel degenerative changes are present.       1.  No acute pulmonary embolism to the proximal segmental arterial level. Symmetric hypoenhancement of bilateral lower lobe distal segmental and subsegmental branches is favored to be artifactual due to bolus timing and poor opacification. If there is high clinical concern for PE repeat CT can be considered. 2. Main pulmonary artery is enlarged and measures 4.0 cm which can be seen with pulmonary arterial hypertension 3. Aneurysmal dilatation of the ascending thoracic aorta measuring up to 4.2 cm. Diffuse calcific atherosclerosis of the aorta and arch vessels. 4. Cardiomegaly. Mild interlobular septal thickening ground-glass opacities suggestive of developing interstitial edema/CHF. 5. Advanced centrilobular and paraseptal emphysema. 6. There small bilateral pleural effusions with adjacent airspace and consolidative opacities. This may relate to compressive atelectasis versus developing airspace disease. Clinical correlation and continued short-term follow-up to resolution is advised. 7. There is reflux of contrast into the IVC and intrahepatic veins suggestive of right heart dysfunction. 8. Additional findings as described above.     MACRO: None   Signed by: Homero Grey 6/7/2025 7:47 PM Dictation workstation:   [id number]    CT abdomen pelvis w IV contrast  Result Date: 6/7/2025  Interpreted By:  Ministerio Dailey, STUDY: CT ABDOMEN PELVIS W IV CONTRAST; ;  6/7/2025 4:49 pm   INDICATION: Signs/Symptoms:lower abdominal pain, nausea, UTI, leukocytosis.   COMPARISON: None.   ACCESSION NUMBER(S): [id number]   ORDERING CLINICIAN: SARAY GONGORA    TECHNIQUE: Contiguous axial CT sections are performed from the lung bases to the lesser trochanters following the uneventful administration of 75 cc of intravenous Omnipaque 350. Study is supplemented with coronal sagittal reformatted images.   FINDINGS: There are small bilateral pleural effusions with bibasilar infiltrates greater on the left. These findings are incompletely visualized.   The heart is mildly enlarged. There are atherosclerotic arterial calcifications of the coronary arteries.   There is mild diffuse heterogenous enhancement of the liver with hypoattenuation which could reflect fatty metamorphosis and hepatocellular disease. There is no space-occupying mass or surrounding fluid. The liver has a slightly lobular contour inferiorly at the right hepatic lobe.   The spleen is prominent. The spleen uniformly enhances. There is no space-occupying mass or surrounding fluid.   The gallbladder, pancreas, and adrenal glands are of normal CT appearance.   There is bilateral renal cortical thinning. There is a too small to characterize hypodensity in the mid left kidney laterally. There is a cyst in the medial mid left kidney measuring 5 cm in diameter. There is a 2 mm calculus in the lower pole of the left kidney anteriorly. There is no hydronephrosis bilaterally. There is no hydroureter or obstructing ureteral calculus. The urinary bladder is well distended though not opacified. There is some irregular bladder wall thickening at the dome of the urinary bladder and along the right lateral wall. There is wall prominence at the base of the urinary bladder likely related to encroachment of the prostate.   There are atherosclerotic arterial calcifications throughout the abdominal aorta. The aorta is of normal caliber. There is no periaortic mass or fluid collection. There is no bowel distension. There scattered diverticula in the colon. There are no findings of diverticulitis. There may be mild wall thickening of  the distal sigmoid colon and rectum.   There severe multilevel discogenic degenerative changes of the thoracolumbar spine with lumbar dextro convexity in lower thoracic levo convexity. There are severe osteoarthritic changes of both hips. The osseous structures are intact.       Small bilateral pleural effusions. There are bibasilar infiltrates, greater on the left.   Cardiomegaly with scattered atherosclerotic arterial calcifications.   Fatty metamorphosis of the liver and hepatocellular disease. Correlate with clinical findings of cirrhosis.   Bilateral renal cortical thinning. There is a 5 cm cyst in the mid left kidney medially. A   2 mm nonobstructing calculus in the left kidney.   Mild bladder wall thickening and hypertrophy. Further workup with direct visualization is recommended. There is some wall irregularity at the base of the urinary bladder with some encroachment of the prostate. Correlate with PSA levels.   Sigmoid diverticulosis. There is mild wall prominence of the distal sigmoid colon and rectum.   Severe discogenic degenerative changes of the lumbar spine and osteoarthritic changes of both hips.       MACRO: None   Signed by: Ministerio Dailey 6/7/2025 5:59 PM Dictation workstation:   [id number]    XR chest 1 view  Result Date: 6/7/2025  Interpreted By:  Ministerio Dailey, STUDY: XR CHEST 1 VIEW; ;  6/7/2025 5:30 pm   INDICATION: Signs/Symptoms:weakness, low grade fever, UTI, possible hypoxia.   COMPARISON: 04/15/2024   ACCESSION NUMBER(S): [id number]   ORDERING CLINICIAN: SARAY GONGORA   TECHNIQUE: A portable radiograph of the chest is performed.   FINDINGS: Postoperative changes are identified with midline sternotomy wires and vascular clips.   The heart is mildly enlarged. There is mild vascular and interstitial congestion. There is faint ground-glass density in both lungs more pronounced in the lung bases. There is a small right pleural effusion. There is a small component of  atelectasis in the right lower lobe. Small infiltrate can not be excluded. There is no airspace consolidation. There is no pneumothorax. The osseous structures are grossly intact.       Mild cardiomegaly and central vascular congestion.   Small right pleural effusion and atelectasis. There is no airspace consolidation.   Faint ground-glass density can not be excluded in the lung bases. Continued surveillance is recommended as clinically warranted.   MACRO: None   Signed by: Ministerio Dailey 6/7/2025 5:51 PM Dictation workstation:   [id number]    CT chest abdomen pelvis w IV contrast  Result Date: 6/5/2025  Interpreted By:  Marshal Bermudez and Jiang Sirui STUDY: CT CHEST ABDOMEN PELVIS W IV CONTRAST;  6/4/2025 2:20 pm   INDICATION: Signs/Symptoms:bladder cancer staging.   Per clinical notes: Patient with history of bladder cancer status post TURBT and resection in May 2025.   ,C67.1 Malignant neoplasm of dome of bladder (Multi)   COMPARISON: CT AP 02/28/2025   ACCESSION NUMBER(S): [id number]   ORDERING CLINICIAN: YOHANNES SHANE   TECHNIQUE: CT of the chest, abdomen, and pelvis was performed.  Contiguous axial images were obtained at 3 mm slice thickness through the chest, abdomen and pelvis. Coronal and sagittal reconstructions at 3 mm slice thickness were performed. 75 ML of Omnipaque 350 was administered intravenously without immediate complication.   FINDINGS: CHEST:   LUNG/PLEURA/LARGE AIRWAYS: Trachea and central airways are patent. Persistent right-sided pleural effusion with associated atelectasis is noted similar to the prior examination dated 02/28/2025. Upper lung predominant moderate emphysematous changes are again noted. There is bilateral interlobular septal thickening compatible with pulmonary edema. No suspicious pulmonary nodules identified.   VESSELS: The ascending thoracic aorta is dilated measuring 4.6 cm. The main pulmonary artery measures 3.7 cm, correlate for pulmonary arterial  hypertension. Severe atherosclerotic changes are noted of the aorta and branching vessels.  Severe coronary artery calcifications are present.   HEART: The heart is enlarged with four-chamber enlargement. No pericardial effusion.   MEDIASTINUM AND JACOB: Scattered mediastinal lymph nodes which are nonenlarged by size criteria, likely reactive in etiology. The esophagus is unremarkable.   CHEST WALL AND LOWER NECK: The soft tissues of the chest wall demonstrate no gross abnormality. The visualized thyroid gland appears within normal limits.   ABDOMEN:   LIVER: The liver is normal in size with heterogeneous appearance of the hepatic parenchyma. No worrisome hepatic lesions.   BILE DUCTS: The intrahepatic and extrahepatic ducts are not dilated.   GALLBLADDER: No calcified stones. No wall thickening.   PANCREAS: Unremarkable   SPLEEN: The spleen is enlarged measuring 14.1 cm which is similar to the prior examination. No splenic lesions.   ADRENAL GLANDS: Unremarkable   KIDNEYS AND URETERS: The bilateral kidneys are similar in size and enhancement. No hydroureteronephrosis. Nonobstructing 0.3 cm left inferior pole renal calculus is noted (series 201, image 138. Stable left-sided simple renal cysts.   PELVIS:   BLADDER: The urinary bladder demonstrates multiple focal outpouchings compatible with diverticula. The previously noted right-sided urinary bladder wall mass is no longer visualized. There is diffuse urinary bladder wall thickening and perivesicular fat stranding.   REPRODUCTIVE ORGANS: The prostate gland is mildly enlarged, similar to the prior examination.   BOWEL: The stomach is decompressed, limited evaluation. The small and large bowel demonstrate no abnormal bowel thickening or dilatation. The appendix is not definitively visualized. Scattered colonic diverticulosis without evidence of acute diverticulitis.     VESSELS: Severe atherosclerotic calcification of the abdominal aorta without AAA. The IVC is  unremarkable. The portal venous vasculature is patent.   PERITONEUM/RETROPERITONEUM/LYMPH NODES: No intraperitoneal free air or fluid collections. Slight interval increase in the mesenteric haziness when compared to the prior examination. There is a stable 1.0 x 0.8 cm left para-aortic lymph node (series 201, image 146). No new abdominopelvic lymphadenopathy.   BONE AND SOFT TISSUE: No suspicious osseous lesions are identified. Degenerative discogenic disease is noted in the lower thoracic and lumbar spine.  The abdominal wall soft tissues appear normal.       Bladder cancer restaging scan. When compared to the prior examination dated 02/28/2025, there has been interval postsurgical changes of a TURBT and the previously noted right urinary bladder wall mass is no longer visualized. No definite evidence of new sites of metastatic disease. Suggestion of fluid overload which may be cardiogenic in etiology, correlation with echocardiogram is recommended. Additional stable chronic and incidental findings described above.   I personally reviewed the image(s) / study and I agree with the findings as stated by Elia Bunn MD. This study was interpreted at [facility], [place], [place].   MACRO: None   Signed by: Marshal Bermudez 6/5/2025 7:56 PM Dictation workstation:   [id number]    Results for orders placed or performed during the hospital encounter of 06/07/25 (from the past 24 hours)   Sars-CoV-2 and Influenza A/B PCR   Result Value Ref Range    Flu A Result Not Detected Not Detected    Flu B Result Not Detected Not Detected    Coronavirus 2019, PCR Not Detected Not Detected   CBC and Auto Differential   Result Value Ref Range    WBC 15.9 (H) 4.4 - 11.3 x10*3/uL    nRBC 0.0 0.0 - 0.0 /100 WBCs    RBC 4.47 (L) 4.50 - 5.90 x10*6/uL    Hemoglobin 11.1 (L) 13.5 - 17.5 g/dL    Hematocrit 36.7 (L) 41.0 - 52.0 %    MCV 82 80 - 100 fL    MCH 24.8 (L) 26.0 - 34.0 pg    MCHC 30.2 (L) 32.0 - 36.0 g/dL    RDW 19.9  (H) 11.5 - 14.5 %    Platelets 77 (L) 150 - 450 x10*3/uL    Neutrophils % 87.0 40.0 - 80.0 %    Immature Granulocytes %, Automated 0.8 0.0 - 0.9 %    Lymphocytes % 2.5 13.0 - 44.0 %    Monocytes % 9.4 2.0 - 10.0 %    Eosinophils % 0.1 0.0 - 6.0 %    Basophils % 0.2 0.0 - 2.0 %    Neutrophils Absolute 13.86 (H) 1.60 - 5.50 x10*3/uL    Immature Granulocytes Absolute, Automated 0.12 0.00 - 0.50 x10*3/uL    Lymphocytes Absolute 0.39 (L) 0.80 - 3.00 x10*3/uL    Monocytes Absolute 1.49 (H) 0.05 - 0.80 x10*3/uL    Eosinophils Absolute 0.01 0.00 - 0.40 x10*3/uL    Basophils Absolute 0.03 0.00 - 0.10 x10*3/uL   Comprehensive metabolic panel   Result Value Ref Range    Glucose 117 (H) 74 - 99 mg/dL    Sodium 132 (L) 136 - 145 mmol/L    Potassium 5.0 3.5 - 5.3 mmol/L    Chloride 98 98 - 107 mmol/L    Bicarbonate 23 21 - 32 mmol/L    Anion Gap 16 10 - 20 mmol/L    Urea Nitrogen 25 (H) 6 - 23 mg/dL    Creatinine 1.28 0.50 - 1.30 mg/dL    eGFR 54 (L) >60 mL/min/1.73m*2    Calcium 9.2 8.6 - 10.3 mg/dL    Albumin 4.1 3.4 - 5.0 g/dL    Alkaline Phosphatase 136 33 - 136 U/L    Total Protein 7.2 6.4 - 8.2 g/dL    AST 52 (H) 9 - 39 U/L    Bilirubin, Total 1.2 0.0 - 1.2 mg/dL    ALT 28 10 - 52 U/L   Lipase   Result Value Ref Range    Lipase 8 (L) 9 - 82 U/L   B-type natriuretic peptide   Result Value Ref Range    BNP 1,930 (H) 0 - 99 pg/mL   Urinalysis with Reflex Culture and Microscopic   Result Value Ref Range    Color, Urine Yellow Light-Yellow, Yellow, Dark-Yellow    Appearance, Urine Turbid (N) Clear    Specific Gravity, Urine 1.015 1.005 - 1.035    pH, Urine 5.5 5.0, 5.5, 6.0, 6.5, 7.0, 7.5, 8.0    Protein, Urine 20 (TRACE) NEGATIVE, 10 (TRACE), 20 (TRACE) mg/dL    Glucose, Urine 500 (3+) (A) Normal mg/dL    Blood, Urine 0.5 (2+) (A) NEGATIVE mg/dL    Ketones, Urine NEGATIVE NEGATIVE mg/dL    Bilirubin, Urine NEGATIVE NEGATIVE mg/dL    Urobilinogen, Urine Normal Normal mg/dL    Nitrite, Urine NEGATIVE NEGATIVE    Leukocyte  Esterase, Urine 500 Cynthia/uL (A) NEGATIVE   Extra Urine Gray Tube   Result Value Ref Range    Extra Tube Hold for add-ons.    Microscopic Only, Urine   Result Value Ref Range    WBC, Urine >50 (A) 1-5, NONE /HPF    RBC, Urine >20 (A) NONE, 1-2, 3-5 /HPF   Lactate   Result Value Ref Range    Lactate 1.7 0.4 - 2.0 mmol/L   Blood Culture    Specimen: Peripheral Venipuncture; Blood culture   Result Value Ref Range    Blood Culture Loaded on Instrument - Culture in progress    Blood Culture    Specimen: Peripheral Venipuncture; Blood culture   Result Value Ref Range    Blood Culture Loaded on Instrument - Culture in progress    MRSA Surveillance for Vancomycin De-escalation, PCR    Specimen: Anterior Nares; Swab   Result Value Ref Range    MRSA PCR Not Detected Not Detected       Assessment & Plan  Urinary tract infection without hematuria, site unspecified  Bladder cancer (Multi)  UA noted to be grossly abnormal in the setting of recent intervention.  Patient is only completed a reported 2 days of his antibiotic regiment prior to presenting to this facility.  CT of the abdomen/pelvis shows findings consistent with cystitis however there is noted to be bladder wall irregularities specifically citing the base of the urinary bladder with encroachment upon the prostate.  Had a known masslike area of thickening posteriorly along the right lateral bladder wall.  On 2/28/2025, there was concern about interval increase in the size and heterogenicity of the prostate with direct tumor invasion not excluded.  Given the CT findings, urology has been consulted for recommendations and management.  Patient noted to follow with Dr. Hoffman.  Will be treated with IV Zosyn as part of parallel management for his respiratory infection.  Antibiotics ultimately to be modified based off of urine culture results including speciation and sensitivities if applicable.  Chronic systolic heart failure  Chronic atrial fibrillation (Multi)  Pulmonary  hypertension (Multi)  BNP elevated at 1930.  CT scan noting reflux of contrast into the IVC and hepatic veins consistent with pulmonary hypertension/right-sided heart dysfunction.  Imaging studies note bilateral groundglass opacities consistent with developing pulmonary edema.  Patient was treated with 750 cc of IV fluid due to hypotension in the emergency department.  Refrain from additional IV fluids at this time if possible.  Should patient require significant BP support, may ultimately require transfer to the ICU for initiation of vasopressors.  Cardiology service to be consulted due to decompensated heart failure as noted by his elevated BNP and increased pulmonary edema.  Noted to follow with Dr. Valle.  Current medication regiment includes apixaban for chronic atrial fibrillation, metoprolol 25 mg twice daily, Entresto.  Previously on spironolactone but was discontinued due to hyperkalemia.  Echocardiogram to be obtained at this time.  Pneumonia of both lower lobes due to infectious organism  Bibasilar airspace opacities/infiltrates noted.  May represent compressive atelectasis however underlying infectious process cannot be excluded.  Will continue IV Zosyn as part of coverage for both UTI as well as pneumonia.  Anticipate de-escalation of antibiotics pending clinical course.  MRSA swab was negative so vancomycin has been discontinued as part of his pneumonia protocol.  No indication to continue vancomycin for treatment of his UTI.    Diet: Cardiac  Fluids: N/A  DVT prophylaxis: Home Eliquis  Telemetry: Indicated    Home Medications: Reviewed and reconciled    I spent >75 minutes in the professional and overall care of this patient.      Schuyler Chiu DO         [1]   Past Medical History:  Diagnosis Date    Abnormal ECG     Arrhythmia     Atrial fibrillation (Multi)     Bladder cancer (Multi)     BPH (benign prostatic hyperplasia)     CHF (congestive heart failure)     CKD (chronic kidney  disease)     stage 3    Coronary artery disease     COVID-19 02/16/2022    COVID-19    Epilepsy, unspecified, not intractable, without status epilepticus 11/18/2020    Epilepsy    Hyperlipidemia     Hypertension     Ischemic cardiomyopathy     Myocardial infarction (Multi)     Unspecified convulsions (Multi) 02/24/2021    Seizures   [2]   Past Surgical History:  Procedure Laterality Date    ARTERIAL BYPASS SURGERY      CARDIAC CATHETERIZATION      CORONARY ANGIOPLASTY      OTHER SURGICAL HISTORY  02/12/2019    Angioplasty    OTHER SURGICAL HISTORY  02/12/2019    Appendectomy    OTHER SURGICAL HISTORY  02/12/2019    Bypass   [3]   Family History  Problem Relation Name Age of Onset    Heart failure Mother Nandaly     Emphysema Father Saroj John.     Lung disease Father Saroj Sr.     Bipolar disorder Brother     [4] apixaban, 5 mg, oral, BID  atorvastatin, 20 mg, oral, Nightly  levETIRAcetam, 250 mg, oral, Daily  [Held by provider] metoprolol succinate XL, 25 mg, oral, BID  midodrine, 10 mg, oral, Once  [Held by provider] mirtazapine, 15 mg, oral, Nightly  pantoprazole, 40 mg, oral, Daily before breakfast   Or  pantoprazole, 40 mg, intravenous, Daily before breakfast  piperacillin-tazobactam, 4.5 g, intravenous, q8h  pneumoc 20-shi conj-dip cr(PF), 0.5 mL, intramuscular, During hospitalization  [Held by provider] sacubitriL-valsartan, 1 tablet, oral, BID  [Held by provider] sertraline, 100 mg, oral, Daily  [5]    [6] PRN medications: acetaminophen **OR** acetaminophen **OR** acetaminophen, acetaminophen **OR** acetaminophen **OR** acetaminophen, melatonin, metoclopramide **OR** metoclopramide, ondansetron ODT **OR** ondansetron, oxygen

## 2025-06-08 NOTE — CARE PLAN
Problem: Pain - Adult  Goal: Verbalizes/displays adequate comfort level or baseline comfort level  Outcome: Progressing     Problem: Safety - Adult  Goal: Free from fall injury  Outcome: Progressing     Problem: Discharge Planning  Goal: Discharge to home or other facility with appropriate resources  Outcome: Progressing     Problem: Chronic Conditions and Co-morbidities  Goal: Patient's chronic conditions and co-morbidity symptoms are monitored and maintained or improved  Outcome: Progressing     Problem: Nutrition  Goal: Nutrient intake appropriate for maintaining nutritional needs  Outcome: Progressing     Problem: Heart Failure  Goal: Improved gas exchange this shift  Outcome: Progressing  Goal: Improved urinary output this shift  Outcome: Progressing  Goal: Reduction in peripheral edema within 24 hours  Outcome: Progressing  Goal: Report improvement of dyspnea/breathlessness this shift  Outcome: Progressing  Goal: Weight from fluid excess reduced over 2-3 days, then stabilize  Outcome: Progressing  Goal: Increase self care and/or family involvement in 24 hours  Outcome: Progressing   The patient's goals for the shift include     The clinical goals for the shift include Pt will remain HDS throughout shift.

## 2025-06-09 VITALS
TEMPERATURE: 97.3 F | DIASTOLIC BLOOD PRESSURE: 56 MMHG | HEART RATE: 110 BPM | RESPIRATION RATE: 20 BRPM | HEIGHT: 70 IN | OXYGEN SATURATION: 93 % | WEIGHT: 177.69 LBS | SYSTOLIC BLOOD PRESSURE: 114 MMHG | BODY MASS INDEX: 25.44 KG/M2

## 2025-06-09 LAB
ANION GAP SERPL CALC-SCNC: 11 MMOL/L (ref 10–20)
BACTERIA UR CULT: NO GROWTH
BASOPHILS # BLD AUTO: 0.01 X10*3/UL (ref 0–0.1)
BASOPHILS NFR BLD AUTO: 0.1 %
BUN SERPL-MCNC: 23 MG/DL (ref 6–23)
CALCIUM SERPL-MCNC: 8 MG/DL (ref 8.6–10.3)
CHLORIDE SERPL-SCNC: 101 MMOL/L (ref 98–107)
CO2 SERPL-SCNC: 22 MMOL/L (ref 21–32)
CREAT SERPL-MCNC: 1.13 MG/DL (ref 0.5–1.3)
EGFRCR SERPLBLD CKD-EPI 2021: 63 ML/MIN/1.73M*2
EOSINOPHIL # BLD AUTO: 0.11 X10*3/UL (ref 0–0.4)
EOSINOPHIL NFR BLD AUTO: 1.1 %
ERYTHROCYTE [DISTWIDTH] IN BLOOD BY AUTOMATED COUNT: 19.7 % (ref 11.5–14.5)
GLUCOSE SERPL-MCNC: 93 MG/DL (ref 74–99)
HBV SURFACE AB SER-ACNC: 5 MIU/ML
HCT VFR BLD AUTO: 30.8 % (ref 41–52)
HGB BLD-MCNC: 9.3 G/DL (ref 13.5–17.5)
IMM GRANULOCYTES # BLD AUTO: 0.07 X10*3/UL (ref 0–0.5)
IMM GRANULOCYTES NFR BLD AUTO: 0.7 % (ref 0–0.9)
LYMPHOCYTES # BLD AUTO: 0.52 X10*3/UL (ref 0.8–3)
LYMPHOCYTES NFR BLD AUTO: 5.2 %
MAGNESIUM SERPL-MCNC: 1.92 MG/DL (ref 1.6–2.4)
MCH RBC QN AUTO: 24.6 PG (ref 26–34)
MCHC RBC AUTO-ENTMCNC: 30.2 G/DL (ref 32–36)
MCV RBC AUTO: 82 FL (ref 80–100)
MONOCYTES # BLD AUTO: 1.17 X10*3/UL (ref 0.05–0.8)
MONOCYTES NFR BLD AUTO: 11.8 %
NEUTROPHILS # BLD AUTO: 8.07 X10*3/UL (ref 1.6–5.5)
NEUTROPHILS NFR BLD AUTO: 81.1 %
NRBC BLD-RTO: 0 /100 WBCS (ref 0–0)
PLATELET # BLD AUTO: 85 X10*3/UL (ref 150–450)
POTASSIUM SERPL-SCNC: 4.3 MMOL/L (ref 3.5–5.3)
PROCALCITONIN SERPL-MCNC: 6.42 NG/ML
Q ONSET: 226 MS
QRS COUNT: 17 BEATS
QRS DURATION: 96 MS
QT INTERVAL: 376 MS
QTC CALCULATION(BAZETT): 501 MS
QTC FREDERICIA: 456 MS
R AXIS: 64 DEGREES
RBC # BLD AUTO: 3.78 X10*6/UL (ref 4.5–5.9)
SODIUM SERPL-SCNC: 130 MMOL/L (ref 136–145)
T AXIS: 190 DEGREES
T OFFSET: 414 MS
VENTRICULAR RATE: 107 BPM
WBC # BLD AUTO: 10 X10*3/UL (ref 4.4–11.3)

## 2025-06-09 PROCEDURE — 80048 BASIC METABOLIC PNL TOTAL CA: CPT | Performed by: INTERNAL MEDICINE

## 2025-06-09 PROCEDURE — 2500000001 HC RX 250 WO HCPCS SELF ADMINISTERED DRUGS (ALT 637 FOR MEDICARE OP): Performed by: INTERNAL MEDICINE

## 2025-06-09 PROCEDURE — 94640 AIRWAY INHALATION TREATMENT: CPT

## 2025-06-09 PROCEDURE — 97116 GAIT TRAINING THERAPY: CPT | Mod: GP

## 2025-06-09 PROCEDURE — 2500000004 HC RX 250 GENERAL PHARMACY W/ HCPCS (ALT 636 FOR OP/ED): Performed by: INTERNAL MEDICINE

## 2025-06-09 PROCEDURE — 93005 ELECTROCARDIOGRAM TRACING: CPT

## 2025-06-09 PROCEDURE — 2500000002 HC RX 250 W HCPCS SELF ADMINISTERED DRUGS (ALT 637 FOR MEDICARE OP, ALT 636 FOR OP/ED): Performed by: INTERNAL MEDICINE

## 2025-06-09 PROCEDURE — 2500000005 HC RX 250 GENERAL PHARMACY W/O HCPCS: Performed by: INTERNAL MEDICINE

## 2025-06-09 PROCEDURE — 97161 PT EVAL LOW COMPLEX 20 MIN: CPT | Mod: GP

## 2025-06-09 PROCEDURE — 93010 ELECTROCARDIOGRAM REPORT: CPT | Performed by: INTERNAL MEDICINE

## 2025-06-09 PROCEDURE — 83735 ASSAY OF MAGNESIUM: CPT | Performed by: INTERNAL MEDICINE

## 2025-06-09 PROCEDURE — 99222 1ST HOSP IP/OBS MODERATE 55: CPT | Performed by: UROLOGY

## 2025-06-09 PROCEDURE — 51701 INSERT BLADDER CATHETER: CPT

## 2025-06-09 PROCEDURE — 99233 SBSQ HOSP IP/OBS HIGH 50: CPT | Performed by: INTERNAL MEDICINE

## 2025-06-09 PROCEDURE — 2060000001 HC INTERMEDIATE ICU ROOM DAILY

## 2025-06-09 PROCEDURE — 99232 SBSQ HOSP IP/OBS MODERATE 35: CPT | Performed by: INTERNAL MEDICINE

## 2025-06-09 PROCEDURE — 36415 COLL VENOUS BLD VENIPUNCTURE: CPT | Performed by: INTERNAL MEDICINE

## 2025-06-09 PROCEDURE — 51702 INSERT TEMP BLADDER CATH: CPT

## 2025-06-09 PROCEDURE — 85025 COMPLETE CBC W/AUTO DIFF WBC: CPT | Performed by: INTERNAL MEDICINE

## 2025-06-09 RX ORDER — FUROSEMIDE 20 MG/1
20 TABLET ORAL EVERY OTHER DAY
Status: DISCONTINUED | OUTPATIENT
Start: 2025-06-10 | End: 2025-06-11 | Stop reason: HOSPADM

## 2025-06-09 RX ORDER — CEFTRIAXONE 2 G/50ML
2 INJECTION, SOLUTION INTRAVENOUS EVERY 24 HOURS
Status: DISCONTINUED | OUTPATIENT
Start: 2025-06-09 | End: 2025-06-11 | Stop reason: HOSPADM

## 2025-06-09 RX ORDER — ALPRAZOLAM 0.25 MG/1
0.25 TABLET ORAL NIGHTLY PRN
Status: DISCONTINUED | OUTPATIENT
Start: 2025-06-09 | End: 2025-06-11 | Stop reason: HOSPADM

## 2025-06-09 RX ORDER — LANOLIN ALCOHOL/MO/W.PET/CERES
400 CREAM (GRAM) TOPICAL ONCE
Status: COMPLETED | OUTPATIENT
Start: 2025-06-09 | End: 2025-06-09

## 2025-06-09 RX ADMIN — SPIRONOLACTONE 12.5 MG: 25 TABLET ORAL at 11:55

## 2025-06-09 RX ADMIN — IPRATROPIUM BROMIDE AND ALBUTEROL SULFATE 3 ML: 2.5; .5 SOLUTION RESPIRATORY (INHALATION) at 08:38

## 2025-06-09 RX ADMIN — Medication 1 L/MIN: at 08:40

## 2025-06-09 RX ADMIN — LEVETIRACETAM 250 MG: 500 TABLET, FILM COATED ORAL at 10:01

## 2025-06-09 RX ADMIN — APIXABAN 5 MG: 5 TABLET, FILM COATED ORAL at 20:54

## 2025-06-09 RX ADMIN — PIPERACILLIN SODIUM AND TAZOBACTAM SODIUM 4.5 G: 4; .5 INJECTION, SOLUTION INTRAVENOUS at 11:19

## 2025-06-09 RX ADMIN — METOPROLOL SUCCINATE 25 MG: 25 TABLET, EXTENDED RELEASE ORAL at 10:07

## 2025-06-09 RX ADMIN — PANTOPRAZOLE SODIUM 40 MG: 40 TABLET, DELAYED RELEASE ORAL at 06:46

## 2025-06-09 RX ADMIN — GUAIFENESIN 1200 MG: 600 TABLET, EXTENDED RELEASE ORAL at 20:54

## 2025-06-09 RX ADMIN — ALPRAZOLAM 0.25 MG: 0.25 TABLET ORAL at 20:53

## 2025-06-09 RX ADMIN — CEFTRIAXONE 2 G: 2 INJECTION, SOLUTION INTRAVENOUS at 18:04

## 2025-06-09 RX ADMIN — APIXABAN 5 MG: 5 TABLET, FILM COATED ORAL at 10:02

## 2025-06-09 RX ADMIN — IPRATROPIUM BROMIDE AND ALBUTEROL SULFATE 3 ML: 2.5; .5 SOLUTION RESPIRATORY (INHALATION) at 14:22

## 2025-06-09 RX ADMIN — METOPROLOL SUCCINATE 25 MG: 25 TABLET, EXTENDED RELEASE ORAL at 20:54

## 2025-06-09 RX ADMIN — SACUBITRIL AND VALSARTAN 1 TABLET: 24; 26 TABLET, FILM COATED ORAL at 10:07

## 2025-06-09 RX ADMIN — ALPRAZOLAM 0.25 MG: 0.25 TABLET ORAL at 03:49

## 2025-06-09 RX ADMIN — IPRATROPIUM BROMIDE AND ALBUTEROL SULFATE 3 ML: 2.5; .5 SOLUTION RESPIRATORY (INHALATION) at 21:06

## 2025-06-09 RX ADMIN — SACUBITRIL AND VALSARTAN 1 TABLET: 24; 26 TABLET, FILM COATED ORAL at 20:53

## 2025-06-09 RX ADMIN — Medication 1 TABLET: at 10:02

## 2025-06-09 RX ADMIN — GUAIFENESIN 1200 MG: 600 TABLET, EXTENDED RELEASE ORAL at 10:02

## 2025-06-09 RX ADMIN — ATORVASTATIN CALCIUM 20 MG: 20 TABLET, FILM COATED ORAL at 20:54

## 2025-06-09 SDOH — SOCIAL STABILITY: SOCIAL NETWORK
IN A TYPICAL WEEK, HOW MANY TIMES DO YOU TALK ON THE PHONE WITH FAMILY, FRIENDS, OR NEIGHBORS?: MORE THAN THREE TIMES A WEEK

## 2025-06-09 SDOH — SOCIAL STABILITY: SOCIAL NETWORK: HOW OFTEN DO YOU GET TOGETHER WITH FRIENDS OR RELATIVES?: MORE THAN THREE TIMES A WEEK

## 2025-06-09 SDOH — SOCIAL STABILITY: SOCIAL INSECURITY: ARE YOU MARRIED, WIDOWED, DIVORCED, SEPARATED, NEVER MARRIED, OR LIVING WITH A PARTNER?: MARRIED

## 2025-06-09 SDOH — HEALTH STABILITY: MENTAL HEALTH
DO YOU FEEL STRESS - TENSE, RESTLESS, NERVOUS, OR ANXIOUS, OR UNABLE TO SLEEP AT NIGHT BECAUSE YOUR MIND IS TROUBLED ALL THE TIME - THESE DAYS?: TO SOME EXTENT

## 2025-06-09 SDOH — SOCIAL STABILITY: SOCIAL NETWORK
DO YOU BELONG TO ANY CLUBS OR ORGANIZATIONS SUCH AS CHURCH GROUPS, UNIONS, FRATERNAL OR ATHLETIC GROUPS, OR SCHOOL GROUPS?: NO

## 2025-06-09 SDOH — SOCIAL STABILITY: SOCIAL NETWORK: HOW OFTEN DO YOU ATTEND CHURCH OR RELIGIOUS SERVICES?: MORE THAN 4 TIMES PER YEAR

## 2025-06-09 SDOH — HEALTH STABILITY: PHYSICAL HEALTH: ON AVERAGE, HOW MANY MINUTES DO YOU ENGAGE IN EXERCISE AT THIS LEVEL?: 60 MIN

## 2025-06-09 SDOH — SOCIAL STABILITY: SOCIAL NETWORK: HOW OFTEN DO YOU ATTEND MEETINGS OF THE CLUBS OR ORGANIZATIONS YOU BELONG TO?: NEVER

## 2025-06-09 SDOH — HEALTH STABILITY: PHYSICAL HEALTH: ON AVERAGE, HOW MANY DAYS PER WEEK DO YOU ENGAGE IN MODERATE TO STRENUOUS EXERCISE (LIKE A BRISK WALK)?: 6 DAYS

## 2025-06-09 ASSESSMENT — PAIN - FUNCTIONAL ASSESSMENT
PAIN_FUNCTIONAL_ASSESSMENT: 0-10

## 2025-06-09 ASSESSMENT — COGNITIVE AND FUNCTIONAL STATUS - GENERAL
MOBILITY SCORE: 22
TURNING FROM BACK TO SIDE WHILE IN FLAT BAD: A LITTLE
MOBILITY SCORE: 22
CLIMB 3 TO 5 STEPS WITH RAILING: A LITTLE
MOVING FROM LYING ON BACK TO SITTING ON SIDE OF FLAT BED WITH BEDRAILS: A LITTLE
WALKING IN HOSPITAL ROOM: A LITTLE
MOVING TO AND FROM BED TO CHAIR: A LITTLE
STANDING UP FROM CHAIR USING ARMS: A LITTLE
CLIMB 3 TO 5 STEPS WITH RAILING: TOTAL
WALKING IN HOSPITAL ROOM: A LITTLE
MOBILITY SCORE: 16
DAILY ACTIVITIY SCORE: 24
WALKING IN HOSPITAL ROOM: A LITTLE
DAILY ACTIVITIY SCORE: 24
CLIMB 3 TO 5 STEPS WITH RAILING: A LITTLE

## 2025-06-09 ASSESSMENT — PAIN SCALES - GENERAL
PAINLEVEL_OUTOF10: 0 - NO PAIN

## 2025-06-09 ASSESSMENT — ACTIVITIES OF DAILY LIVING (ADL): ADL_ASSISTANCE: INDEPENDENT

## 2025-06-09 NOTE — ASSESSMENT & PLAN NOTE
Urology is on consult.  Appreciate their input regarding urinary retention and requiring a Pandya catheter  Will place a Pandya catheter due to urinary retention and requiring intermittent catheterization every 6 hours for last 24 hours  Patient states that he is following up with closely with Dr. Hoffman  Continue with Zosyn for time being  Follow-up urine culture results

## 2025-06-09 NOTE — PROGRESS NOTES
Minh Harrington Jr. is a 85 y.o. male on day 2 of admission presenting with Urinary tract infection without hematuria, site unspecified.      Subjective   Heart rate up again today.  Blood pressure is better.  Talked about putting in a Pandya catheter as he has required straight catheterization and getting anywhere from 600-900 out every 6 hours.  Still waiting to see urology.  Talked about weaning oxygen down today as well.       Objective     Last Recorded Vitals  /61   Pulse 106   Temp 36.3 °C (97.3 °F) (Temporal)   Resp 18   Wt 78 kg (171 lb 15.3 oz)   SpO2 96%   Intake/Output last 3 Shifts:    Intake/Output Summary (Last 24 hours) at 6/9/2025 0931  Last data filed at 6/9/2025 0648  Gross per 24 hour   Intake 300 ml   Output 2625 ml   Net -2325 ml       Admission Weight  Weight: 78.9 kg (174 lb) (06/07/25 1340)    Daily Weight  06/09/25 : 78 kg (171 lb 15.3 oz)      Physical Exam:  General: Alert, not in acute distress on nasal cannula  HEENT: PERRLA, head intact and normocephalic  Neck: Normal to inspection  Lungs: Bilateral wheezing is noted.  Cardiac: Irregularly irregular into 120s  Abdomen: Soft nontender, positive bowel sounds  : Exam deferred  Skin: Intact  Hematology: No petechia or excessive ecchymosis  Musculoskeletal: Without significant trauma  Neurological: Alert awake oriented, no focal deficit, cranial nerves grossly intact  Psych: No suicidal ideation or homicidal ideation    Relevant Results  Scheduled medications  Scheduled Medications[1]  Continuous medications  Continuous Medications[2]  PRN medications  PRN Medications[3]   Labs  Results from last 7 days   Lab Units 06/09/25  0456 06/08/25  0529 06/07/25  1500   WBC AUTO x10*3/uL 10.0 10.9 15.9*   HEMOGLOBIN g/dL 9.3* 9.1* 11.1*   HEMATOCRIT % 30.8* 31.0* 36.7*   PLATELETS AUTO x10*3/uL 85* 78* 77*     Results from last 7 days   Lab Units 06/09/25  0456 06/08/25  0529 06/07/25  1500   SODIUM mmol/L 130* 131* 132*   POTASSIUM  mmol/L 4.3 4.5 5.0   CHLORIDE mmol/L 101 101 98   CO2 mmol/L 22 24 23   BUN mg/dL 23 25* 25*   CREATININE mg/dL 1.13 1.22 1.28   CALCIUM mg/dL 8.0* 8.2* 9.2   PROTEIN TOTAL g/dL  --  5.9* 7.2   BILIRUBIN TOTAL mg/dL  --  0.8 1.2   ALK PHOS U/L  --  92 136   ALT U/L  --  20 28   AST U/L  --  34 52*   GLUCOSE mg/dL 93 92 117*       Imaging  CT angio chest for pulmonary embolism  Result Date: 6/7/2025  1.  No acute pulmonary embolism to the proximal segmental arterial level. Symmetric hypoenhancement of bilateral lower lobe distal segmental and subsegmental branches is favored to be artifactual due to bolus timing and poor opacification. If there is high clinical concern for PE repeat CT can be considered. 2. Main pulmonary artery is enlarged and measures 4.0 cm which can be seen with pulmonary arterial hypertension 3. Aneurysmal dilatation of the ascending thoracic aorta measuring up to 4.2 cm. Diffuse calcific atherosclerosis of the aorta and arch vessels. 4. Cardiomegaly. Mild interlobular septal thickening ground-glass opacities suggestive of developing interstitial edema/CHF. 5. Advanced centrilobular and paraseptal emphysema. 6. There small bilateral pleural effusions with adjacent airspace and consolidative opacities. This may relate to compressive atelectasis versus developing airspace disease. Clinical correlation and continued short-term follow-up to resolution is advised. 7. There is reflux of contrast into the IVC and intrahepatic veins suggestive of right heart dysfunction. 8. Additional findings as described above.     MACRO: None   Signed by: Homero Grey 6/7/2025 7:47 PM Dictation workstation:   [id number]    CT abdomen pelvis w IV contrast  Result Date: 6/7/2025  Small bilateral pleural effusions. There are bibasilar infiltrates, greater on the left.   Cardiomegaly with scattered atherosclerotic arterial calcifications.   Fatty metamorphosis of the liver and hepatocellular disease. Correlate with  clinical findings of cirrhosis.   Bilateral renal cortical thinning. There is a 5 cm cyst in the mid left kidney medially. A   2 mm nonobstructing calculus in the left kidney.   Mild bladder wall thickening and hypertrophy. Further workup with direct visualization is recommended. There is some wall irregularity at the base of the urinary bladder with some encroachment of the prostate. Correlate with PSA levels.   Sigmoid diverticulosis. There is mild wall prominence of the distal sigmoid colon and rectum.   Severe discogenic degenerative changes of the lumbar spine and osteoarthritic changes of both hips.       MACRO: None   Signed by: Ministerio Dailey 6/7/2025 5:59 PM Dictation workstation:   [id number]    XR chest 1 view  Result Date: 6/7/2025  Mild cardiomegaly and central vascular congestion.   Small right pleural effusion and atelectasis. There is no airspace consolidation.   Faint ground-glass density can not be excluded in the lung bases. Continued surveillance is recommended as clinically warranted.   MACRO: None   Signed by: Ministerio Dailey 6/7/2025 5:51 PM Dictation workstation:   [id number]    CT chest abdomen pelvis w IV contrast  Result Date: 6/5/2025  Bladder cancer restaging scan. When compared to the prior examination dated 02/28/2025, there has been interval postsurgical changes of a TURBT and the previously noted right urinary bladder wall mass is no longer visualized. No definite evidence of new sites of metastatic disease. Suggestion of fluid overload which may be cardiogenic in etiology, correlation with echocardiogram is recommended. Additional stable chronic and incidental findings described above.   I personally reviewed the image(s) / study and I agree with the findings as stated by Elia Bunn MD. This study was interpreted at [facility], [place], [place].   MACRO: None   Signed by: Marshal Bermudez 6/5/2025 7:56 PM Dictation workstation:    [id number]      Cardiology, Vascular, and Other Imaging  ECG 12 lead  Result Date: 6/8/2025  Atrial fibrillation with rapid ventricular response Nonspecific ST and T wave abnormality Prolonged QT Abnormal ECG When compared with ECG of 01-MAR-2025 15:13, Borderline criteria for Inferior infarct are no longer Present QT has lengthened    Transthoracic Echo Complete  Result Date: 6/8/2025            [facility] [id number] [facility] [address]    Tel [phone] Fax [phone] TRANSTHORACIC ECHOCARDIOGRAM REPORT Patient Name:       EAMON KIRBY CHINO  Reading Physician:    [id number] Kala Kelly MD Study Date:         6/8/2025             Ordering Provider:    [id number] ISRAEL MARISCAL MRN/PID:            [id number]             Fellow: Accession#:         [id number]         Nurse: Date of Birth/Age:  1940 / 85 years Sonographer:          Tanya Nguyen RDCS Gender Assigned at  M                    Additional Staff: Birth: Height:             177.80 cm            Admit Date:           6/7/2025 Weight:             80.29 kg             Admission Status:     Inpatient -                                                                Priority                                                                discharge BSA / BMI:          1.98 m2 / 25.40      Department Location:  [facility] CCU SD                     kg/m2 Blood Pressure: 105 /53 mmHg Study Type:    TRANSTHORACIC ECHO (TTE) LIMITED Diagnosis/ICD: Acute combined systolic (congestive) and diastolic (congestive)                heart failure (CHF)-I50.41 Indication:    Congestive Heart Failure CPT Codes:     Echo Limited-88835; Doppler Limited-35982; Color Doppler-26915 Patient History: Smoker:            Former. Pertinent History: CAD, Hyperlipidemia, HTN and CHF. h/o bladder ca/ CKD 3a;                    v-tach; WINNIE;  previous echocardiogram 1-. Study Detail: The following Echo studies were performed: 2D, Doppler and color               flow. Technically challenging study due to patient lying in supine               position. Unable to obtain suprasternal notch and subcostal view.  PHYSICIAN INTERPRETATION: Left Ventricle: Left ventricular ejection fraction is moderately decreased calculated by Jo's biplane at 33%. There is mild eccentric left ventricular hypertrophy. Multiple wall motion abnormalties exist, see findings. The left ventricular cavity size is upper limits of normal. There is mild increased septal and normal posterior left ventricular wall thickness. The interventricular septum is flattened in systole, consistent with right ventricular pressure overload. Left ventricular diastolic filling was indeterminate due to atrial fibrillation/flutter. Left Atrium: The left atrial size is mild to moderately dilated. Right Ventricle: The right ventricle is mildly enlarged. There is mildly reduced right ventricular systolic function. Right Atrium: The right atrial size is mildly dilated. Aortic Valve: The aortic valve is trileaflet. There is mild aortic valve cusp calcification. There is moderate aortic valve thickening. There is anterior aortic valve annular calcification. There is trace to mild aortic valve regurgitation. Mitral Valve: The mitral valve is mild to moderately thickened. There is no evidence of mitral valve prolapse. There is no evidence of mitral valve stenosis. There is mild mitral annular calcification. There is mild to moderate mitral valve regurgitation. The E Vmax is 0.83 m/s. Tricuspid Valve: The tricuspid valve is structurally normal. There is no evidence of tricuspid valve stenosis. There is mild to moderate tricuspid regurgitation. The Doppler estimated right ventricular systolic pressure (RVSP) is mildly elevated. Pulmonic Valve: The pulmonic valve is not well visualized. There is trace  pulmonic valve regurgitation. Pericardium: Trivial pericardial effusion. There is no evidence of cardiac tamponade. Aorta: The aortic root was not assessed. Systemic Veins: The inferior vena cava was not assessed, IVC inspiratory collapse was not assessed. In comparison to the previous echocardiogram(s): Compared with study dated 1/22/2025,. No change in LVEF. Mitral regurgitation less.  CONCLUSIONS:  1. Left ventricular ejection fraction is moderately decreased calculated by Jo's biplane at 33%.  2. Multiple wall motion abnormalties exist, see findings.  3. Left ventricular diastolic filling was indeterminate due to atrial fibrillation/flutter.  4. Right ventricular pressure overload.  5. There is mildly reduced right ventricular systolic function.  6. Mildly enlarged right ventricle.  7. The left atrial size is mild to moderately dilated.  8. There is no evidence of cardiac tamponade.  9. Mild to moderate mitral valve regurgitation. 10. No evidence of mitral valve prolapse. 11. There is No tricuspid stenosis. 12. Mild to moderate tricuspid regurgitation. 13. The Doppler estimated RVSP is mildly elevated. 14. No change in LVEF. Mitral regurgitation less. QUANTITATIVE DATA SUMMARY:  2D MEASUREMENTS:             Normal Ranges: LAs:             4.79 cm     (2.7-4.0cm) IVSd:            1.22 cm     (0.6-1.1cm) LVPWd:           1.00 cm     (0.6-1.1cm) LVIDd:           5.70 cm     (3.9-5.9cm) LVIDs:           4.75 cm LV Mass Index:   130.9 g/m2 LVEDV Index:     52.16 ml/m2 LV % FS          16.6 %  LV SYSTOLIC FUNCTION:                      Normal Ranges: EF-A4C View:    32 % (>=55%) EF-A2C View:    27 % EF-Biplane:     33 % LV EF Reported: 33 %  LV DIASTOLIC FUNCTION:           Normal Ranges: MV Peak E:             0.83 m/s  (0.7-1.2 m/s) MV e'                  0.114 m/s (>8.0) MV lateral e'          0.14 m/s MV medial e'           0.09 m/s E/e' Ratio:            7.33      (<8.0)  TRICUSPID VALVE/RVSP:           Normal Ranges: Peak TR Velocity:     3.20 m/s  AORTA: Asc Ao Diam 4.24 cm  [id number] Kala Kelly MD Electronically signed on 6/8/2025 at 1:50:43 PM  ** Final **                       Assessment/Plan   Minh Harrington Jr. is a 85 y.o. male on day 2 of admission presenting with Urinary tract infection without hematuria, site unspecified.  Assessment & Plan  Urinary tract infection without hematuria, site unspecified  Bladder cancer (Multi)  Urology is on consult.  Appreciate their input regarding urinary retention and requiring a Pandya catheter  Will place a Pandya catheter due to urinary retention and requiring intermittent catheterization every 6 hours for last 24 hours  Patient states that he is following up with closely with Dr. Hoffman  Continue with Zosyn for time being  Follow-up urine culture results  Chronic systolic heart failure  Chronic atrial fibrillation (Multi)  Pulmonary hypertension (Multi)  Blood pressure is better but heart rate is not controlled today  Appreciate cardiology input  Continue with metoprolol  Slowly GDMT has been started by cardiology with the parameters  Continue metoprolol, Entresto, spironolactone  Pandya catheter being placed due to urinary retention requiring straight catheterization  Appreciate cardiology input  Continue with Eliquis, atorvastatin  Pneumonia of both lower lobes due to infectious organism  Scheduled breathing treatment  Mucinex  I-S, flutter valve  Continue with Zosyn  Supplemental O2 and wean as tolerated  Follow-up procalcitonin and sputum culture result  Patient with also acute hypoxemic respiratory failure present on admission       Plan discussed with patient at bedside along with primary nurse  Disposition: Given patient's heart rate is still not controlled, he will remain in stepdown unit today and will reevaluate over next 24 hours continuously.  High level of MDM based on above issue and discussing plan    This note is created using voice recognition  software. All efforts are made to minimize errors, if there are errors there due to transcription.    Ruiz Lechuga  Hospitalist           [1] apixaban, 5 mg, oral, BID  atorvastatin, 20 mg, oral, Nightly  guaiFENesin, 1,200 mg, oral, BID  ipratropium-albuteroL, 3 mL, nebulization, TID  levETIRAcetam, 250 mg, oral, Daily  magnesium oxide, 400 mg of magnesium oxide, oral, Once  metoprolol succinate XL, 25 mg, oral, BID  [Held by provider] mirtazapine, 15 mg, oral, Nightly  pantoprazole, 40 mg, oral, Daily before breakfast  piperacillin-tazobactam, 4.5 g, intravenous, q8h  pneumoc 20-shi conj-dip cr(PF), 0.5 mL, intramuscular, During hospitalization  sacubitriL-valsartan, 1 tablet, oral, BID  [Held by provider] sertraline, 100 mg, oral, Daily  spironolactone, 12.5 mg, oral, q24h BRISSA     [2]    [3] PRN medications: acetaminophen **OR** acetaminophen **OR** acetaminophen, acetaminophen **OR** acetaminophen **OR** acetaminophen, ipratropium-albuteroL, melatonin, ondansetron ODT **OR** ondansetron, oxygen

## 2025-06-09 NOTE — NURSING NOTE
Met with pt's wife. Instructions reviewed. Healthy at home reviewed and explained. She will notify me if she wants a referral.

## 2025-06-09 NOTE — PROGRESS NOTES
Physical Therapy    Physical Therapy Evaluation & Treatment    Patient Name: Minh Harrington Jr.  MRN: [id number]  Today's Date: 6/9/2025   Time Calculation  Start Time: 1310  Stop Time: 1334  Time Calculation (min): 24 min  2107/2107-A    Assessment/Plan   PT Assessment  PT Assessment Results: Decreased strength, Decreased range of motion, Decreased endurance, Impaired balance, Decreased mobility, Decreased safety awareness  Rehab Prognosis: Good  Medical Staff Made Aware: Yes  End of Session Communication: Bedside nurse  Assessment Comment: Pt is a 86 y/o male admitted for UTI without hematuria. Pt presents with decreased strength, endurance and balance. Pt able to tolerate bed mobility, transfers and gait training this date. He is functioning below baseline level of function and will benefit from skilled therapy during stay to improve overall functional mobility and safety awareness. Upon discharge pt will benefit from low intensity therapy for continued improvement in functional mobility.     End of Session Patient Position: Up in chair, Alarm on (call light within reach)  IP OR SWING BED PT PLAN  Inpatient or Swing Bed: Inpatient  PT Plan  Treatment/Interventions: Bed mobility, Transfer training, Gait training, Stair training, Balance training, Neuromuscular re-education, Endurance training, Strengthening, Range of motion, Therapeutic exercise, Therapeutic activity, Home exercise program, Positioning, Postural re-education  PT Plan: Ongoing PT  PT Frequency: 4 times per week  PT Discharge Recommendations: Low intensity level of continued care  Equipment Recommended upon Discharge: Wheeled walker  PT Recommended Transfer Status: Assist x1, Assistive device, Contact guard  PT - OK to Discharge: Yes (Once medically cleared)    Current Problem:  Problem List[1]    Subjective     General Visit Information:  General  Reason for Referral: P/w major complaint of nausea, dry heaves, and diarrhea that started today.   He was recently diagnosed with UTI and was started on antibiotic in the form of Augmentin that was later switched to amoxicillin. Pt admitted for UTI without hematuria.  Referred By: Dr. Lechuga  Past Medical History Relevant to Rehab: HLD, CAD, A-fib on Eliquis, HFrEF (LVEF 30 to 35%), pulmonary hypertension, ascending thoracic aortic aneurysm, mitral/tricuspid valve regurgitation, bladder cancer s/p TURP with repeat TURBT recently on 5/7/2025  Family/Caregiver Present: Yes  Caregiver Feedback: Wife present who was helpful and supportive.  Prior to Session Communication: Bedside nurse  Patient Position Received: Bed, 3 rail up, Alarm on  Preferred Learning Style: verbal  General Comment: Pt pleasant and agreeable to work with therapy.    Home Living:  Home Living  Type of Home: Apartment  Lives With: Spouse  Home Adaptive Equipment: Walker rolling or standard, Cane  Home Layout: One level, Laundry main level  Home Access: Level entry  Bathroom Shower/Tub: Tub/shower unit, Walk-in shower  Bathroom Equipment: Grab bars in shower, Shower chair with back  Home Living Comments: Wife home to assist as needed    Prior Level of Function:  Prior Function Per Pt/Caregiver Report  Level of [place]: Independent with ADLs and functional transfers, Needs assistance with homemaking  Receives Help From: Family  ADL Assistance: Independent  Homemaking Assistance: Needs assistance  Ambulatory Assistance: Independent  Prior Function Comments: (+) drives, (-) falls    Precautions:  Precautions  Hearing/Visual Limitations: Wears glasses  Medical Precautions: Fall precautions, Oxygen therapy device and L/min (1L O2 via NC)  Precautions Comment: MIHIR Pandya    Objective     Pain:  Pain Assessment  Pain Assessment: 0-10  0-10 (Numeric) Pain Score: 0 - No pain    Cognition:  Cognition  Overall Cognitive Status: Within Functional Limits  Orientation Level: Oriented X4  Attention: Within Functional Limits  Insight: Within function  limits    General Assessments:      Activity Tolerance  Endurance: Tolerates 10 - 20 min exercise with multiple rests    Sensation  Light Touch: No apparent deficits  Sensation Comment: Pt denies any n/t.     Static Sitting Balance  Static Sitting-Balance Support: Bilateral upper extremity supported, Feet supported  Static Sitting-Level of Assistance: Close supervision  Static Standing Balance  Static Standing-Balance Support: Bilateral upper extremity supported  Static Standing-Level of Assistance: Contact guard  Dynamic Standing Balance  Dynamic Standing-Balance Support: Bilateral upper extremity supported  Dynamic Standing-Level of Assistance: Contact guard    Functional Assessments:     Bed Mobility  Bed Mobility: Yes  Bed Mobility 1  Bed Mobility 1: Supine to sitting, Scooting  Level of Assistance 1: Contact guard, Minimal verbal cues  Bed Mobility Comments 1: HOB elevated and use of bed HR. VCs for proper hand placement and body mechancis.    Transfers  Transfer: Yes  Transfer 1  Transfer From 1: Bed to  Transfer to 1: Stand  Technique 1: Sit to stand  Transfer Device 1: Walker, Gait belt  Transfer Level of Assistance 1: Contact guard, Minimal verbal cues  Trials/Comments 1: VCs for proper hand placement and body mechanics.  Transfers 2  Transfer From 2: Stand to  Transfer to 2: Chair with arms  Technique 2: Stand to sit  Transfer Device 2: Walker, Gait belt  Transfer Level of Assistance 2: Contact guard, Minimal verbal cues    Ambulation/Gait Training  Ambulation/Gait Training Performed: Yes  Ambulation/Gait Training 1  Surface 1: Level tile  Device 1: Rolling walker  Gait Support Devices: Gait belt  Assistance 1: Contact guard, Minimal verbal cues  Quality of Gait 1: Diminished heel strike, Forward flexed posture (decreased aaron)  Comments/Distance (ft) 1: ~60 ft with reciprocal gait pattern; VCs for safety awareness with FWW and proper gait mechanics to minimuze shuffling gait     Extremity/Trunk  Assessments:  RUE   RUE : Within Functional Limits  LUE   LUE: Within Functional Limits  RLE   RLE : Within Functional Limits  LLE   LLE : Within Functional Limits    Treatments:  Pt able to tolerate the following activities during today's treatment session. Pt instructed/educated throughout the session on safety awareness, body mechanics and proper hand placement. Skilled monitoring of vitals throughout session for pt safety. Pt ambulated ~60 ft with FWW/gait belt CGA with verbal cues for safety awareness with FWW and proper gait mechanics.     Outcome Measures:  [facility] Basic Mobility  Turning from your back to your side while in a flat bed without using bedrails: A little  Moving from lying on your back to sitting on the side of a flat bed without using bedrails: A little  Moving to and from bed to chair (including a wheelchair): A little  Standing up from a chair using your arms (e.g. wheelchair or bedside chair): A little  To walk in hospital room: A little  Climbing 3-5 steps with railing: Total  Basic Mobility - Total Score: 16     Goals:  Encounter Problems       Encounter Problems (Active)       Mobility       Pt will be able to ambulate >/= 100 ft with FWW SBA with good safety awareness and stability.        Start:  06/09/25    Expected End:  06/23/25            Pt will complete supine, seated and standing exercises to maintain/improve overall strength with minimal verbal cues.         Start:  06/09/25    Expected End:  06/23/25               PT Transfers       Pt will be able to complete all bed mobility tasks (rolling, sit <> sup) mod I.          Start:  06/09/25    Expected End:  06/23/25            Pt will be able to complete all transfers with FWW mod I demonstrating good safety awareness and proper body mechanics.        Start:  06/09/25    Expected End:  06/23/25                 Education Documentation  Precautions, taught by Arlene Seth, PT at 6/9/2025  4:29 PM.  Learner: Patient  Readiness:  Acceptance  Method: Explanation  Response: Verbalizes Understanding, Demonstrated Understanding, Needs Reinforcement    Body Mechanics, taught by Arlene Seth PT at 6/9/2025  4:29 PM.  Learner: Patient  Readiness: Acceptance  Method: Explanation  Response: Verbalizes Understanding, Demonstrated Understanding, Needs Reinforcement    Home Exercise Program, taught by Arlene Seth PT at 6/9/2025  4:29 PM.  Learner: Patient  Readiness: Acceptance  Method: Explanation  Response: Verbalizes Understanding, Demonstrated Understanding, Needs Reinforcement    Mobility Training, taught by Arlene Seth PT at 6/9/2025  4:29 PM.  Learner: Patient  Readiness: Acceptance  Method: Explanation  Response: Verbalizes Understanding, Demonstrated Understanding, Needs Reinforcement    Education Comments  No comments found.           [1]   Patient Active Problem List  Diagnosis    Anxiety    BPH (benign prostatic hyperplasia)    CAD (coronary artery disease)    Carotid artery plaque    Herpes zoster    Post herpetic neuralgia    HLD (hyperlipidemia)    HTN (hypertension)    Insomnia    Male erectile disorder of organic origin    Skin cancer    Thrombocytopenia    Vitamin D deficiency    Moderate episode of recurrent major depressive disorder    Aneurysm of ascending aorta without rupture    V tach (Multi)    Mitral valve insufficiency    Atrial fibrillation with controlled ventricular response (Multi)    History of coronary artery bypass graft    Acute combined systolic and diastolic heart failure    Former smoker    Stage 3a chronic kidney disease (Multi)    Acute on chronic systolic (congestive) heart failure    Hemorrhagic disorder due to extrinsic circulating anticoagulants (Multi)    WINNIE (obstructive sleep apnea)    Central sleep apnea in conditions classified elsewhere(327.27)    Cardiomyopathy, ischemic    Partial idiopathic epilepsy with seizures of localized onset, not intractable, without status epilepticus     Nonrheumatic tricuspid valve regurgitation    Pulmonary hypertension (Multi)    Gross hematuria    BMI 24.0-24.9, adult    Bladder cancer (Multi)    Urinary tract infection without hematuria, site unspecified    Chronic systolic heart failure    Chronic atrial fibrillation (Multi)    Pneumonia of both lower lobes due to infectious organism

## 2025-06-09 NOTE — ASSESSMENT & PLAN NOTE
Blood pressure is better but heart rate is not controlled today  Appreciate cardiology input  Continue with metoprolol  Slowly GDMT has been started by cardiology with the parameters  Continue metoprolol, Entresto, spironolactone  Pandya catheter being placed due to urinary retention requiring straight catheterization  Appreciate cardiology input  Continue with Eliquis, atorvastatin

## 2025-06-09 NOTE — PROGRESS NOTES
Cardiology Progress Note           Rounding Cardiologist:  Dr. Kala Kelly  Primary Cardiologist: Dr. Heriberto Valle    Date:  6/9/2025  Patient:  Minh Harrington Jr.  YOB: 1940  MRN:  [id number]   Admit Date:  6/7/2025      SUBJECTIVE    Minh Harrington Jr. was seen and examined today at bedside. Patient doing better and less SBO. Has indwelling barbosa now due to inability to evacuate bladder.     Discussed patients HF regimen with him. Jardiance on hold due to recurrent UTI - this made worse by inability to evacuate his bladder. No chest pain or shortness of breath today.       Review of Systems No new complaints.     VITALS     Vitals:    06/09/25 0950 06/09/25 1003 06/09/25 1120 06/09/25 1200   BP: 94/55 102/54 105/55    BP Location:   Right arm    Patient Position:   Lying    Pulse: (!) 112 108 100 102   Resp:   20    Temp:   36 °C (96.8 °F)    TempSrc:   Temporal    SpO2: 92% 92% 92% 96%   Weight:       Height:           Intake/Output Summary (Last 24 hours) at 6/9/2025 1233  Last data filed at 6/9/2025 1058  Gross per 24 hour   Intake 300 ml   Output 2725 ml   Net -2425 ml       Vitals:    06/09/25 0652   Weight: 78 kg (171 lb 15.3 oz)       CURRENT MEDICATIONS    Scheduled Medications[1]  Continuous Medications[2]  Current Outpatient Medications   Medication Instructions    ALPRAZolam (XANAX) 0.5 mg, oral, 3 times daily PRN    amoxicillin-clavulanate (Augmentin) 875-125 mg tablet 875 mg of amoxicillin, oral, 2 times daily    apixaban (Eliquis) 5 mg tablet ON HOLD PER CDO 3/20/25    apixaban (ELIQUIS) 5 mg, 2 times daily    atorvastatin (LIPITOR) 20 mg, oral, Daily, as directed    dutasteride (AVODART) 0.5 mg, oral, Daily    empagliflozin (JARDIANCE) 10 mg, oral, Daily    furosemide (Lasix) 20 mg tablet Take one daily ,if weight gain of 3 lbs in one day or 5 lbs in one week take another tablet that day until weight is back to normal    levETIRAcetam (Keppra) 500 mg tablet  "TAKE 1/2 (ONE-HALF) OF A TABLET BY MOUTH DAILY    metoprolol succinate XL (TOPROL-XL) 25 mg, oral, 2 times daily    mirtazapine (REMERON) 15 mg, oral, Nightly    sacubitriL-valsartan (Entresto) 24-26 mg tablet 1 tablet, oral, 2 times daily    sertraline (ZOLOFT) 100 mg, oral, Daily    tamsulosin (FLOMAX) 0.4 mg, oral, Nightly        PHYSICAL EXAMINATION   GENERAL:  Well developed, well nourished, in no acute distress.  CHEST:  Symmetric and nontender.  NECK:  Supple, no JVD, no bruit.  LUNGS:  Normal respiratory effort. Minimal rales left base.   HEART:  Irregularly irregular with normal S1 and S2, no S3.   ABDOMEN: Soft, NT, ND without palpable organomegaly, normoactive bowel sounds.   EXTREMITIES:  Warm with good color, no clubbing or cyanosis.  There is no edema noted.  PERIPHERAL VASCULAR:  Pulses present and equally palpable.  NEURO/PSYCH:  Alert and oriented times three with approppriate behavior and responses.  INTEGUMENT: No rashes    LAB DATA     CBC:   Results from last 7 days   Lab Units 06/09/25  0456   WBC AUTO x10*3/uL 10.0   RBC AUTO x10*6/uL 3.78*   HEMOGLOBIN g/dL 9.3*   HEMATOCRIT % 30.8*   PLATELETS AUTO x10*3/uL 85*        CMP:    Results from last 7 days   Lab Units 06/09/25  0456   SODIUM mmol/L 130*   POTASSIUM mmol/L 4.3   CHLORIDE mmol/L 101   CO2 mmol/L 22   BUN mg/dL 23   CREATININE mg/dL 1.13   GLUCOSE mg/dL 93   CALCIUM mg/dL 8.0*       Cardiac Enzymes:    Lab Results   Component Value Date    TROPHS 32 (H) 04/15/2024    TROPHS 32 (H) 04/15/2024       Magnesium:    Lab Results   Component Value Date    MG 1.92 06/09/2025       Lipid Profile:  No results found for: \"CHLPL\", \"TRIG\", \"HDL\", \"LDLCALC\", \"LDLDIRECT\"    TSH:  No results found for: \"TSH\"    BNP:   Lab Results   Component Value Date    BNP 1,930 (H) 06/07/2025        PT/INR:  No results found for: \"PROTIME\", \"INR\"    HgBA1c:    Lab Results   Component Value Date    HGBA1C 5.2 10/11/2023       CBC:  Lab Results   Component Value " Date    WBC 10.0 06/09/2025    WBC 10.9 06/08/2025    WBC 15.9 (H) 06/07/2025    RBC 3.78 (L) 06/09/2025    RBC 3.71 (L) 06/08/2025    RBC 4.47 (L) 06/07/2025    HGB 9.3 (L) 06/09/2025    HGB 9.1 (L) 06/08/2025    HGB 11.1 (L) 06/07/2025    HCT 30.8 (L) 06/09/2025    HCT 31.0 (L) 06/08/2025    HCT 36.7 (L) 06/07/2025    MCV 82 06/09/2025    MCV 84 06/08/2025    MCV 82 06/07/2025    MCH 24.6 (L) 06/09/2025    MCH 24.5 (L) 06/08/2025    MCH 24.8 (L) 06/07/2025    MCHC 30.2 (L) 06/09/2025    MCHC 29.4 (L) 06/08/2025    MCHC 30.2 (L) 06/07/2025    RDW 19.7 (H) 06/09/2025    RDW 19.7 (H) 06/08/2025    RDW 19.9 (H) 06/07/2025    PLT 85 (L) 06/09/2025    PLT 78 (L) 06/08/2025    PLT 77 (L) 06/07/2025       BMP:  Lab Results   Component Value Date     (L) 06/09/2025     (L) 06/08/2025     (L) 06/07/2025    K 4.3 06/09/2025    K 4.5 06/08/2025    K 5.0 06/07/2025     06/09/2025     06/08/2025    CL 98 06/07/2025    CO2 22 06/09/2025    CO2 24 06/08/2025    CO2 23 06/07/2025    BUN 23 06/09/2025    BUN 25 (H) 06/08/2025    BUN 25 (H) 06/07/2025    CREATININE 1.13 06/09/2025    CREATININE 1.22 06/08/2025    CREATININE 1.28 06/07/2025       Hepatic Function Panel:    Lab Results   Component Value Date    ALKPHOS 92 06/08/2025    ALT 20 06/08/2025    AST 34 06/08/2025    PROT 5.9 (L) 06/08/2025    BILITOT 0.8 06/08/2025     Labs reviewed.     DIAGNOSTIC TESTING RESULTS     ECG 12 lead  Result Date: 6/8/2025  Atrial fibrillation with rapid ventricular response Nonspecific ST and T wave abnormality Prolonged QT Abnormal ECG When compared with ECG of 01-MAR-2025 15:13, Borderline criteria for Inferior infarct are no longer Present QT has lengthened    Transthoracic Echo Complete  Result Date: 6/8/2025            [facility] [place] [address] [facility], [facility] [address]    Tel [phone] Fax [phone] TRANSTHORACIC ECHOCARDIOGRAM REPORT Patient Name:       EAMON Bond  Physician:    [id number] Kala Kelly MD Study Date:         6/8/2025             Ordering Provider:    [id number] ISRAEL MARISCAL MRN/PID:            [id number]             Fellow: Accession#:         [id number]         Nurse: Date of Birth/Age:  1940 / 85 years Sonographer:          Tanya Nguyen RDCS Gender Assigned at  M                    Additional Staff: Birth: Height:             177.80 cm            Admit Date:           6/7/2025 Weight:             80.29 kg             Admission Status:     Inpatient -                                                                Priority                                                                discharge BSA / BMI:          1.98 m2 / 25.40      Department Location:  [facility]                     kg/m2 Blood Pressure: 105 /53 mmHg Study Type:    TRANSTHORACIC ECHO (TTE) LIMITED Diagnosis/ICD: 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 Pertinent History: CAD, Hyperlipidemia, HTN and CHF. h/o bladder ca/ CKD 3a;                    v-tach; WINNIE; previous echocardiogram 1-. Study Detail: The following Echo studies were performed: 2D, Doppler and color               flow. Technically challenging study due to patient lying in supine               position. Unable to obtain suprasternal notch and subcostal view.  PHYSICIAN INTERPRETATION: Left Ventricle: Left ventricular ejection fraction is moderately decreased calculated by Jo's biplane at 33%. There is mild eccentric left ventricular hypertrophy. Multiple wall motion abnormalties exist, see findings. The left ventricular cavity size is upper limits of normal. There  is mild increased septal and normal posterior left ventricular wall thickness. The interventricular septum is flattened in systole, consistent with right ventricular pressure overload. Left ventricular diastolic filling was indeterminate due to atrial fibrillation/flutter. Left Atrium: The left atrial size is mild to moderately dilated. Right Ventricle: The right ventricle is mildly enlarged. There is mildly reduced right ventricular systolic function. Right Atrium: The right atrial size is mildly dilated. Aortic Valve: The aortic valve is trileaflet. There is mild aortic valve cusp calcification. There is moderate aortic valve thickening. There is anterior aortic valve annular calcification. There is trace to mild aortic valve regurgitation. Mitral Valve: The mitral valve is mild to moderately thickened. There is no evidence of mitral valve prolapse. There is no evidence of mitral valve stenosis. There is mild mitral annular calcification. There is mild to moderate mitral valve regurgitation. The E Vmax is 0.83 m/s. Tricuspid Valve: The tricuspid valve is structurally normal. There is no evidence of tricuspid valve stenosis. There is mild to moderate tricuspid regurgitation. The Doppler estimated right ventricular systolic pressure (RVSP) is mildly elevated. Pulmonic Valve: The pulmonic valve is not well visualized. There is trace pulmonic valve regurgitation. Pericardium: Trivial pericardial effusion. There is no evidence of cardiac tamponade. Aorta: The aortic root was not assessed. Systemic Veins: The inferior vena cava was not assessed, IVC inspiratory collapse was not assessed. In comparison to the previous echocardiogram(s): Compared with study dated 1/22/2025,. No change in LVEF. Mitral regurgitation less.  CONCLUSIONS:  1. Left ventricular ejection fraction is moderately decreased calculated by Jo's biplane at 33%.  2. Multiple wall motion abnormalties exist, see findings.  3. Left ventricular  diastolic filling was indeterminate due to atrial fibrillation/flutter.  4. Right ventricular pressure overload.  5. There is mildly reduced right ventricular systolic function.  6. Mildly enlarged right ventricle.  7. The left atrial size is mild to moderately dilated.  8. There is no evidence of cardiac tamponade.  9. Mild to moderate mitral valve regurgitation. 10. No evidence of mitral valve prolapse. 11. There is No tricuspid stenosis. 12. Mild to moderate tricuspid regurgitation. 13. The Doppler estimated RVSP is mildly elevated. 14. No change in LVEF. Mitral regurgitation less. QUANTITATIVE DATA SUMMARY:  2D MEASUREMENTS:             Normal Ranges: LAs:             4.79 cm     (2.7-4.0cm) IVSd:            1.22 cm     (0.6-1.1cm) LVPWd:           1.00 cm     (0.6-1.1cm) LVIDd:           5.70 cm     (3.9-5.9cm) LVIDs:           4.75 cm LV Mass Index:   130.9 g/m2 LVEDV Index:     52.16 ml/m2 LV % FS          16.6 %  LV SYSTOLIC FUNCTION:                      Normal Ranges: EF-A4C View:    32 % (>=55%) EF-A2C View:    27 % EF-Biplane:     33 % LV EF Reported: 33 %  LV DIASTOLIC FUNCTION:           Normal Ranges: MV Peak E:             0.83 m/s  (0.7-1.2 m/s) MV e'                  0.114 m/s (>8.0) MV lateral e'          0.14 m/s MV medial e'           0.09 m/s E/e' Ratio:            7.33      (<8.0)  TRICUSPID VALVE/RVSP:          Normal Ranges: Peak TR Velocity:     3.20 m/s  AORTA: Asc Ao Diam 4.24 cm  [id number] Kala Kelly MD Electronically signed on 6/8/2025 at 1:50:43 PM  ** Final **     CT angio chest for pulmonary embolism  Result Date: 6/7/2025  Interpreted By:  Homero Grey, STUDY: CT ANGIO CHEST FOR PULMONARY EMBOLISM;  6/7/2025 6:55 pm   INDICATION: Signs/Symptoms:tachycardia, hypoxia, hypotension.   COMPARISON: CT scan of the chest 06/04/2025.   ACCESSION NUMBER(S): [id number]   ORDERING CLINICIAN: SARAY ROLAN   TECHNIQUE: Helical data acquisition of the chest was obtained after intravenous  administration of  60 mL of Omnipaque 350. Images were reformatted in axial, coronal, and sagittal planes. Axial and coronal MIPS were reconstructed and reviewed.   FINDINGS: HEART AND VESSELS: No acute pulmonary embolism to the  proximal segmental arterial level. Symmetric hypoenhancement of the lower lobe distal segmental and subsegmental branches is favored to be artifactual due to bolus timing and poor opacification.   Main pulmonary artery is enlarged and measures 4.0 cm which can be seen with pulmonary arterial hypertension   Aneurysmal dilatation of the ascending thoracic aorta measuring up to 4.2 cm. Diffuse calcific atherosclerosis of the aorta and arch vessels.   Severe coronary artery calcifications are seen. Postsurgical changes of prior CABG with midline sternotomy wires noted.The study is not optimized for evaluation of coronary arteries.   The cardiac chambers are enlarged. Aortic root and mitral annular calcifications noted.   No evidence of pericardial effusion.   MEDIASTINUM AND JACOB, LOWER NECK AND AXILLA: The visualized thyroid gland is within normal limits.   Nonenlarged mediastinal lymph nodes noted. No thoracic adenopathy.   Esophagus appears within normal limits as seen.   LUNGS AND AIRWAYS: The trachea and central airways are patent. No endobronchial lesion.   Advanced centrilobular and paraseptal emphysema. There small bilateral pleural effusions with adjacent airspace and consolidative opacities. This may relate to compressive atelectasis versus developing airspace disease. Mild interlobular septal thickening and ground-glass opacities suggestive of developing interstitial edema. No pneumothorax.   UPPER ABDOMEN: There is reflux of contrast into the IVC and intrahepatic veins.   CHEST WALL AND OSSEOUS STRUCTURES: Mild bilateral shoulder osteoarthrosis. Multilevel degenerative changes are present.       1.  No acute pulmonary embolism to the proximal segmental arterial level. Symmetric  hypoenhancement of bilateral lower lobe distal segmental and subsegmental branches is favored to be artifactual due to bolus timing and poor opacification. If there is high clinical concern for PE repeat CT can be considered. 2. Main pulmonary artery is enlarged and measures 4.0 cm which can be seen with pulmonary arterial hypertension 3. Aneurysmal dilatation of the ascending thoracic aorta measuring up to 4.2 cm. Diffuse calcific atherosclerosis of the aorta and arch vessels. 4. Cardiomegaly. Mild interlobular septal thickening ground-glass opacities suggestive of developing interstitial edema/CHF. 5. Advanced centrilobular and paraseptal emphysema. 6. There small bilateral pleural effusions with adjacent airspace and consolidative opacities. This may relate to compressive atelectasis versus developing airspace disease. Clinical correlation and continued short-term follow-up to resolution is advised. 7. There is reflux of contrast into the IVC and intrahepatic veins suggestive of right heart dysfunction. 8. Additional findings as described above.     MACRO: None   Signed by: Homero Grey 6/7/2025 7:47 PM Dictation workstation:   [id number]    CT abdomen pelvis w IV contrast  Result Date: 6/7/2025  Interpreted By:  Ministerio Dailey, STUDY: CT ABDOMEN PELVIS W IV CONTRAST; ;  6/7/2025 4:49 pm   INDICATION: Signs/Symptoms:lower abdominal pain, nausea, UTI, leukocytosis.   COMPARISON: None.   ACCESSION NUMBER(S): [id number]   ORDERING CLINICIAN: SARAY GONGORA   TECHNIQUE: Contiguous axial CT sections are performed from the lung bases to the lesser trochanters following the uneventful administration of 75 cc of intravenous Omnipaque 350. Study is supplemented with coronal sagittal reformatted images.   FINDINGS: There are small bilateral pleural effusions with bibasilar infiltrates greater on the left. These findings are incompletely visualized.   The heart is mildly enlarged. There are atherosclerotic arterial  calcifications of the coronary arteries.   There is mild diffuse heterogenous enhancement of the liver with hypoattenuation which could reflect fatty metamorphosis and hepatocellular disease. There is no space-occupying mass or surrounding fluid. The liver has a slightly lobular contour inferiorly at the right hepatic lobe.   The spleen is prominent. The spleen uniformly enhances. There is no space-occupying mass or surrounding fluid.   The gallbladder, pancreas, and adrenal glands are of normal CT appearance.   There is bilateral renal cortical thinning. There is a too small to characterize hypodensity in the mid left kidney laterally. There is a cyst in the medial mid left kidney measuring 5 cm in diameter. There is a 2 mm calculus in the lower pole of the left kidney anteriorly. There is no hydronephrosis bilaterally. There is no hydroureter or obstructing ureteral calculus. The urinary bladder is well distended though not opacified. There is some irregular bladder wall thickening at the dome of the urinary bladder and along the right lateral wall. There is wall prominence at the base of the urinary bladder likely related to encroachment of the prostate.   There are atherosclerotic arterial calcifications throughout the abdominal aorta. The aorta is of normal caliber. There is no periaortic mass or fluid collection. There is no bowel distension. There scattered diverticula in the colon. There are no findings of diverticulitis. There may be mild wall thickening of the distal sigmoid colon and rectum.   There severe multilevel discogenic degenerative changes of the thoracolumbar spine with lumbar dextro convexity in lower thoracic levo convexity. There are severe osteoarthritic changes of both hips. The osseous structures are intact.       Small bilateral pleural effusions. There are bibasilar infiltrates, greater on the left.   Cardiomegaly with scattered atherosclerotic arterial calcifications.   Fatty  metamorphosis of the liver and hepatocellular disease. Correlate with clinical findings of cirrhosis.   Bilateral renal cortical thinning. There is a 5 cm cyst in the mid left kidney medially. A   2 mm nonobstructing calculus in the left kidney.   Mild bladder wall thickening and hypertrophy. Further workup with direct visualization is recommended. There is some wall irregularity at the base of the urinary bladder with some encroachment of the prostate. Correlate with PSA levels.   Sigmoid diverticulosis. There is mild wall prominence of the distal sigmoid colon and rectum.   Severe discogenic degenerative changes of the lumbar spine and osteoarthritic changes of both hips.       MACRO: None   Signed by: Ministerio Dailey 6/7/2025 5:59 PM Dictation workstation:   [id number]    XR chest 1 view  Result Date: 6/7/2025  Interpreted By:  Ministerio Dailey, STUDY: XR CHEST 1 VIEW; ;  6/7/2025 5:30 pm   INDICATION: Signs/Symptoms:weakness, low grade fever, UTI, possible hypoxia.   COMPARISON: 04/15/2024   ACCESSION NUMBER(S): [id number]   ORDERING CLINICIAN: SARAY GONGORA   TECHNIQUE: A portable radiograph of the chest is performed.   FINDINGS: Postoperative changes are identified with midline sternotomy wires and vascular clips.   The heart is mildly enlarged. There is mild vascular and interstitial congestion. There is faint ground-glass density in both lungs more pronounced in the lung bases. There is a small right pleural effusion. There is a small component of atelectasis in the right lower lobe. Small infiltrate can not be excluded. There is no airspace consolidation. There is no pneumothorax. The osseous structures are grossly intact.       Mild cardiomegaly and central vascular congestion.   Small right pleural effusion and atelectasis. There is no airspace consolidation.   Faint ground-glass density can not be excluded in the lung bases. Continued surveillance is recommended as clinically warranted.    MACRO: None   Signed by: Ministerio Dailey 6/7/2025 5:51 PM Dictation workstation:   [id number]         RADIOLOGY     Transthoracic Echo Complete   Final Result      CT angio chest for pulmonary embolism   Final Result   1.  No acute pulmonary embolism to the proximal segmental arterial   level. Symmetric hypoenhancement of bilateral lower lobe distal   segmental and subsegmental branches is favored to be artifactual due   to bolus timing and poor opacification. If there is high clinical   concern for PE repeat CT can be considered.   2. Main pulmonary artery is enlarged and measures 4.0 cm which can be   seen with pulmonary arterial hypertension   3. Aneurysmal dilatation of the ascending thoracic aorta measuring up   to 4.2 cm. Diffuse calcific atherosclerosis of the aorta and arch   vessels.   4. Cardiomegaly. Mild interlobular septal thickening ground-glass   opacities suggestive of developing interstitial edema/CHF.   5. Advanced centrilobular and paraseptal emphysema.   6. There small bilateral pleural effusions with adjacent airspace and   consolidative opacities. This may relate to compressive atelectasis   versus developing airspace disease. Clinical correlation and   continued short-term follow-up to resolution is advised.   7. There is reflux of contrast into the IVC and intrahepatic veins   suggestive of right heart dysfunction.   8. Additional findings as described above.             MACRO:   None        Signed by: Homero Grey 6/7/2025 7:47 PM   Dictation workstation:   [id number]      XR chest 1 view   Final Result   Mild cardiomegaly and central vascular congestion.        Small right pleural effusion and atelectasis. There is no airspace   consolidation.        Faint ground-glass density can not be excluded in the lung bases.   Continued surveillance is recommended as clinically warranted.        MACRO:   None        Signed by: Ministerio Dailey 6/7/2025 5:51 PM   Dictation workstation:    [id number]      CT abdomen pelvis w IV contrast   Final Result   Small bilateral pleural effusions. There are bibasilar infiltrates,   greater on the left.        Cardiomegaly with scattered atherosclerotic arterial calcifications.        Fatty metamorphosis of the liver and hepatocellular disease.   Correlate with clinical findings of cirrhosis.        Bilateral renal cortical thinning. There is a 5 cm cyst in the mid   left kidney medially. A        2 mm nonobstructing calculus in the left kidney.        Mild bladder wall thickening and hypertrophy. Further workup with   direct visualization is recommended. There is some wall irregularity   at the base of the urinary bladder with some encroachment of the   prostate. Correlate with PSA levels.        Sigmoid diverticulosis. There is mild wall prominence of the distal   sigmoid colon and rectum.        Severe discogenic degenerative changes of the lumbar spine and   osteoarthritic changes of both hips.                  MACRO:   None        Signed by: Ministerio Dailey 6/7/2025 5:59 PM   Dictation workstation:   [id number]        Imaging reviewed.     ASSESSMENT     Problem List Items Addressed This Visit       Pulmonary hypertension (Multi)    Relevant Orders    Transthoracic Echo Complete (Completed)    * (Principal) Urinary tract infection without hematuria, site unspecified - Primary     Other Visit Diagnoses         Sepsis without acute organ dysfunction, due to unspecified organism (Multi)          Community acquired pneumonia, unspecified laterality          Acute combined systolic (congestive) and diastolic (congestive) heart failure        Relevant Medications    metoprolol succinate XL (Toprol-XL) 24 hr tablet 25 mg    sacubitriL-valsartan (Entresto) 24-26 mg per tablet 1 tablet    midodrine (Proamatine) tablet 10 mg (Completed)            Problem List[3]    PLAN     Acute on chronic HF - improved and stabilized. With stopping Jardiance due to UTI  will need to put back on a scheduled diuretic regimen. Can re-examine restarting his SGLT2 inhibitor post-DC if urinary retention issues are resolved and will defer that to his outpatient cardiologist. For now will add furosemide every other day.   UTI - Per urology.     Will follow while hospitalized. Call if any questions. Should be ready for DC in next 24-36 hours from our perspective.     Please do not hesitate to call with questions.  Electronically signed by Kala Kelly MD, on 6/9/2025 at 12:33 PM       [1] apixaban, 5 mg, oral, BID  atorvastatin, 20 mg, oral, Nightly  [START ON 6/10/2025] furosemide, 20 mg, oral, Every other day  guaiFENesin, 1,200 mg, oral, BID  ipratropium-albuteroL, 3 mL, nebulization, TID  levETIRAcetam, 250 mg, oral, Daily  metoprolol succinate XL, 25 mg, oral, BID  [Held by provider] mirtazapine, 15 mg, oral, Nightly  pantoprazole, 40 mg, oral, Daily before breakfast  piperacillin-tazobactam, 4.5 g, intravenous, q8h  pneumoc 20-shi conj-dip cr(PF), 0.5 mL, intramuscular, During hospitalization  sacubitriL-valsartan, 1 tablet, oral, BID  [Held by provider] sertraline, 100 mg, oral, Daily  spironolactone, 12.5 mg, oral, q24h BRISSA    [2]    [3]   Patient Active Problem List  Diagnosis    Anxiety    BPH (benign prostatic hyperplasia)    CAD (coronary artery disease)    Carotid artery plaque    Herpes zoster    Post herpetic neuralgia    HLD (hyperlipidemia)    HTN (hypertension)    Insomnia    Male erectile disorder of organic origin    Skin cancer    Thrombocytopenia    Vitamin D deficiency    Moderate episode of recurrent major depressive disorder    Aneurysm of ascending aorta without rupture    V tach (Multi)    Mitral valve insufficiency    Atrial fibrillation with controlled ventricular response (Multi)    History of coronary artery bypass graft    Acute combined systolic and diastolic heart failure    Former smoker    Stage 3a chronic kidney disease (Multi)    Acute on chronic  systolic (congestive) heart failure    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

## 2025-06-09 NOTE — PROGRESS NOTES
06/09/25 1429   Discharge Planning   Assistance Needed has a cane, does not use   Expected Discharge Disposition Home H   Patient Choice   Provider Choice list and CMS website (https://medicare.gov/care-compare#search) for post-acute Quality and Resource Measure Data were provided and reviewed with: Patient;Family   Patient / Family choosing to utilize agency / facility established prior to hospitalization No   Stroke Family Assessment   Stroke Family Assessment Needed No   Intensity of Service   Intensity of Service 0-30 min     Introduced myself and what my role is to patient and wife. Permission to speak withher in the room. Confirmed demographics. PCP is Dr. Ritter. Pharmacy is [facility]. Continues to drive. Manages hs own health care needs. Has a barbosa in place. Has managed a barbosa in the past. O2 on. HHC choice list fro care port given and reviewed. Anticipate HHC at discharge.

## 2025-06-09 NOTE — CARE PLAN
The patient's goals for the shift include:    The clinical goals for the shift include Pt will remain HDS throughout shift. Patient will be able to tolerate Ra weaning off of O2 NC. Patient will remained afebrile with stable Bps throughout the night.       Problem: Pain - Adult  Goal: Verbalizes/displays adequate comfort level or baseline comfort level  Outcome: Progressing     Problem: Safety - Adult  Goal: Free from fall injury  Outcome: Progressing     Problem: Discharge Planning  Goal: Discharge to home or other facility with appropriate resources  Outcome: Progressing     Problem: Chronic Conditions and Co-morbidities  Goal: Patient's chronic conditions and co-morbidity symptoms are monitored and maintained or improved  Outcome: Progressing     Problem: Nutrition  Goal: Nutrient intake appropriate for maintaining nutritional needs  Outcome: Progressing

## 2025-06-09 NOTE — CONSULTS
Reason For Consult  Urinary    History Of Present Illness  Minh Harrington Jr. is a 85 y.o. male w/ a hx of remote TURP 2008, recent diagnosis of non-muscle invasive bladder cancer scheduled to start intravesical BCG who is currently admitted with concern for UTI, urinary retention, fatigue and AMS. Reports difficulty voiding, double voiding, dysuria, and concern for altered behavior on behalf of patient's wife. He initially presented to urgent care and was treated w/ abx for UTI. Per pt, since admission he had immense difficulty voiding. -900cc. Since had a barbosa catheter placed. He is feeling much better.     Ucx 6/6/25 - mixed genitourinary jose luis  Ucx 6/7/25 - no growth  Bcx x 2 no growth     Past Medical History  He has a past medical history of Abnormal ECG, Arrhythmia, Atrial fibrillation (Multi), Bladder cancer (Multi), BPH (benign prostatic hyperplasia), CHF (congestive heart failure), CKD (chronic kidney disease), Coronary artery disease, COVID-19 (02/16/2022), Epilepsy, unspecified, not intractable, without status epilepticus (11/18/2020), Hyperlipidemia, Hypertension, Ischemic cardiomyopathy, Myocardial infarction (Multi), and Unspecified convulsions (Multi) (02/24/2021).    Surgical History  He has a past surgical history that includes Other surgical history (02/12/2019); Other surgical history (02/12/2019); Other surgical history (02/12/2019); Cardiac catheterization; Arterial bypass surgry; and Coronary angioplasty.     Social History  He reports that he quit smoking about 28 years ago. His smoking use included cigarettes. He started smoking about 68 years ago. He has a 40 pack-year smoking history. He has never been exposed to tobacco smoke. He has never used smokeless tobacco. He reports current alcohol use of about 12.0 standard drinks of alcohol per week. He reports that he does not use drugs.    Family History  Family History[1]     Allergies  Patient has no known allergies.    Review  "of Systems  A 12 system review was completed and is negative with the exception of those signs and symptoms noted in the history of present illness.     Physical Exam  General: in NAD, appears stated age  Head: normocephalic, atraumatic  Respiratory: normal effort, no use of accessory muscles  Cardiovascular: no edema noted  Skin: normal turgor, no rashes  Neurologic: grossly intact, oriented to person/place/time  Psychiatric: mode and affect appropriate  Barbosa draining clear urine     Last Recorded Vitals  Blood pressure 102/84, pulse 106, temperature 36.2 °C (97.2 °F), temperature source Temporal, resp. rate 20, height 1.778 m (5' 10\"), weight 78 kg (171 lb 15.3 oz), SpO2 92%.    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     Lung disease Father Saroj Sr.     Bipolar disorder Brother       "

## 2025-06-09 NOTE — NURSING NOTE
Visited with pt. He tells me he is not sleeping well  and overall feels no better.  Sepsis homegoing reviewed. He has asked that I return and review with his wife. Staff requested to contact me on her arrival.

## 2025-06-09 NOTE — ASSESSMENT & PLAN NOTE
Scheduled breathing treatment  Mucinex  I-S, flutter valve  Continue with Zosyn  Supplemental O2 and wean as tolerated  Follow-up procalcitonin and sputum culture result  Patient with also acute hypoxemic respiratory failure present on admission

## 2025-06-09 NOTE — PROGRESS NOTES
PHARMACIST CONSULT  RENAL DOSING ADJUSTMENT    Patient Name: Minh Harrington Jr.  MRN: [id number]  Admission Date: 6/7/2025  1:46 PM  Date/Time of Consult: 06/09/25 at 1:46 PM    In accordance with the inpatient pharmacy renal dose adjustment consult agreement the following medication changes have been made:  Antimicrobial: currently ordered Zosyn 4.5 g q8hr, will be adjusted to 3.375 g q8hr    Indication if pertinent for dosing: PNA and complicated UTI    Results from last 72 hours   Lab Units 06/09/25  0456 06/08/25  0529 06/07/25  1500   CREATININE mg/dL 1.13 1.22 1.28   BUN mg/dL 23 25* 25*       Serum creatinine: 1.13 mg/dL 06/09/25 0456  Estimated creatinine clearance: 47.6 mL/min      Per consult agreement, pharmacy will order related labs, evaluate results, and adjust dosing as it pertains to the drug therapy being managed.  Thank you for allowing me to participate in the care for this patient.    Signature: Janette Warren, PharmD  06/09/25 at 1:46 PM

## 2025-06-10 ENCOUNTER — APPOINTMENT (OUTPATIENT)
Dept: CARDIOLOGY | Facility: HOSPITAL | Age: 85
DRG: 871 | End: 2025-06-10
Payer: MEDICARE

## 2025-06-10 LAB
ANION GAP SERPL CALC-SCNC: 11 MMOL/L (ref 10–20)
BACTERIA SPEC RESP CULT: NORMAL
BASOPHILS # BLD AUTO: 0.03 X10*3/UL (ref 0–0.1)
BASOPHILS NFR BLD AUTO: 0.4 %
BUN SERPL-MCNC: 19 MG/DL (ref 6–23)
CALCIUM SERPL-MCNC: 8.4 MG/DL (ref 8.6–10.3)
CHLORIDE SERPL-SCNC: 101 MMOL/L (ref 98–107)
CO2 SERPL-SCNC: 26 MMOL/L (ref 21–32)
CREAT SERPL-MCNC: 1.06 MG/DL (ref 0.5–1.3)
EGFRCR SERPLBLD CKD-EPI 2021: 68 ML/MIN/1.73M*2
EOSINOPHIL # BLD AUTO: 0.17 X10*3/UL (ref 0–0.4)
EOSINOPHIL NFR BLD AUTO: 2.2 %
ERYTHROCYTE [DISTWIDTH] IN BLOOD BY AUTOMATED COUNT: 19.8 % (ref 11.5–14.5)
GLUCOSE SERPL-MCNC: 88 MG/DL (ref 74–99)
GRAM STN SPEC: NORMAL
GRAM STN SPEC: NORMAL
HCT VFR BLD AUTO: 34.5 % (ref 41–52)
HGB BLD-MCNC: 10.1 G/DL (ref 13.5–17.5)
IMM GRANULOCYTES # BLD AUTO: 0.08 X10*3/UL (ref 0–0.5)
IMM GRANULOCYTES NFR BLD AUTO: 1 % (ref 0–0.9)
LYMPHOCYTES # BLD AUTO: 0.64 X10*3/UL (ref 0.8–3)
LYMPHOCYTES NFR BLD AUTO: 8.4 %
MAGNESIUM SERPL-MCNC: 2.26 MG/DL (ref 1.6–2.4)
MCH RBC QN AUTO: 24.9 PG (ref 26–34)
MCHC RBC AUTO-ENTMCNC: 29.3 G/DL (ref 32–36)
MCV RBC AUTO: 85 FL (ref 80–100)
MONOCYTES # BLD AUTO: 0.86 X10*3/UL (ref 0.05–0.8)
MONOCYTES NFR BLD AUTO: 11.2 %
NEUTROPHILS # BLD AUTO: 5.88 X10*3/UL (ref 1.6–5.5)
NEUTROPHILS NFR BLD AUTO: 76.8 %
NRBC BLD-RTO: 0 /100 WBCS (ref 0–0)
PLATELET # BLD AUTO: 102 X10*3/UL (ref 150–450)
POTASSIUM SERPL-SCNC: 4.1 MMOL/L (ref 3.5–5.3)
RBC # BLD AUTO: 4.06 X10*6/UL (ref 4.5–5.9)
SODIUM SERPL-SCNC: 134 MMOL/L (ref 136–145)
WBC # BLD AUTO: 7.7 X10*3/UL (ref 4.4–11.3)

## 2025-06-10 PROCEDURE — 2500000002 HC RX 250 W HCPCS SELF ADMINISTERED DRUGS (ALT 637 FOR MEDICARE OP, ALT 636 FOR OP/ED): Performed by: INTERNAL MEDICINE

## 2025-06-10 PROCEDURE — 36415 COLL VENOUS BLD VENIPUNCTURE: CPT | Performed by: INTERNAL MEDICINE

## 2025-06-10 PROCEDURE — 97110 THERAPEUTIC EXERCISES: CPT | Mod: GP,CQ

## 2025-06-10 PROCEDURE — 99231 SBSQ HOSP IP/OBS SF/LOW 25: CPT | Performed by: INTERNAL MEDICINE

## 2025-06-10 PROCEDURE — 83735 ASSAY OF MAGNESIUM: CPT | Performed by: INTERNAL MEDICINE

## 2025-06-10 PROCEDURE — 2500000001 HC RX 250 WO HCPCS SELF ADMINISTERED DRUGS (ALT 637 FOR MEDICARE OP): Performed by: INTERNAL MEDICINE

## 2025-06-10 PROCEDURE — 2500000005 HC RX 250 GENERAL PHARMACY W/O HCPCS: Performed by: INTERNAL MEDICINE

## 2025-06-10 PROCEDURE — 85025 COMPLETE CBC W/AUTO DIFF WBC: CPT | Performed by: INTERNAL MEDICINE

## 2025-06-10 PROCEDURE — 99233 SBSQ HOSP IP/OBS HIGH 50: CPT | Performed by: INTERNAL MEDICINE

## 2025-06-10 PROCEDURE — 51702 INSERT TEMP BLADDER CATH: CPT

## 2025-06-10 PROCEDURE — 94640 AIRWAY INHALATION TREATMENT: CPT

## 2025-06-10 PROCEDURE — 2500000004 HC RX 250 GENERAL PHARMACY W/ HCPCS (ALT 636 FOR OP/ED): Performed by: INTERNAL MEDICINE

## 2025-06-10 PROCEDURE — 97116 GAIT TRAINING THERAPY: CPT | Mod: GP,CQ

## 2025-06-10 PROCEDURE — 80048 BASIC METABOLIC PNL TOTAL CA: CPT | Performed by: INTERNAL MEDICINE

## 2025-06-10 PROCEDURE — 1100000001 HC PRIVATE ROOM DAILY

## 2025-06-10 RX ORDER — LACTULOSE 10 G/15ML
20 SOLUTION ORAL ONCE
Status: COMPLETED | OUTPATIENT
Start: 2025-06-10 | End: 2025-06-10

## 2025-06-10 RX ORDER — MAGNESIUM SULFATE 1 G/100ML
1 INJECTION INTRAVENOUS ONCE
Status: COMPLETED | OUTPATIENT
Start: 2025-06-10 | End: 2025-06-10

## 2025-06-10 RX ORDER — LANOLIN ALCOHOL/MO/W.PET/CERES
800 CREAM (GRAM) TOPICAL ONCE
Status: COMPLETED | OUTPATIENT
Start: 2025-06-10 | End: 2025-06-10

## 2025-06-10 RX ORDER — IPRATROPIUM BROMIDE AND ALBUTEROL SULFATE 2.5; .5 MG/3ML; MG/3ML
3 SOLUTION RESPIRATORY (INHALATION) EVERY 2 HOUR PRN
Status: DISCONTINUED | OUTPATIENT
Start: 2025-06-10 | End: 2025-06-11 | Stop reason: HOSPADM

## 2025-06-10 RX ADMIN — SERTRALINE HYDROCHLORIDE 100 MG: 100 TABLET ORAL at 08:57

## 2025-06-10 RX ADMIN — APIXABAN 5 MG: 5 TABLET, FILM COATED ORAL at 21:40

## 2025-06-10 RX ADMIN — METOPROLOL SUCCINATE 25 MG: 25 TABLET, EXTENDED RELEASE ORAL at 08:57

## 2025-06-10 RX ADMIN — GUAIFENESIN 1200 MG: 600 TABLET, EXTENDED RELEASE ORAL at 08:57

## 2025-06-10 RX ADMIN — MIRTAZAPINE 15 MG: 15 TABLET, FILM COATED ORAL at 21:40

## 2025-06-10 RX ADMIN — METOPROLOL SUCCINATE 25 MG: 25 TABLET, EXTENDED RELEASE ORAL at 21:40

## 2025-06-10 RX ADMIN — SACUBITRIL AND VALSARTAN 1 TABLET: 24; 26 TABLET, FILM COATED ORAL at 21:40

## 2025-06-10 RX ADMIN — CEFTRIAXONE 2 G: 2 INJECTION, SOLUTION INTRAVENOUS at 18:02

## 2025-06-10 RX ADMIN — FUROSEMIDE 20 MG: 20 TABLET ORAL at 09:18

## 2025-06-10 RX ADMIN — APIXABAN 5 MG: 5 TABLET, FILM COATED ORAL at 08:57

## 2025-06-10 RX ADMIN — IPRATROPIUM BROMIDE AND ALBUTEROL SULFATE 3 ML: 2.5; .5 SOLUTION RESPIRATORY (INHALATION) at 08:06

## 2025-06-10 RX ADMIN — MAGNESIUM SULFATE HEPTAHYDRATE 1 G: 1 INJECTION, SOLUTION INTRAVENOUS at 01:46

## 2025-06-10 RX ADMIN — IPRATROPIUM BROMIDE AND ALBUTEROL SULFATE 3 ML: 2.5; .5 SOLUTION RESPIRATORY (INHALATION) at 14:34

## 2025-06-10 RX ADMIN — ALPRAZOLAM 0.25 MG: 0.25 TABLET ORAL at 21:40

## 2025-06-10 RX ADMIN — SPIRONOLACTONE 12.5 MG: 25 TABLET ORAL at 10:18

## 2025-06-10 RX ADMIN — Medication 2 TABLET: at 10:18

## 2025-06-10 RX ADMIN — IPRATROPIUM BROMIDE AND ALBUTEROL SULFATE 3 ML: 2.5; .5 SOLUTION RESPIRATORY (INHALATION) at 20:48

## 2025-06-10 RX ADMIN — LACTULOSE 20 G: 20 SOLUTION ORAL at 08:58

## 2025-06-10 RX ADMIN — PANTOPRAZOLE SODIUM 40 MG: 40 TABLET, DELAYED RELEASE ORAL at 06:23

## 2025-06-10 RX ADMIN — GUAIFENESIN 1200 MG: 600 TABLET, EXTENDED RELEASE ORAL at 21:40

## 2025-06-10 RX ADMIN — Medication 2 L/MIN: at 08:09

## 2025-06-10 RX ADMIN — ATORVASTATIN CALCIUM 20 MG: 20 TABLET, FILM COATED ORAL at 21:40

## 2025-06-10 RX ADMIN — LEVETIRACETAM 250 MG: 500 TABLET, FILM COATED ORAL at 08:57

## 2025-06-10 RX ADMIN — Medication 1 L/MIN: at 13:36

## 2025-06-10 RX ADMIN — SACUBITRIL AND VALSARTAN 1 TABLET: 24; 26 TABLET, FILM COATED ORAL at 08:57

## 2025-06-10 ASSESSMENT — COGNITIVE AND FUNCTIONAL STATUS - GENERAL
WALKING IN HOSPITAL ROOM: A LITTLE
WALKING IN HOSPITAL ROOM: A LITTLE
MOVING TO AND FROM BED TO CHAIR: A LITTLE
TURNING FROM BACK TO SIDE WHILE IN FLAT BAD: A LITTLE
MOBILITY SCORE: 22
CLIMB 3 TO 5 STEPS WITH RAILING: A LITTLE
WALKING IN HOSPITAL ROOM: A LITTLE
MOBILITY SCORE: 17
DAILY ACTIVITIY SCORE: 24
STANDING UP FROM CHAIR USING ARMS: A LITTLE
DAILY ACTIVITIY SCORE: 24
CLIMB 3 TO 5 STEPS WITH RAILING: A LOT
CLIMB 3 TO 5 STEPS WITH RAILING: A LITTLE
MOBILITY SCORE: 22
MOVING FROM LYING ON BACK TO SITTING ON SIDE OF FLAT BED WITH BEDRAILS: A LITTLE

## 2025-06-10 ASSESSMENT — PAIN SCALES - GENERAL
PAINLEVEL_OUTOF10: 0 - NO PAIN

## 2025-06-10 ASSESSMENT — PAIN - FUNCTIONAL ASSESSMENT
PAIN_FUNCTIONAL_ASSESSMENT: 0-10

## 2025-06-10 NOTE — PROGRESS NOTES
Cardiology Progress Note           Rounding Cardiologist:  Dr. Kala Kelly  Primary Cardiologist: Dr. Heriberto Valle    Date:  6/10/2025  Patient:  Minh Harrington Jr.  YOB: 1940  MRN:  [id number]   Admit Date:  6/7/2025      SUBJECTIVE    Minh Harrington Jr. was seen and examined today at bedside.  Patient is doing well today without shortness of breath.  He feels better and is anticipating discharge home in regards to his urinary tract infection.  He has no dizziness or lightheadedness no chest pain or discomfort.      Review of Systems no new complaints    VITALS     Vitals:    06/10/25 0717 06/10/25 0800 06/10/25 0806 06/10/25 0809   BP: 119/53 143/82     BP Location: Right arm Left arm     Patient Position: Lying Lying     Pulse: 82 66     Resp: 20 18     Temp: 36 °C (96.8 °F) 36.1 °C (97 °F)     TempSrc: Temporal Temporal     SpO2: 95% 96% 98% 98%   Weight:       Height:           Intake/Output Summary (Last 24 hours) at 6/10/2025 1046  Last data filed at 6/10/2025 0600  Gross per 24 hour   Intake 350 ml   Output 3125 ml   Net -2775 ml       Vitals:    06/10/25 0600   Weight: 78.4 kg (172 lb 13.5 oz)       CURRENT MEDICATIONS    Scheduled Medications[1]  Continuous Medications[2]  Current Outpatient Medications   Medication Instructions    ALPRAZolam (XANAX) 0.5 mg, oral, 3 times daily PRN    amoxicillin-clavulanate (Augmentin) 875-125 mg tablet 875 mg of amoxicillin, oral, 2 times daily    apixaban (Eliquis) 5 mg tablet ON HOLD PER CDO 3/20/25    apixaban (ELIQUIS) 5 mg, 2 times daily    atorvastatin (LIPITOR) 20 mg, oral, Daily, as directed    dutasteride (AVODART) 0.5 mg, oral, Daily    empagliflozin (JARDIANCE) 10 mg, oral, Daily    furosemide (Lasix) 20 mg tablet Take one daily ,if weight gain of 3 lbs in one day or 5 lbs in one week take another tablet that day until weight is back to normal    levETIRAcetam (Keppra) 500 mg tablet TAKE 1/2 (ONE-HALF) OF A TABLET BY  "MOUTH DAILY    metoprolol succinate XL (TOPROL-XL) 25 mg, oral, 2 times daily    mirtazapine (REMERON) 15 mg, oral, Nightly    sacubitriL-valsartan (Entresto) 24-26 mg tablet 1 tablet, oral, 2 times daily    sertraline (ZOLOFT) 100 mg, oral, Daily    tamsulosin (FLOMAX) 0.4 mg, oral, Nightly        PHYSICAL EXAMINATION   GENERAL:  Well developed, well nourished, in no acute distress.  CHEST:  Symmetric and nontender.  NECK:  no JVD, no bruit.  LUNGS:  Clear to auscultation bilaterally, normal respiratory effort.  Mildly diminished at the bases  HEART: PMI is nondisplaced.  There is an irregularly irregular rhythm with a normal S1 and S2 no S3.  There is no problems with elevated rate and no peripheral edema  ABDOMEN: Soft, NT, ND without palpable organomegaly, normoactive bowel sounds.   EXTREMITIES:  Warm with good color, no clubbing or cyanosis.  There is no edema noted.  PERIPHERAL VASCULAR:  Pulses present and equally palpable.  NEURO/PSYCH:  Alert and oriented times three with approppriate behavior and responses.  INTEGUMENT: No rashes    LAB DATA     CBC:   Results from last 7 days   Lab Units 06/10/25  0639   WBC AUTO x10*3/uL 7.7   RBC AUTO x10*6/uL 4.06*   HEMOGLOBIN g/dL 10.1*   HEMATOCRIT % 34.5*   PLATELETS AUTO x10*3/uL 102*        CMP:    Results from last 7 days   Lab Units 06/10/25  0639   SODIUM mmol/L 134*   POTASSIUM mmol/L 4.1   CHLORIDE mmol/L 101   CO2 mmol/L 26   BUN mg/dL 19   CREATININE mg/dL 1.06   GLUCOSE mg/dL 88   CALCIUM mg/dL 8.4*       Cardiac Enzymes:    Lab Results   Component Value Date    TROPHS 32 (H) 04/15/2024    TROPHS 32 (H) 04/15/2024       Magnesium:    Lab Results   Component Value Date    MG 2.26 06/10/2025       Lipid Profile:  No results found for: \"CHLPL\", \"TRIG\", \"HDL\", \"LDLCALC\", \"LDLDIRECT\"    TSH:  No results found for: \"TSH\"    BNP:   Lab Results   Component Value Date    BNP 1,930 (H) 06/07/2025        PT/INR:  No results found for: \"PROTIME\", \"INR\"    HgBA1c:  "   Lab Results   Component Value Date    HGBA1C 5.2 10/11/2023       CBC:  Lab Results   Component Value Date    WBC 7.7 06/10/2025    WBC 10.0 06/09/2025    WBC 10.9 06/08/2025    RBC 4.06 (L) 06/10/2025    RBC 3.78 (L) 06/09/2025    RBC 3.71 (L) 06/08/2025    HGB 10.1 (L) 06/10/2025    HGB 9.3 (L) 06/09/2025    HGB 9.1 (L) 06/08/2025    HCT 34.5 (L) 06/10/2025    HCT 30.8 (L) 06/09/2025    HCT 31.0 (L) 06/08/2025    MCV 85 06/10/2025    MCV 82 06/09/2025    MCV 84 06/08/2025    MCH 24.9 (L) 06/10/2025    MCH 24.6 (L) 06/09/2025    MCH 24.5 (L) 06/08/2025    MCHC 29.3 (L) 06/10/2025    MCHC 30.2 (L) 06/09/2025    MCHC 29.4 (L) 06/08/2025    RDW 19.8 (H) 06/10/2025    RDW 19.7 (H) 06/09/2025    RDW 19.7 (H) 06/08/2025     (L) 06/10/2025    PLT 85 (L) 06/09/2025    PLT 78 (L) 06/08/2025       BMP:  Lab Results   Component Value Date     (L) 06/10/2025     (L) 06/09/2025     (L) 06/08/2025    K 4.1 06/10/2025    K 4.3 06/09/2025    K 4.5 06/08/2025     06/10/2025     06/09/2025     06/08/2025    CO2 26 06/10/2025    CO2 22 06/09/2025    CO2 24 06/08/2025    BUN 19 06/10/2025    BUN 23 06/09/2025    BUN 25 (H) 06/08/2025    CREATININE 1.06 06/10/2025    CREATININE 1.13 06/09/2025    CREATININE 1.22 06/08/2025       Hepatic Function Panel:    Lab Results   Component Value Date    ALKPHOS 92 06/08/2025    ALT 20 06/08/2025    AST 34 06/08/2025    PROT 5.9 (L) 06/08/2025    BILITOT 0.8 06/08/2025     Labs reviewed    DIAGNOSTIC TESTING RESULTS     ECG 12 lead  Result Date: 6/9/2025  Atrial fibrillation with rapid ventricular response Nonspecific ST and T wave abnormality Prolonged QT When compared with ECG of 01-MAR-2025 15:13, Borderline criteria for Inferior infarct are no longer Present QT has lengthened Reconfirmed by Claudio Perales ([id number]) on 6/9/2025 7:36:40 PM    Transthoracic Echo Complete  Result Date: 6/8/2025            [facility] [address],  [facility] [address]    Tel [phone] Fax [phone] TRANSTHORACIC ECHOCARDIOGRAM REPORT Patient Name:       EAMON ORR LULÚ  Reading Physician:    [id number] Kala Kelly MD Study Date:         6/8/2025             Ordering Provider:    [id number] ISRAEL RADHA                                                                LOIDA MRN/PID:            [id number]             Fellow: Accession#:         [id number]         Nurse: Date of Birth/Age:  1940 / 85 years Sonographer:          Tanya Nguyen [facility] Gender Assigned at                      Additional Staff: Birth: Height:             177.80 cm            Admit Date:           6/7/2025 Weight:             80.29 kg             Admission Status:     Inpatient -                                                                Priority                                                                discharge BSA / BMI:          1.98 m2 / 25.40      Department Location:  [facility] CCU SD                     kg/m2 Blood Pressure: 105 /53 mmHg Study Type:    TRANSTHORACIC ECHO (TTE) LIMITED Diagnosis/ICD: Acute combined systolic (congestive) and diastolic (congestive)                heart failure (CHF)-I50.41 Indication:    Congestive Heart Failure CPT Codes:     Echo Limited-46813; Doppler Limited-80507; Color Doppler-89124 Patient History: Smoker:            Former. Pertinent History: CAD, Hyperlipidemia, HTN and CHF. h/o bladder ca/ CKD 3a;                    v-tach; WINNIE; previous echocardiogram 1-. Study Detail: The following Echo studies were performed: 2D, Doppler and color               flow. Technically challenging study due to patient lying in supine               position. Unable to obtain suprasternal notch and subcostal view.  PHYSICIAN INTERPRETATION: Left Ventricle: Left ventricular ejection fraction is moderately decreased calculated by Jo's biplane at 33%. There is mild eccentric left  ventricular hypertrophy. Multiple wall motion abnormalties exist, see findings. The left ventricular cavity size is upper limits of normal. There is mild increased septal and normal posterior left ventricular wall thickness. The interventricular septum is flattened in systole, consistent with right ventricular pressure overload. Left ventricular diastolic filling was indeterminate due to atrial fibrillation/flutter. Left Atrium: The left atrial size is mild to moderately dilated. Right Ventricle: The right ventricle is mildly enlarged. There is mildly reduced right ventricular systolic function. Right Atrium: The right atrial size is mildly dilated. Aortic Valve: The aortic valve is trileaflet. There is mild aortic valve cusp calcification. There is moderate aortic valve thickening. There is anterior aortic valve annular calcification. There is trace to mild aortic valve regurgitation. Mitral Valve: The mitral valve is mild to moderately thickened. There is no evidence of mitral valve prolapse. There is no evidence of mitral valve stenosis. There is mild mitral annular calcification. There is mild to moderate mitral valve regurgitation. The E Vmax is 0.83 m/s. Tricuspid Valve: The tricuspid valve is structurally normal. There is no evidence of tricuspid valve stenosis. There is mild to moderate tricuspid regurgitation. The Doppler estimated right ventricular systolic pressure (RVSP) is mildly elevated. Pulmonic Valve: The pulmonic valve is not well visualized. There is trace pulmonic valve regurgitation. Pericardium: Trivial pericardial effusion. There is no evidence of cardiac tamponade. Aorta: The aortic root was not assessed. Systemic Veins: The inferior vena cava was not assessed, IVC inspiratory collapse was not assessed. In comparison to the previous echocardiogram(s): Compared with study dated 1/22/2025,. No change in LVEF. Mitral regurgitation less.  CONCLUSIONS:  1. Left ventricular ejection fraction is  moderately decreased calculated by Jo's biplane at 33%.  2. Multiple wall motion abnormalties exist, see findings.  3. Left ventricular diastolic filling was indeterminate due to atrial fibrillation/flutter.  4. Right ventricular pressure overload.  5. There is mildly reduced right ventricular systolic function.  6. Mildly enlarged right ventricle.  7. The left atrial size is mild to moderately dilated.  8. There is no evidence of cardiac tamponade.  9. Mild to moderate mitral valve regurgitation. 10. No evidence of mitral valve prolapse. 11. There is No tricuspid stenosis. 12. Mild to moderate tricuspid regurgitation. 13. The Doppler estimated RVSP is mildly elevated. 14. No change in LVEF. Mitral regurgitation less. QUANTITATIVE DATA SUMMARY:  2D MEASUREMENTS:             Normal Ranges: LAs:             4.79 cm     (2.7-4.0cm) IVSd:            1.22 cm     (0.6-1.1cm) LVPWd:           1.00 cm     (0.6-1.1cm) LVIDd:           5.70 cm     (3.9-5.9cm) LVIDs:           4.75 cm LV Mass Index:   130.9 g/m2 LVEDV Index:     52.16 ml/m2 LV % FS          16.6 %  LV SYSTOLIC FUNCTION:                      Normal Ranges: EF-A4C View:    32 % (>=55%) EF-A2C View:    27 % EF-Biplane:     33 % LV EF Reported: 33 %  LV DIASTOLIC FUNCTION:           Normal Ranges: MV Peak E:             0.83 m/s  (0.7-1.2 m/s) MV e'                  0.114 m/s (>8.0) MV lateral e'          0.14 m/s MV medial e'           0.09 m/s E/e' Ratio:            7.33      (<8.0)  TRICUSPID VALVE/RVSP:          Normal Ranges: Peak TR Velocity:     3.20 m/s  AORTA: Asc Ao Diam 4.24 cm  [id number] Kala Kelly MD Electronically signed on 6/8/2025 at 1:50:43 PM  ** Final **     CT angio chest for pulmonary embolism  Result Date: 6/7/2025  Interpreted By:  Homero Grey, STUDY: CT ANGIO CHEST FOR PULMONARY EMBOLISM;  6/7/2025 6:55 pm   INDICATION: Signs/Symptoms:tachycardia, hypoxia, hypotension.   COMPARISON: CT scan of the chest 06/04/2025.   ACCESSION  NUMBER(S): [id number]   ORDERING CLINICIAN: SARAY GONGORA   TECHNIQUE: Helical data acquisition of the chest was obtained after intravenous administration of  60 mL of Omnipaque 350. Images were reformatted in axial, coronal, and sagittal planes. Axial and coronal MIPS were reconstructed and reviewed.   FINDINGS: HEART AND VESSELS: No acute pulmonary embolism to the  proximal segmental arterial level. Symmetric hypoenhancement of the lower lobe distal segmental and subsegmental branches is favored to be artifactual due to bolus timing and poor opacification.   Main pulmonary artery is enlarged and measures 4.0 cm which can be seen with pulmonary arterial hypertension   Aneurysmal dilatation of the ascending thoracic aorta measuring up to 4.2 cm. Diffuse calcific atherosclerosis of the aorta and arch vessels.   Severe coronary artery calcifications are seen. Postsurgical changes of prior CABG with midline sternotomy wires noted.The study is not optimized for evaluation of coronary arteries.   The cardiac chambers are enlarged. Aortic root and mitral annular calcifications noted.   No evidence of pericardial effusion.   MEDIASTINUM AND JACOB, LOWER NECK AND AXILLA: The visualized thyroid gland is within normal limits.   Nonenlarged mediastinal lymph nodes noted. No thoracic adenopathy.   Esophagus appears within normal limits as seen.   LUNGS AND AIRWAYS: The trachea and central airways are patent. No endobronchial lesion.   Advanced centrilobular and paraseptal emphysema. There small bilateral pleural effusions with adjacent airspace and consolidative opacities. This may relate to compressive atelectasis versus developing airspace disease. Mild interlobular septal thickening and ground-glass opacities suggestive of developing interstitial edema. No pneumothorax.   UPPER ABDOMEN: There is reflux of contrast into the IVC and intrahepatic veins.   CHEST WALL AND OSSEOUS STRUCTURES: Mild bilateral shoulder  osteoarthrosis. Multilevel degenerative changes are present.       1.  No acute pulmonary embolism to the proximal segmental arterial level. Symmetric hypoenhancement of bilateral lower lobe distal segmental and subsegmental branches is favored to be artifactual due to bolus timing and poor opacification. If there is high clinical concern for PE repeat CT can be considered. 2. Main pulmonary artery is enlarged and measures 4.0 cm which can be seen with pulmonary arterial hypertension 3. Aneurysmal dilatation of the ascending thoracic aorta measuring up to 4.2 cm. Diffuse calcific atherosclerosis of the aorta and arch vessels. 4. Cardiomegaly. Mild interlobular septal thickening ground-glass opacities suggestive of developing interstitial edema/CHF. 5. Advanced centrilobular and paraseptal emphysema. 6. There small bilateral pleural effusions with adjacent airspace and consolidative opacities. This may relate to compressive atelectasis versus developing airspace disease. Clinical correlation and continued short-term follow-up to resolution is advised. 7. There is reflux of contrast into the IVC and intrahepatic veins suggestive of right heart dysfunction. 8. Additional findings as described above.     MACRO: None   Signed by: Homero Grey 6/7/2025 7:47 PM Dictation workstation:   [id number]    CT abdomen pelvis w IV contrast  Result Date: 6/7/2025  Interpreted By:  Ministerio Dailey, STUDY: CT ABDOMEN PELVIS W IV CONTRAST; ;  6/7/2025 4:49 pm   INDICATION: Signs/Symptoms:lower abdominal pain, nausea, UTI, leukocytosis.   COMPARISON: None.   ACCESSION NUMBER(S): [id number]   ORDERING CLINICIAN: SARAY GONGORA   TECHNIQUE: Contiguous axial CT sections are performed from the lung bases to the lesser trochanters following the uneventful administration of 75 cc of intravenous Omnipaque 350. Study is supplemented with coronal sagittal reformatted images.   FINDINGS: There are small bilateral pleural effusions with  bibasilar infiltrates greater on the left. These findings are incompletely visualized.   The heart is mildly enlarged. There are atherosclerotic arterial calcifications of the coronary arteries.   There is mild diffuse heterogenous enhancement of the liver with hypoattenuation which could reflect fatty metamorphosis and hepatocellular disease. There is no space-occupying mass or surrounding fluid. The liver has a slightly lobular contour inferiorly at the right hepatic lobe.   The spleen is prominent. The spleen uniformly enhances. There is no space-occupying mass or surrounding fluid.   The gallbladder, pancreas, and adrenal glands are of normal CT appearance.   There is bilateral renal cortical thinning. There is a too small to characterize hypodensity in the mid left kidney laterally. There is a cyst in the medial mid left kidney measuring 5 cm in diameter. There is a 2 mm calculus in the lower pole of the left kidney anteriorly. There is no hydronephrosis bilaterally. There is no hydroureter or obstructing ureteral calculus. The urinary bladder is well distended though not opacified. There is some irregular bladder wall thickening at the dome of the urinary bladder and along the right lateral wall. There is wall prominence at the base of the urinary bladder likely related to encroachment of the prostate.   There are atherosclerotic arterial calcifications throughout the abdominal aorta. The aorta is of normal caliber. There is no periaortic mass or fluid collection. There is no bowel distension. There scattered diverticula in the colon. There are no findings of diverticulitis. There may be mild wall thickening of the distal sigmoid colon and rectum.   There severe multilevel discogenic degenerative changes of the thoracolumbar spine with lumbar dextro convexity in lower thoracic levo convexity. There are severe osteoarthritic changes of both hips. The osseous structures are intact.       Small bilateral pleural  effusions. There are bibasilar infiltrates, greater on the left.   Cardiomegaly with scattered atherosclerotic arterial calcifications.   Fatty metamorphosis of the liver and hepatocellular disease. Correlate with clinical findings of cirrhosis.   Bilateral renal cortical thinning. There is a 5 cm cyst in the mid left kidney medially. A   2 mm nonobstructing calculus in the left kidney.   Mild bladder wall thickening and hypertrophy. Further workup with direct visualization is recommended. There is some wall irregularity at the base of the urinary bladder with some encroachment of the prostate. Correlate with PSA levels.   Sigmoid diverticulosis. There is mild wall prominence of the distal sigmoid colon and rectum.   Severe discogenic degenerative changes of the lumbar spine and osteoarthritic changes of both hips.       MACRO: None   Signed by: Ministerio Dailey 6/7/2025 5:59 PM Dictation workstation:   [id number]    XR chest 1 view  Result Date: 6/7/2025  Interpreted By:  Ministerio Dailey, STUDY: XR CHEST 1 VIEW; ;  6/7/2025 5:30 pm   INDICATION: Signs/Symptoms:weakness, low grade fever, UTI, possible hypoxia.   COMPARISON: 04/15/2024   ACCESSION NUMBER(S): [id number]   ORDERING CLINICIAN: SARAY GONGORA   TECHNIQUE: A portable radiograph of the chest is performed.   FINDINGS: Postoperative changes are identified with midline sternotomy wires and vascular clips.   The heart is mildly enlarged. There is mild vascular and interstitial congestion. There is faint ground-glass density in both lungs more pronounced in the lung bases. There is a small right pleural effusion. There is a small component of atelectasis in the right lower lobe. Small infiltrate can not be excluded. There is no airspace consolidation. There is no pneumothorax. The osseous structures are grossly intact.       Mild cardiomegaly and central vascular congestion.   Small right pleural effusion and atelectasis. There is no airspace  consolidation.   Faint ground-glass density can not be excluded in the lung bases. Continued surveillance is recommended as clinically warranted.   MACRO: None   Signed by: Ministerio Dailey 6/7/2025 5:51 PM Dictation workstation:   [id number]     Reviewed, telemetry shows A-fib with controlled rates    RADIOLOGY     Transthoracic Echo Complete   Final Result      CT angio chest for pulmonary embolism   Final Result   1.  No acute pulmonary embolism to the proximal segmental arterial   level. Symmetric hypoenhancement of bilateral lower lobe distal   segmental and subsegmental branches is favored to be artifactual due   to bolus timing and poor opacification. If there is high clinical   concern for PE repeat CT can be considered.   2. Main pulmonary artery is enlarged and measures 4.0 cm which can be   seen with pulmonary arterial hypertension   3. Aneurysmal dilatation of the ascending thoracic aorta measuring up   to 4.2 cm. Diffuse calcific atherosclerosis of the aorta and arch   vessels.   4. Cardiomegaly. Mild interlobular septal thickening ground-glass   opacities suggestive of developing interstitial edema/CHF.   5. Advanced centrilobular and paraseptal emphysema.   6. There small bilateral pleural effusions with adjacent airspace and   consolidative opacities. This may relate to compressive atelectasis   versus developing airspace disease. Clinical correlation and   continued short-term follow-up to resolution is advised.   7. There is reflux of contrast into the IVC and intrahepatic veins   suggestive of right heart dysfunction.   8. Additional findings as described above.             MACRO:   None        Signed by: Homero Grey 6/7/2025 7:47 PM   Dictation workstation:   [id number]      XR chest 1 view   Final Result   Mild cardiomegaly and central vascular congestion.        Small right pleural effusion and atelectasis. There is no airspace   consolidation.        Faint ground-glass density can not  be excluded in the lung bases.   Continued surveillance is recommended as clinically warranted.        MACRO:   None        Signed by: Ministerio Dailey 6/7/2025 5:51 PM   Dictation workstation:   [id number]      CT abdomen pelvis w IV contrast   Final Result   Small bilateral pleural effusions. There are bibasilar infiltrates,   greater on the left.        Cardiomegaly with scattered atherosclerotic arterial calcifications.        Fatty metamorphosis of the liver and hepatocellular disease.   Correlate with clinical findings of cirrhosis.        Bilateral renal cortical thinning. There is a 5 cm cyst in the mid   left kidney medially. A        2 mm nonobstructing calculus in the left kidney.        Mild bladder wall thickening and hypertrophy. Further workup with   direct visualization is recommended. There is some wall irregularity   at the base of the urinary bladder with some encroachment of the   prostate. Correlate with PSA levels.        Sigmoid diverticulosis. There is mild wall prominence of the distal   sigmoid colon and rectum.        Severe discogenic degenerative changes of the lumbar spine and   osteoarthritic changes of both hips.                  MACRO:   None        Signed by: Ministerio Dailey 6/7/2025 5:59 PM   Dictation workstation:   [id number]            ASSESSMENT     Problem List Items Addressed This Visit       Pulmonary hypertension (Multi)    Relevant Orders    Transthoracic Echo Complete (Completed)    * (Principal) Urinary tract infection without hematuria, site unspecified - Primary     Other Visit Diagnoses         Sepsis without acute organ dysfunction, due to unspecified organism (Multi)          Community acquired pneumonia, unspecified laterality          Acute combined systolic (congestive) and diastolic (congestive) heart failure        Relevant Medications    metoprolol succinate XL (Toprol-XL) 24 hr tablet 25 mg    sacubitriL-valsartan (Entresto) 24-26 mg per tablet 1  tablet    midodrine (Proamatine) tablet 10 mg (Completed)            Problem List[3]    PLAN     1.  Urinary tract infection.  Per primary care improving clinically.  2.  Acute on chronic combined systolic and diastolic congestive heart failure.  Patient is stabilized and compensated at this time on his medical regimen.  Would recommend that he be discharged on every other day furosemide.  I reviewed with the patient's wife when to hold the medication and when to take an extra dose.  He already has an appointment to see his outpatient cardiologist on June 19 we have encouraged him to keep that appointment.  3.  Atrial fibrillation.  The patient has permanent atrial fibrillation with controlled rates.  Telemetry can be discontinued.    The patient is stable for discharge whenever he is otherwise ready for discharge with his urinary tract infection.  He has appropriate outpatient follow-up already arranged.  Please reconsult cardiology if you have any additional questions.    Please do not hesitate to call with questions.  Electronically signed by Kala Kelly MD, on 6/10/2025 at 10:46 AM       [1] apixaban, 5 mg, oral, BID  atorvastatin, 20 mg, oral, Nightly  cefTRIAXone, 2 g, intravenous, q24h  furosemide, 20 mg, oral, Every other day  guaiFENesin, 1,200 mg, oral, BID  ipratropium-albuteroL, 3 mL, nebulization, TID  levETIRAcetam, 250 mg, oral, Daily  metoprolol succinate XL, 25 mg, oral, BID  mirtazapine, 15 mg, oral, Nightly  pantoprazole, 40 mg, oral, Daily before breakfast  pneumoc 20-shi conj-dip cr(PF), 0.5 mL, intramuscular, During hospitalization  sacubitriL-valsartan, 1 tablet, oral, BID  sertraline, 100 mg, oral, Daily  spironolactone, 12.5 mg, oral, q24h BRISSA    [2]    [3]   Patient Active Problem List  Diagnosis    Anxiety    BPH (benign prostatic hyperplasia)    CAD (coronary artery disease)    Carotid artery plaque    Herpes zoster    Post herpetic neuralgia    HLD (hyperlipidemia)    HTN  (hypertension)    Insomnia    Male erectile disorder of organic origin    Skin cancer    Thrombocytopenia    Vitamin D deficiency    Moderate episode of recurrent major depressive disorder    Aneurysm of ascending aorta without rupture    V tach (Multi)    Mitral valve insufficiency    Atrial fibrillation with controlled ventricular response (Multi)    History of coronary artery bypass graft    Acute combined systolic and diastolic heart failure    Former smoker    Stage 3a chronic kidney disease (Multi)    Acute on chronic systolic (congestive) heart failure    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

## 2025-06-10 NOTE — PROGRESS NOTES
Physical Therapy    Physical Therapy    Physical Therapy Treatment    Patient Name: Minh Harrington Jr.  MRN: [id number]  Today's Date: 6/10/2025  Time Calculation  Start Time: 1336  Stop Time: 1402  Time Calculation (min): 26 min     3009/3009-A     06/10/25 1336   PT  Visit   PT Received On 06/10/25   Response to Previous Treatment Patient with no complaints from previous session.   General   Reason for Referral P/w major complaint of nausea, dry heaves, and diarrhea that started today.  He was recently diagnosed with UTI and was started on antibiotic in the form of Augmentin that was later switched to amoxicillin. Pt admitted for UTI without hematuria.   Referred By Dr. Lechuga   Past Medical History Relevant to Rehab HLD, CAD, A-fib on Eliquis, HFrEF (LVEF 30 to 35%), pulmonary hypertension, ascending thoracic aortic aneurysm, mitral/tricuspid valve regurgitation, bladder cancer s/p TURP with repeat TURBT recently on 5/7/2025   Family/Caregiver Present Yes   Caregiver Feedback Wife present who was helpful and supportive.   Prior to Session Communication Bedside nurse   Patient Position Received Up in chair;Alarm on   Preferred Learning Style verbal   General Comment Pt pleasant and agreeable to work with therapy.   Precautions   Medical Precautions Fall precautions;Oxygen therapy device and L/min   Precautions Comment Pandya   Vital Signs   SpO2 99 %  (2L)   Pain Assessment   Pain Assessment 0-10   0-10 (Numeric) Pain Score 0 - No pain   Cognition   Overall Cognitive Status WFL   Orientation Level Oriented X4   Therapeutic Exercise   Therapeutic Exercise Performed Yes   Therapeutic Exercise Activity 1 AP, LAQ, marching x20   Ambulation/Gait Training   Ambulation/Gait Training Performed Yes   Ambulation/Gait Training 1   Surface 1 Level tile   Device 1 Rolling walker   Gait Support Devices Gait belt   Assistance 1 Contact guard   Quality of Gait 1 Diminished heel strike;Forward flexed posture   Comments/Distance  (ft) 1 250' slow reciprocal gait with cues to facilitate upright posture with good follow through   Transfers   Transfer Yes   Transfer 1   Transfer From 1 Chair with arms to   Transfer to 1 Wheelchair   Technique 1 Sit to stand;Stand to sit   Transfer Device 1 Walker;Gait belt   Transfer Level of Assistance 1 Contact guard;Minimal verbal cues   Trials/Comments 1 Cues for technique and safety. Pt being transferred to third floor   Transfers 2   Transfer From 2 Bed to   Transfer to 2 Stand   Technique 2 Sit to stand   Transfer Device 2 Walker;Gait belt   Transfer Level of Assistance 2 Contact guard;Minimal verbal cues   Trials/Comments 2 cues for proper hand placement for increased safety with good follow through   Transfers 3   Transfer From 3 Sit to;Stand to   Transfer to 3 Chair with arms   Technique 3 Sit to stand;Stand to sit   Transfer Device 3 Walker;Gait belt   Transfer Level of Assistance 3 Contact guard;Minimal verbal cues   Trials/Comments 3 x3   Activity Tolerance   Endurance Tolerates 30 min exercise with multiple rests   PT Assessment   PT Assessment Results Decreased strength;Decreased range of motion;Decreased endurance;Impaired balance;Decreased mobility;Decreased safety awareness   Rehab Prognosis Good   End of Session Communication Bedside nurse   Assessment Comment Pt put forth good effort. CGA for safety. Min cues for sequencing and safety with good follow through. Pt requires increased time to complete all activities.   End of Session Patient Position Up in chair;Alarm on     Outcome Measures:  [facility] Basic Mobility  Turning from your back to your side while in a flat bed without using bedrails: A little  Moving from lying on your back to sitting on the side of a flat bed without using bedrails: A little  Moving to and from bed to chair (including a wheelchair): A little  Standing up from a chair using your arms (e.g. wheelchair or bedside chair): A little  To walk in hospital room: A  little  Climbing 3-5 steps with railing: A lot  Basic Mobility - Total Score: 17                             EDUCATION:     Education Documentation  Precautions, taught by Anjali Rodriguez PTA at 6/10/2025  1:39 PM.  Learner: Family, Patient  Readiness: Acceptance  Method: Explanation  Response: Verbalizes Understanding, Demonstrated Understanding    Body Mechanics, taught by Anjali Rodriguez PTA at 6/10/2025  1:39 PM.  Learner: Family, Patient  Readiness: Acceptance  Method: Explanation  Response: Verbalizes Understanding, Demonstrated Understanding    Home Exercise Program, taught by Anjali Rodriguez PTA at 6/10/2025  1:39 PM.  Learner: Family, Patient  Readiness: Acceptance  Method: Explanation  Response: Verbalizes Understanding, Demonstrated Understanding    Mobility Training, taught by Anjali Rodriguez PTA at 6/10/2025  1:39 PM.  Learner: Family, Patient  Readiness: Acceptance  Method: Explanation  Response: Verbalizes Understanding, Demonstrated Understanding    Education Comments  No comments found.        GOALS:  Encounter Problems       Encounter Problems (Active)       Mobility       Pt will be able to ambulate >/= 100 ft with FWW SBA with good safety awareness and stability.        Start:  06/09/25    Expected End:  06/23/25            Pt will complete supine, seated and standing exercises to maintain/improve overall strength with minimal verbal cues.         Start:  06/09/25    Expected End:  06/23/25               PT Transfers       Pt will be able to complete all bed mobility tasks (rolling, sit <> sup) mod I.          Start:  06/09/25    Expected End:  06/23/25            Pt will be able to complete all transfers with FWW mod I demonstrating good safety awareness and proper body mechanics.        Start:  06/09/25    Expected End:  06/23/25               Pain - Adult

## 2025-06-10 NOTE — DOCUMENTATION CLARIFICATION NOTE
"    PATIENT:               EAMON CHINO  ACCT #:                  [id number]  MRN:                       [id number]  :                       1940  ADMIT DATE:       2025 1:46 PM  DISCH DATE:  RESPONDING PROVIDER #:        [id number]          PROVIDER RESPONSE TEXT:    Sepsis with circulatory organ dysfunction of Hypotension    CDI QUERY TEXT:    Clarification    Instruction:    Based on your assessment of the patient and the clinical information, please provide the requested documentation by clicking on the appropriate radio button and enter any additional information if prompted.    Question: Please further clarify if a relationship exists between the Sepsis and acute organ dysfunction    When answering this query, please exercise your independent professional judgment. The fact that a question is being asked, does not imply that any particular answer is desired or expected.    The patient's clinical indicators include:  Clinical Information:  Admitted for PNA, Acute on Chronic Combined Systolic/Diastolic Heart Failure, AHRF, UTI/Cystitis.    Clinical Indicators:  VS : T 36.0-37.4, HR , RR 18-28 w/pox % on RA-3L, BP 70//157.  MAP dropping to 63 on .    Labs: WBC 15.9 on .  Procalcitonin 6.42 on .  UA turbid, 500 leukocyte esterase, >50 WBC on .  Platelets 77 on .    CXR  \"There is faint ground-glass density in both lungs more pronounced in the lung bases.\"    ED physician documentation on   0220 PM states \"Urinary tract infection without hematuria\", \"Sepsis without acute organ dysfunction\", \"Community acquired pneumonia\", \"ICU was contacted for admission as the patient may require vasopressors given his persistent hypotension\".    IM note on 6/10 0849 AM states \"Urinary tract infection without hematuria\", \"Pneumonia of both lower lobes\".    Treatment: UA w/culture.  CXR.  Blood cultures.  Lactate.  Sputum culture.  COVID/Flu/MRSA screening.  Procalcitonin.  " Supplemental O2.  Pox.  VS w/MAP.  Urology consult.  Rocephin 2 g IV Q24 hrs 6/7-6/9.  Doxycycline 100 mg IV on 6/7.  Zosyn 4.5 g IV Q8 hrs 6/8-6/9.  Vancomycin 1250 mg IV on 6/7.  LR IVF bolus 250 mL on 6/7.  LR IVF bolus 500 mL on 6/7.  Midodrine 10 mg PO on 6/8.  Oral hydration 250 mL on 6/7.    Risk Factors: Elderly male s/p recent procedure w/Bladder CA, BPH w/urinary retention.  Options provided:  -- Sepsis with circulatory organ dysfunction of Hypotension  -- Sepsis with hematological organ dysfunction of Thrombocytopenia  -- Sepsis with multisystem organ dysfunction of Hypotension and Thrombocytopenia  -- Sepsis with other organ dysfunction, Please specify sepsis associated organ dysfunction below  -- Other - I will add my own diagnosis  -- Refer to Clinical Documentation Reviewer    Query created by: Silva Alvarez on 6/10/2025 3:11 PM      Electronically signed by:  RICK DIEGO MD 6/10/2025 3:13 PM

## 2025-06-10 NOTE — NURSING NOTE
Patient transferred to this unit this shift. Patient stable this shift. Patient denies any pain or discomfort this shift. Up with x 1 assist. Continues on abx for uti no adverse effects noted. Call light within reach

## 2025-06-10 NOTE — ASSESSMENT & PLAN NOTE
Suspect bacterial pneumonia.  Scheduled breathing treatment  Mucinex  I-S, flutter valve  Continue with ceftriaxone for now  MRSA swab negative  Elevated procalcitonin on admission  Will switch to Augmentin on discharge  Will do desaturation study today

## 2025-06-10 NOTE — PROGRESS NOTES
06/10/25 1049   Discharge Planning   Who is requesting discharge planning? Provider   Home or Post Acute Services In home services   Type of Home Care Services Home OT;Home PT   Expected Discharge Disposition Home H   Does the patient need discharge transport arranged? No     Spoke with wife in the hallway. Introduced self and role in hospital. Spouse states and feels that patient may not need any therapy at home but still has the list of agencies in the room. States that if he does well with therapy, she feels that HC will not be needed . CT team will continue to follow patient for discharge needs.

## 2025-06-10 NOTE — CARE PLAN
The patient's goals for the shift include Get better    The clinical goals for the shift include Pt will be HDS throughout this shift      Problem: Discharge Planning  Goal: Discharge to home or other facility with appropriate resources  Outcome: Progressing     Problem: Pain - Adult  Goal: Verbalizes/displays adequate comfort level or baseline comfort level  Outcome: Met     Problem: Safety - Adult  Goal: Free from fall injury  Outcome: Met

## 2025-06-10 NOTE — ASSESSMENT & PLAN NOTE
Hemodynamically stable.  Anticipate discharge in next 24 to 48 hours  Appreciate cardiology input  Continue with metoprolol  Continue metoprolol, Entresto, spironolactone  Pandya catheter will be maintained after discharge  Appreciate cardiology input  Continue with Eliquis, atorvastatin

## 2025-06-10 NOTE — PROGRESS NOTES
Minh Harrington Jr. is a 85 y.o. male on day 3 of admission presenting with Urinary tract infection without hematuria, site unspecified.      Subjective   Patient feeling better.  Phlegm is coming loose.  Pandya catheter in and recommended to stay in by urologist.  Had a run of nonsustained V. tach but was asymptomatic.  Electrolytes are repleted.  Talked about discharge planning in next 24 hours and he would like to just go home       Objective     Last Recorded Vitals  /82 (BP Location: Left arm, Patient Position: Lying)   Pulse 66   Temp 36.1 °C (97 °F) (Temporal)   Resp 18   Wt 78.4 kg (172 lb 13.5 oz)   SpO2 98%   Intake/Output last 3 Shifts:    Intake/Output Summary (Last 24 hours) at 6/10/2025 0849  Last data filed at 6/10/2025 0600  Gross per 24 hour   Intake 350 ml   Output 3125 ml   Net -2775 ml       Admission Weight  Weight: 78.9 kg (174 lb) (06/07/25 1340)    Daily Weight  06/10/25 : 78.4 kg (172 lb 13.5 oz)      Physical Exam:  General: Alert, not in acute distress on nasal cannula  HEENT: PERRLA, head intact and normocephalic  Neck: Normal to inspection  Lungs: Bilateral wheezing is noted.  Cardiac: Irregularly irregular in 80s  Abdomen: Soft nontender, positive bowel sounds  : Pandya catheter in place  Skin: Intact  Hematology: No petechia or excessive ecchymosis  Musculoskeletal: Without significant trauma  Neurological: Alert awake oriented, no focal deficit, cranial nerves grossly intact  Psych: No suicidal ideation or homicidal ideation    Relevant Results  Scheduled medications  Scheduled Medications[1]  Continuous medications  Continuous Medications[2]  PRN medications  PRN Medications[3]   Labs  Results from last 7 days   Lab Units 06/10/25  0639 06/09/25  0456 06/08/25  0529   WBC AUTO x10*3/uL 7.7 10.0 10.9   HEMOGLOBIN g/dL 10.1* 9.3* 9.1*   HEMATOCRIT % 34.5* 30.8* 31.0*   PLATELETS AUTO x10*3/uL 102* 85* 78*     Results from last 7 days   Lab Units 06/10/25  0639  06/09/25  0456 06/08/25  0529 06/07/25  1500   SODIUM mmol/L 134* 130* 131* 132*   POTASSIUM mmol/L 4.1 4.3 4.5 5.0   CHLORIDE mmol/L 101 101 101 98   CO2 mmol/L 26 22 24 23   BUN mg/dL 19 23 25* 25*   CREATININE mg/dL 1.06 1.13 1.22 1.28   CALCIUM mg/dL 8.4* 8.0* 8.2* 9.2   PROTEIN TOTAL g/dL  --   --  5.9* 7.2   BILIRUBIN TOTAL mg/dL  --   --  0.8 1.2   ALK PHOS U/L  --   --  92 136   ALT U/L  --   --  20 28   AST U/L  --   --  34 52*   GLUCOSE mg/dL 88 93 92 117*       Imaging  CT angio chest for pulmonary embolism  Result Date: 6/7/2025  1.  No acute pulmonary embolism to the proximal segmental arterial level. Symmetric hypoenhancement of bilateral lower lobe distal segmental and subsegmental branches is favored to be artifactual due to bolus timing and poor opacification. If there is high clinical concern for PE repeat CT can be considered. 2. Main pulmonary artery is enlarged and measures 4.0 cm which can be seen with pulmonary arterial hypertension 3. Aneurysmal dilatation of the ascending thoracic aorta measuring up to 4.2 cm. Diffuse calcific atherosclerosis of the aorta and arch vessels. 4. Cardiomegaly. Mild interlobular septal thickening ground-glass opacities suggestive of developing interstitial edema/CHF. 5. Advanced centrilobular and paraseptal emphysema. 6. There small bilateral pleural effusions with adjacent airspace and consolidative opacities. This may relate to compressive atelectasis versus developing airspace disease. Clinical correlation and continued short-term follow-up to resolution is advised. 7. There is reflux of contrast into the IVC and intrahepatic veins suggestive of right heart dysfunction. 8. Additional findings as described above.     MACRO: None   Signed by: Homero Grey 6/7/2025 7:47 PM Dictation workstation:   [id number]    CT abdomen pelvis w IV contrast  Result Date: 6/7/2025  Small bilateral pleural effusions. There are bibasilar infiltrates, greater on the left.    Cardiomegaly with scattered atherosclerotic arterial calcifications.   Fatty metamorphosis of the liver and hepatocellular disease. Correlate with clinical findings of cirrhosis.   Bilateral renal cortical thinning. There is a 5 cm cyst in the mid left kidney medially. A   2 mm nonobstructing calculus in the left kidney.   Mild bladder wall thickening and hypertrophy. Further workup with direct visualization is recommended. There is some wall irregularity at the base of the urinary bladder with some encroachment of the prostate. Correlate with PSA levels.   Sigmoid diverticulosis. There is mild wall prominence of the distal sigmoid colon and rectum.   Severe discogenic degenerative changes of the lumbar spine and osteoarthritic changes of both hips.       MACRO: None   Signed by: Ministerio Dailey 6/7/2025 5:59 PM Dictation workstation:   [id number]    XR chest 1 view  Result Date: 6/7/2025  Mild cardiomegaly and central vascular congestion.   Small right pleural effusion and atelectasis. There is no airspace consolidation.   Faint ground-glass density can not be excluded in the lung bases. Continued surveillance is recommended as clinically warranted.   MACRO: None   Signed by: Ministerio Dailey 6/7/2025 5:51 PM Dictation workstation:   [id number]    CT chest abdomen pelvis w IV contrast  Result Date: 6/5/2025  Bladder cancer restaging scan. When compared to the prior examination dated 02/28/2025, there has been interval postsurgical changes of a TURBT and the previously noted right urinary bladder wall mass is no longer visualized. No definite evidence of new sites of metastatic disease. Suggestion of fluid overload which may be cardiogenic in etiology, correlation with echocardiogram is recommended. Additional stable chronic and incidental findings described above.   I personally reviewed the image(s) / study and I agree with the findings as stated by Elia Bunn MD. This study was interpreted at   [place], [place].   MACRO: None   Signed by: Marshal Bermudez 6/5/2025 7:56 PM Dictation workstation:   [id number]      Cardiology, Vascular, and Other Imaging  ECG 12 lead  Result Date: 6/9/2025  Atrial fibrillation with rapid ventricular response Nonspecific ST and T wave abnormality Prolonged QT When compared with ECG of 01-MAR-2025 15:13, Borderline criteria for Inferior infarct are no longer Present QT has lengthened Reconfirmed by Claudio Perales ([id number]) on 6/9/2025 7:36:40 PM    Transthoracic Echo Complete  Result Date: 6/8/2025            [facility] [id number] [id number]    Tel [phone] Fax [phone] TRANSTHORACIC ECHOCARDIOGRAM REPORT Patient Name:       EAMON ORR LULÚ  Reading Physician:    [id number] Kala Kelly MD Study Date:         6/8/2025             Ordering Provider:    [id number] ISRAEL MARISCAL MRN/PID:            [id number]             Fellow: Accession#:         [id number]         Nurse: Date of Birth/Age:  1940 / 85 years Sonographer:          Tanya Nguyen RD Gender Assigned at  M                    Additional Staff: Birth: Height:             177.80 cm            Admit Date:           6/7/2025 Weight:             80.29 kg             Admission Status:     Inpatient -                                                                Priority                                                                discharge BSA / BMI:          1.98 m2 / 25.40      Department Location:  [facility]                     kg/m2 Blood Pressure: 105 /53 mmHg Study Type:    TRANSTHORACIC ECHO (TTE) LIMITED Diagnosis/ICD: Acute combined systolic (congestive) and diastolic (congestive)                heart failure (CHF)-I50.41 Indication:    Congestive Heart Failure CPT Codes:     Echo Limited-93573; Doppler Limited-84292;  Color Doppler-24728 Patient History: Smoker:            Former. Pertinent History: CAD, Hyperlipidemia, HTN and CHF. h/o bladder ca/ CKD 3a;                    v-tach; WINNIE; previous echocardiogram 1-. Study Detail: The following Echo studies were performed: 2D, Doppler and color               flow. Technically challenging study due to patient lying in supine               position. Unable to obtain suprasternal notch and subcostal view.  PHYSICIAN INTERPRETATION: Left Ventricle: Left ventricular ejection fraction is moderately decreased calculated by Jo's biplane at 33%. There is mild eccentric left ventricular hypertrophy. Multiple wall motion abnormalties exist, see findings. The left ventricular cavity size is upper limits of normal. There is mild increased septal and normal posterior left ventricular wall thickness. The interventricular septum is flattened in systole, consistent with right ventricular pressure overload. Left ventricular diastolic filling was indeterminate due to atrial fibrillation/flutter. Left Atrium: The left atrial size is mild to moderately dilated. Right Ventricle: The right ventricle is mildly enlarged. There is mildly reduced right ventricular systolic function. Right Atrium: The right atrial size is mildly dilated. Aortic Valve: The aortic valve is trileaflet. There is mild aortic valve cusp calcification. There is moderate aortic valve thickening. There is anterior aortic valve annular calcification. There is trace to mild aortic valve regurgitation. Mitral Valve: The mitral valve is mild to moderately thickened. There is no evidence of mitral valve prolapse. There is no evidence of mitral valve stenosis. There is mild mitral annular calcification. There is mild to moderate mitral valve regurgitation. The E Vmax is 0.83 m/s. Tricuspid Valve: The tricuspid valve is structurally normal. There is no evidence of tricuspid valve stenosis. There is mild to moderate tricuspid  regurgitation. The Doppler estimated right ventricular systolic pressure (RVSP) is mildly elevated. Pulmonic Valve: The pulmonic valve is not well visualized. There is trace pulmonic valve regurgitation. Pericardium: Trivial pericardial effusion. There is no evidence of cardiac tamponade. Aorta: The aortic root was not assessed. Systemic Veins: The inferior vena cava was not assessed, IVC inspiratory collapse was not assessed. In comparison to the previous echocardiogram(s): Compared with study dated 1/22/2025,. No change in LVEF. Mitral regurgitation less.  CONCLUSIONS:  1. Left ventricular ejection fraction is moderately decreased calculated by Jo's biplane at 33%.  2. Multiple wall motion abnormalties exist, see findings.  3. Left ventricular diastolic filling was indeterminate due to atrial fibrillation/flutter.  4. Right ventricular pressure overload.  5. There is mildly reduced right ventricular systolic function.  6. Mildly enlarged right ventricle.  7. The left atrial size is mild to moderately dilated.  8. There is no evidence of cardiac tamponade.  9. Mild to moderate mitral valve regurgitation. 10. No evidence of mitral valve prolapse. 11. There is No tricuspid stenosis. 12. Mild to moderate tricuspid regurgitation. 13. The Doppler estimated RVSP is mildly elevated. 14. No change in LVEF. Mitral regurgitation less. QUANTITATIVE DATA SUMMARY:  2D MEASUREMENTS:             Normal Ranges: LAs:             4.79 cm     (2.7-4.0cm) IVSd:            1.22 cm     (0.6-1.1cm) LVPWd:           1.00 cm     (0.6-1.1cm) LVIDd:           5.70 cm     (3.9-5.9cm) LVIDs:           4.75 cm LV Mass Index:   130.9 g/m2 LVEDV Index:     52.16 ml/m2 LV % FS          16.6 %  LV SYSTOLIC FUNCTION:                      Normal Ranges: EF-A4C View:    32 % (>=55%) EF-A2C View:    27 % EF-Biplane:     33 % LV EF Reported: 33 %  LV DIASTOLIC FUNCTION:           Normal Ranges: MV Peak E:             0.83 m/s  (0.7-1.2 m/s) MV e'                   0.114 m/s (>8.0) MV lateral e'          0.14 m/s MV medial e'           0.09 m/s E/e' Ratio:            7.33      (<8.0)  TRICUSPID VALVE/RVSP:          Normal Ranges: Peak TR Velocity:     3.20 m/s  AORTA: Asc Ao Diam 4.24 cm  [id number] Kala Kelly MD Electronically signed on 6/8/2025 at 1:50:43 PM  ** Final **                       Assessment/Plan   Minh Harrington Jr. is a 85 y.o. male on day 3 of admission presenting with Urinary tract infection without hematuria, site unspecified.  Assessment & Plan  Urinary tract infection without hematuria, site unspecified  Bladder cancer (Multi)  Urology consult is noted  Recommended keeping Pandya catheter even after discharge  10 days of ciprofloxacin  Outpatient BCG with Dr. Watts  Chronic systolic heart failure  Chronic atrial fibrillation (Multi)  Pulmonary hypertension (Multi)  Hemodynamically stable.  Anticipate discharge in next 24 to 48 hours  Appreciate cardiology input  Continue with metoprolol  Continue metoprolol, Entresto, spironolactone  Pandya catheter will be maintained after discharge  Appreciate cardiology input  Continue with Eliquis, atorvastatin  Pneumonia of both lower lobes due to infectious organism  Suspect bacterial pneumonia.  Scheduled breathing treatment  Mucinex  I-S, flutter valve  Continue with ceftriaxone for now  MRSA swab negative  Elevated procalcitonin on admission  Will switch to Augmentin on discharge  Will do desaturation study today       Plan discussed with patient at bedside along with primary nurse  Disposition: Discharge in next 24 hours if patient remains hemodynamically stable and continues to improve.  High level of MDM based on above issue and discussing plan    This note is created using voice recognition software. All efforts are made to minimize errors, if there are errors there due to transcription.    Ruiz Lechuga  Hospitalist           [1] apixaban, 5 mg, oral, BID  atorvastatin, 20 mg, oral,  Nightly  cefTRIAXone, 2 g, intravenous, q24h  furosemide, 20 mg, oral, Every other day  guaiFENesin, 1,200 mg, oral, BID  ipratropium-albuteroL, 3 mL, nebulization, TID  lactulose, 20 g, oral, Once  levETIRAcetam, 250 mg, oral, Daily  magnesium oxide, 800 mg of magnesium oxide, oral, Once  metoprolol succinate XL, 25 mg, oral, BID  mirtazapine, 15 mg, oral, Nightly  pantoprazole, 40 mg, oral, Daily before breakfast  pneumoc 20-shi conj-dip cr(PF), 0.5 mL, intramuscular, During hospitalization  sacubitriL-valsartan, 1 tablet, oral, BID  sertraline, 100 mg, oral, Daily  spironolactone, 12.5 mg, oral, q24h BRISSA     [2]    [3] PRN medications: acetaminophen **OR** acetaminophen **OR** acetaminophen, acetaminophen **OR** acetaminophen **OR** acetaminophen, ALPRAZolam, ipratropium-albuteroL, melatonin, ondansetron ODT **OR** ondansetron, oxygen

## 2025-06-10 NOTE — ASSESSMENT & PLAN NOTE
Urology consult is noted  Recommended keeping Pandya catheter even after discharge  10 days of ciprofloxacin  Outpatient BCG with Dr. Watts

## 2025-06-10 NOTE — CARE PLAN
The patient's goals for the shift include Get better    The clinical goals for the shift include Pt will be HDS throughout this shift      Problem: Pain - Adult  Goal: Verbalizes/displays adequate comfort level or baseline comfort level  Outcome: Progressing     Problem: Safety - Adult  Goal: Free from fall injury  Outcome: Progressing     Problem: Discharge Planning  Goal: Discharge to home or other facility with appropriate resources  Outcome: Progressing     Problem: Chronic Conditions and Co-morbidities  Goal: Patient's chronic conditions and co-morbidity symptoms are monitored and maintained or improved  Outcome: Progressing     Problem: Nutrition  Goal: Nutrient intake appropriate for maintaining nutritional needs  Outcome: Progressing     Problem: Heart Failure  Goal: Improved gas exchange this shift  Outcome: Progressing  Goal: Improved urinary output this shift  Outcome: Progressing  Goal: Reduction in peripheral edema within 24 hours  Outcome: Progressing  Goal: Report improvement of dyspnea/breathlessness this shift  Outcome: Progressing  Goal: Weight from fluid excess reduced over 2-3 days, then stabilize  Outcome: Progressing  Goal: Increase self care and/or family involvement in 24 hours  Outcome: Progressing

## 2025-06-11 ENCOUNTER — PHARMACY VISIT (OUTPATIENT)
Dept: PHARMACY | Facility: CLINIC | Age: 85
End: 2025-06-11
Payer: MEDICARE

## 2025-06-11 ENCOUNTER — TELEPHONE (OUTPATIENT)
Dept: PRIMARY CARE | Facility: CLINIC | Age: 85
End: 2025-06-11
Payer: MEDICARE

## 2025-06-11 VITALS
HEART RATE: 91 BPM | DIASTOLIC BLOOD PRESSURE: 64 MMHG | BODY MASS INDEX: 24.74 KG/M2 | OXYGEN SATURATION: 95 % | SYSTOLIC BLOOD PRESSURE: 104 MMHG | WEIGHT: 172.84 LBS | TEMPERATURE: 97.9 F | HEIGHT: 70 IN | RESPIRATION RATE: 20 BRPM

## 2025-06-11 LAB
ANION GAP SERPL CALC-SCNC: 10 MMOL/L (ref 10–20)
ATRIAL RATE: 104 BPM
BACTERIA BLD CULT: NORMAL
BACTERIA BLD CULT: NORMAL
BASOPHILS # BLD AUTO: 0.03 X10*3/UL (ref 0–0.1)
BASOPHILS NFR BLD AUTO: 0.4 %
BUN SERPL-MCNC: 17 MG/DL (ref 6–23)
CALCIUM SERPL-MCNC: 8.4 MG/DL (ref 8.6–10.3)
CHLORIDE SERPL-SCNC: 102 MMOL/L (ref 98–107)
CO2 SERPL-SCNC: 27 MMOL/L (ref 21–32)
CREAT SERPL-MCNC: 0.99 MG/DL (ref 0.5–1.3)
EGFRCR SERPLBLD CKD-EPI 2021: 74 ML/MIN/1.73M*2
EOSINOPHIL # BLD AUTO: 0.22 X10*3/UL (ref 0–0.4)
EOSINOPHIL NFR BLD AUTO: 2.9 %
ERYTHROCYTE [DISTWIDTH] IN BLOOD BY AUTOMATED COUNT: 19.6 % (ref 11.5–14.5)
GLUCOSE SERPL-MCNC: 81 MG/DL (ref 74–99)
HCT VFR BLD AUTO: 33.4 % (ref 41–52)
HGB BLD-MCNC: 9.9 G/DL (ref 13.5–17.5)
IMM GRANULOCYTES # BLD AUTO: 0.07 X10*3/UL (ref 0–0.5)
IMM GRANULOCYTES NFR BLD AUTO: 0.9 % (ref 0–0.9)
LYMPHOCYTES # BLD AUTO: 0.87 X10*3/UL (ref 0.8–3)
LYMPHOCYTES NFR BLD AUTO: 11.6 %
MAGNESIUM SERPL-MCNC: 2.24 MG/DL (ref 1.6–2.4)
MCH RBC QN AUTO: 24.3 PG (ref 26–34)
MCHC RBC AUTO-ENTMCNC: 29.6 G/DL (ref 32–36)
MCV RBC AUTO: 82 FL (ref 80–100)
MONOCYTES # BLD AUTO: 0.84 X10*3/UL (ref 0.05–0.8)
MONOCYTES NFR BLD AUTO: 11.2 %
NEUTROPHILS # BLD AUTO: 5.48 X10*3/UL (ref 1.6–5.5)
NEUTROPHILS NFR BLD AUTO: 73 %
NRBC BLD-RTO: 0 /100 WBCS (ref 0–0)
PLATELET # BLD AUTO: 118 X10*3/UL (ref 150–450)
POTASSIUM SERPL-SCNC: 4 MMOL/L (ref 3.5–5.3)
Q ONSET: 227 MS
QRS COUNT: 16 BEATS
QRS DURATION: 86 MS
QT INTERVAL: 402 MS
QTC CALCULATION(BAZETT): 499 MS
QTC FREDERICIA: 465 MS
R AXIS: 35 DEGREES
RBC # BLD AUTO: 4.08 X10*6/UL (ref 4.5–5.9)
SODIUM SERPL-SCNC: 135 MMOL/L (ref 136–145)
T AXIS: 149 DEGREES
T OFFSET: 428 MS
VENTRICULAR RATE: 93 BPM
WBC # BLD AUTO: 7.5 X10*3/UL (ref 4.4–11.3)

## 2025-06-11 PROCEDURE — 2500000002 HC RX 250 W HCPCS SELF ADMINISTERED DRUGS (ALT 637 FOR MEDICARE OP, ALT 636 FOR OP/ED): Performed by: INTERNAL MEDICINE

## 2025-06-11 PROCEDURE — 80048 BASIC METABOLIC PNL TOTAL CA: CPT | Performed by: INTERNAL MEDICINE

## 2025-06-11 PROCEDURE — 2500000001 HC RX 250 WO HCPCS SELF ADMINISTERED DRUGS (ALT 637 FOR MEDICARE OP): Performed by: INTERNAL MEDICINE

## 2025-06-11 PROCEDURE — 99239 HOSP IP/OBS DSCHRG MGMT >30: CPT | Performed by: INTERNAL MEDICINE

## 2025-06-11 PROCEDURE — 83735 ASSAY OF MAGNESIUM: CPT | Performed by: INTERNAL MEDICINE

## 2025-06-11 PROCEDURE — 2500000004 HC RX 250 GENERAL PHARMACY W/ HCPCS (ALT 636 FOR OP/ED): Performed by: INTERNAL MEDICINE

## 2025-06-11 PROCEDURE — 2500000005 HC RX 250 GENERAL PHARMACY W/O HCPCS: Performed by: INTERNAL MEDICINE

## 2025-06-11 PROCEDURE — 36415 COLL VENOUS BLD VENIPUNCTURE: CPT | Performed by: INTERNAL MEDICINE

## 2025-06-11 PROCEDURE — 85025 COMPLETE CBC W/AUTO DIFF WBC: CPT | Performed by: INTERNAL MEDICINE

## 2025-06-11 PROCEDURE — RXMED WILLOW AMBULATORY MEDICATION CHARGE

## 2025-06-11 PROCEDURE — 94640 AIRWAY INHALATION TREATMENT: CPT

## 2025-06-11 RX ORDER — SPIRONOLACTONE 25 MG/1
12.5 TABLET ORAL
Qty: 15 TABLET | Refills: 0 | Status: SHIPPED | OUTPATIENT
Start: 2025-06-12 | End: 2025-07-12

## 2025-06-11 RX ORDER — CIPROFLOXACIN 500 MG/1
500 TABLET, FILM COATED ORAL 2 TIMES DAILY
Qty: 20 TABLET | Refills: 0 | Status: SHIPPED | OUTPATIENT
Start: 2025-06-11 | End: 2025-06-21

## 2025-06-11 RX ORDER — FUROSEMIDE 20 MG/1
20 TABLET ORAL EVERY OTHER DAY
Qty: 16 TABLET | Refills: 0 | Status: SHIPPED | OUTPATIENT
Start: 2025-06-11 | End: 2025-06-19 | Stop reason: SDUPTHER

## 2025-06-11 RX ORDER — AMOXICILLIN AND CLAVULANATE POTASSIUM 875; 125 MG/1; MG/1
875 TABLET, FILM COATED ORAL 2 TIMES DAILY
Qty: 10 TABLET | Refills: 0 | Status: SHIPPED | OUTPATIENT
Start: 2025-06-11 | End: 2025-06-16

## 2025-06-11 RX ADMIN — APIXABAN 5 MG: 5 TABLET, FILM COATED ORAL at 09:56

## 2025-06-11 RX ADMIN — SPIRONOLACTONE 12.5 MG: 25 TABLET ORAL at 09:54

## 2025-06-11 RX ADMIN — PANTOPRAZOLE SODIUM 40 MG: 40 TABLET, DELAYED RELEASE ORAL at 09:50

## 2025-06-11 RX ADMIN — SERTRALINE HYDROCHLORIDE 100 MG: 100 TABLET ORAL at 09:54

## 2025-06-11 RX ADMIN — IPRATROPIUM BROMIDE AND ALBUTEROL SULFATE 3 ML: 2.5; .5 SOLUTION RESPIRATORY (INHALATION) at 08:07

## 2025-06-11 RX ADMIN — SACUBITRIL AND VALSARTAN 1 TABLET: 24; 26 TABLET, FILM COATED ORAL at 09:57

## 2025-06-11 RX ADMIN — LEVETIRACETAM 250 MG: 500 TABLET, FILM COATED ORAL at 09:55

## 2025-06-11 RX ADMIN — Medication 1 L/MIN: at 08:08

## 2025-06-11 RX ADMIN — GUAIFENESIN 1200 MG: 600 TABLET, EXTENDED RELEASE ORAL at 09:56

## 2025-06-11 RX ADMIN — METOPROLOL SUCCINATE 25 MG: 25 TABLET, EXTENDED RELEASE ORAL at 09:56

## 2025-06-11 ASSESSMENT — COGNITIVE AND FUNCTIONAL STATUS - GENERAL
CLIMB 3 TO 5 STEPS WITH RAILING: A LITTLE
WALKING IN HOSPITAL ROOM: A LITTLE
DAILY ACTIVITIY SCORE: 24
MOBILITY SCORE: 22

## 2025-06-11 ASSESSMENT — PAIN SCALES - GENERAL: PAINLEVEL_OUTOF10: 0 - NO PAIN

## 2025-06-11 NOTE — PROGRESS NOTES
Date/Time SpO2 Medical Gas Therapy Medical Gas Delivery Method Oxygen L/min Patient is on During the Study Patient Activity During Study                      06/11/25 0900 87 %  None (Room air)  --  --  At rest     06/11/25 0905 94 %  Supplemental oxygen  Nasal cannula  2 L/min  At rest             Was a Home Oxygen Evaluation Performed? Yes  Based on the Home Oxygen Evaluation, Does the Patient Qualify for Home Oxygen Therapy? Yes            Recommendations:    Recommended Oxygen Dose at Rest is 2 L/min

## 2025-06-11 NOTE — HOSPITAL COURSE
85-year-old male with past medical history of CAD status post CABG, atrial fibrillation on Eliquis, chronic combined systolic and diastolic heart failure, CKD, thrombocytopenia, epilepsy on Keppra, BPH and bladder cancer status post multiple procedures with Dr. Anthony Meza presented to emergency department for shortness of breath and cough.  Patient was found to have acute respiratory failure with hypoxemia secondary to combined heart failure as well as pneumonia.  Patient was seen and evaluated by cardiology as well as urology during the hospital stay.  Cardiology optimize his medications and urology recommended putting in a Pandya catheter and maintaining it due to urinary retention.  Patient has improved significantly hemodynamically after treatment of pneumonia as well as UTI and heart failure and hemodynamically stable now.  He is hypoxic still and will need 2 L of oxygen at rest.  Patient will be discharged on Augmentin for 5 days and urology recommended Cipro with recent Klebsiella infection for total of 10 days 500 mg twice daily.  We have made adjustment to his medications for heart failure and stopping Jardiance due to recurrent UTI and added spironolactone and made Lasix every other day scheduled for him.  He will follow-up with Dr. Valle as outpatient.    44 minutes spent in discharge timing

## 2025-06-11 NOTE — CARE PLAN
The patient's goals for the shift include Get better    The clinical goals for the shift include see care plan      Problem: Discharge Planning  Goal: Discharge to home or other facility with appropriate resources  Flowsheets (Taken 6/11/2025 0738)  Discharge to home or other facility with appropriate resources: Identify barriers to discharge with patient and caregiver     Problem: Chronic Conditions and Co-morbidities  Goal: Patient's chronic conditions and co-morbidity symptoms are monitored and maintained or improved  Flowsheets (Taken 6/11/2025 0738)  Care Plan - Patient's Chronic Conditions and Co-Morbidity Symptoms are Monitored and Maintained or Improved: Monitor and assess patient's chronic conditions and comorbid symptoms for stability, deterioration, or improvement

## 2025-06-11 NOTE — TELEPHONE ENCOUNTER
Wife calling to scheduled follow from [facility] pneumonia and UTI.  Wants next week I offered Thursday 6/19/25, he has apt with heart doctor.  Should he cancel that apt or Ok to wait to apt already scheduled for 6/27?  Please advise

## 2025-06-11 NOTE — CARE PLAN
The patient's goals for the shift include Get better    The clinical goals for the shift include plan of care      Problem: Chronic Conditions and Co-morbidities  Goal: Patient's chronic conditions and co-morbidity symptoms are monitored and maintained or improved  Outcome: Progressing     Problem: Nutrition  Goal: Nutrient intake appropriate for maintaining nutritional needs  Outcome: Progressing     Problem: Heart Failure  Goal: Improved gas exchange this shift  Outcome: Progressing  Goal: Improved urinary output this shift  Outcome: Progressing  Goal: Reduction in peripheral edema within 24 hours  Outcome: Progressing  Goal: Report improvement of dyspnea/breathlessness this shift  Outcome: Progressing  Goal: Weight from fluid excess reduced over 2-3 days, then stabilize  Outcome: Progressing  Goal: Increase self care and/or family involvement in 24 hours  Outcome: Progressing

## 2025-06-11 NOTE — NURSING NOTE
6/10/2025    1945- Patient sitting at edge of bed and states he would like Xanax before bed for anxiety.

## 2025-06-11 NOTE — NURSING NOTE
Pt calm and cooperative through shift. Pt did not complain of pain this shift. Pt repositioned every two hours and had a ambulatory pulse ox completed and required oxygen. Pt will be discharged, IV removed, Pt understands DC paperwork.

## 2025-06-11 NOTE — PROGRESS NOTES
Spiritual Care Visit  Spiritual Care Request    Reason for Visit:  Routine Visit: Introduction  Continue Visiting: Yes     Request Received From:       Focus of Care:  Visited With: Patient and family together         Refer to :          Spiritual Care Assessment    Spiritual Assessment:                      Care Provided:  Intended Effects: Establish rapport and connectedness, Zoraida affirmation, Build relationship of care and support, Demonstrate caring and concern  Methods: Offer spiritual/Buddhist support  Interventions: Perform a Buddhist rite or ritual, [place]    Sense of Community and or Zoroastrianism Affiliation:  Congregational         Addressed Needs/Concerns and/or Gale Through:  Zoroastrianism Encounters  Zoroastrianism Needs: Prayer  Sacramental Encounters  Communion: Patient wants communion  Communion Given Indicator: No  Sacrament of Sick-Anointing: Anointed, Patient requested anointing    Outcome:  Outcome of Spiritual Care Visit: [place], Affirmation, Comfort/healing presence, Spirituality connected     Advance Directives:         Spiritual Care Annotation    Annotation:  His wife Justine was present when I visited yesterday.

## 2025-06-12 ENCOUNTER — PATIENT OUTREACH (OUTPATIENT)
Dept: PRIMARY CARE | Facility: CLINIC | Age: 85
End: 2025-06-12
Payer: MEDICARE

## 2025-06-12 NOTE — PROGRESS NOTES
Discharge Facility:  [facility] [place]   Discharge Diagnosis:  Abdominal Pain  Diarrhea  Nausea  Fever   Admission Date:  6/7/25  Discharge Date: 6/11/25    PCP Appointment Date:  6/16/25   Specialist Appointment Date:  6/19/25 cardio   Pt to make appt with Urology - urology will call   Hospital Encounter and Summary Linked: Yes    ED to Hosp-Admission (Discharged) with Ruiz Lechuga MD; Seb Browne DO (06/07/2025)       Issues Requiring Follow-Up  Follow-up with primary care provider, cardiology and urology as outpatient       Wrap Up  Wrap Up Additional Comments: Successful transition of care outreach with patient. Pt reports doing well at home since discharge. New meds reviewed. Pt denies CP and SOB. Patient denies any further discharge questions/needs at this time. Pt aware of my availability for non-emergent concerns. Contact info provided to patient (6/12/2025 11:59 AM)    Medications  Medications reviewed with patient/caregiver?: Yes (6/12/2025 11:59 AM)  Is the patient having any side effects they believe may be caused by any medication additions or changes?: No (6/12/2025 11:59 AM)  Does the patient have all medications ordered at discharge?: Yes (6/12/2025 11:59 AM)  Prescription Comments: New scripts given at discharge :  Cipro aldactone   CHANGE Lasix every other day   Stop jardiance (6/12/2025 11:59 AM)  Is the patient taking all medications as directed (includes completed medication regime)?: Yes (6/12/2025 11:59 AM)  Care Management Interventions: Provided patient education (6/12/2025 11:59 AM)  Medication Comments: Pt denies problems obtaining or affording medication. No medication-related issues identified (6/12/2025 11:59 AM)    Appointments  Does the patient have a primary care provider?: Yes (6/12/2025 11:59 AM)  Care Management Interventions: Verified appointment date/time/provider (6/12/2025 11:59 AM)  Has the patient kept scheduled appointments due by today?: Yes (6/12/2025 11:59  AM)  Care Management Interventions: Advised patient to keep appointment (6/12/2025 11:59 AM)    Self Management  What is the home health agency?: DENIES NEED (6/12/2025 11:59 AM)  What Durable Medical Equipment (DME) was ordered?: walker (6/12/2025 11:59 AM)    Patient Teaching  Does the patient have access to their discharge instructions?: Yes (6/12/2025 11:59 AM)  Care Management Interventions: Reviewed instructions with patient (6/12/2025 11:59 AM)  What is the patient's perception of their health status since discharge?: Improving (6/12/2025 11:59 AM)  Is the patient/caregiver able to teach back the hierarchy of who to call/visit for symptoms/problems? PCP, Specialist, Home Health nurse, Urgent Care, ED, 911: Yes (6/12/2025 11:59 AM)

## 2025-06-16 ENCOUNTER — APPOINTMENT (OUTPATIENT)
Dept: PRIMARY CARE | Facility: CLINIC | Age: 85
End: 2025-06-16
Payer: MEDICARE

## 2025-06-16 ENCOUNTER — CLINICAL SUPPORT (OUTPATIENT)
Dept: UROLOGY | Facility: CLINIC | Age: 85
End: 2025-06-16
Payer: MEDICARE

## 2025-06-16 VITALS
TEMPERATURE: 97.7 F | DIASTOLIC BLOOD PRESSURE: 62 MMHG | SYSTOLIC BLOOD PRESSURE: 92 MMHG | WEIGHT: 165 LBS | OXYGEN SATURATION: 99 % | RESPIRATION RATE: 16 BRPM | HEIGHT: 70 IN | HEART RATE: 104 BPM | BODY MASS INDEX: 23.62 KG/M2

## 2025-06-16 DIAGNOSIS — R33.9 RETENTION OF URINE: ICD-10-CM

## 2025-06-16 DIAGNOSIS — F33.1 MODERATE EPISODE OF RECURRENT MAJOR DEPRESSIVE DISORDER: ICD-10-CM

## 2025-06-16 DIAGNOSIS — I27.20 PULMONARY HYPERTENSION (MULTI): ICD-10-CM

## 2025-06-16 DIAGNOSIS — D50.0 IRON DEFICIENCY ANEMIA DUE TO CHRONIC BLOOD LOSS: Primary | ICD-10-CM

## 2025-06-16 DIAGNOSIS — C67.8 MALIGNANT NEOPLASM OF OVERLAPPING SITES OF BLADDER (MULTI): ICD-10-CM

## 2025-06-16 DIAGNOSIS — I50.22 CHRONIC SYSTOLIC HEART FAILURE: ICD-10-CM

## 2025-06-16 DIAGNOSIS — F41.9 ANXIETY: ICD-10-CM

## 2025-06-16 DIAGNOSIS — D68.32 HEMORRHAGIC DISORDER DUE TO EXTRINSIC CIRCULATING ANTICOAGULANTS (MULTI): ICD-10-CM

## 2025-06-16 DIAGNOSIS — I71.21 ANEURYSM OF ASCENDING AORTA WITHOUT RUPTURE: ICD-10-CM

## 2025-06-16 DIAGNOSIS — I48.91 ATRIAL FIBRILLATION WITH CONTROLLED VENTRICULAR RESPONSE (MULTI): ICD-10-CM

## 2025-06-16 DIAGNOSIS — R33.9 URINARY RETENTION: ICD-10-CM

## 2025-06-16 DIAGNOSIS — I50.23 ACUTE ON CHRONIC SYSTOLIC (CONGESTIVE) HEART FAILURE: ICD-10-CM

## 2025-06-16 PROCEDURE — 3078F DIAST BP <80 MM HG: CPT | Performed by: INTERNAL MEDICINE

## 2025-06-16 PROCEDURE — 99211 OFF/OP EST MAY X REQ PHY/QHP: CPT | Performed by: UROLOGY

## 2025-06-16 PROCEDURE — 1111F DSCHRG MED/CURRENT MED MERGE: CPT | Performed by: INTERNAL MEDICINE

## 2025-06-16 PROCEDURE — 3074F SYST BP LT 130 MM HG: CPT | Performed by: INTERNAL MEDICINE

## 2025-06-16 PROCEDURE — 1159F MED LIST DOCD IN RCRD: CPT | Performed by: INTERNAL MEDICINE

## 2025-06-16 PROCEDURE — 99496 TRANSJ CARE MGMT HIGH F2F 7D: CPT | Performed by: INTERNAL MEDICINE

## 2025-06-16 PROCEDURE — 1160F RVW MEDS BY RX/DR IN RCRD: CPT | Performed by: INTERNAL MEDICINE

## 2025-06-16 RX ORDER — ALPRAZOLAM 0.5 MG/1
0.5 TABLET ORAL 3 TIMES DAILY PRN
Qty: 45 TABLET | Refills: 0 | Status: SHIPPED | OUTPATIENT
Start: 2025-06-16 | End: 2025-07-16

## 2025-06-16 ASSESSMENT — PATIENT HEALTH QUESTIONNAIRE - PHQ9
2. FEELING DOWN, DEPRESSED OR HOPELESS: NOT AT ALL
SUM OF ALL RESPONSES TO PHQ9 QUESTIONS 1 AND 2: 0
1. LITTLE INTEREST OR PLEASURE IN DOING THINGS: NOT AT ALL

## 2025-06-16 NOTE — PROGRESS NOTES
"Subjective   Minh Harrnigton Jr. is a 85 y.o. male who presents for Hospital Follow-up.    HPI   Hospitalized [facility] 6/7/25 - 6/11/25  Dx: UTI w/o Hematuria, Acute Respiratory Failure w/Hypoxemia, Decompensated HF, Urinary retention requiring Pandya catheter placement.  Labs, CXR, CTA Chest, CT Abdomen Pelvis, EKG, TTE  Med changes: Cipro, Spironolactone, O2  Follow up: PCP, Cardiology, Urology  Saw Urology today. Pandya removed.  Increase Anxiety.    Has been checking SpO2 w/o O2 at home. 93-97% RA.    Review of Systems   Constitutional:  Negative for fatigue and fever.   Respiratory:  Negative for cough and shortness of breath.    Cardiovascular:  Negative for chest pain and leg swelling.   All other systems reviewed and are negative.      Health Maintenance Due   Topic Date Due    Derm Melanoma Skin Check  Never done    Hepatitis A Vaccines (1 of 2 - Risk 2-dose series) Never done    CKD: Urine Protein Screening  Never done    DTaP/Tdap/Td Vaccines (1 - Tdap) Never done    Zoster Vaccines (1 of 2) Never done    Hepatitis B Vaccines (1 of 3 - Risk 3-dose series) Never done    RSV High Risk: (Elderly (60+) or Pregnant Population) (1 - 1-dose 75+ series) Never done    COVID-19 Vaccine (4 - 2024-25 season) 09/01/2024       Objective   BP 92/62   Pulse 104   Temp 36.5 °C (97.7 °F)   Resp 16   Ht 1.778 m (5' 10\")   Wt 74.8 kg (165 lb)   SpO2 99% Comment: 2L via n/c  BMI 23.68 kg/m²     Physical Exam  Vitals and nursing note reviewed.   Constitutional:       Appearance: Normal appearance.   HENT:      Head: Normocephalic.   Eyes:      Conjunctiva/sclera: Conjunctivae normal.      Pupils: Pupils are equal, round, and reactive to light.   Cardiovascular:      Rate and Rhythm: Normal rate and regular rhythm.      Pulses: Normal pulses.      Heart sounds: Normal heart sounds.   Pulmonary:      Effort: Pulmonary effort is normal.      Breath sounds: Normal breath sounds.   Musculoskeletal:         General: No " swelling.      Cervical back: Neck supple.   Skin:     General: Skin is warm and dry.   Neurological:      General: No focal deficit present.      Mental Status: He is oriented to person, place, and time.         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  This report is scanned into the emr.  I have considered the risks of abuse, dependence, addiction and diversion.  Reviewed records  Recheck labs  Cont meds  Answered questions  Fu with uro , cards  Stable based on symptoms and exam.  Continue established treatment plan and follow up at least yearly.

## 2025-06-16 NOTE — PROGRESS NOTES
Pt presented for trial of void and catheter removal. Pt did not tolerate trial of void well. Pt tolerated catheter removal well. Advised pt and spouse to walk around for an hour, drink some water and see if that helped with urination. Pt and spouse agreed to walking around. Pt returned to clinic after one hour. Pt tolerated PVR check well. Advised pt and spouse of normal/abnormal signs and symptoms of urinary retention and when to seek provider or ER care. Pt and spouse stated understanding.

## 2025-06-17 ASSESSMENT — ENCOUNTER SYMPTOMS
SHORTNESS OF BREATH: 0
FATIGUE: 0
COUGH: 0
FEVER: 0

## 2025-06-19 ENCOUNTER — APPOINTMENT (OUTPATIENT)
Dept: CARDIOLOGY | Facility: CLINIC | Age: 85
End: 2025-06-19
Payer: MEDICARE

## 2025-06-19 ENCOUNTER — DOCUMENTATION (OUTPATIENT)
Dept: INPATIENT UNIT | Facility: HOSPITAL | Age: 85
End: 2025-06-19

## 2025-06-19 VITALS
HEART RATE: 76 BPM | HEIGHT: 70 IN | SYSTOLIC BLOOD PRESSURE: 106 MMHG | WEIGHT: 168.4 LBS | BODY MASS INDEX: 24.11 KG/M2 | DIASTOLIC BLOOD PRESSURE: 60 MMHG

## 2025-06-19 DIAGNOSIS — I50.23 ACUTE ON CHRONIC SYSTOLIC (CONGESTIVE) HEART FAILURE: ICD-10-CM

## 2025-06-19 DIAGNOSIS — C67.8 MALIGNANT NEOPLASM OF OVERLAPPING SITES OF BLADDER (MULTI): ICD-10-CM

## 2025-06-19 DIAGNOSIS — I25.5 CARDIOMYOPATHY, ISCHEMIC: ICD-10-CM

## 2025-06-19 DIAGNOSIS — I50.22 CHRONIC SYSTOLIC HEART FAILURE: ICD-10-CM

## 2025-06-19 DIAGNOSIS — I36.1 NONRHEUMATIC TRICUSPID VALVE REGURGITATION: ICD-10-CM

## 2025-06-19 DIAGNOSIS — I71.21 ANEURYSM OF ASCENDING AORTA WITHOUT RUPTURE: ICD-10-CM

## 2025-06-19 DIAGNOSIS — Z87.891 FORMER SMOKER: ICD-10-CM

## 2025-06-19 DIAGNOSIS — Z95.1 HISTORY OF CORONARY ARTERY BYPASS GRAFT: ICD-10-CM

## 2025-06-19 DIAGNOSIS — I34.0 MITRAL VALVE INSUFFICIENCY, UNSPECIFIED ETIOLOGY: ICD-10-CM

## 2025-06-19 DIAGNOSIS — E78.2 MIXED HYPERLIPIDEMIA: ICD-10-CM

## 2025-06-19 DIAGNOSIS — G47.33 OSA (OBSTRUCTIVE SLEEP APNEA): ICD-10-CM

## 2025-06-19 DIAGNOSIS — I25.10 CORONARY ARTERY DISEASE INVOLVING NATIVE CORONARY ARTERY OF NATIVE HEART WITHOUT ANGINA PECTORIS: ICD-10-CM

## 2025-06-19 DIAGNOSIS — I50.22 CHRONIC SYSTOLIC HEART FAILURE: Primary | ICD-10-CM

## 2025-06-19 DIAGNOSIS — I27.20 PULMONARY HYPERTENSION (MULTI): ICD-10-CM

## 2025-06-19 PROCEDURE — 1159F MED LIST DOCD IN RCRD: CPT | Performed by: INTERNAL MEDICINE

## 2025-06-19 PROCEDURE — 3078F DIAST BP <80 MM HG: CPT | Performed by: INTERNAL MEDICINE

## 2025-06-19 PROCEDURE — 3074F SYST BP LT 130 MM HG: CPT | Performed by: INTERNAL MEDICINE

## 2025-06-19 PROCEDURE — 1111F DSCHRG MED/CURRENT MED MERGE: CPT | Performed by: INTERNAL MEDICINE

## 2025-06-19 PROCEDURE — 99214 OFFICE O/P EST MOD 30 MIN: CPT | Performed by: INTERNAL MEDICINE

## 2025-06-19 PROCEDURE — 1036F TOBACCO NON-USER: CPT | Performed by: INTERNAL MEDICINE

## 2025-06-19 RX ORDER — FUROSEMIDE 20 MG/1
20 TABLET ORAL DAILY PRN
Qty: 16 TABLET | Refills: 0 | OUTPATIENT
Start: 2025-06-19 | End: 2025-07-19

## 2025-06-19 NOTE — PATIENT INSTRUCTIONS
Continue same medications and treatments.   Patient educated on proper medication use.   Patient educated on risk factor modification.   Please bring any lab results from other providers / physicians to your next appointment.     Please bring all medicines, vitamins, and herbal supplements with you when you come to the office.     Prescriptions will not be filled unless you are compliant with your follow up appointments or have a follow up appointment scheduled as per instruction of your physician. Refills should be requested at the time of your visit.    CHANGE LASIX TO IF NEEDED    FOLLOW UP IN 3 MONTHS    Samira LEE LPN, am scribing for and in the presence of Dr. Heriberto Valle MD, FACC

## 2025-06-19 NOTE — PROGRESS NOTES
CARDIOLOGY OFFICE VISIT      CHIEF COMPLAINT  Chief Complaint   Patient presents with    Follow-up     2 month follow up for medication review    Hospital Follow-up     Hospital follow up from 6/7/2025-6/11/2025 at [facility] for UTI, decompensated heart failure, and respiratory failure        HISTORY OF PRESENT ILLNESS  The patient states she was admitted to [facility] from 6 7-6 11.  He had urinary tract infection, distended bladder, pneumonia, and exacerbation of his chronic congestive heart failure.  He states that he is doing much better since he has been home.  He states he is back to baseline.  He does not like taking the Lasix.  I told him he can go back to taking it as needed based on his daily weights which we have gone over in the past.  He denies chest discomfort.  His breathing is back to baseline.  He denies palpitations and syncope.  He states he is going to have to start getting treatments in his bladder for his bladder cancer.      Impression:  Coronary disease no angina  Remote CABG  Former smoker  Hyperlipidemia  Chronic systolic congestive heart failure  Permanent atrial fibrillation  Ischemic cardiomyopathy left ventricular ejection fraction 30 to 35% on echocardiogram   Overweight  History of aneurysm of ascending thoracic aorta  Obstructive Sleep Apnea with CPAP   Moderate Mitral Regurgitation  Moderate tricuspid regurgitation  Pulmonary hypertension        Please excuse any errors in grammar or translation related to this dictation.  Voice recognition software was utilized to prepare this document    Past Medical History  Medical History[1]    Social History  Social History[2]    Family History   Family History[3]     Allergies:  RX Allergies[4]     Outpatient Medications:  Current Outpatient Medications   Medication Instructions    ALPRAZolam (XANAX) 0.5 mg, oral, 3 times daily PRN    apixaban (ELIQUIS) 5 mg, 2 times daily    atorvastatin (LIPITOR) 20 mg, oral, Daily, as  directed    ciprofloxacin (CIPRO) 500 mg, oral, 2 times daily    dutasteride (AVODART) 0.5 mg, oral, Daily    furosemide (LASIX) 20 mg, oral, Every other day    levETIRAcetam (Keppra) 500 mg tablet TAKE 1/2 (ONE-HALF) OF A TABLET BY MOUTH DAILY    metoprolol succinate XL (TOPROL-XL) 25 mg, oral, 2 times daily    mirtazapine (REMERON) 15 mg, oral, Nightly    sacubitriL-valsartan (Entresto) 24-26 mg tablet 1 tablet, oral, 2 times daily    sertraline (ZOLOFT) 100 mg, oral, Daily    spironolactone (ALDACTONE) 12.5 mg, oral, Every 24 hours scheduled    tamsulosin (FLOMAX) 0.4 mg, oral, Nightly          REVIEW OF SYSTEMS  Review of Systems   All other systems reviewed and are negative.        VITALS  Vitals:    06/19/25 1523   BP: 106/60   Pulse: 76       PHYSICAL EXAM  Constitutional:       Appearance: Healthy appearance. Not in distress.   Neck:      Vascular: No JVR. JVD normal.   Pulmonary:      Effort: Pulmonary effort is normal.      Breath sounds: Normal breath sounds. No wheezing. No rhonchi. No rales.   Chest:      Chest wall: Not tender to palpatation.   Cardiovascular:      PMI at left midclavicular line. Normal rate. Regular rhythm. Normal S1. Normal S2.       Murmurs: There is a grade 3/6 systolic murmur.      No gallop.  No click. No rub.   Pulses:     Intact distal pulses.   Edema:     Pretibial: bilateral 1+ edema of the pretibial area.     Ankle: bilateral 1+ edema of the ankle.     Feet: bilateral 1+ edema of the feet.  Abdominal:      General: Bowel sounds are normal.      Palpations: Abdomen is soft.      Tenderness: There is no abdominal tenderness.   Musculoskeletal: Normal range of motion.         General: No tenderness. Skin:     General: Skin is warm and dry.   Neurological:      General: No focal deficit present.      Mental Status: Alert and oriented to person, place and time.           ASSESSMENT AND PLAN  Diagnoses and all orders for this visit:  Coronary artery disease involving native  coronary artery of native heart without angina pectoris  Mixed hyperlipidemia  Acute on chronic systolic (congestive) heart failure  Cardiomyopathy, ischemic  Nonrheumatic tricuspid valve regurgitation  History of coronary artery bypass graft  Mitral valve insufficiency, unspecified etiology  Aneurysm of ascending aorta without rupture  Pulmonary hypertension (Multi)  Malignant neoplasm of overlapping sites of bladder (Multi)  WINNIE (obstructive sleep apnea)  Former smoker  BMI 24.0-24.9, adult      [unfilled]      I,Samira Turcios LPN am scribing for, and in the presence of Dr. Heriberto Valle MD, [facility].    I, Dr. Heriberto Valle MD, [facility], personally performed the services described in the documentation as scribed by Samira Turcios LPN in my presence, and confirm it is both accurate and complete.      Dr. Heriberto Martinez MD  Thank you for allowing me to participate in the care of this patient. Please do not hesitate to contact me with any further questions or concerns.         [1]   Past Medical History:  Diagnosis Date    Abnormal ECG     Arrhythmia     Atrial fibrillation (Multi)     Bladder cancer (Multi)     BPH (benign prostatic hyperplasia)     CHF (congestive heart failure)     CKD (chronic kidney disease)     stage 3    Coronary artery disease     COVID-19 2022    COVID-19    Epilepsy, unspecified, not intractable, without status epilepticus 2020    Epilepsy    Hyperlipidemia     Hypertension     Ischemic cardiomyopathy     Myocardial infarction (Multi)     Unspecified convulsions (Multi) 2021    Seizures   [2]   Social History  Tobacco Use    Smoking status: Former     Current packs/day: 0.00     Average packs/day: 1 pack/day for 40.0 years (40.0 ttl pk-yrs)     Types: Cigarettes     Start date: 1957     Quit date:      Years since quittin.4     Passive exposure: Never    Smokeless tobacco: Never   Vaping Use    Vaping status: Never Used    Substance Use Topics    Alcohol use: Not Currently     Alcohol/week: 14.0 standard drinks of alcohol     Types: 14 Standard drinks or equivalent per week    Drug use: Never   [3]   Family History  Problem Relation Name Age of Onset    Heart failure Mother Nana1     Emphysema Father Saroj Sr.     Lung disease Father Saroj Sr.     Bipolar disorder Brother     [4] No Known Allergies

## 2025-06-25 DIAGNOSIS — C67.9 BLADDER CANCER (MULTI): ICD-10-CM

## 2025-06-26 ENCOUNTER — PATIENT OUTREACH (OUTPATIENT)
Dept: PRIMARY CARE | Facility: CLINIC | Age: 85
End: 2025-06-26
Payer: MEDICARE

## 2025-06-26 NOTE — PROGRESS NOTES
Confirmation of at least 2 patient identifiers.    Completed telephonic follow-up with patient after recent visit with Dr Ritter     Spoke to Spouse/significant other during outreach call.    Patient reports feeling: Improved  wife reports pt is back to the gym a few times a week.     Patient has questions or concerns about medications: No    Have all prescribed medications been filled? Yes    Patient has necessary resources to manage their care? Yes    Patient has questions or concerns? No  Pt to start new med for ca they found last month. Per spouse     Next care management follow-up approximately within one month.  Care  information provided to patient.

## 2025-06-27 ENCOUNTER — APPOINTMENT (OUTPATIENT)
Dept: PRIMARY CARE | Facility: CLINIC | Age: 85
End: 2025-06-27
Payer: MEDICARE

## 2025-06-27 ENCOUNTER — PROCEDURE VISIT (OUTPATIENT)
Dept: UROLOGY | Facility: CLINIC | Age: 85
End: 2025-06-27
Payer: MEDICARE

## 2025-06-27 VITALS — DIASTOLIC BLOOD PRESSURE: 68 MMHG | TEMPERATURE: 96.9 F | SYSTOLIC BLOOD PRESSURE: 101 MMHG | HEART RATE: 82 BPM

## 2025-06-27 DIAGNOSIS — C67.8 MALIGNANT NEOPLASM OF OVERLAPPING SITES OF BLADDER (MULTI): ICD-10-CM

## 2025-06-27 DIAGNOSIS — C67.9 MALIGNANT NEOPLASM OF URINARY BLADDER, UNSPECIFIED SITE (MULTI): Primary | ICD-10-CM

## 2025-06-27 DIAGNOSIS — B37.9 YEAST INFECTION: ICD-10-CM

## 2025-06-27 LAB
POC APPEARANCE, URINE: ABNORMAL
POC BILIRUBIN, URINE: NEGATIVE
POC BLOOD, URINE: ABNORMAL
POC COLOR, URINE: YELLOW
POC GLUCOSE, URINE: NEGATIVE MG/DL
POC KETONES, URINE: NEGATIVE MG/DL
POC LEUKOCYTES, URINE: ABNORMAL
POC NITRITE,URINE: NEGATIVE
POC PH, URINE: 5.5 PH
POC PROTEIN, URINE: NEGATIVE MG/DL
POC SPECIFIC GRAVITY, URINE: <=1.005
POC UROBILINOGEN, URINE: 0.2 EU/DL

## 2025-06-27 PROCEDURE — 81003 URINALYSIS AUTO W/O SCOPE: CPT | Performed by: NURSE PRACTITIONER

## 2025-06-27 PROCEDURE — 51720 TREATMENT OF BLADDER LESION: CPT | Performed by: NURSE PRACTITIONER

## 2025-06-27 RX ORDER — ALBUTEROL SULFATE 0.83 MG/ML
3 SOLUTION RESPIRATORY (INHALATION) AS NEEDED
Status: DISCONTINUED | OUTPATIENT
Start: 2025-06-27 | End: 2025-06-27 | Stop reason: HOSPADM

## 2025-06-27 RX ORDER — EPINEPHRINE 0.3 MG/.3ML
0.3 INJECTION SUBCUTANEOUS EVERY 5 MIN PRN
Status: DISCONTINUED | OUTPATIENT
Start: 2025-06-27 | End: 2025-06-27 | Stop reason: HOSPADM

## 2025-06-27 RX ORDER — NYSTATIN 100000 [USP'U]/G
1 POWDER TOPICAL 2 TIMES DAILY
Qty: 45 G | Refills: 1 | Status: SHIPPED | OUTPATIENT
Start: 2025-06-27 | End: 2026-06-27

## 2025-06-27 RX ORDER — FAMOTIDINE 10 MG/ML
20 INJECTION, SOLUTION INTRAVENOUS ONCE AS NEEDED
Status: DISCONTINUED | OUTPATIENT
Start: 2025-06-27 | End: 2025-06-27 | Stop reason: HOSPADM

## 2025-06-27 RX ORDER — DIPHENHYDRAMINE HYDROCHLORIDE 50 MG/ML
50 INJECTION, SOLUTION INTRAMUSCULAR; INTRAVENOUS AS NEEDED
Status: DISCONTINUED | OUTPATIENT
Start: 2025-06-27 | End: 2025-06-27 | Stop reason: HOSPADM

## 2025-06-27 NOTE — PROGRESS NOTES
Patient ID: Minh Harrington Jr. is a 85 y.o. male.    Bladder Catheterization    Date/Time: 6/27/2025 10:44 AM    Performed by: VANIA Lee  Authorized by: VANIA Lee    Procedure Details    Procedure: bladder instillation      Catheter type: coudé      Catheter size: 14 Fr    Urine characteristics: clear and yellow    Post-Procedure Details     Outcome: patient tolerated procedure well with no complications      Post-procedure interventions: post-procedure education provided      Disposition: discharged home in satisfactory condition      Additional Details      BCG 1/6        Procedure Visit on 06/27/2025   Component Date Value Ref Range Status    POC Color, Urine 06/27/2025 Yellow  Straw, Yellow, Light-Yellow Final    POC Appearance, Urine 06/27/2025 Cloudy (A)  Clear Final    POC Glucose, Urine 06/27/2025 NEGATIVE  NEGATIVE mg/dl Final    POC Bilirubin, Urine 06/27/2025 NEGATIVE  NEGATIVE Final    POC Ketones, Urine 06/27/2025 NEGATIVE  NEGATIVE mg/dl Final    POC Specific Gravity, Urine 06/27/2025 <=1.005  1.005 - 1.035 Final    POC Blood, Urine 06/27/2025 LARGE (3+) (A)  NEGATIVE Final    POC PH, Urine 06/27/2025 5.5  No Reference Range Established PH Final    POC Protein, Urine 06/27/2025 NEGATIVE  NEGATIVE mg/dl Final    POC Urobilinogen, Urine 06/27/2025 0.2  0.2, 1.0 EU/DL Final    Poc Nitrite, Urine 06/27/2025 NEGATIVE  NEGATIVE Final    POC Leukocytes, Urine 06/27/2025 MODERATE (2+) (A)  NEGATIVE Final

## 2025-06-30 ENCOUNTER — APPOINTMENT (OUTPATIENT)
Dept: UROLOGY | Facility: CLINIC | Age: 85
End: 2025-06-30
Payer: MEDICARE

## 2025-07-02 DIAGNOSIS — C67.9 BLADDER CANCER (MULTI): ICD-10-CM

## 2025-07-03 ENCOUNTER — APPOINTMENT (OUTPATIENT)
Dept: UROLOGY | Facility: CLINIC | Age: 85
End: 2025-07-03
Payer: MEDICARE

## 2025-07-03 DIAGNOSIS — C67.9 MALIGNANT NEOPLASM OF URINARY BLADDER, UNSPECIFIED SITE (MULTI): Primary | ICD-10-CM

## 2025-07-03 DIAGNOSIS — C67.8 MALIGNANT NEOPLASM OF OVERLAPPING SITES OF BLADDER (MULTI): ICD-10-CM

## 2025-07-03 LAB
POC APPEARANCE, URINE: CLEAR
POC BILIRUBIN, URINE: NEGATIVE
POC BLOOD, URINE: ABNORMAL
POC COLOR, URINE: YELLOW
POC GLUCOSE, URINE: NEGATIVE MG/DL
POC KETONES, URINE: NEGATIVE MG/DL
POC LEUKOCYTES, URINE: ABNORMAL
POC NITRITE,URINE: NEGATIVE
POC PH, URINE: 5.5 PH
POC PROTEIN, URINE: NEGATIVE MG/DL
POC SPECIFIC GRAVITY, URINE: 1.01
POC UROBILINOGEN, URINE: 0.2 EU/DL

## 2025-07-03 RX ORDER — DIPHENHYDRAMINE HYDROCHLORIDE 50 MG/ML
50 INJECTION, SOLUTION INTRAMUSCULAR; INTRAVENOUS AS NEEDED
Status: DISCONTINUED | OUTPATIENT
Start: 2025-07-03 | End: 2025-07-03 | Stop reason: HOSPADM

## 2025-07-03 RX ORDER — FAMOTIDINE 10 MG/ML
20 INJECTION, SOLUTION INTRAVENOUS ONCE AS NEEDED
Status: DISCONTINUED | OUTPATIENT
Start: 2025-07-03 | End: 2025-07-03 | Stop reason: HOSPADM

## 2025-07-03 RX ORDER — EPINEPHRINE 0.3 MG/.3ML
0.3 INJECTION SUBCUTANEOUS EVERY 5 MIN PRN
Status: DISCONTINUED | OUTPATIENT
Start: 2025-07-03 | End: 2025-07-03 | Stop reason: HOSPADM

## 2025-07-03 RX ORDER — ALBUTEROL SULFATE 0.83 MG/ML
3 SOLUTION RESPIRATORY (INHALATION) AS NEEDED
Status: DISCONTINUED | OUTPATIENT
Start: 2025-07-03 | End: 2025-07-03 | Stop reason: HOSPADM

## 2025-07-03 NOTE — PROGRESS NOTES
Patient here for 2/6 BCG induction. Informed consent completed by provider prior to start of treatment series. Pt denies signs or symptoms of UTI. Urine dipped prior to administration of BCG. 14 Fr catheter inserted by sterile technique. Urine drained clear, yellow about 400 ml.  50mg BCG reconstituted in 50 mL of preservative saline, administered intravesically then catheter was removed. Patient tolerated procedure well. Home going instructions reviewed. Pt verbalized understanding and will call with any questions or concerns.

## 2025-07-09 DIAGNOSIS — C67.8 MALIGNANT NEOPLASM OF OVERLAPPING SITES OF BLADDER (MULTI): ICD-10-CM

## 2025-07-11 ENCOUNTER — APPOINTMENT (OUTPATIENT)
Dept: UROLOGY | Facility: CLINIC | Age: 85
End: 2025-07-11
Payer: MEDICARE

## 2025-07-11 DIAGNOSIS — C67.8 MALIGNANT NEOPLASM OF OVERLAPPING SITES OF BLADDER (MULTI): ICD-10-CM

## 2025-07-11 DIAGNOSIS — C67.9 MALIGNANT NEOPLASM OF URINARY BLADDER, UNSPECIFIED SITE (MULTI): Primary | ICD-10-CM

## 2025-07-11 LAB
POC APPEARANCE, URINE: CLEAR
POC BILIRUBIN, URINE: NEGATIVE
POC BLOOD, URINE: ABNORMAL
POC COLOR, URINE: YELLOW
POC GLUCOSE, URINE: NEGATIVE MG/DL
POC KETONES, URINE: NEGATIVE MG/DL
POC LEUKOCYTES, URINE: ABNORMAL
POC NITRITE,URINE: NEGATIVE
POC PH, URINE: 7 PH
POC PROTEIN, URINE: ABNORMAL MG/DL
POC SPECIFIC GRAVITY, URINE: 1.01
POC UROBILINOGEN, URINE: 0.2 EU/DL

## 2025-07-11 RX ORDER — EPINEPHRINE 0.3 MG/.3ML
0.3 INJECTION SUBCUTANEOUS EVERY 5 MIN PRN
Status: DISCONTINUED | OUTPATIENT
Start: 2025-07-11 | End: 2025-07-11 | Stop reason: HOSPADM

## 2025-07-11 RX ORDER — DIPHENHYDRAMINE HYDROCHLORIDE 50 MG/ML
50 INJECTION, SOLUTION INTRAMUSCULAR; INTRAVENOUS AS NEEDED
Status: DISCONTINUED | OUTPATIENT
Start: 2025-07-11 | End: 2025-07-11 | Stop reason: HOSPADM

## 2025-07-11 RX ORDER — FAMOTIDINE 10 MG/ML
20 INJECTION, SOLUTION INTRAVENOUS ONCE AS NEEDED
Status: DISCONTINUED | OUTPATIENT
Start: 2025-07-11 | End: 2025-07-11 | Stop reason: HOSPADM

## 2025-07-11 RX ORDER — ALBUTEROL SULFATE 0.83 MG/ML
3 SOLUTION RESPIRATORY (INHALATION) AS NEEDED
Status: DISCONTINUED | OUTPATIENT
Start: 2025-07-11 | End: 2025-07-11 | Stop reason: HOSPADM

## 2025-07-11 NOTE — PROGRESS NOTES
Patient here for 3/6 BCG induction. Informed consent completed by provider prior to start of treatment series. Pt denies signs or symptoms of UTI. Urine dipped prior to administration of BCG. 14 Fr catheter inserted by sterile technique. Urine drained clear, yellow 400 ml.  50mg BCG reconstituted in 50 mL of preservative saline, administered intravesically then catheter was removed. Patient tolerated procedure well. Home going instructions reviewed. Pt verbalized understanding and will call with any questions or concerns.

## 2025-07-14 ENCOUNTER — APPOINTMENT (OUTPATIENT)
Dept: PRIMARY CARE | Facility: CLINIC | Age: 85
End: 2025-07-14
Payer: MEDICARE

## 2025-07-14 VITALS
HEART RATE: 76 BPM | HEIGHT: 70 IN | SYSTOLIC BLOOD PRESSURE: 114 MMHG | WEIGHT: 170 LBS | TEMPERATURE: 97.9 F | BODY MASS INDEX: 24.34 KG/M2 | OXYGEN SATURATION: 95 % | RESPIRATION RATE: 16 BRPM | DIASTOLIC BLOOD PRESSURE: 68 MMHG

## 2025-07-14 DIAGNOSIS — I48.91 ATRIAL FIBRILLATION WITH CONTROLLED VENTRICULAR RESPONSE (MULTI): ICD-10-CM

## 2025-07-14 DIAGNOSIS — C67.8 MALIGNANT NEOPLASM OF OVERLAPPING SITES OF BLADDER (MULTI): Primary | ICD-10-CM

## 2025-07-14 DIAGNOSIS — F41.9 ANXIETY: ICD-10-CM

## 2025-07-14 DIAGNOSIS — E78.2 MIXED HYPERLIPIDEMIA: ICD-10-CM

## 2025-07-14 PROCEDURE — 3078F DIAST BP <80 MM HG: CPT | Performed by: INTERNAL MEDICINE

## 2025-07-14 PROCEDURE — G2211 COMPLEX E/M VISIT ADD ON: HCPCS | Performed by: INTERNAL MEDICINE

## 2025-07-14 PROCEDURE — 1159F MED LIST DOCD IN RCRD: CPT | Performed by: INTERNAL MEDICINE

## 2025-07-14 PROCEDURE — 1160F RVW MEDS BY RX/DR IN RCRD: CPT | Performed by: INTERNAL MEDICINE

## 2025-07-14 PROCEDURE — 3074F SYST BP LT 130 MM HG: CPT | Performed by: INTERNAL MEDICINE

## 2025-07-14 PROCEDURE — 99214 OFFICE O/P EST MOD 30 MIN: CPT | Performed by: INTERNAL MEDICINE

## 2025-07-14 RX ORDER — ALPRAZOLAM 0.5 MG/1
0.5 TABLET ORAL 3 TIMES DAILY PRN
Qty: 45 TABLET | Refills: 0 | Status: SHIPPED | OUTPATIENT
Start: 2025-07-14 | End: 2025-08-13

## 2025-07-14 RX ORDER — METOPROLOL SUCCINATE 25 MG/1
25 TABLET, EXTENDED RELEASE ORAL 2 TIMES DAILY
Qty: 180 TABLET | Refills: 3 | Status: SHIPPED | OUTPATIENT
Start: 2025-07-14

## 2025-07-14 RX ORDER — ATORVASTATIN CALCIUM 20 MG/1
20 TABLET, FILM COATED ORAL DAILY
Qty: 90 TABLET | Refills: 3 | Status: SHIPPED | OUTPATIENT
Start: 2025-07-14

## 2025-07-14 NOTE — PROGRESS NOTES
Subjective   Minh Harrington Jr. is a 85 y.o. male who presents for Follow-up.    HPI     OARRS:  Isabel Ritter DO on 7/15/2025  9:00 AM  I have personally reviewed the OARRS report for Minh Harrington Jr.. I have considered the risks of abuse, dependence, addiction and diversion    Is the patient prescribed a combination of a benzodiazepine and opioid?  No    Last Urine Drug Screen / ordered today: No  Recent Results (from the past 8760 hours)   Confirmation Opiate/Opioid/Benzo Prescription Compliance    Collection Time: 10/30/24  3:29 PM   Result Value Ref Range    Clonazepam <25 <25 ng/mL    7-Aminoclonazepam <25 <25 ng/mL    Alprazolam 33 (H) <25 ng/mL    Alpha-Hydroxyalprazolam 89 (H) <25 ng/mL    Midazolam <25 <25 ng/mL    Alpha-Hydroxymidazolam <25 <25 ng/mL    Chlordiazepoxide <25 <25 ng/mL    Diazepam <25 <25 ng/mL    Nordiazepam <25 <25 ng/mL    Temazepam <25 <25 ng/mL    Oxazepam <25 <25 ng/mL    Lorazepam <25 <25 ng/mL    Methadone <25 <25 ng/mL    EDDP <25 <25 ng/mL    6-Acetylmorphine <25 <25 ng/mL    Codeine <50 <50 ng/mL    Hydrocodone <25 <25 ng/mL    Hydromorphone <25 <25 ng/mL    Morphine  <50 <50 ng/mL    Norhydrocodone <25 <25 ng/mL    Noroxycodone <25 <25 ng/mL    Oxycodone <25 <25 ng/mL    Oxymorphone <25 <25 ng/mL    Fentanyl <2.5 <2.5 ng/mL    Norfentanyl <2.5 <2.5 ng/mL    Tramadol <50 <50 ng/mL    O-Desmethyltramadol <50 <50 ng/mL    Zolpidem <25 <25 ng/mL    Zolpidem Metabolite (ZCA) <25 <25 ng/mL   Screen Opiate/Opioid/Benzo Prescription Compliance    Collection Time: 10/30/24  3:29 PM   Result Value Ref Range    Creatinine, Urine Random 71.8 20.0 - 370.0 mg/dL    Amphetamine Screen, Urine Presumptive Negative Presumptive Negative    Barbiturate Screen, Urine Presumptive Negative Presumptive Negative    Cannabinoid Screen, Urine Presumptive Negative Presumptive Negative    Cocaine Metabolite Screen, Urine Presumptive Negative Presumptive Negative    PCP Screen, Urine  "Presumptive Negative Presumptive Negative     Results are as expected.     Clinical rationale for not completing a Urine Drug Screen: utd      Controlled Substance Agreement:  Date of the Last Agreement: 10/30/2024  Reviewed Controlled Substance Agreement including but not limited to the benefits, risks, and alternatives to treatment with a Controlled Substance medication(s).    Benzodiazepines:  What is the patient's goal of therapy? Anxiety relief  Is this being achieved with current treatment? yes    SANFORD-7:  No data recorded    Activities of Daily Living:   Is your overall impression that this patient is benefiting (symptom reduction outweighs side effects) from benzodiazepine therapy? Yes     1. Physical Functioning: Better  2. Family Relationship: Better  3. Social Relationship: Better  4. Mood: Better  5. Sleep Patterns: Better  6. Overall Function: Better  Review of Systems   Constitutional:  Positive for fatigue. Negative for fever.   Respiratory:  Negative for cough and shortness of breath.    Cardiovascular:  Negative for chest pain and leg swelling.   All other systems reviewed and are negative.      Health Maintenance Due   Topic Date Due    Hepatitis A Vaccines (1 of 2 - Risk 2-dose series) Never done    CKD: Urine Protein Screening  Never done    DTaP/Tdap/Td Vaccines (1 - Tdap) Never done    Zoster Vaccines (1 of 2) Never done    Hepatitis B Vaccines (1 of 3 - Risk 3-dose series) Never done    RSV High Risk: (Elderly (60+) or Pregnant Population) (1 - 1-dose 75+ series) Never done    COVID-19 Vaccine (4 - 2024-25 season) 09/01/2024       Objective   /68   Pulse 76   Temp 36.6 °C (97.9 °F)   Resp 16   Ht 1.778 m (5' 10\")   Wt 77.1 kg (170 lb)   SpO2 95%   BMI 24.39 kg/m²     Physical Exam  Vitals and nursing note reviewed.   Constitutional:       Appearance: Normal appearance.   HENT:      Head: Normocephalic.   Eyes:      Conjunctiva/sclera: Conjunctivae normal.      Pupils: Pupils are " equal, round, and reactive to light.   Cardiovascular:      Rate and Rhythm: Normal rate and regular rhythm.      Pulses: Normal pulses.      Heart sounds: Normal heart sounds.   Pulmonary:      Effort: Pulmonary effort is normal.      Breath sounds: Normal breath sounds.   Musculoskeletal:         General: No swelling.      Cervical back: Neck supple.   Skin:     General: Skin is warm and dry.   Neurological:      General: No focal deficit present.      Mental Status: He is oriented to person, place, and time.         Assessment/Plan   Problem List Items Addressed This Visit       Anxiety    Relevant Medications    ALPRAZolam (Xanax) 0.5 mg tablet    HLD (hyperlipidemia)    Relevant Medications    atorvastatin (Lipitor) 20 mg tablet    Atrial fibrillation with controlled ventricular response (Multi)    Relevant Medications    metoprolol succinate XL (Toprol-XL) 25 mg 24 hr tablet    Bladder cancer (Multi) - Primary     Reviewed records  I have personally reviewed patients OARRS report.  This report is scanned into the emr.  I have considered the risks of abuse, dependence, addiction and diversion.  Reassurance  Cont with bcg treatments   Consider straight cath prn  Pt has diarrhea after treatments  If ok with urology will rx lomotil or immodium but not sure if ok due to bcg treatments  Stable based on symptoms and exam.  Continue established treatment plan and follow up at least yearly.

## 2025-07-15 ASSESSMENT — ENCOUNTER SYMPTOMS
COUGH: 0
FATIGUE: 1
SHORTNESS OF BREATH: 0
FEVER: 0

## 2025-07-16 DIAGNOSIS — C67.8 MALIGNANT NEOPLASM OF OVERLAPPING SITES OF BLADDER (MULTI): ICD-10-CM

## 2025-07-18 ENCOUNTER — APPOINTMENT (OUTPATIENT)
Dept: UROLOGY | Facility: CLINIC | Age: 85
End: 2025-07-18
Payer: MEDICARE

## 2025-07-18 DIAGNOSIS — C67.8 MALIGNANT NEOPLASM OF OVERLAPPING SITES OF BLADDER (MULTI): ICD-10-CM

## 2025-07-18 DIAGNOSIS — C67.9 MALIGNANT NEOPLASM OF URINARY BLADDER, UNSPECIFIED SITE (MULTI): Primary | ICD-10-CM

## 2025-07-18 LAB
POC APPEARANCE, URINE: ABNORMAL
POC BILIRUBIN, URINE: NEGATIVE
POC BLOOD, URINE: ABNORMAL
POC COLOR, URINE: ABNORMAL
POC GLUCOSE, URINE: NEGATIVE MG/DL
POC KETONES, URINE: NEGATIVE MG/DL
POC LEUKOCYTES, URINE: ABNORMAL
POC NITRITE,URINE: NEGATIVE
POC PH, URINE: 6 PH
POC PROTEIN, URINE: ABNORMAL MG/DL
POC SPECIFIC GRAVITY, URINE: 1.02
POC UROBILINOGEN, URINE: 0.2 EU/DL

## 2025-07-18 RX ORDER — DIPHENHYDRAMINE HYDROCHLORIDE 50 MG/ML
50 INJECTION, SOLUTION INTRAMUSCULAR; INTRAVENOUS AS NEEDED
Status: DISCONTINUED | OUTPATIENT
Start: 2025-07-18 | End: 2025-07-21 | Stop reason: HOSPADM

## 2025-07-18 RX ORDER — ALBUTEROL SULFATE 0.83 MG/ML
3 SOLUTION RESPIRATORY (INHALATION) AS NEEDED
Status: DISCONTINUED | OUTPATIENT
Start: 2025-07-18 | End: 2025-07-21 | Stop reason: HOSPADM

## 2025-07-18 RX ORDER — FAMOTIDINE 10 MG/ML
20 INJECTION, SOLUTION INTRAVENOUS ONCE AS NEEDED
Status: DISCONTINUED | OUTPATIENT
Start: 2025-07-18 | End: 2025-07-21 | Stop reason: HOSPADM

## 2025-07-18 RX ORDER — EPINEPHRINE 0.3 MG/.3ML
0.3 INJECTION SUBCUTANEOUS EVERY 5 MIN PRN
Status: DISCONTINUED | OUTPATIENT
Start: 2025-07-18 | End: 2025-07-21 | Stop reason: HOSPADM

## 2025-07-18 NOTE — PROGRESS NOTES
Patient here for 4/6 BCG induction. Informed consent completed by provider prior to start of treatment series. Pt denies signs or symptoms of UTI. Urine dipped prior to administration of BCG. 14 Fr catheter inserted by sterile technique. Urine drained dark yellow 200 ml.  50mg BCG reconstituted in 50 mL of preservative saline, administered intravesically then catheter was removed. Patient tolerated procedure well. Home going instructions reviewed. Pt verbalized understanding and will call with any questions or concerns.

## 2025-07-23 DIAGNOSIS — C67.8 MALIGNANT NEOPLASM OF OVERLAPPING SITES OF BLADDER (MULTI): ICD-10-CM

## 2025-07-24 ENCOUNTER — APPOINTMENT (OUTPATIENT)
Dept: UROLOGY | Facility: CLINIC | Age: 85
End: 2025-07-24
Payer: MEDICARE

## 2025-07-24 DIAGNOSIS — C67.8 MALIGNANT NEOPLASM OF OVERLAPPING SITES OF BLADDER (MULTI): ICD-10-CM

## 2025-07-24 DIAGNOSIS — C67.9 MALIGNANT NEOPLASM OF URINARY BLADDER, UNSPECIFIED SITE (MULTI): Primary | ICD-10-CM

## 2025-07-24 PROCEDURE — 51720 TREATMENT OF BLADDER LESION: CPT | Performed by: UROLOGY

## 2025-07-24 PROCEDURE — 81002 URINALYSIS NONAUTO W/O SCOPE: CPT | Performed by: UROLOGY

## 2025-07-24 RX ORDER — ALBUTEROL SULFATE 0.83 MG/ML
3 SOLUTION RESPIRATORY (INHALATION) AS NEEDED
Status: DISCONTINUED | OUTPATIENT
Start: 2025-07-24 | End: 2025-07-24 | Stop reason: HOSPADM

## 2025-07-24 RX ORDER — EPINEPHRINE 0.3 MG/.3ML
0.3 INJECTION SUBCUTANEOUS EVERY 5 MIN PRN
Status: DISCONTINUED | OUTPATIENT
Start: 2025-07-24 | End: 2025-07-24 | Stop reason: HOSPADM

## 2025-07-24 RX ORDER — DIPHENHYDRAMINE HYDROCHLORIDE 50 MG/ML
50 INJECTION, SOLUTION INTRAMUSCULAR; INTRAVENOUS AS NEEDED
Status: DISCONTINUED | OUTPATIENT
Start: 2025-07-24 | End: 2025-07-24 | Stop reason: HOSPADM

## 2025-07-24 RX ORDER — FAMOTIDINE 10 MG/ML
20 INJECTION, SOLUTION INTRAVENOUS ONCE AS NEEDED
Status: DISCONTINUED | OUTPATIENT
Start: 2025-07-24 | End: 2025-07-24 | Stop reason: HOSPADM

## 2025-07-24 NOTE — PROGRESS NOTES
Patient here for 5/6 BCG induction. Informed consent completed by provider prior to start of treatment series. Pt denies signs or symptoms of UTI. Urine dipped prior to administration of BCG. 14 Fr catheter inserted by sterile technique. Urine drained clear, yellow 325 ml.  50mg BCG reconstituted in 50 mL of preservative saline, administered intravesically then catheter was removed. Patient tolerated procedure well. Home going instructions reviewed. Pt verbalized understanding and will call with any questions or concerns.

## 2025-07-25 ENCOUNTER — APPOINTMENT (OUTPATIENT)
Dept: UROLOGY | Facility: CLINIC | Age: 85
End: 2025-07-25
Payer: MEDICARE

## 2025-07-30 DIAGNOSIS — C67.8 MALIGNANT NEOPLASM OF OVERLAPPING SITES OF BLADDER (MULTI): ICD-10-CM

## 2025-08-01 ENCOUNTER — APPOINTMENT (OUTPATIENT)
Dept: UROLOGY | Facility: CLINIC | Age: 85
End: 2025-08-01
Payer: MEDICARE

## 2025-08-01 VITALS — DIASTOLIC BLOOD PRESSURE: 77 MMHG | HEART RATE: 120 BPM | SYSTOLIC BLOOD PRESSURE: 116 MMHG

## 2025-08-01 DIAGNOSIS — C67.9 MALIGNANT NEOPLASM OF URINARY BLADDER, UNSPECIFIED SITE (MULTI): ICD-10-CM

## 2025-08-01 DIAGNOSIS — C67.8 MALIGNANT NEOPLASM OF OVERLAPPING SITES OF BLADDER (MULTI): Primary | ICD-10-CM

## 2025-08-01 LAB
POC APPEARANCE, URINE: CLEAR
POC BILIRUBIN, URINE: NEGATIVE
POC BLOOD, URINE: ABNORMAL
POC COLOR, URINE: YELLOW
POC GLUCOSE, URINE: NEGATIVE MG/DL
POC KETONES, URINE: NEGATIVE MG/DL
POC LEUKOCYTES, URINE: ABNORMAL
POC NITRITE,URINE: NEGATIVE
POC PH, URINE: 5.5 PH
POC PROTEIN, URINE: ABNORMAL MG/DL
POC SPECIFIC GRAVITY, URINE: 1.02
POC UROBILINOGEN, URINE: 0.2 EU/DL

## 2025-08-01 PROCEDURE — 51720 TREATMENT OF BLADDER LESION: CPT | Performed by: NURSE PRACTITIONER

## 2025-08-01 PROCEDURE — 81003 URINALYSIS AUTO W/O SCOPE: CPT | Performed by: NURSE PRACTITIONER

## 2025-08-01 RX ORDER — EPINEPHRINE 0.3 MG/.3ML
0.3 INJECTION SUBCUTANEOUS EVERY 5 MIN PRN
Status: DISCONTINUED | OUTPATIENT
Start: 2025-08-01 | End: 2025-08-01 | Stop reason: HOSPADM

## 2025-08-01 RX ORDER — FAMOTIDINE 10 MG/ML
20 INJECTION, SOLUTION INTRAVENOUS ONCE AS NEEDED
Status: DISCONTINUED | OUTPATIENT
Start: 2025-08-01 | End: 2025-08-01 | Stop reason: HOSPADM

## 2025-08-01 RX ORDER — ALBUTEROL SULFATE 0.83 MG/ML
3 SOLUTION RESPIRATORY (INHALATION) AS NEEDED
Status: DISCONTINUED | OUTPATIENT
Start: 2025-08-01 | End: 2025-08-01 | Stop reason: HOSPADM

## 2025-08-01 RX ORDER — DIPHENHYDRAMINE HYDROCHLORIDE 50 MG/ML
50 INJECTION, SOLUTION INTRAMUSCULAR; INTRAVENOUS AS NEEDED
Status: DISCONTINUED | OUTPATIENT
Start: 2025-08-01 | End: 2025-08-01 | Stop reason: HOSPADM

## 2025-08-01 NOTE — PROGRESS NOTES
Procedure Visit on 08/01/2025   Component Date Value Ref Range Status    POC Color, Urine 08/01/2025 Yellow  Straw, Yellow, Light-Yellow Final    POC Appearance, Urine 08/01/2025 Clear  Clear Final    POC Glucose, Urine 08/01/2025 NEGATIVE  NEGATIVE mg/dl Final    POC Bilirubin, Urine 08/01/2025 NEGATIVE  NEGATIVE Final    POC Ketones, Urine 08/01/2025 NEGATIVE  NEGATIVE mg/dl Final    POC Specific Gravity, Urine 08/01/2025 1.020  1.005 - 1.035 Final    POC Blood, Urine 08/01/2025 LARGE (3+) (A)  NEGATIVE Final    POC PH, Urine 08/01/2025 5.5  No Reference Range Established PH Final    POC Protein, Urine 08/01/2025 30 (1+) (A)  NEGATIVE mg/dl Final    POC Urobilinogen, Urine 08/01/2025 0.2  0.2, 1.0 EU/DL Final    Poc Nitrite, Urine 08/01/2025 NEGATIVE  NEGATIVE Final    POC Leukocytes, Urine 08/01/2025 SMALL (1+) (A)  NEGATIVE Final     Patient ID: Minh Rickettslona Hardy is a 85 y.o. male.    Bladder Catheterization    Date/Time: 8/1/2025 10:53 AM    Performed by: VANIA Lee  Authorized by: VANIA Lee    Procedure Details    Procedure: bladder instillation      Catheter type: coudé      Catheter size: 14 Fr    Urine characteristics: clear and yellow    Additional Details      BCG 6/6

## 2025-08-04 DIAGNOSIS — R56.9 UNSPECIFIED CONVULSIONS (MULTI): ICD-10-CM

## 2025-08-04 RX ORDER — LEVETIRACETAM 500 MG/1
TABLET ORAL
Qty: 45 TABLET | Refills: 3 | Status: SHIPPED | OUTPATIENT
Start: 2025-08-04

## 2025-08-20 DIAGNOSIS — F41.9 ANXIETY: ICD-10-CM

## 2025-08-20 RX ORDER — ALPRAZOLAM 0.5 MG/1
0.5 TABLET ORAL 3 TIMES DAILY PRN
Qty: 45 TABLET | Refills: 0 | Status: SHIPPED | OUTPATIENT
Start: 2025-08-20 | End: 2025-09-19

## 2025-08-27 ENCOUNTER — PATIENT OUTREACH (OUTPATIENT)
Dept: PRIMARY CARE | Facility: CLINIC | Age: 85
End: 2025-08-27
Payer: MEDICARE

## 2025-08-28 ENCOUNTER — PROCEDURE VISIT (OUTPATIENT)
Dept: UROLOGY | Facility: CLINIC | Age: 85
End: 2025-08-28
Payer: MEDICARE

## 2025-08-28 VITALS — SYSTOLIC BLOOD PRESSURE: 122 MMHG | DIASTOLIC BLOOD PRESSURE: 73 MMHG | HEART RATE: 73 BPM

## 2025-08-28 DIAGNOSIS — C67.1 MALIGNANT NEOPLASM OF DOME OF URINARY BLADDER (MULTI): ICD-10-CM

## 2025-08-28 DIAGNOSIS — C67.8 MALIGNANT NEOPLASM OF OVERLAPPING SITES OF BLADDER (MULTI): ICD-10-CM

## 2025-08-28 DIAGNOSIS — N13.8 BPH WITH OBSTRUCTION/LOWER URINARY TRACT SYMPTOMS: Primary | ICD-10-CM

## 2025-08-28 DIAGNOSIS — N40.1 BPH WITH OBSTRUCTION/LOWER URINARY TRACT SYMPTOMS: Primary | ICD-10-CM

## 2025-08-28 PROCEDURE — 52000 CYSTOURETHROSCOPY: CPT | Performed by: UROLOGY

## 2025-08-28 PROCEDURE — 99214 OFFICE O/P EST MOD 30 MIN: CPT | Mod: 25 | Performed by: UROLOGY

## 2025-08-28 PROCEDURE — 99214 OFFICE O/P EST MOD 30 MIN: CPT | Performed by: UROLOGY

## 2025-08-28 RX ORDER — EPINEPHRINE 0.3 MG/.3ML
0.3 INJECTION SUBCUTANEOUS EVERY 5 MIN PRN
OUTPATIENT
Start: 2025-10-31

## 2025-08-28 RX ORDER — EPINEPHRINE 0.3 MG/.3ML
0.3 INJECTION SUBCUTANEOUS EVERY 5 MIN PRN
OUTPATIENT
Start: 2025-11-14

## 2025-08-28 RX ORDER — DIPHENHYDRAMINE HYDROCHLORIDE 50 MG/ML
50 INJECTION, SOLUTION INTRAMUSCULAR; INTRAVENOUS AS NEEDED
OUTPATIENT
Start: 2025-11-14

## 2025-08-28 RX ORDER — EPINEPHRINE 0.3 MG/.3ML
0.3 INJECTION SUBCUTANEOUS EVERY 5 MIN PRN
OUTPATIENT
Start: 2025-11-07

## 2025-08-28 RX ORDER — FAMOTIDINE 10 MG/ML
20 INJECTION, SOLUTION INTRAVENOUS ONCE AS NEEDED
OUTPATIENT
Start: 2025-10-31

## 2025-08-28 RX ORDER — DIPHENHYDRAMINE HYDROCHLORIDE 50 MG/ML
50 INJECTION, SOLUTION INTRAMUSCULAR; INTRAVENOUS AS NEEDED
OUTPATIENT
Start: 2025-11-07

## 2025-08-28 RX ORDER — ALBUTEROL SULFATE 0.83 MG/ML
3 SOLUTION RESPIRATORY (INHALATION) AS NEEDED
OUTPATIENT
Start: 2025-11-07

## 2025-08-28 RX ORDER — ALBUTEROL SULFATE 0.83 MG/ML
3 SOLUTION RESPIRATORY (INHALATION) AS NEEDED
OUTPATIENT
Start: 2025-10-31

## 2025-08-28 RX ORDER — FAMOTIDINE 10 MG/ML
20 INJECTION, SOLUTION INTRAVENOUS ONCE AS NEEDED
OUTPATIENT
Start: 2025-11-07

## 2025-08-28 RX ORDER — FAMOTIDINE 10 MG/ML
20 INJECTION, SOLUTION INTRAVENOUS ONCE AS NEEDED
OUTPATIENT
Start: 2025-11-14

## 2025-08-28 RX ORDER — ALBUTEROL SULFATE 0.83 MG/ML
3 SOLUTION RESPIRATORY (INHALATION) AS NEEDED
OUTPATIENT
Start: 2025-11-14

## 2025-08-28 RX ORDER — DIPHENHYDRAMINE HYDROCHLORIDE 50 MG/ML
50 INJECTION, SOLUTION INTRAMUSCULAR; INTRAVENOUS AS NEEDED
OUTPATIENT
Start: 2025-10-31

## 2025-08-31 ENCOUNTER — OFFICE VISIT (OUTPATIENT)
Dept: URGENT CARE | Age: 85
End: 2025-08-31
Payer: MEDICARE

## 2025-08-31 ASSESSMENT — ENCOUNTER SYMPTOMS
DIFFICULTY URINATING: 0
ABDOMINAL PAIN: 0
DYSURIA: 1
CHILLS: 0
FEVER: 0
SHORTNESS OF BREATH: 0
HEMATURIA: 0
FREQUENCY: 1
FATIGUE: 0
FLANK PAIN: 0
VOMITING: 0
CHEST TIGHTNESS: 0
NAUSEA: 0
DIARRHEA: 0
COUGH: 0

## 2025-09-05 ENCOUNTER — TELEPHONE (OUTPATIENT)
Dept: UROLOGY | Facility: CLINIC | Age: 85
End: 2025-09-05
Payer: MEDICARE

## 2025-09-18 ENCOUNTER — APPOINTMENT (OUTPATIENT)
Dept: CARDIOLOGY | Facility: CLINIC | Age: 85
End: 2025-09-18
Payer: MEDICARE

## 2025-11-13 ENCOUNTER — APPOINTMENT (OUTPATIENT)
Dept: PRIMARY CARE | Facility: CLINIC | Age: 85
End: 2025-11-13
Payer: MEDICARE

## 2026-01-08 ENCOUNTER — APPOINTMENT (OUTPATIENT)
Facility: CLINIC | Age: 86
End: 2026-01-08
Payer: MEDICARE

## 2026-01-29 ENCOUNTER — APPOINTMENT (OUTPATIENT)
Dept: UROLOGY | Facility: CLINIC | Age: 86
End: 2026-01-29
Payer: MEDICARE

## (undated) DEVICE — ELECTRODE, HF, ESG PLASMA LOOP-MEDIUM 30 DEG

## (undated) DEVICE — PREP TRAY, VAGINAL

## (undated) DEVICE — BAG, DRAINAGE, ANTI-REFLUX CHAMBER, 2000ML

## (undated) DEVICE — HOLSTER, ELECTROSURGERY ACCESSORY, STERILE

## (undated) DEVICE — GLOVE, SURGICAL, PROTEXIS PI BLUE W/NEUTHERA, 8.0, PF, LF

## (undated) DEVICE — SYRINGE, 20 CC, LUER LOCK

## (undated) DEVICE — PROTECTOR, NERVE, ULNAR, PINK

## (undated) DEVICE — Device